# Patient Record
Sex: FEMALE | Race: BLACK OR AFRICAN AMERICAN | Employment: UNEMPLOYED | URBAN - METROPOLITAN AREA
[De-identification: names, ages, dates, MRNs, and addresses within clinical notes are randomized per-mention and may not be internally consistent; named-entity substitution may affect disease eponyms.]

---

## 2017-02-21 ENCOUNTER — HOSPITAL ENCOUNTER (INPATIENT)
Age: 42
LOS: 9 days | Discharge: HOME | DRG: 264 | End: 2017-03-02
Attending: EMERGENCY MEDICINE | Admitting: INTERNAL MEDICINE
Payer: MEDICARE

## 2017-02-21 ENCOUNTER — APPOINTMENT (OUTPATIENT)
Dept: GENERAL RADIOLOGY | Age: 42
DRG: 264 | End: 2017-02-21
Attending: PHYSICIAN ASSISTANT
Payer: MEDICARE

## 2017-02-21 DIAGNOSIS — N18.6 ESRD NEEDING DIALYSIS (HCC): ICD-10-CM

## 2017-02-21 DIAGNOSIS — Z99.2 ESRD NEEDING DIALYSIS (HCC): ICD-10-CM

## 2017-02-21 DIAGNOSIS — A41.9 SEPSIS, DUE TO UNSPECIFIED ORGANISM: ICD-10-CM

## 2017-02-21 DIAGNOSIS — J18.9 CAP (COMMUNITY ACQUIRED PNEUMONIA): Primary | ICD-10-CM

## 2017-02-21 PROBLEM — E87.1 HYPONATREMIA: Status: ACTIVE | Noted: 2017-02-21

## 2017-02-21 PROBLEM — D64.9 ANEMIA: Status: ACTIVE | Noted: 2017-02-21

## 2017-02-21 PROBLEM — E86.0 DEHYDRATION: Status: ACTIVE | Noted: 2017-02-21

## 2017-02-21 LAB
ALBUMIN SERPL BCP-MCNC: 3.2 G/DL (ref 3.4–5)
ALBUMIN/GLOB SERPL: 0.7 {RATIO} (ref 0.8–1.7)
ALP SERPL-CCNC: 187 U/L (ref 45–117)
ALT SERPL-CCNC: 11 U/L (ref 13–56)
ANION GAP BLD CALC-SCNC: 14 MMOL/L (ref 3–18)
AST SERPL W P-5'-P-CCNC: 8 U/L (ref 15–37)
ATRIAL RATE: 126 BPM
BASOPHILS # BLD AUTO: 0 K/UL (ref 0–0.06)
BASOPHILS # BLD: 0 % (ref 0–2)
BILIRUB SERPL-MCNC: 0.4 MG/DL (ref 0.2–1)
BNP SERPL-MCNC: 2552 PG/ML (ref 0–450)
BUN SERPL-MCNC: 121 MG/DL (ref 7–18)
BUN/CREAT SERPL: 6 (ref 12–20)
CALCIUM SERPL-MCNC: 9.8 MG/DL (ref 8.5–10.1)
CALCULATED P AXIS, ECG09: -2 DEGREES
CALCULATED R AXIS, ECG10: 23 DEGREES
CALCULATED T AXIS, ECG11: 67 DEGREES
CHLORIDE SERPL-SCNC: 91 MMOL/L (ref 100–108)
CK MB CFR SERPL CALC: ABNORMAL % (ref 0–4)
CK MB SERPL-MCNC: <0.5 NG/ML (ref 0.5–3.6)
CK SERPL-CCNC: 63 U/L (ref 26–192)
CO2 SERPL-SCNC: 23 MMOL/L (ref 21–32)
CREAT SERPL-MCNC: 20.99 MG/DL (ref 0.6–1.3)
DIAGNOSIS, 93000: NORMAL
DIFFERENTIAL METHOD BLD: ABNORMAL
EOSINOPHIL # BLD: 0 K/UL (ref 0–0.4)
EOSINOPHIL NFR BLD: 0 % (ref 0–5)
ERYTHROCYTE [DISTWIDTH] IN BLOOD BY AUTOMATED COUNT: 15.1 % (ref 11.6–14.5)
FLUAV AG NPH QL IA: NEGATIVE
FLUBV AG NOSE QL IA: NEGATIVE
GLOBULIN SER CALC-MCNC: 4.6 G/DL (ref 2–4)
GLUCOSE SERPL-MCNC: 110 MG/DL (ref 74–99)
HCG SERPL QL: NEGATIVE
HCT VFR BLD AUTO: 31.4 % (ref 35–45)
HCT VFR BLD AUTO: 35 % (ref 35–45)
HGB BLD-MCNC: 10.8 G/DL (ref 12–16)
HGB BLD-MCNC: 11.8 G/DL (ref 12–16)
INR PPP: 4.5 (ref 0.8–1.2)
LACTATE SERPL-SCNC: 1.8 MMOL/L (ref 0.4–2)
LYMPHOCYTES # BLD AUTO: 5 % (ref 21–52)
LYMPHOCYTES # BLD: 0.6 K/UL (ref 0.9–3.6)
MCH RBC QN AUTO: 31.1 PG (ref 24–34)
MCHC RBC AUTO-ENTMCNC: 33.7 G/DL (ref 31–37)
MCV RBC AUTO: 92.1 FL (ref 74–97)
MONOCYTES # BLD: 0.7 K/UL (ref 0.05–1.2)
MONOCYTES NFR BLD AUTO: 6 % (ref 3–10)
NEUTS SEG # BLD: 11 K/UL (ref 1.8–8)
NEUTS SEG NFR BLD AUTO: 89 % (ref 40–73)
P-R INTERVAL, ECG05: 88 MS
PLATELET # BLD AUTO: 182 K/UL (ref 135–420)
PMV BLD AUTO: 10.9 FL (ref 9.2–11.8)
POTASSIUM SERPL-SCNC: 4.1 MMOL/L (ref 3.5–5.5)
PROT SERPL-MCNC: 7.8 G/DL (ref 6.4–8.2)
PROTHROMBIN TIME: 40.2 SEC (ref 11.5–15.2)
Q-T INTERVAL, ECG07: 406 MS
QRS DURATION, ECG06: 84 MS
QTC CALCULATION (BEZET), ECG08: 588 MS
RBC # BLD AUTO: 3.8 M/UL (ref 4.2–5.3)
SODIUM SERPL-SCNC: 128 MMOL/L (ref 136–145)
TROPONIN I SERPL-MCNC: <0.02 NG/ML (ref 0–0.06)
VENTRICULAR RATE, ECG03: 126 BPM
WBC # BLD AUTO: 12.2 K/UL (ref 4.6–13.2)

## 2017-02-21 PROCEDURE — 74011250636 HC RX REV CODE- 250/636: Performed by: PHYSICIAN ASSISTANT

## 2017-02-21 PROCEDURE — 74011250637 HC RX REV CODE- 250/637: Performed by: INTERNAL MEDICINE

## 2017-02-21 PROCEDURE — 74011250636 HC RX REV CODE- 250/636: Performed by: INTERNAL MEDICINE

## 2017-02-21 PROCEDURE — 99285 EMERGENCY DEPT VISIT HI MDM: CPT

## 2017-02-21 PROCEDURE — 71010 XR CHEST PORT: CPT

## 2017-02-21 PROCEDURE — 77030013140 HC MSK NEB VYRM -A

## 2017-02-21 PROCEDURE — 87804 INFLUENZA ASSAY W/OPTIC: CPT | Performed by: PHYSICIAN ASSISTANT

## 2017-02-21 PROCEDURE — 65660000000 HC RM CCU STEPDOWN

## 2017-02-21 PROCEDURE — 83605 ASSAY OF LACTIC ACID: CPT | Performed by: PHYSICIAN ASSISTANT

## 2017-02-21 PROCEDURE — 74011250637 HC RX REV CODE- 250/637: Performed by: PHYSICIAN ASSISTANT

## 2017-02-21 PROCEDURE — 87186 SC STD MICRODIL/AGAR DIL: CPT | Performed by: PHYSICIAN ASSISTANT

## 2017-02-21 PROCEDURE — 87040 BLOOD CULTURE FOR BACTERIA: CPT | Performed by: PHYSICIAN ASSISTANT

## 2017-02-21 PROCEDURE — 93005 ELECTROCARDIOGRAM TRACING: CPT

## 2017-02-21 PROCEDURE — 83880 ASSAY OF NATRIURETIC PEPTIDE: CPT | Performed by: PHYSICIAN ASSISTANT

## 2017-02-21 PROCEDURE — 74011000250 HC RX REV CODE- 250: Performed by: PHYSICIAN ASSISTANT

## 2017-02-21 PROCEDURE — 85025 COMPLETE CBC W/AUTO DIFF WBC: CPT | Performed by: PHYSICIAN ASSISTANT

## 2017-02-21 PROCEDURE — 82550 ASSAY OF CK (CPK): CPT | Performed by: PHYSICIAN ASSISTANT

## 2017-02-21 PROCEDURE — 87077 CULTURE AEROBIC IDENTIFY: CPT | Performed by: PHYSICIAN ASSISTANT

## 2017-02-21 PROCEDURE — 85018 HEMOGLOBIN: CPT | Performed by: INTERNAL MEDICINE

## 2017-02-21 PROCEDURE — 85610 PROTHROMBIN TIME: CPT | Performed by: INTERNAL MEDICINE

## 2017-02-21 PROCEDURE — 96360 HYDRATION IV INFUSION INIT: CPT

## 2017-02-21 PROCEDURE — 74011000258 HC RX REV CODE- 258: Performed by: PHYSICIAN ASSISTANT

## 2017-02-21 PROCEDURE — 36415 COLL VENOUS BLD VENIPUNCTURE: CPT | Performed by: INTERNAL MEDICINE

## 2017-02-21 PROCEDURE — 84703 CHORIONIC GONADOTROPIN ASSAY: CPT | Performed by: PHYSICIAN ASSISTANT

## 2017-02-21 PROCEDURE — 74011000258 HC RX REV CODE- 258: Performed by: INTERNAL MEDICINE

## 2017-02-21 PROCEDURE — 80053 COMPREHEN METABOLIC PANEL: CPT | Performed by: PHYSICIAN ASSISTANT

## 2017-02-21 RX ORDER — LEVOFLOXACIN 5 MG/ML
750 INJECTION, SOLUTION INTRAVENOUS ONCE
Status: COMPLETED | OUTPATIENT
Start: 2017-02-21 | End: 2017-02-21

## 2017-02-21 RX ORDER — AZITHROMYCIN 250 MG/1
250 TABLET, FILM COATED ORAL
Status: COMPLETED | OUTPATIENT
Start: 2017-02-21 | End: 2017-02-21

## 2017-02-21 RX ORDER — ALBUMIN HUMAN 250 G/1000ML
12.5 SOLUTION INTRAVENOUS
Status: COMPLETED | OUTPATIENT
Start: 2017-02-21 | End: 2017-02-22

## 2017-02-21 RX ORDER — ACETAMINOPHEN 325 MG/1
650 TABLET ORAL
Status: DISCONTINUED | OUTPATIENT
Start: 2017-02-21 | End: 2017-03-02 | Stop reason: HOSPADM

## 2017-02-21 RX ORDER — ACETAMINOPHEN 500 MG
500 TABLET ORAL
Status: COMPLETED | OUTPATIENT
Start: 2017-02-21 | End: 2017-02-21

## 2017-02-21 RX ORDER — DIPHENHYDRAMINE HCL 25 MG
25 CAPSULE ORAL
Status: ON HOLD | COMMUNITY
End: 2017-06-21

## 2017-02-21 RX ORDER — SODIUM CHLORIDE 0.9 % (FLUSH) 0.9 %
5-10 SYRINGE (ML) INJECTION AS NEEDED
Status: DISCONTINUED | OUTPATIENT
Start: 2017-02-21 | End: 2017-03-02 | Stop reason: HOSPADM

## 2017-02-21 RX ORDER — SODIUM CHLORIDE 9 MG/ML
500 INJECTION, SOLUTION INTRAVENOUS ONCE
Status: COMPLETED | OUTPATIENT
Start: 2017-02-21 | End: 2017-02-21

## 2017-02-21 RX ORDER — ZOLPIDEM TARTRATE 5 MG/1
5 TABLET ORAL
COMMUNITY

## 2017-02-21 RX ORDER — IPRATROPIUM BROMIDE AND ALBUTEROL SULFATE 2.5; .5 MG/3ML; MG/3ML
3 SOLUTION RESPIRATORY (INHALATION)
Status: DISCONTINUED | OUTPATIENT
Start: 2017-02-21 | End: 2017-03-02 | Stop reason: HOSPADM

## 2017-02-21 RX ORDER — WARFARIN 2.5 MG/1
2.5 TABLET ORAL EVERY EVENING
Status: ON HOLD | COMMUNITY
End: 2017-02-22

## 2017-02-21 RX ORDER — MIDODRINE HYDROCHLORIDE 5 MG/1
5 TABLET ORAL DAILY
COMMUNITY
End: 2017-03-02

## 2017-02-21 RX ORDER — PRAVASTATIN SODIUM 20 MG/1
20 TABLET ORAL
COMMUNITY

## 2017-02-21 RX ORDER — SEVELAMER HYDROCHLORIDE 800 MG/1
1600 TABLET, FILM COATED ORAL SEE ADMIN INSTRUCTIONS
Status: ON HOLD | COMMUNITY
End: 2017-05-12

## 2017-02-21 RX ORDER — CINACALCET 30 MG/1
30 TABLET, FILM COATED ORAL
Status: ON HOLD | COMMUNITY
End: 2017-05-12

## 2017-02-21 RX ORDER — IPRATROPIUM BROMIDE AND ALBUTEROL SULFATE 2.5; .5 MG/3ML; MG/3ML
3 SOLUTION RESPIRATORY (INHALATION)
Status: COMPLETED | OUTPATIENT
Start: 2017-02-21 | End: 2017-02-21

## 2017-02-21 RX ORDER — TIZANIDINE HYDROCHLORIDE 4 MG/1
4 CAPSULE, GELATIN COATED ORAL
COMMUNITY
End: 2017-04-04

## 2017-02-21 RX ORDER — FLUTICASONE PROPIONATE 50 MCG
2 SPRAY, SUSPENSION (ML) NASAL
Status: ON HOLD | COMMUNITY
End: 2017-05-12

## 2017-02-21 RX ORDER — PROMETHAZINE HYDROCHLORIDE 25 MG/1
25 TABLET ORAL
COMMUNITY

## 2017-02-21 RX ORDER — ACETAMINOPHEN 500 MG
1000 TABLET ORAL
Status: COMPLETED | OUTPATIENT
Start: 2017-02-21 | End: 2017-02-21

## 2017-02-21 RX ORDER — MIDODRINE HYDROCHLORIDE 2.5 MG/1
5 TABLET ORAL
Status: DISCONTINUED | OUTPATIENT
Start: 2017-02-21 | End: 2017-03-02 | Stop reason: HOSPADM

## 2017-02-21 RX ORDER — TRAMADOL HYDROCHLORIDE 50 MG/1
50 TABLET ORAL
COMMUNITY
End: 2017-03-02

## 2017-02-21 RX ORDER — SEVELAMER HYDROCHLORIDE 800 MG/1
2400 TABLET, FILM COATED ORAL 2 TIMES DAILY
COMMUNITY

## 2017-02-21 RX ADMIN — IPRATROPIUM BROMIDE AND ALBUTEROL SULFATE 3 ML: .5; 3 SOLUTION RESPIRATORY (INHALATION) at 11:45

## 2017-02-21 RX ADMIN — MIDODRINE HYDROCHLORIDE 5 MG: 2.5 TABLET ORAL at 17:40

## 2017-02-21 RX ADMIN — SODIUM CHLORIDE 500 MG: 900 INJECTION, SOLUTION INTRAVENOUS at 15:55

## 2017-02-21 RX ADMIN — SODIUM CHLORIDE 500 ML: 900 INJECTION, SOLUTION INTRAVENOUS at 08:05

## 2017-02-21 RX ADMIN — PIPERACILLIN SODIUM,TAZOBACTAM SODIUM 2.25 G: 2; .25 INJECTION, POWDER, FOR SOLUTION INTRAVENOUS at 11:41

## 2017-02-21 RX ADMIN — ACETAMINOPHEN 650 MG: 325 TABLET, FILM COATED ORAL at 13:48

## 2017-02-21 RX ADMIN — SODIUM CHLORIDE 500 ML: 900 INJECTION, SOLUTION INTRAVENOUS at 12:11

## 2017-02-21 RX ADMIN — MIDODRINE HYDROCHLORIDE 5 MG: 2.5 TABLET ORAL at 13:10

## 2017-02-21 RX ADMIN — ACETAMINOPHEN 500 MG: 500 TABLET ORAL at 15:05

## 2017-02-21 RX ADMIN — CEFTRIAXONE 2 G: 2 INJECTION, POWDER, FOR SOLUTION INTRAMUSCULAR; INTRAVENOUS at 13:07

## 2017-02-21 RX ADMIN — LEVOFLOXACIN 750 MG: 5 INJECTION, SOLUTION INTRAVENOUS at 13:52

## 2017-02-21 RX ADMIN — ACETAMINOPHEN 1000 MG: 500 TABLET ORAL at 07:40

## 2017-02-21 RX ADMIN — ACETAMINOPHEN 650 MG: 325 TABLET, FILM COATED ORAL at 18:40

## 2017-02-21 RX ADMIN — AZITHROMYCIN 250 MG: 250 TABLET, FILM COATED ORAL at 18:47

## 2017-02-21 NOTE — ED NOTES
Receiving RN Eleazar Tang) made aware of Darius Amaya MD approval for pt to go upstairs. Updated on MAR and pt's VS. Muse score reviewed.

## 2017-02-21 NOTE — ED NOTES
Pt's BP reported to Griffin Abbott and orders received to give another 500 mL NS bolus to pt and reassess BP. Receiving RN made aware. Dialysis RN at bedside to speak w/ pt. Aware that pt is going to telemetry. Pt resting in stretcher in NAD.

## 2017-02-21 NOTE — PROGRESS NOTES
Pharmacy Dosing Services:  Warfarin    Consult for Warfarin Dosing by Pharmacy by Dr. Jessica Guillermo provided for this 35 y.o.  female , for indication of DVT. Dose to achieve an INR goal of 2-3    Warfarin to be Held Tonight ( INR 4.5 ). LABS    PT/INR Lab Results   Component Value Date/Time    INR 4.5 02/21/2017 08:00 AM      Platelets Lab Results   Component Value Date/Time    PLATELET 975 81/89/6004 08:00 AM      H/H     Lab Results   Component Value Date/Time    HGB 11.8 02/21/2017 08:00 AM        Warfarin Administrations (last 168 hours)     None          Pharmacy to follow daily and will provide subsequent Warfarin dosing based on clinical status.   Tiffanie Baca, [facility] [place]  Contact information     [phone]

## 2017-02-21 NOTE — PROGRESS NOTES
Readmission Risk Assessment:     Moderate Risk and MSSP/Good Help ACO patients    RRAT Score:  13-20    Initial Assessment: Chart reviewed and noted Pt currently in ED pending bed placement to unit. CM following for transition of care needs. Emergency Contact:  See facesheet    Pertinent Medical Hx:     CKD, Dialysis,     PCP/Specialists: Pt unable to recall MD's name      Community Services:       DME:          Moderate Risk Care Transition Plan:  1. Evaluate for [facility] or [facility], SNF, acute rehab, community care coordination of resources. 2. Involve patient/caregiver in assessment, planning, education and implement of intervention. 3. CM daily patient care huddles/interdisciplinary rounds. 4. PCP/Specialist appointment within 5  7 days made prior to discharge. 5. Facilitate transportation and logistics for follow-up appointments. 6. Medication reconciliation [address]  7. Formal handoff between hospital provider and post-acute provider to transition patient  Handoff to [address] Nurse Navigator or PCP practice.

## 2017-02-21 NOTE — IP AVS SNAPSHOT
Beulah Latham 
 
 
 [address] [place] [address] 
[phone] Patient: Luis M Restrepo MRN: [id number] ZBN:[birth year] You are allergic to the following Allergen Reactions Vancomycin Other (comments) Felt like her body was burning Aspirin Nausea and Vomiting Pt reports aspirin gave her a stomach ulcer. Vicodin (Hydrocodone-Acetaminophen) Nausea and Vomiting Recent Documentation Height  
  
  
  
  
  
 1.651 m Unresulted Labs Order Current Status CULTURE, BLOOD Preliminary result CULTURE, BLOOD Preliminary result CULTURE, BLOOD Preliminary result CULTURE, BLOOD Preliminary result CULTURE, BLOOD Preliminary result CULTURE, BLOOD Preliminary result Emergency Contacts Name Discharge Info Relation Home Work Mobile Marlys CAREGIVER [3] Daughter [21]   [phone] Rosa Isela Pablo  Friend [5] [phone] About your hospitalization You were admitted on:  February 21, 2017 You last received care in the:  [address] [place] [address] You were discharged on:  March 2, 2017 Why you were hospitalized Your primary diagnosis was:  Sepsis (Hcc) Your diagnoses also included:  Cap (Community Acquired Pneumonia), Esrd Needing Dialysis (Hcc), Anemia, Hyponatremia, Dehydration, Prolonged Q-T Interval On Ecg, Diastolic Dysfunction, Infected Prosthetic Vascular Graft (Hcc), Bacteremia Due To Staphylococcus Aureus, C. Difficile Colitis, Pna (Pneumonia), Hypokalemia, Hypoglycemia, Constipation Providers Seen During Your Hospitalizations Provider Role Specialty Primary office phone Carley Turk MD Attending Provider Emergency Medicine [phone] Manoj Shen,  Attending Provider Internal Medicine [phone] Your Primary Care Physician (PCP) Primary Care Physician Office Phone Office Fax OTHER, PHYS ** None ** ** None ** Follow-up Information Follow up With Details Comments Contact Info Elías Roland MD  PCP [address] [facility] [address] [facility] 
[phone] HD clinic as scheduled [address] In 2 weeks call and set appointment for follow up [phone] Oralia Kirk MD In 2 weeks call and set appointment for follow up [facility] [place] [id number] 
[phone] Current Discharge Medication List  
  
START taking these medications Dose & Instructions Dispensing Information Comments Morning Noon Evening Bedtime  
 oxyCODONE-acetaminophen  mg per tablet Commonly known as:  PERCOCET 10 Your next dose is: Today, Tomorrow Other:  _________ Dose:  1-2 Tab Take 1-2 Tabs by mouth every four (4) hours as needed. Max Daily Amount: 12 Tabs. Quantity:  20 Tab Refills:  0 CONTINUE these medications which have CHANGED Dose & Instructions Dispensing Information Comments Morning Noon Evening Bedtime  
 midodrine 5 mg tablet Commonly known as:  Elyn Pata What changed:  when to take this Your next dose is: Today, Tomorrow Other:  _________ Dose:  5 mg Take 1 Tab by mouth three (3) times daily (with meals) for 30 days. Quantity:  30 Tab Refills:  0 CONTINUE these medications which have NOT CHANGED Dose & Instructions Dispensing Information Comments Morning Noon Evening Bedtime AMBIEN 5 mg tablet Generic drug:  zolpidem Your next dose is: Today, Tomorrow Other:  _________ Dose:  5 mg Take 5 mg by mouth nightly as needed for Sleep. Refills:  0  
     
   
   
   
  
 BENADRYL 25 mg capsule Generic drug:  diphenhydrAMINE Your next dose is: Today, Tomorrow Other:  _________  Dose:  25 mg  
 Take 25 mg by mouth See Admin Instructions. Dialysis pre med Refills:  0  
     
   
   
   
  
 FLONASE 50 mcg/actuation nasal spray Generic drug:  fluticasone Your next dose is: Today, Tomorrow Other:  _________ Dose:  2 Spray 2 Sprays by Both Nostrils route daily as needed for Rhinitis. Refills:  0  
     
   
   
   
  
 pravastatin 20 mg tablet Commonly known as:  PRAVACHOL Your next dose is: Today, Tomorrow Other:  _________ Dose:  20 mg Take 20 mg by mouth nightly. Refills:  0  
     
   
   
   
  
 promethazine 25 mg tablet Commonly known as:  PHENERGAN Your next dose is: Today, Tomorrow Other:  _________ Dose:  25 mg Take 25 mg by mouth See Admin Instructions. Dialysis premed Refills:  0 SENSIPAR 30 mg tablet Generic drug:  cinacalcet Your next dose is: Today, Tomorrow Other:  _________ Dose:  30 mg Take 30 mg by mouth nightly. Refills:  0  
     
   
   
   
  
 * sevelamer 800 mg tablet Commonly known as:  RENAGEL Your next dose is: Today, Tomorrow Other:  _________ Dose:  2400 mg Take 2,400 mg by mouth three (3) times daily (with meals). Refills:  0  
     
   
   
   
  
 * sevelamer 800 mg tablet Commonly known as:  RENAGEL Your next dose is: Today, Tomorrow Other:  _________ Dose:  1600 mg Take 1,600 mg by mouth See Admin Instructions. 2 tabs if/when eating snack Refills:  0  
     
   
   
   
  
 tiZANidine 4 mg capsule Commonly known as:  Vibha Muscat Your next dose is: Today, Tomorrow Other:  _________ Dose:  4 mg Take 4 mg by mouth two (2) times daily as needed. Refills:  0  
     
   
   
   
  
 * Notice: This list has 2 medication(s) that are the same as other medications prescribed for you.  Read the directions carefully, and ask your doctor or other care provider to review them with you. STOP taking these medications   
 dextromethorphan-guaiFENesin  mg/5 mL syrup Commonly known as:  ROBITUSSIN-DM  
   
  
 traMADol 50 mg tablet Commonly known as:  ULTRAM  
   
  
 warfarin 2.5 mg tablet Commonly known as:  COUMADIN Where to Get Your Medications Information on where to get these meds will be given to you by the nurse or doctor. ! Ask your nurse or doctor about these medications  
  midodrine 5 mg tablet  
 oxyCODONE-acetaminophen  mg per tablet Discharge Instructions DISCHARGE SUMMARY from Nurse The following personal items are in your possession at time of discharge: 
 
Dental Appliances: None Visual Aid: None Home Medications: None Jewelry: Bracelet (red plastic bracelet) Clothing: Pants, Shirt, Other (comment) (sneakers) Other Valuables: Cell Phone PATIENT INSTRUCTIONS: 
 
 
F-face looks uneven A-arms unable to move or move unevenly S-speech slurred or non-existent T-time-call 911 as soon as signs and symptoms begin-DO NOT go Back to bed or wait to see if you get better-TIME IS BRAIN. Warning Signs of HEART ATTACK Call 911 if you have these symptoms: 
? Chest discomfort. Most heart attacks involve discomfort in the center of the chest that lasts more than a few minutes, or that goes away and comes back. It can feel like uncomfortable pressure, squeezing, fullness, or pain. ? Discomfort in other areas of the upper body. Symptoms can include pain or discomfort in one or both arms, the back, neck, jaw, or stomach. ? Shortness of breath with or without chest discomfort. ? Other signs may include breaking out in a cold sweat, nausea, or lightheadedness. Don't wait more than five minutes to call [address]! Fast action can save your life. Calling 911 is almost always the fastest way to get lifesaving treatment. Emergency Medical Services staff can begin treatment when they arrive  up to an hour sooner than if someone gets to the hospital by car. The discharge information has been reviewed with the patient. The patient verbalized understanding. Discharge medications reviewed with the patient and appropriate educational materials and side effects teaching were provided. Patient armband removed and shredded Anemia: Care Instructions Your Care Instructions Anemia is a low level of red blood cells, which carry oxygen throughout your body. Many things can cause anemia. Lack of iron is one of the most common causes. Your body needs iron to make hemoglobin, a substance in red blood cells that carries oxygen from the lungs to your body's cells. Without enough iron, the body produces fewer and smaller red blood cells. As a result, your body's cells do not get enough oxygen, and you feel tired and weak. And you may have trouble concentrating. Bleeding is the most common cause of a lack of iron. You may have heavy menstrual bleeding or bleeding caused by conditions such as ulcers, hemorrhoids, or cancer. Regular use of aspirin or other anti-inflammatory medicines (such as ibuprofen) also can cause bleeding in some people. A lack of iron in your diet also can cause anemia, especially at times when the body needs more iron, such as during pregnancy, infancy, and the teen years. Your doctor may have prescribed iron pills. It may take several months of treatment for your iron levels to return to normal. Your doctor also may suggest that you eat foods that are rich in iron, such as meat and beans. There are many other causes of anemia. It is not always due to a lack of iron. Finding the specific cause of your anemia will help your doctor find the right treatment for you. Follow-up care is a key part of your treatment and safety. Be sure to make and go to all appointments, and call your doctor if you are having problems. It's also a good idea to know your test results and keep a list of the medicines you take. How can you care for yourself at home? · Take your medicines exactly as prescribed. Call your doctor if you think you are having a problem with your medicine. · If your doctor recommends iron pills, take them as directed: ¨ Try to take the pills on an empty stomach about 1 hour before or 2 hours after meals. But you may need to take iron with food to avoid an upset stomach. ¨ Do not take antacids or drink milk or caffeine drinks (such as coffee, tea, or cola) at the same time or within 2 hours of the time that you take your iron. They can make it hard for your body to absorb the iron. ¨ Vitamin C (from food or supplements) helps your body absorb iron. Try taking iron pills with a glass of orange juice or some other food that is high in vitamin C, such as citrus fruits. ¨ Iron pills may cause stomach problems, such as heartburn, nausea, diarrhea, constipation, and cramps. Be sure to drink plenty of fluids, and include fruits, vegetables, and fiber in your diet each day. Iron pills often make your bowel movements dark or green. ¨ If you forget to take an iron pill, do not take a double dose of iron the next time you take a pill. ¨ Keep iron pills out of the reach of small children. An overdose of iron can be very dangerous. · Follow your doctor's advice about eating iron-rich foods. These include red meat, shellfish, poultry, eggs, beans, raisins, whole-grain bread, and leafy green vegetables. · Steam vegetables to help them keep their iron content. When should you call for help? Call 911 anytime you think you may need emergency care. For example, call if: 
· You have symptoms of a heart attack. These may include: ¨ Chest pain or pressure, or a strange feeling in the chest. 
¨ Sweating. ¨ Shortness of breath. ¨ Nausea or vomiting. ¨ Pain, pressure, or a strange feeling in the back, neck, jaw, or upper belly or in one or both shoulders or arms. ¨ Lightheadedness or sudden weakness. ¨ A fast or irregular heartbeat. After you call 911, the  may tell you to chew 1 adult-strength or 2 to 4 low-dose aspirin. Wait for an ambulance. Do not try to drive yourself. · You passed out (lost consciousness). Call your doctor now or seek immediate medical care if: 
· You have new or increased shortness of breath. · You are dizzy or lightheaded, or you feel like you may faint. · Your fatigue and weakness continue or get worse. · You have any abnormal bleeding, such as: 
¨ Nosebleeds. ¨ Vaginal bleeding that is different (heavier, more frequent, at a different time of the month) than what you are used to. ¨ Bloody or black stools, or rectal bleeding. ¨ Bloody or pink urine. Watch closely for changes in your health, and be sure to contact your doctor if: 
· You do not get better as expected. Where can you learn more? Go to http://jaquan-treva.info/. Enter R301 in the search box to learn more about \"Anemia: Care Instructions. \" Current as of: February 5, 2016 Content Version: 11.1 © 3830-7973 [employer]. Care instructions adapted under license by [employer] (which disclaims liability or warranty for this information). If you have questions about a medical condition or this instruction, always ask your healthcare professional. [address] any warranty or liability for your use of this information. Sepsis: Care Instructions Your Care Instructions Sepsis is an infection that has spread throughout your body. It is a life-threatening condition and often causes extremely low blood pressure. This can lead to problems with many different organs. The cause of sepsis is not always clear, but it can happen as part of a long-term or sudden illness. Sometimes even a mild illness can lead to sepsis. Follow-up care is a key part of your treatment and safety. Be sure to make and go to all appointments, and call your doctor if you are having problems. Its also a good idea to know your test results and keep a list of the medicines you take. How can you care for yourself at home? · If your doctor prescribed antibiotics, take them as directed. Do not stop taking them just because you feel better. You need to take the full course of antibiotics. · Drink plenty of fluids, enough so that your urine is light yellow or clear like water. Choose water or caffeine-free clear liquids until you feel better. If you have kidney, heart, or liver disease and have to limit fluids, talk with your doctor before you increase your fluid intake. You can try rehydration drinks, such as Gatorade or Powerade. · Do not drink alcohol. · Eat a healthy diet. Include fruits, vegetables, and whole grains in your diet every day. · Walking is an easy way to get exercise. Gradually increase the amount you walk every day. Make sure your doctor knows that you are starting an exercise program. 
· Do not smoke or use other tobacco products. If you need help quitting, talk to your doctor about stop-smoking programs and medicines. These can increase your chances of quitting for good. When should you call for help? Call 911 anytime you think you may need emergency care. For example, call if: 
· You passed out (lost consciousness). Call your doctor now or seek immediate medical care if: 
· You have a fever or chills. · You have cool, pale, or clammy skin. · You are dizzy or lightheaded, or you feel like you may faint. · You have any new symptoms, such as a cough, pain in one part of your body, or urinary problems. Watch closely for changes in your health, and be sure to contact your doctor if: 
· You do not get better as expected. Where can you learn more? Go to http://jaquan-treva.info/. Enter S474 in the search box to learn more about \"Sepsis: Care Instructions. \" Current as of: May 27, 2016 Content Version: 11.1 © 8687-6643 [employer]. Care instructions adapted under license by [employer] (which disclaims liability or warranty for this information). If you have questions about a medical condition or this instruction, always ask your healthcare professional. [address] any warranty or liability for your use of this information. Discharge Orders None General Information Please provide this summary of care documentation to your next provider. Introducing [facility]! Emily Joshi introduces [employer] patient portal. Now you can access parts of your medical record, email your doctor's office, and request medication refills online. 1. In your internet browser, go to [email or web address] 2. Click on the First Time User? Click Here link in the Sign In box. You will see the New Member Sign Up page. 3. Enter your [employer] Access Code exactly as it appears below. You will not need to use this code after youve completed the sign-up process. If you do not sign up before the expiration date, you must request a new code. · [employer] Access Code: [email or web address] Expires: 5/22/2017  8:26 AM 
 
4. Enter the last four digits of your Social Security Number (xxxx) and Date of Birth (mm/dd/yyyy) as indicated and click Submit. You will be taken to the next sign-up page. 5. Create a [employer] Gifts Project[employer] ID. This will be your [employer] login ID and cannot be changed, so think of one that is secure and easy to remember. 6. Create a [employer] password. You can change your password at any time. 7. Enter your Password Reset Question and Answer. This can be used at a later time if you forget your password. 8. Enter your e-mail address. You will receive e-mail notification when new information is available in [facility]. 9. Click Sign Up. You can now view and download portions of your medical record. 10. Click the Download Summary menu link to download a portable copy of your medical information. If you have questions, please visit the Frequently Asked Questions section of the [employer] website. Remember, [employer] is NOT to be used for urgent needs. For medical emergencies, dial 911. Now available from your iPhone and Android! Patient Signature:  ____________________________________________________________ Date:  ____________________________________________________________  
  
[facility] Provider Signature:  ____________________________________________________________ Date:  ____________________________________________________________

## 2017-02-21 NOTE — PROGRESS NOTES
Nephrology Progress note    Subjective:     Kasandra Matthews is a 35 y.o. female with PMH chronic hypotension, ESRD on HD TTS presenting with weakness, fever to 102.3, cough. She has chills and her BP in ER was lower than baseline so she received multiple fluid boluses. She denies SOB, CP.  currently. She missed HD Saturday due to feeling poorly. K 4.1 today. O2 sat 100% RA. CXR shows RLL pneumonia. Admit Date: 2/21/2017      Allergy:  Allergies   Allergen Reactions    Vancomycin Other (comments)     Felt like her body was burning    Aspirin Nausea and Vomiting     Pt reports aspirin gave her a stomach ulcer.      Vicodin [Hydrocodone-Acetaminophen] Nausea and Vomiting        Objective:     Visit Vitals    /78 (BP 1 Location: Right arm, BP Patient Position: At rest)    Pulse (!) 138    Temp (!) 100.5 °F (38.1 °C)    Resp 22    Ht 5' 5\" (1.651 m)    Wt 92.7 kg (204 lb 4.8 oz)    LMP 12/01/2012    SpO2 98%    BMI 34 kg/m2         Intake/Output Summary (Last 24 hours) at 02/21/17 1438  Last data filed at 02/21/17 1352   Gross per 24 hour   Intake              150 ml   Output                0 ml   Net              150 ml       Physical Exam:       General: No acute distress   HENT: Atraumatic and normocephalic   Eyes: Normal conjunctiva   Neck: Supple    Cardiovascular: Normal S1 & S2, no m/r/g   Pulmonary/Chest Wall: Clear to auscultation bilaterally   Abdominal: Soft and non-tender   Musculoskeletal: no edema   Neurological: No focal deficits       Data Review:    @anne@  Lab Results   Component Value Date/Time    WBC 12.2 02/21/2017 08:00 AM    RBC 3.80 (L) 02/21/2017 08:00 AM    HCT 35.0 02/21/2017 08:00 AM    MCV 92.1 02/21/2017 08:00 AM    MCH 31.1 02/21/2017 08:00 AM    MCHC 33.7 02/21/2017 08:00 AM    RDW 15.1 (H) 02/21/2017 08:00 AM      No results found for: IRONNo components found for: FERRITIN  No components found for: PTHINT  Urinalysis  No results found for: UGLU Impression:     ESRD  Pneumonia-r/o sepsis  Hypotension  Tachycardia  Anemia CKD  SHPT    Plan:     IV boluses given, would avoid further IVF  Blood cx sent and multiple abx started-Zithromax, Ceftriaxone, levaquin  Too unstable to HD currently given low BP and high HR- will attempt in AM if more hemodynamically stable    MD Gaudencio Baxter  [phone]

## 2017-02-21 NOTE — ED NOTES
Pt reports to this RN that she is having SOB. Pt presents w/o tachypnea and 02 saturations are 100% on room air. Pt in NAD distress. Pt repositioned in stretcher. Family at bedside.

## 2017-02-21 NOTE — ED NOTES
Spoke w/ Riaz Vazquez MD on phone. MD updated on pt's VS and MD gives approval for pt to go upstairs to Telemetry.

## 2017-02-21 NOTE — H&P
History & Physical    Patient: Maris Baig MRN: [id number]  CSN: [id number]    YOB: 1975  Age: 35 y.o. Sex: female      DOA: 2/21/2017  Primary Care Provider:  Yamil Howard MD      Assessment/Plan     1. Sepsis  2. CAP  3. ESRD on HD  4. Hypotension, chronic  5. 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 Hyponatremia  7. dehdyration      PLAN:  - Admit to medical service with telemetry monitoring  - culture blood, sputum  - start ceftriaxone/ zithromax  - resume home dose midodrine  - nephrology consultation for hemodialysis  - check INR, pharmacy to dose warfarin  - duonebs prn wheezing/ dyspnea  - full code, dvt ppx on warfarin      Patient Active Problem List   Diagnosis Code    CAP (community acquired pneumonia) J18.9    ESRD needing dialysis ([facility] [address] N18.6    Sepsis ([facility] [address] A41.9    Anemia D64.9    Hyponatremia E87.1    Dehydration E86.0     HPI:   CC: \" she made me come\"  Maris Baig is a 35 y.o. 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 Symptoms are associated w myalgia and poor po intake. She had skipped Tuesday dialysis session due to malaise. Currently she denies dyspnea but does have a dry cough. No sore throat, headaches, mental status changes, nausea or vomiting. On presentation to the ER she was febrile to 102.3, tachcyrdic and normotensive. Exam was unremarkable. Rapid flu negative. She did not have leukocytosis but a left shift was present. CXR w RLL inifiltrate. Medicine is asked to admit for further management.        Past Medical History   Diagnosis Date    Chronic kidney disease     Dialysis patient ([facility] [address]     Hypertension      Past Surgical History   Procedure Laterality Date    Hx csf shunt      Hx hysterectomy      Hx other surgical       dialysis shunt left arm      Social History Substance Use Topics    Smoking status: Never Smoker    Smokeless tobacco: None    Alcohol use No     History reviewed. No pertinent family history. No current facility-administered medications on file prior to encounter. Current Outpatient Prescriptions on File Prior to Encounter   Medication Sig Dispense Refill    sevelamer (RENAGEL) 400 mg tablet Take 400 mg by mouth three (3) times daily (with meals). Allergies   Allergen Reactions    Vancomycin Anaphylaxis    Aspirin Nausea and Vomiting     Pt reports aspirin gave her a stomach ulcer.  Vicodin [Hydrocodone-Acetaminophen] Nausea and Vomiting       Review of Systems  Constitutional: + fever, +chills, +diaphoresis, +malaise, fatigue -weight gain/loss or falls  Skin: no itching or rashes  HEENT: no neck stiffness, hearing loss, tinnitus, epistaxis or sore throat  EYES: no blurry vision, double vision or photophobia  CARDIOVASCULAR: no, cp, palpitations, orthopnea, pnd or LE edema  PULMONARY: + cough,- wheeze, -shortness of breath or sputum production  GI: no nausea, vomiting, diarrhea, abdominal pain, melena, hematemesis or brbpr  : no dysuria, hematuria  MUSCULOSKELETAL: no back pain, joint pain or myalgias  ENDOCRINE: no heat/cold intolerance, polyuria or polydipsia  HEME: no easy bruising or bleeding  NEURO: no unilateral weakness, numbness, tingling or seizures      Physical Exam:        Visit Vitals    BP 96/66    Pulse (!) 115    Temp 99.4 °F (37.4 °C)    Resp 13    Ht 5' 5\" (1.651 m)    Wt 92.7 kg (204 lb 4.8 oz)    LMP 2012    SpO2 99%    BMI 34 kg/m2      O2 Device: Room air    Temp (24hrs), Av.9 °F (38.3 °C), Min:99.4 °F (37.4 °C), Max:102.3 °F (39.1 °C)           Body mass index is 34 kg/(m^2). General:  Awake, cooperative, no distress, ill appearing   Head:  Normocephalic, without obvious abnormality, atraumatic. Eyes:  Conjunctivae/corneas clear, sclera anicteric, PERRL, EOMs intact.    Nose: Nares normal. No drainage or sinus tenderness. Throat: Lips, mucosa, and tongue normal. .   Neck: Supple, symmetrical, trachea midline, no adenopathy. Lungs:    scattered expiratory wheezes, no rales or rhonchi noted, equal expansion       Heart:  Regular rate and rhythm, S1, S2 normal, no murmur, click, rub or gallop, cap refill normal      Abdomen: Soft, non-tender. Bowel sounds normal. No masses,  No organomegaly. Extremities: Extremities normal, atraumatic, no cyanosis or edema. Pulses: 2+ and symmetric all extremities. Skin: Skin color pale, texture, turgor normal. No rashes or lesions   Neurologic: CNII-XII intact. No focal motor or sensory deficit. Laboratory Studies:    CMP:   Lab Results   Component Value Date/Time     (L) 02/21/2017 08:00 AM    K 4.1 02/21/2017 08:00 AM    CL 91 (L) 02/21/2017 08:00 AM    CO2 23 02/21/2017 08:00 AM    AGAP 14 02/21/2017 08:00 AM     (H) 02/21/2017 08:00 AM     (H) 02/21/2017 08:00 AM    CREA 20.99 (H) 02/21/2017 08:00 AM    GFRAA 2 (L) 02/21/2017 08:00 AM    GFRNA 2 (L) 02/21/2017 08:00 AM    CA 9.8 02/21/2017 08:00 AM    ALB 3.2 (L) 02/21/2017 08:00 AM    TP 7.8 02/21/2017 08:00 AM    GLOB 4.6 (H) 02/21/2017 08:00 AM    AGRAT 0.7 (L) 02/21/2017 08:00 AM    SGOT 8 (L) 02/21/2017 08:00 AM    ALT 11 (L) 02/21/2017 08:00 AM     CBC:   Lab Results   Component Value Date/Time    WBC 12.2 02/21/2017 08:00 AM    HGB 11.8 (L) 02/21/2017 08:00 AM    HCT 35.0 02/21/2017 08:00 AM     02/21/2017 08:00 AM       Imaging studies personally reviewed:  CXR: \"   Patchy right lower lobe opacity, which may represent an early pneumonic  infiltrate. Recommend radiographic follow-up to expected clinical resolution. \"               Mg Hernandez, DO  Internal Medicine/Geriatrics

## 2017-02-21 NOTE — ED NOTES
IV access attempt unsuccessful at this time. IV fluids paused. Pt resting in stretcher. Pt in NAD at this time. MD aware and states that he will attempt IV access on pt. Ultrasound placed at bedside.

## 2017-02-21 NOTE — ROUTINE PROCESS
TRANSFER - OUT REPORT:    Verbal report given to Gretel Still RN (name) on Sandra Young  being transferred to Telemetry (unit) for routine progression of care       Report consisted of patients Situation, Background, Assessment and   Recommendations(SBAR). Information from the following report(s) SBAR, ED Summary, MAR, Recent Results and Cardiac Rhythm Sinus Tachycardia was reviewed with the receiving nurse. Lines:   Peripheral IV 02/21/17 Right Forearm (Active)   Site Assessment Clean, dry, & intact 2/21/2017 11:40 AM   Phlebitis Assessment 0 2/21/2017 11:40 AM   Infiltration Assessment 0 2/21/2017 11:40 AM   Dressing Status Clean, dry, & intact 2/21/2017 11:40 AM   Dressing Type Transparent 2/21/2017 11:40 AM   Hub Color/Line Status Flushed;Patent 2/21/2017 11:40 AM        Opportunity for questions and clarification was provided.       Patient transported with:   Monitor  Tech

## 2017-02-21 NOTE — ED TRIAGE NOTES
Per EMS, pt reports feeling fatigued and achy w/ flu like symptoms since Thursday. Pt is a dialysis pt and had last dialysis round on Thursday. Pt presents w/ fever but denies cough. Per EMS, pt does not produce urine. Sepsis Screening completed    ( X )Patient meets SIRS criteria. (  )Patient does not meet SIRS criteria.       SIRS Criteria is achieved when two or more of the following are present   Temperature < 96.8°F (36°C) or > 100.9°F (38.3°C)   Heart Rate > 90 beats per minute   Respiratory Rate > 20 breaths per minute   WBC count > 12,000 or <4,000 or > 10% bands

## 2017-02-21 NOTE — ED NOTES
Pt given sheet for comfort. Pt in NAD at this time. Family at bedside. Side rails raised and call light within reach.

## 2017-02-21 NOTE — ED PROVIDER NOTES
HPI Comments:   7:36 AM   Maria Del Carmen Montano is a 35 y.o. female with a hx of HTN and ESRD on HD x 5 years (Tuesday/Thursday/Saturday, [facility] to the ED for cold/flu-like sxs x 4 days. Associated sxs include generalized body aches, generalized fatigue/lethargy, nasal congestion, nausea, diarrhea, and fever (temperature in .3F). Pt apparently missed her dialysis appointment 3 days ago (last treatment was 5 days ago). States she doesn't produce urine. Vaccines are UTD, including flu. PSHx hysterectomy and CSF shunt placement. Pt denies vomiting, cough, sore throat, rhinorrhea and any other Sx or complaints. The history is provided by the patient and the EMS personnel. No  was used. Past Medical History:   Diagnosis Date    Chronic kidney disease     Dialysis patient ([facility] [address]     Hypertension        Past Surgical History:   Procedure Laterality Date    Hx csf shunt      Hx hysterectomy      Hx other surgical       dialysis shunt left arm         History reviewed. No pertinent family history. Social History     Social History    Marital status: SINGLE     Spouse name: N/A    Number of children: N/A    Years of education: N/A     Occupational History    Not on file. Social History Main Topics    Smoking status: Never Smoker    Smokeless tobacco: Not on file    Alcohol use No    Drug use: Not on file    Sexual activity: Not on file     Other Topics Concern    Not on file     Social History Narrative         ALLERGIES: Vancomycin; Aspirin; and Vicodin [hydrocodone-acetaminophen]    Review of Systems   Constitutional: Positive for fatigue (generalized) and fever (temperature in .3F). Negative for chills. HENT: Positive for congestion. Negative for rhinorrhea and sore throat. Respiratory: Negative for cough and shortness of breath. Cardiovascular: Negative for chest pain.    Gastrointestinal: Positive for diarrhea and nausea. Negative for abdominal distention and vomiting. Genitourinary: Positive for decreased urine volume (no longer makes urine). Musculoskeletal: Positive for myalgias (generalized body aches). Negative for joint swelling. Skin: Negative for rash. Neurological: Positive for weakness (generalized). Negative for dizziness and headaches. Hematological: Negative for adenopathy. Vitals:    02/21/17 0930 02/21/17 1205 02/21/17 1300 02/21/17 1310   BP:  (!) 81/49 95/58 105/77   Pulse:  (!) 109 (!) 114 (!) 117   Resp:  24 20    Temp: 99.4 °F (37.4 °C) 98.9 °F (37.2 °C)     SpO2:  99% 99%    Weight:       Height:                Physical Exam   Constitutional: She is oriented to person, place, and time. She appears well-developed and well-nourished. No distress. AA female in NAD. Appears sick. Answering questions. Following directions. Daughter at bedside. HENT:   Head: Normocephalic and atraumatic. Right Ear: External ear normal. No swelling or tenderness. Tympanic membrane is not perforated, not erythematous and not bulging. Left Ear: External ear normal. No swelling or tenderness. Tympanic membrane is not perforated, not erythematous and not bulging. Nose: Mucosal edema present. No rhinorrhea. Mouth/Throat: Uvula is midline, oropharynx is clear and moist and mucous membranes are normal. No oral lesions. No trismus in the jaw. No dental abscesses or uvula swelling. No oropharyngeal exudate, posterior oropharyngeal edema, posterior oropharyngeal erythema or tonsillar abscesses. Eyes: Conjunctivae are normal. Right eye exhibits no discharge. Left eye exhibits no discharge. No scleral icterus. Neck: Normal range of motion. Neck supple. Cardiovascular: Normal rate, regular rhythm, normal heart sounds and intact distal pulses. Exam reveals no gallop and no friction rub. No murmur heard. Pulmonary/Chest: Effort normal and breath sounds normal. No accessory muscle usage.  No tachypnea. No respiratory distress. She has no decreased breath sounds. She has no wheezes. She has no rhonchi. She has no rales. Abdominal: Soft. She exhibits no distension. There is no tenderness. Musculoskeletal: Normal range of motion. Left AV fistula with trace bloody drainage. Non-tender. Appreciate bruit   Lymphadenopathy:     She has no cervical adenopathy. Neurological: She is alert and oriented to person, place, and time. Skin: Skin is warm and dry. No rash noted. She is not diaphoretic. No erythema. Psychiatric: She has a normal mood and affect. Judgment normal.   Nursing note and vitals reviewed. RESULTS:    EKG interpretation: (Preliminary)  7:33 AM   Sinus tachycardia with short NE, rate 126 bpm. No MARITA. Abnormal EKG. EKG read by Joni Ford PA-C    XR CHEST PORT   Final Result:  Patchy right lower lobe opacity, which may represent an early pneumonic  infiltrate. Recommend radiographic follow-up to expected clinical resolution. As read by the radiologist.            Labs Reviewed   METABOLIC PANEL, COMPREHENSIVE - Abnormal; Notable for the following:        Result Value    Sodium 128 (*)     Chloride 91 (*)     Glucose 110 (*)      (*)     Creatinine 20.99 (*)     BUN/Creatinine ratio 6 (*)     GFR est AA 2 (*)     GFR est non-AA 2 (*)     ALT (SGPT) 11 (*)     AST (SGOT) 8 (*)     Alk. phosphatase 187 (*)     Albumin 3.2 (*)     Globulin 4.6 (*)     A-G Ratio 0.7 (*)     All other components within normal limits   CBC WITH AUTOMATED DIFF - Abnormal; Notable for the following:     RBC 3.80 (*)     HGB 11.8 (*)     RDW 15.1 (*)     NEUTROPHILS 89 (*)     LYMPHOCYTES 5 (*)     ABS. NEUTROPHILS 11.0 (*)     ABS.  LYMPHOCYTES 0.6 (*)     All other components within normal limits   CARDIAC PANEL,(CK, CKMB & TROPONIN) - Abnormal; Notable for the following:     CK - MB <0.5 (*)     All other components within normal limits   PRO-BNP - Abnormal; Notable for the following:     NT pro-BNP 2552 (*)     All other components within normal limits   PROTHROMBIN TIME + INR - Abnormal; Notable for the following:     Prothrombin time 40.2 (*)     INR 4.5 (*)     All other components within normal limits   INFLUENZA A & B AG (RAPID TEST)   CULTURE, BLOOD   LACTIC ACID, PLASMA   HCG QL SERUM   HGB & HCT       Recent Results (from the past 12 hour(s))   INFLUENZA A & B AG (RAPID TEST)    Collection Time: 02/21/17  7:30 AM   Result Value Ref Range    Influenza A Antigen NEGATIVE  NEG      Influenza B Antigen NEGATIVE  NEG     EKG, 12 LEAD, INITIAL    Collection Time: 02/21/17  7:33 AM   Result Value Ref Range    Ventricular Rate 126 BPM    Atrial Rate 126 BPM    P-R Interval 88 ms    QRS Duration 84 ms    Q-T Interval 406 ms    QTC Calculation (Bezet) 588 ms    Calculated P Axis -2 degrees    Calculated R Axis 23 degrees    Calculated T Axis 67 degrees    Diagnosis       Sinus tachycardia with short MN  Nonspecific T wave abnormality  Abnormal ECG  When compared with ECG of 13-OCT-2015 14:03,  No significant change was found     LACTIC ACID, PLASMA    Collection Time: 02/21/17  8:00 AM   Result Value Ref Range    Lactic acid 1.8 0.4 - 2.0 MMOL/L   METABOLIC PANEL, COMPREHENSIVE    Collection Time: 02/21/17  8:00 AM   Result Value Ref Range    Sodium 128 (L) 136 - 145 mmol/L    Potassium 4.1 3.5 - 5.5 mmol/L    Chloride 91 (L) 100 - 108 mmol/L    CO2 23 21 - 32 mmol/L    Anion gap 14 3.0 - 18 mmol/L    Glucose 110 (H) 74 - 99 mg/dL     (H) 7.0 - 18 MG/DL    Creatinine 20.99 (H) 0.6 - 1.3 MG/DL    BUN/Creatinine ratio 6 (L) 12 - 20      GFR est AA 2 (L) >60 ml/min/1.73m2    GFR est non-AA 2 (L) >60 ml/min/1.73m2    Calcium 9.8 8.5 - 10.1 MG/DL    Bilirubin, total 0.4 0.2 - 1.0 MG/DL    ALT (SGPT) 11 (L) 13 - 56 U/L    AST (SGOT) 8 (L) 15 - 37 U/L    Alk.  phosphatase 187 (H) 45 - 117 U/L    Protein, total 7.8 6.4 - 8.2 g/dL    Albumin 3.2 (L) 3.4 - 5.0 g/dL    Globulin 4.6 (H) 2.0 - 4.0 g/dL    A-G Ratio 0.7 (L) 0.8 - 1.7     CBC WITH AUTOMATED DIFF    Collection Time: 02/21/17  8:00 AM   Result Value Ref Range    WBC 12.2 4.6 - 13.2 K/uL    RBC 3.80 (L) 4.20 - 5.30 M/uL    HGB 11.8 (L) 12.0 - 16.0 g/dL    HCT 35.0 35.0 - 45.0 %    MCV 92.1 74.0 - 97.0 FL    MCH 31.1 24.0 - 34.0 PG    MCHC 33.7 31.0 - 37.0 g/dL    RDW 15.1 (H) 11.6 - 14.5 %    PLATELET 555 119 - 481 K/uL    MPV 10.9 9.2 - 11.8 FL    NEUTROPHILS 89 (H) 40 - 73 %    LYMPHOCYTES 5 (L) 21 - 52 %    MONOCYTES 6 3 - 10 %    EOSINOPHILS 0 0 - 5 %    BASOPHILS 0 0 - 2 %    ABS. NEUTROPHILS 11.0 (H) 1.8 - 8.0 K/UL    ABS. LYMPHOCYTES 0.6 (L) 0.9 - 3.6 K/UL    ABS. MONOCYTES 0.7 0.05 - 1.2 K/UL    ABS. EOSINOPHILS 0.0 0.0 - 0.4 K/UL    ABS. BASOPHILS 0.0 0.0 - 0.06 K/UL    DF AUTOMATED     CARDIAC PANEL,(CK, CKMB & TROPONIN)    Collection Time: 02/21/17  8:00 AM   Result Value Ref Range    CK 63 26 - 192 U/L    CK - MB <0.5 (L) 0.5 - 3.6 ng/ml    CK-MB Index CANNOT BE CALCULATED 0.0 - 4.0 %    Troponin-I, Qt. <0.02 0.00 - 0.06 NG/ML   PRO-BNP    Collection Time: 02/21/17  8:00 AM   Result Value Ref Range    NT pro-BNP 2552 (H) 0 - 450 PG/ML   HCG QL SERUM    Collection Time: 02/21/17  8:00 AM   Result Value Ref Range    HCG, Ql. NEGATIVE  NEG     PROTHROMBIN TIME + INR    Collection Time: 02/21/17  8:00 AM   Result Value Ref Range    Prothrombin time 40.2 (H) 11.5 - 15.2 sec    INR 4.5 (H) 0.8 - 1.2          MDM  Number of Diagnoses or Management Options  CAP (community acquired pneumonia):   ESRD needing dialysis [facility]):   Sepsis, due to unspecified organism [facility]):   Diagnosis management comments: Influenza, UTI, PNA, bronchitis, sepsis, metabolic derangement.  Will likely need dialysis       Amount and/or Complexity of Data Reviewed  Clinical lab tests: reviewed and ordered  Tests in the radiology section of CPT®: ordered and reviewed (CXR)  Tests in the medicine section of CPT®: reviewed and ordered (EKG)  Obtain history from someone other than the patient: yes (EMS personnel)  Discuss the patient with other providers: yes Kathleen Vo MD (Nephrology), Hiren Aguilar MD (ED Attending), Xiomara Salvador DO (Internal Medicine))  Independent visualization of images, tracings, or specimens: yes (EKG, CXR)    Critical Care  Total time providing critical care: 30-74 minutes (30)    ED Course     MEDICATIONS GIVEN:  Medications   sodium chloride (NS) flush 5-10 mL (not administered)   levoFLOXacin (LEVAQUIN) 750 mg in D5W IVPB (not administered)   midodrine (PROAMITINE) tablet 5 mg (5 mg Oral Given 2/21/17 1310)   cefTRIAXone (ROCEPHIN) 2 g in 0.9% sodium chloride (MBP/ADV) 50 mL MBP (2 g IntraVENous New Bag 2/21/17 1307)   azithromycin (ZITHROMAX) 500 mg in 0.9% sodium chloride (MBP/ADV) 250 mL adv (not administered)   acetaminophen (TYLENOL) tablet 650 mg (not administered)   albuterol-ipratropium (DUO-NEB) 2.5 MG-0.5 MG/3 ML (not administered)   albumin human 25% (BUMINATE) solution 12.5 g (not administered)   acetaminophen (TYLENOL) tablet 1,000 mg (1,000 mg Oral Given 2/21/17 0740)   sodium chloride 0.9 % bolus infusion 500 mL (0 mL IntraVENous IV Completed 2/21/17 1202)   piperacillin-tazobactam (ZOSYN) 2.25 g in 0.9% sodium chloride (MBP/ADV) 50 mL MBP (0 g IntraVENous IV Completed 2/21/17 1211)   albuterol-ipratropium (DUO-NEB) 2.5 MG-0.5 MG/3 ML (3 mL Nebulization Given 2/21/17 1145)   0.9% sodium chloride infusion 500 mL (500 mL IntraVENous New Bag 2/21/17 1211)        Procedures      PROGRESS NOTE:   7:36 AM  Initial assessment performed.   Written by Dex Mccauley ED Scribe, as dictated by Kizzy Barney PA-C    SEPSIS ASSESSMENT NOTE:   9:14 AM  Kizzy Barney PA-C has seen and assessed the patient as follows:    Capillary Refill:normal/brisk  Cardiopulmonary Evaluation:   Lung Sounds: rhonchi   Cardiac Sounds: tachycardic, regular and rhythm ______  Peripheral Pulses: present  Skin Exam: pale and warm    Visit Vitals    /77    Pulse (!) 117    Temp 98.9 °F (37.2 °C)    Resp 20    Ht 5' 5\" (1.651 m)    Wt 92.7 kg (204 lb 4.8 oz)    LMP 12/01/2012    SpO2 99%    BMI 34 kg/m2       Written by RODRIGO Smith, as dictated by Jerardo Coronel PA-C     CONSULT NOTE:   9:21 AM  Jerardo Coronel PA-C spoke with Gerson Cerda MD   Specialty: Nephrology  Discussed pt's hx, disposition, and available diagnostic and imaging results over the telephone. Reviewed care plans. She agrees that pt needs dialysis and states she will set up dialysis in the hospital. She agrees to consult the pt. Written by RODRIGO Smith, as dictated by Jerardo Coronel PA-C. FACE-TO-FACE PROGRESS NOTE:  9:24 AM   Was requested to see pt by the ALLAN. Evaluated pt face-to-face. On exam, pt had a rapid HR with a regular rhythm. Lungs were CTAB. Skin was warm to touch. Left upper arm with dialysis fistula in place with palpable thrill. Abdomen is soft, non tender, and non distended. No rebound or guarding. Explained to pt and family that she did need admission and will be dialyzed in the ED or the floor, depending on where the nephrologist is able to get it. Explained the diagnosis of PNA to pt and family. Written by RODRIGO Smith, as dictated by Lea Lara MD.     CONSULT NOTE:   9:31 AM  Jerardo Coronel PA-C spoke with Negar Sterling DO   Specialty: Internal Medicine  Discussed pt's hx, disposition, and available diagnostic and imaging results over the telephone. Reviewed care plans. Consulting physician agrees with plans as outlined. He agrees to admit the pt to telemetry. Written by RODRIGO Smith, as dictated by Jerardo Coronel PA-C    ADMISSION NOTE:  9:31 AM  Patient is being admitted to the hospital by Negar Sterling DO. The results of their tests and reasons for their admission have been discussed with them and/or available family. They convey agreement and understanding for the need to be admitted and for their admission diagnosis. Written by Candido Kathleen ED Scribe, as dictated by Keith Omer PA-C.    CONDITIONS ON ADMISSION:  Pneumonia is present at the time of admission. MRSA is not present at the time of admission. Wound infection is not present at the time of admission. Pressure Ulcer is not present at the time of admission. PROGRESS NOTE:   10:22 AM  Sha Tony MD attempted to place right EJ but was unsuccessful. PROGRESS NOTE:   12:10 PM  Weve had difficulty gaining peripheral access, despite repeated attempts. Vascular tech has achieved access, so there has been a delay in treatment. CLINICAL IMPRESSION    1. CAP (community acquired pneumonia)    2. ESRD needing dialysis ([facility] [address]    3. Sepsis, due to unspecified organism [facility])         Attestations: This note is prepared by Tr Allen, acting as Scribe for Keith Omer PA-C. Keith Omer PA-C: The scribe's documentation has been prepared under my direction and personally reviewed by me in its entirety. I confirm that the note above accurately reflects all work, treatment, procedures, and medical decision making performed by me. 10:44 AM  I have spent 30 minutes of critical care time involved in lab review, consultations with specialist, family decision-making, and documentation. During this entire length of time I was immediately available to the patient. Critical Care: The reason for providing this level of medical care for this critically ill patient was due a critical illness that impaired one or more vital organ systems such that there was a high probability of imminent or life threatening deterioration in the patients condition. This care involved high complexity decision making to assess, manipulate, and support vital system functions, to treat this degreee vital organ system failure and to prevent further life threatening deterioration of the patients condition.

## 2017-02-22 PROBLEM — R94.31 PROLONGED Q-T INTERVAL ON ECG: Status: ACTIVE | Noted: 2017-02-22

## 2017-02-22 PROBLEM — T82.7XXA INFECTED PROSTHETIC VASCULAR GRAFT (HCC): Status: ACTIVE | Noted: 2017-02-22

## 2017-02-22 PROBLEM — I51.89 DIASTOLIC DYSFUNCTION: Chronic | Status: ACTIVE | Noted: 2017-02-22

## 2017-02-22 LAB
ANION GAP BLD CALC-SCNC: 22 MMOL/L (ref 3–18)
BACTERIA SPEC CULT: ABNORMAL
BASOPHILS # BLD AUTO: 0 K/UL (ref 0–0.06)
BASOPHILS # BLD: 0 % (ref 0–2)
BUN SERPL-MCNC: 129 MG/DL (ref 7–18)
BUN/CREAT SERPL: 6 (ref 12–20)
CALCIUM SERPL-MCNC: 9.4 MG/DL (ref 8.5–10.1)
CHLORIDE SERPL-SCNC: 92 MMOL/L (ref 100–108)
CO2 SERPL-SCNC: 20 MMOL/L (ref 21–32)
CREAT SERPL-MCNC: 21.74 MG/DL (ref 0.6–1.3)
DIFFERENTIAL METHOD BLD: ABNORMAL
EOSINOPHIL # BLD: 0.1 K/UL (ref 0–0.4)
EOSINOPHIL NFR BLD: 1 % (ref 0–5)
ERYTHROCYTE [DISTWIDTH] IN BLOOD BY AUTOMATED COUNT: 15.2 % (ref 11.6–14.5)
GLUCOSE SERPL-MCNC: 90 MG/DL (ref 74–99)
HCT VFR BLD AUTO: 31 % (ref 35–45)
HGB BLD-MCNC: 10.6 G/DL (ref 12–16)
INR PPP: 7.5 (ref 0.8–1.2)
LYMPHOCYTES # BLD AUTO: 7 % (ref 21–52)
LYMPHOCYTES # BLD: 0.7 K/UL (ref 0.9–3.6)
MAGNESIUM SERPL-MCNC: 2.2 MG/DL (ref 1.8–2.4)
MCH RBC QN AUTO: 31.1 PG (ref 24–34)
MCHC RBC AUTO-ENTMCNC: 34.2 G/DL (ref 31–37)
MCV RBC AUTO: 90.9 FL (ref 74–97)
MONOCYTES # BLD: 0.8 K/UL (ref 0.05–1.2)
MONOCYTES NFR BLD AUTO: 7 % (ref 3–10)
NEUTS SEG # BLD: 9.1 K/UL (ref 1.8–8)
NEUTS SEG NFR BLD AUTO: 85 % (ref 40–73)
PLATELET # BLD AUTO: 164 K/UL (ref 135–420)
PMV BLD AUTO: 10.7 FL (ref 9.2–11.8)
POTASSIUM SERPL-SCNC: 4.1 MMOL/L (ref 3.5–5.5)
PROTHROMBIN TIME: 59.1 SEC (ref 11.5–15.2)
RBC # BLD AUTO: 3.41 M/UL (ref 4.2–5.3)
SERVICE CMNT-IMP: ABNORMAL
SODIUM SERPL-SCNC: 134 MMOL/L (ref 136–145)
WBC # BLD AUTO: 10.6 K/UL (ref 4.6–13.2)

## 2017-02-22 PROCEDURE — 74011250636 HC RX REV CODE- 250/636: Performed by: INTERNAL MEDICINE

## 2017-02-22 PROCEDURE — 74011250636 HC RX REV CODE- 250/636: Performed by: FAMILY MEDICINE

## 2017-02-22 PROCEDURE — 83735 ASSAY OF MAGNESIUM: CPT | Performed by: FAMILY MEDICINE

## 2017-02-22 PROCEDURE — 65660000000 HC RM CCU STEPDOWN

## 2017-02-22 PROCEDURE — 85610 PROTHROMBIN TIME: CPT | Performed by: INTERNAL MEDICINE

## 2017-02-22 PROCEDURE — 74011250637 HC RX REV CODE- 250/637: Performed by: INTERNAL MEDICINE

## 2017-02-22 PROCEDURE — 74011000258 HC RX REV CODE- 258: Performed by: INTERNAL MEDICINE

## 2017-02-22 PROCEDURE — P9047 ALBUMIN (HUMAN), 25%, 50ML: HCPCS | Performed by: INTERNAL MEDICINE

## 2017-02-22 PROCEDURE — 85025 COMPLETE CBC W/AUTO DIFF WBC: CPT | Performed by: INTERNAL MEDICINE

## 2017-02-22 PROCEDURE — 36415 COLL VENOUS BLD VENIPUNCTURE: CPT | Performed by: INTERNAL MEDICINE

## 2017-02-22 PROCEDURE — 90935 HEMODIALYSIS ONE EVALUATION: CPT

## 2017-02-22 PROCEDURE — 74011000258 HC RX REV CODE- 258: Performed by: FAMILY MEDICINE

## 2017-02-22 PROCEDURE — 80048 BASIC METABOLIC PNL TOTAL CA: CPT | Performed by: INTERNAL MEDICINE

## 2017-02-22 PROCEDURE — 87493 C DIFF AMPLIFIED PROBE: CPT | Performed by: FAMILY MEDICINE

## 2017-02-22 RX ADMIN — CEFTAROLINE FOSAMIL 200 MG: 400 POWDER, FOR SOLUTION INTRAVENOUS at 15:43

## 2017-02-22 RX ADMIN — PHYTONADIONE 10 MG: 10 INJECTION, EMULSION INTRAMUSCULAR; INTRAVENOUS; SUBCUTANEOUS at 15:27

## 2017-02-22 RX ADMIN — MIDODRINE HYDROCHLORIDE 5 MG: 2.5 TABLET ORAL at 09:20

## 2017-02-22 RX ADMIN — MIDODRINE HYDROCHLORIDE 5 MG: 2.5 TABLET ORAL at 12:04

## 2017-02-22 RX ADMIN — ALBUMIN (HUMAN) 12.5 G: 0.25 INJECTION, SOLUTION INTRAVENOUS at 06:57

## 2017-02-22 NOTE — PROGRESS NOTES
Nephrology Progress note    Subjective:     Pete Eden is a 35 y.o. female with PMH chronic hypotension, ESRD on HD TTS presenting with weakness, fever to 102.3, cough. She has chills and her BP in ER was lower than baseline so she received multiple fluid boluses. She denies SOB, CP.  currently. She missed HD Saturday due to feeling poorly. K 4.1 today. O2 sat 100% RA. CXR shows RLL pneumonia. Seen on HD- tolerated well. Access noted to have small hole with purulent drainage- Vascular has been notified and graft scan ordered  Admit Date: 2/21/2017      Allergy:  Allergies   Allergen Reactions    Vancomycin Other (comments)     Felt like her body was burning    Aspirin Nausea and Vomiting     Pt reports aspirin gave her a stomach ulcer.      Vicodin [Hydrocodone-Acetaminophen] Nausea and Vomiting        Objective:     Visit Vitals    BP (!) 86/62 (BP 1 Location: Right arm, BP Patient Position: At rest)    Pulse 80    Temp 97.7 °F (36.5 °C)    Resp 20    Ht 5' 5\" (1.651 m)    Wt 92.7 kg (204 lb 4.8 oz)    LMP 12/01/2012    SpO2 99%    Breastfeeding No    BMI 34 kg/m2         Intake/Output Summary (Last 24 hours) at 02/22/17 1144  Last data filed at 02/21/17 1352   Gross per 24 hour   Intake              150 ml   Output                0 ml   Net              150 ml       Physical Exam:       General: No acute distress   HENT: Atraumatic and normocephalic   Eyes: Normal conjunctiva   Neck: Supple    Cardiovascular: Normal S1 & S2, no m/r/g   Pulmonary/Chest Wall: Clear to auscultation bilaterally   Abdominal: Soft and non-tender   Musculoskeletal: no edema   Neurological: No focal deficits       Data Review:      Lab Results   Component Value Date/Time    WBC 10.6 02/22/2017 01:24 AM    RBC 3.41 (L) 02/22/2017 01:24 AM    HCT 31.0 (L) 02/22/2017 01:24 AM    MCV 90.9 02/22/2017 01:24 AM    MCH 31.1 02/22/2017 01:24 AM    MCHC 34.2 02/22/2017 01:24 AM    RDW 15.2 (H) 02/22/2017 01:24 AM      No results found for: IRONNo components found for: FERRITIN  No components found for: PTHINT  Urinalysis  No results found for: UGLU       Impression:     ESRD  Pneumonia  Infected AVG with tiny hole noted by dialysis nurse  Hypotension/sepsis- GPC bacteremia  Tachycardia-improved  Anemia CKD  SHPT    Plan:     F/u blood cx and multiple abx started-Zithromax, Ceftriaxone, levaquin  HD today  Vascular to evaluate access, may need revision    MD Gaudencio Hidalgo  [phone]

## 2017-02-22 NOTE — PROGRESS NOTES
Called by nursing staff for consult to evaluate patients LUE AVG. Pt is septic and hypotensive and has a possible infected AVG by history. There is no documented exam of AVG. Will see patient when I can after office hours. Have ordered a graft scan. Patient may require a graft excision and placement of a temporary dialysis catheter until infection clears then placement of TDC. However, none of this will be done until INR is 1.5 or less. Currently 7.5.

## 2017-02-22 NOTE — CONSULTS
Surgery Consult      Patient: Medina Amin MRN: [id number]  CSN: [id number]      YOB: 1975    Age: 35 y.o. Sex: female      DOA: 2/21/2017       HPI:     Medina Amin is a 35 y.o. female who came into the ED via EMS for fever, weakness on the 2/21. She is ESRD with H/O HTN. She receives HD TTS via LUE AVG. She and her daughter noticed pus coming from the site on 2/19 with swelling which was new. Her last dialysis prior to the ER was on 2/18. She voices chronic pain to the graft site but other than that has never had issues with it. She is followed by Dr. Elisa Gaines. This is her second AVG and has been on HD for 5 years. Past Medical History:   Diagnosis Date    Chronic kidney disease     Dialysis patient ([facility] [address]     Hypertension        Past Surgical History:   Procedure Laterality Date    HX CSF SHUNT      HX HYSTERECTOMY      HX OTHER SURGICAL      dialysis shunt left arm       History reviewed. No pertinent family history. Social History     Social History    Marital status: SINGLE     Spouse name: N/A    Number of children: N/A    Years of education: N/A     Social History Main Topics    Smoking status: Never Smoker    Smokeless tobacco: None    Alcohol use No    Drug use: None    Sexual activity: Not Asked     Other Topics Concern    None     Social History Narrative       Prior to Admission medications    Medication Sig Start Date End Date Taking? Authorizing Provider   sevelamer (RENAGEL) 800 mg tablet Take 2,400 mg by mouth three (3) times daily (with meals). Yes Historical Provider   sevelamer (RENAGEL) 800 mg tablet Take 1,600 mg by mouth See Admin Instructions. 2 tabs if/when eating snack   Yes Historical Provider   pravastatin (PRAVACHOL) 20 mg tablet Take 20 mg by mouth nightly. Yes Historical Provider   warfarin (COUMADIN) 2.5 mg tablet Take 2.5 mg by mouth every evening.    Yes Historical Provider   zolpidem (AMBIEN) 5 mg tablet Take 5 mg by mouth nightly as needed for Sleep. Yes Historical Provider   traMADol (ULTRAM) 50 mg tablet Take 50 mg by mouth every twelve (12) hours as needed for Pain. Yes Historical Provider   tiZANidine (ZANAFLEX) 4 mg capsule Take 4 mg by mouth two (2) times daily as needed. Historical Provider   midodrine (PROAMITINE) 5 mg tablet Take 5 mg by mouth daily. Historical Provider   cinacalcet (SENSIPAR) 30 mg tablet Take 30 mg by mouth nightly. Historical Provider   diphenhydrAMINE (BENADRYL) 25 mg capsule Take 25 mg by mouth See Admin Instructions. Dialysis pre med    Historical Provider   promethazine (PHENERGAN) 25 mg tablet Take 25 mg by mouth See Admin Instructions. Dialysis premed    Historical Provider   fluticasone (FLONASE) 50 mcg/actuation nasal spray 2 Sprays by Both Nostrils route daily as needed for Rhinitis. Historical Provider   dextromethorphan-guaiFENesin (ROBITUSSIN-DM)  mg/5 mL syrup Take 5 mL by mouth every six (6) hours as needed for Cough. Historical Provider       Allergies   Allergen Reactions    Vancomycin Other (comments)     Felt like her body was burning    Aspirin Nausea and Vomiting     Pt reports aspirin gave her a stomach ulcer.      Vicodin [Hydrocodone-Acetaminophen] Nausea and Vomiting       Physical Exam:      Visit Vitals    BP (!) 86/62 (BP 1 Location: Right arm, BP Patient Position: At rest)    Pulse 80    Temp 97.7 °F (36.5 °C)    Resp 20    Ht 5' 5\" (1.651 m)    Wt 204 lb 4.8 oz (92.7 kg)    SpO2 99%    Breastfeeding No    BMI 34 kg/m2       GENERAL: alert, fatigued, cooperative, no distress, appears stated age, EYE: negative findings: anicteric sclera, LYMPHATIC: Cervical, supraclavicular, and axillary nodes normal.,  , THROAT & NECK: normal and no erythema or exudates noted. , LUNG: clear to auscultation bilaterally, HEART: regular rate and rhythm, S1, S2 normal, no murmur, click, rub or gallop, EXTREMITIES:  extremities normal, atraumatic, no cyanosis or edema, SKIN: LUE AVG with small open area of red drain, swelling, TTP, full ROM of hand/wrist NEUROLOGIC: negative    ROS:  Constitutional: negative  Eyes: negative  Ears, nose, mouth, throat, and face: negative  Respiratory: negative  Cardiovascular: negative  Genitourinary:anuric  Musculoskeletal:negative  Behavioral/Psych: negative  Unless otherwise mentioned in the HPI. Data Review:    CBC:   Lab Results   Component Value Date/Time    WBC 10.6 02/22/2017 01:24 AM    RBC 3.41 02/22/2017 01:24 AM    HGB 10.6 02/22/2017 01:24 AM    HCT 31.0 02/22/2017 01:24 AM    PLATELET 201 01/32/0280 01:24 AM      BMP:   Lab Results   Component Value Date/Time    Glucose 90 02/22/2017 01:24 AM    Sodium 134 02/22/2017 01:24 AM    Potassium 4.1 02/22/2017 01:24 AM    Chloride 92 02/22/2017 01:24 AM    CO2 20 02/22/2017 01:24 AM     02/22/2017 01:24 AM    Creatinine 21.74 02/22/2017 01:24 AM    Calcium 9.4 02/22/2017 01:24 AM     Coagulation:   Lab Results   Component Value Date/Time    Prothrombin time 59.1 02/22/2017 01:24 AM    INR 7.5 02/22/2017 01:24 AM    aPTT 30.9 03/31/2011 05:20 AM     Cardiac markers:   Lab Results   Component Value Date/Time    CK 63 02/21/2017 08:00 AM    CK-MB Index CANNOT BE CALCULATED 02/21/2017 08:00 AM     Liver:   Lab Results   Component Value Date/Time    AST (SGOT) 8 02/21/2017 08:00 AM    Alk. phosphatase 187 02/21/2017 08:00 AM    Albumin 3.2 02/21/2017 08:00 AM    Protein, total 7.8 02/21/2017 08:00 AM    Lipase 144 09/18/2010 08:25 PM         Assessment/Plan     42yo F admitted for fever, weakness. Vascular services were called d/t pus/drainage from LUE AVG. Ms. Doreen Neumann and her daughter were present during this consult with myself and Dr. Reji Smith. They were in agreement with the following plan. Plan:  1. Duplex of LUE Graft  2. The graft is most likely infected. A graft excision and placement of a temporary dialysis catheter will occur once the INR level is <1.5 (Current level is 7.5).  Also, pt is hypotensive.   3. Once infection clears, a tunneled dialysis catheter will be placed    Principal Problem:    Sepsis ([facility] [address] (2/21/2017)    Active Problems:    CAP (community acquired pneumonia) (2/21/2017)      ESRD needing dialysis ([facility] [address] (2/21/2017)      Anemia (2/21/2017)      Hyponatremia (2/21/2017)      Dehydration (2/21/2017)      Prolonged Q-T interval on ECG (8/72/3277)      Diastolic dysfunction (9/78/6711)        Anika Funes NP  February 22, 2017

## 2017-02-22 NOTE — PROGRESS NOTES
1942:  Assumed care. Pt awake, alert and oriented. Pt resting in bed with no acute signs of distress. Call bell and telephone within reach. White board updated. Family at bedside. Ice applied to right forearm for prior infiltrate. No IV access. Hospitalist aware    The documentation for this period is being entered following the guidelines as defined in the [facility] policy by Christelle Villareal RN.    0230:  Critical result. Positive blood cultures. Hospitalist paged. No new orders. 0244:  Critical result:  INR 7.5. Hospitalist paged. Order for Vitamin K 10 mg once. 0330:  Vitamin K 10 mg administered. Shift Summary:  Pt had uneventful shift besides critical results. Pt getting dialysis this am.  Pt in bed resting with no signs of distress or c/o pain.

## 2017-02-22 NOTE — PROGRESS NOTES
Pharmacy Dosing Consult for Teflaro dosing:    Pt is receiving Teflaro 200 mg IVPB q12h per MD for MRSA ( suspected source to be infected graft )  HD patient   Current dose is appropriate for HD dosing.     Garcia Last RPh  [phone]

## 2017-02-22 NOTE — ROUTINE PROCESS
Bedside and Verbal shift change report given to Danita Ruggiero RN (oncoming nurse) by Ervin Bell RN (offgoing nurse).  Report included the following information SBAR, Kardex, Intake/Output, MAR, Recent Results and Cardiac Rhythm SR ST.

## 2017-02-22 NOTE — PROGRESS NOTES
Hospitalist Progress Note    Patient: Katharine Thornton MRN: [id number]  CSN: [id number]    YOB: 1975  Age: 35 y.o. Sex: female    DOA: 2/21/2017 LOS:  LOS: 1 day          Chief Complaint:    Fever and Cough      Assessment/Plan       Hospital Problems  Date Reviewed: 2/21/2017          Codes Class Noted POA    Prolonged Q-T interval on ECG ICD-10-CM: R94.31  ICD-9-CM: 794.31  2/22/2017 Yes        Diastolic dysfunction (Chronic) ICD-10-CM: I51.9  ICD-9-CM: 429.9  2/22/2017 Yes        Infected prosthetic vascular graft ([facility] ICD-10-CM: T82. 7XXA  ICD-9-CM: 996.62  2/22/2017 Yes        CAP (community acquired pneumonia) ICD-10-CM: J18.9  ICD-9-CM: 243  2/21/2017 Yes        ESRD needing dialysis [facility]) ICD-10-CM: N18.6  ICD-9-CM: 585.6  2/21/2017 Yes        * (Principal)Sepsis ([facility] ICD-10-CM: A41.9  ICD-9-CM: 038.9, 995.91  2/21/2017 Yes        Anemia ICD-10-CM: D64.9  ICD-9-CM: 285.9  2/21/2017 Yes        Hyponatremia ICD-10-CM: E87.1  ICD-9-CM: 276.1  2/21/2017 Yes        Dehydration ICD-10-CM: E86.0  ICD-9-CM: 276.51  2/21/2017 Yes            Continue sepsis bundle; Day 2 of Rocephin and Azithromycin for CAP. S/P 1 dose levaquin yesterday. Positive blood cultures overnight: Gm (+) cocci in clusters in both aerobic and anaerobic bottles--presumptive Staph aureus  WBC trending down but w/ similar decrease in Hgb (may be dilutional). Afebrile since 1550 yesterday; Tachycardia resolved. Change to Ceftaroline today for MRSA coverage due to Vancomycin allergy; Total treatment duration anticipated to be 14+ sterile days at this point; daily cultures. Source suspected to be infected graft. Need to have INR <1.5 to have revision tomorrow per vascular surgery. BP still on the low side; only 2 doses of midodrine yesterday given patient's arrival time to ED. Monitor closely today. Albumin given this morning per nephrology. Hypochloremic hyponatremia improved. Likely dehydration.     BUN/Cr markedly elevated. Patient has been skipping HD recently. If vitals stable, re-attempt HD today. Appreciate nephrology assistance w/ this patient. INR 7.5 today (2/22/17) up from 4.5 yesterday: 10mg vitamin K administered overnight per nursing notes although no record of this in [facility]; re-order 10mg vitamin K for SLOW IV infusion; check INR 12 hours; if still >1.5; can dose vitamin K again at that time. Prolonged QTc: avoid prolonging agents. Discontinue azithromycin. Doubtful 2 sources of infection and risk of further prolongation and torsades outweighs benefit of azithro at this point. Check Mg. CM for discharge planning    Subjective:    Was not able to go for HD yesterday due to unstable vital signs. Not much appetite but feeling a bit better.      Review of systems:    Constitutional: denies fevers, chills, myalgias  Respiratory: denies SOB, cough  Cardiovascular: denies chest pain, palpitations  Gastrointestinal: denies nausea, vomiting, diarrhea      Vital signs/Intake and Output:  Visit Vitals    BP (!) 86/62 (BP 1 Location: Right arm, BP Patient Position: At rest)    Pulse 80    Temp 97.7 °F (36.5 °C)    Resp 20    Ht 5' 5\" (1.651 m)    Wt 92.7 kg (204 lb 4.8 oz)    SpO2 99%    Breastfeeding No    BMI 34 kg/m2     Current Shift:     Last three shifts:  02/20 1901 - 02/22 0700  In: 150 [I.V.:150]  Out: -     Exam:    General: Well developed, alert, NAD, OX3  Head/Neck: NCAT, supple, No masses, No lymphadenopathy  CVS:Regular rate and rhythm, no M/R/G, S1/S2 heard, no thrill  Lungs:Clear to auscultation bilaterally, no wheezes, rhonchi, or rales  Abdomen: Soft, Nontender, No distention, Normal Bowel sounds, No hepatomegaly  Extremities: No C/C/E, pulses palpable 2+; LUE AV graft; swollen, tender, erythematous  Skin:normal texture and turgor, no rashes, no lesions  Neuro:grossly normal , follows commands  Psych:appropriate                Labs: Results:       Chemistry Recent Labs 02/22/17   0124  02/21/17   0800   GLU  90  110*   NA  134*  128*   K  4.1  4.1   CL  92*  91*   CO2  20*  23   BUN  129*  121*   CREA  21.74*  20.99*   CA  9.4  9.8   AGAP  22*  14   BUCR  6*  6*   AP   --   187*   TP   --   7.8   ALB   --   3.2*   GLOB   --   4.6*   AGRAT   --   0.7*      CBC w/Diff Recent Labs      02/22/17   0124  02/21/17   1809  02/21/17   0800   WBC  10.6   --   12.2   RBC  3.41*   --   3.80*   HGB  10.6*  10.8*  11.8*   HCT  31.0*  31.4*  35.0   PLT  164   --   182   GRANS  85*   --   89*   LYMPH  7*   --   5*   EOS  1   --   0      Cardiac Enzymes Recent Labs      02/21/17   0800   CPK  63   CKND1  CANNOT BE CALCULATED      Coagulation Recent Labs      02/22/17   0124  02/21/17   0800   PTP  59.1*  40.2*   INR  7.5*  4.5*       Lipid Panel No results found for: CHOL, CHOLPOCT, CHOLX, CHLST, CHOLV, [id number], HDL, LDL, NLDLCT, DLDL, LDLC, DLDLP, [id number], VLDLC, VLDL, TGL, TGLX, TRIGL, [id number], TRIGP, TGLPOCT, [id number], CHHD, CHHDX   BNP No results for input(s): BNPP in the last 72 hours. Liver Enzymes Recent Labs      02/21/17   0800   TP  7.8   ALB  3.2*   AP  187*   SGOT  8*      Thyroid Studies No results found for: T4, T3U, TSH, TSHEXT, TSHEXT     Procedures/imaging: see electronic medical records for all procedures/Xrays and details which were not copied into this note but were reviewed prior to creation of Plan    90 minutes of critical care time spent in the direct evaluation and treatment of this high risk patient. The reason for providing this level of medical care for this critically ill patient was due a critical illness that impaired one or more vital organ systems such that there was a high probability of imminent or life threatening deterioration in the patients condition.  This care involved high complexity decision making to assess, manipulate, and support vital system functions, to treat this degreee vital organ system failure and to prevent further life threatening deterioration of the patients condition.         Nan Amaral,

## 2017-02-22 NOTE — PROGRESS NOTES
0700 Assumed pt care, pt is alert and oriented x4, family is at bedside and pt is currently undergoing dialysis. Pt does not have IV access. [phone] Left voicemail for Dr. Vikas Siddiqui in regards to pt's left arm graft. Graft site was oozing pus and blood. Hospitalist is aware. 1000 Dr. Vikas Siddiqui and his NP rounding with pt. Shift Summary- Pt experienced an uneventful shift. Dressing to left arm was replaced and reinforced due to bleeding and pt's blood pressure has increased to the upper 78O systolic.

## 2017-02-23 ENCOUNTER — ANESTHESIA EVENT (OUTPATIENT)
Dept: SURGERY | Age: 42
DRG: 264 | End: 2017-02-23
Payer: MEDICARE

## 2017-02-23 PROBLEM — B95.61 BACTEREMIA DUE TO STAPHYLOCOCCUS AUREUS: Status: ACTIVE | Noted: 2017-02-23

## 2017-02-23 PROBLEM — A04.72 C. DIFFICILE COLITIS: Status: ACTIVE | Noted: 2017-02-23

## 2017-02-23 PROBLEM — R78.81 BACTEREMIA DUE TO STAPHYLOCOCCUS AUREUS: Status: ACTIVE | Noted: 2017-02-23

## 2017-02-23 PROBLEM — J18.9 PNA (PNEUMONIA): Status: ACTIVE | Noted: 2017-02-23

## 2017-02-23 LAB
ANION GAP BLD CALC-SCNC: 13 MMOL/L (ref 3–18)
BACTERIA SPEC CULT: ABNORMAL
BASOPHILS # BLD AUTO: 0 K/UL (ref 0–0.06)
BASOPHILS # BLD: 0 % (ref 0–2)
BUN SERPL-MCNC: 55 MG/DL (ref 7–18)
BUN/CREAT SERPL: 5 (ref 12–20)
CALCIUM SERPL-MCNC: 9 MG/DL (ref 8.5–10.1)
CHLORIDE SERPL-SCNC: 100 MMOL/L (ref 100–108)
CO2 SERPL-SCNC: 26 MMOL/L (ref 21–32)
CREAT SERPL-MCNC: 11.24 MG/DL (ref 0.6–1.3)
DIFFERENTIAL METHOD BLD: ABNORMAL
EOSINOPHIL # BLD: 0.3 K/UL (ref 0–0.4)
EOSINOPHIL NFR BLD: 4 % (ref 0–5)
ERYTHROCYTE [DISTWIDTH] IN BLOOD BY AUTOMATED COUNT: 15.3 % (ref 11.6–14.5)
EST. AVERAGE GLUCOSE BLD GHB EST-MCNC: NORMAL MG/DL
GLUCOSE BLD STRIP.AUTO-MCNC: 84 MG/DL (ref 70–110)
GLUCOSE BLD STRIP.AUTO-MCNC: 84 MG/DL (ref 70–110)
GLUCOSE BLD STRIP.AUTO-MCNC: 98 MG/DL (ref 70–110)
GLUCOSE SERPL-MCNC: 80 MG/DL (ref 74–99)
GRAM STN SPEC: ABNORMAL
GRAM STN SPEC: ABNORMAL
HBA1C MFR BLD: 4.9 % (ref 4.5–5.6)
HCT VFR BLD AUTO: 28.5 % (ref 35–45)
HGB BLD-MCNC: 9.6 G/DL (ref 12–16)
INR PPP: 1.3 (ref 0.8–1.2)
LYMPHOCYTES # BLD AUTO: 17 % (ref 21–52)
LYMPHOCYTES # BLD: 1.1 K/UL (ref 0.9–3.6)
MCH RBC QN AUTO: 31 PG (ref 24–34)
MCHC RBC AUTO-ENTMCNC: 33.7 G/DL (ref 31–37)
MCV RBC AUTO: 91.9 FL (ref 74–97)
MONOCYTES # BLD: 0.7 K/UL (ref 0.05–1.2)
MONOCYTES NFR BLD AUTO: 10 % (ref 3–10)
NEUTS SEG # BLD: 4.6 K/UL (ref 1.8–8)
NEUTS SEG NFR BLD AUTO: 69 % (ref 40–73)
PLATELET # BLD AUTO: 149 K/UL (ref 135–420)
PMV BLD AUTO: 11.6 FL (ref 9.2–11.8)
POTASSIUM SERPL-SCNC: 3.6 MMOL/L (ref 3.5–5.5)
PROTHROMBIN TIME: 16.1 SEC (ref 11.5–15.2)
RBC # BLD AUTO: 3.1 M/UL (ref 4.2–5.3)
SERVICE CMNT-IMP: ABNORMAL
SODIUM SERPL-SCNC: 139 MMOL/L (ref 136–145)
WBC # BLD AUTO: 6.8 K/UL (ref 4.6–13.2)

## 2017-02-23 PROCEDURE — 74011250637 HC RX REV CODE- 250/637: Performed by: INTERNAL MEDICINE

## 2017-02-23 PROCEDURE — 74011000258 HC RX REV CODE- 258: Performed by: FAMILY MEDICINE

## 2017-02-23 PROCEDURE — 85610 PROTHROMBIN TIME: CPT | Performed by: INTERNAL MEDICINE

## 2017-02-23 PROCEDURE — 74011000258 HC RX REV CODE- 258: Performed by: INTERNAL MEDICINE

## 2017-02-23 PROCEDURE — 82962 GLUCOSE BLOOD TEST: CPT

## 2017-02-23 PROCEDURE — 87040 BLOOD CULTURE FOR BACTERIA: CPT | Performed by: FAMILY MEDICINE

## 2017-02-23 PROCEDURE — 80048 BASIC METABOLIC PNL TOTAL CA: CPT | Performed by: INTERNAL MEDICINE

## 2017-02-23 PROCEDURE — 36415 COLL VENOUS BLD VENIPUNCTURE: CPT | Performed by: INTERNAL MEDICINE

## 2017-02-23 PROCEDURE — 74011250636 HC RX REV CODE- 250/636: Performed by: FAMILY MEDICINE

## 2017-02-23 PROCEDURE — 65660000000 HC RM CCU STEPDOWN

## 2017-02-23 PROCEDURE — 74011250636 HC RX REV CODE- 250/636: Performed by: INTERNAL MEDICINE

## 2017-02-23 PROCEDURE — 93990 DOPPLER FLOW TESTING: CPT

## 2017-02-23 PROCEDURE — 83036 HEMOGLOBIN GLYCOSYLATED A1C: CPT | Performed by: HOSPITALIST

## 2017-02-23 PROCEDURE — 74011000250 HC RX REV CODE- 250: Performed by: FAMILY MEDICINE

## 2017-02-23 PROCEDURE — 85025 COMPLETE CBC W/AUTO DIFF WBC: CPT | Performed by: INTERNAL MEDICINE

## 2017-02-23 RX ORDER — DEXTROSE 50 % IN WATER (D50W) INTRAVENOUS SYRINGE
25-50 AS NEEDED
Status: DISCONTINUED | OUTPATIENT
Start: 2017-02-23 | End: 2017-03-02 | Stop reason: HOSPADM

## 2017-02-23 RX ORDER — MAGNESIUM SULFATE 100 %
4 CRYSTALS MISCELLANEOUS AS NEEDED
Status: DISCONTINUED | OUTPATIENT
Start: 2017-02-23 | End: 2017-03-02 | Stop reason: HOSPADM

## 2017-02-23 RX ORDER — METRONIDAZOLE 500 MG/100ML
500 INJECTION, SOLUTION INTRAVENOUS EVERY 8 HOURS
Status: DISCONTINUED | OUTPATIENT
Start: 2017-02-23 | End: 2017-02-23

## 2017-02-23 RX ORDER — INSULIN LISPRO 100 [IU]/ML
INJECTION, SOLUTION INTRAVENOUS; SUBCUTANEOUS
Status: DISCONTINUED | OUTPATIENT
Start: 2017-02-23 | End: 2017-03-02 | Stop reason: HOSPADM

## 2017-02-23 RX ORDER — METRONIDAZOLE 250 MG/1
500 TABLET ORAL 3 TIMES DAILY
Status: DISCONTINUED | OUTPATIENT
Start: 2017-02-23 | End: 2017-03-02 | Stop reason: HOSPADM

## 2017-02-23 RX ADMIN — METRONIDAZOLE 500 MG: 250 TABLET ORAL at 15:26

## 2017-02-23 RX ADMIN — METRONIDAZOLE 500 MG: 500 INJECTION, SOLUTION INTRAVENOUS at 07:43

## 2017-02-23 RX ADMIN — METRONIDAZOLE 500 MG: 250 TABLET ORAL at 22:31

## 2017-02-23 RX ADMIN — MIDODRINE HYDROCHLORIDE 5 MG: 2.5 TABLET ORAL at 11:18

## 2017-02-23 RX ADMIN — MIDODRINE HYDROCHLORIDE 5 MG: 2.5 TABLET ORAL at 07:43

## 2017-02-23 RX ADMIN — CEFTAROLINE FOSAMIL 200 MG: 400 POWDER, FOR SOLUTION INTRAVENOUS at 01:30

## 2017-02-23 RX ADMIN — CEFTRIAXONE 2 G: 2 INJECTION, POWDER, FOR SOLUTION INTRAMUSCULAR; INTRAVENOUS at 11:17

## 2017-02-23 RX ADMIN — MIDODRINE HYDROCHLORIDE 5 MG: 2.5 TABLET ORAL at 15:27

## 2017-02-23 RX ADMIN — METRONIDAZOLE 500 MG: 500 INJECTION, SOLUTION INTRAVENOUS at 02:15

## 2017-02-23 NOTE — PROGRESS NOTES
200  Pt is resting quietly in bed, NAD, no c/o voiced. Daughter at bedside. Placed isolation materials at door and explained to pt isolation protocol. Left upper arm fistula dressing with scant amt blood, able to feel pulse in fistula but does not have bruit or thrill. Good radial pulse in both arms, good sensation and movement, no pain. 0130  Pt cont to rest quietly in NAD.    0230  Assisted to restroom for liquid dark stool, no abd pain    0408  Resting quietly in bed, daughter at bedside. 0556  No change in status. 0710  {BSI BEDSIDE_VERBAL_Bedside shift change report given to LEANDRO Barnett (oncoming nurse) by DRAKE Blancas RN (offgoing nurse). Report included the following information SBAR, Intake/Output, Recent Results, Med Rec Status, Cardiac Rhythm sr and Alarm Parameters .

## 2017-02-23 NOTE — PROGRESS NOTES
Hospitalist Progress Note-critical care note     Patient: Stephanie Avila MRN: [id number]  CSN: [id number]    YOB: 1975  Age: 35 y.o. Sex: female    DOA: 2/21/2017 LOS:  LOS: 2 days            Chief complaint: bacteremia,  Sepsis, c diff, pna, infectious of av fistular     Assessment/Plan         Patient Active Problem List   Diagnosis Code    CAP (community acquired pneumonia) J18.9    ESRD needing dialysis ([facility] [address] N18.6    Sepsis ([facility] [address] A41.9    Anemia D64.9    Hyponatremia E87.1    Dehydration E86.0    Prolonged Q-T interval on ECG C86.19    Diastolic dysfunction T61.1    Infected prosthetic vascular graft ([facility] [address] T82. 7XXA    Bacteremia due to Staphylococcus aureus R78.81    C. difficile colitis A04.7    PNA (pneumonia) J18.9   1. Sepsis   due to c diff, av fistular infection vs pna   Id was on board  2. Bacteremia-MSSA and pna  ID on board ,on rocephin now, will f/u repeated bcx   3. c diff   Still having diarrhea ,will  Continue monitor electrolytes  4 ESRD on HD  Nephrology on board , vascular will have temp TDC  Will have Duplex of graft to confirm the infection   5. . 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 Hyponatremia  Resolved       Subjective: still have diarrhea,stomach uncomfortable   Nurse: no acute issue     Review of systems:    General: No fevers or chills. Cardiovascular: No chest pain or pressure. No palpitations. Pulmonary: No shortness of breath. Gastrointestinal: No nausea, vomiting. Diarrhea     Vital signs/Intake and Output:  Visit Vitals    /66    Pulse 73    Temp 98.2 °F (36.8 °C)    Resp 17    Ht 5' 5\" (1.651 m)    Wt 93.5 kg (206 lb 2.1 oz)    SpO2 98%    Breastfeeding No    BMI 34.3 kg/m2     Current Shift:     Last three shifts:  02/21 1901 - 02/23 0700  In: 56 [P.O.:290; I.V.:200]  Out: -     Physical Exam:  General: WD, WN.   Alert, cooperative, no acute distress    HEENT: NC, Atraumatic. PERRLA, anicteric sclerae. Lungs: CTA Bilaterally. No Wheezing/Rhonchi/Rales. Heart:  Regular  rhythm,  No murmur, No Rubs, No Gallops  Abdomen: Soft, Non distended, Non tender.  +Bowel sounds,   Extremities: No c/c. Av fistular noted   Psych:   Not anxious or agitated. Neurologic:  No acute neurological deficit. Labs: Results:       Chemistry Recent Labs      02/23/17 0117 02/22/17 0124 02/21/17   0800   GLU  80  90  110*   NA  139  134*  128*   K  3.6  4.1  4.1   CL  100  92*  91*   CO2  26  20*  23   BUN  55*  129*  121*   CREA  11.24*  21.74*  20.99*   CA  9.0  9.4  9.8   AGAP  13  22*  14   BUCR  5*  6*  6*   AP   --    --   187*   TP   --    --   7.8   ALB   --    --   3.2*   GLOB   --    --   4.6*   AGRAT   --    --   0.7*      CBC w/Diff Recent Labs      02/23/17 0117 02/22/17 0124 02/21/17   1809  02/21/17   0800   WBC  6.8  10.6   --   12.2   RBC  3.10*  3.41*   --   3.80*   HGB  9.6*  10.6*  10.8*  11.8*   HCT  28.5*  31.0*  31.4*  35.0   PLT  149  164   --   182   GRANS  69  85*   --   89*   LYMPH  17*  7*   --   5*   EOS  4  1   --   0      Cardiac Enzymes Recent Labs      02/21/17   0800   CPK  63   CKND1  CANNOT BE CALCULATED      Coagulation Recent Labs      02/23/17 0117 02/22/17   0124   PTP  16.1*  59.1*   INR  1.3*  7.5*       Lipid Panel No results found for: CHOL, CHOLPOCT, CHOLX, CHLST, CHOLV, [id number], HDL, LDL, NLDLCT, DLDL, LDLC, DLDLP, 190268, VLDLC, VLDL, TGL, TGLX, TRIGL, [id number], TRIGP, TGLPOCT, [id number], CHHD, CHHDX   BNP No results for input(s): BNPP in the last 72 hours.    Liver Enzymes Recent Labs      02/21/17   0800   TP  7.8   ALB  3.2*   AP  187*   SGOT  8*      Thyroid Studies No results found for: T4, T3U, TSH, TSHEXT     Procedures/imaging: see electronic medical records for all procedures/Xrays and details which were not copied into this note but were reviewed prior to creation of Rosa Glalo MD

## 2017-02-23 NOTE — PROGRESS NOTES
0700 Assumed pt care, pt is alert and oriented x4, no acute events reported overnight, VSS, lungs sounds diminished in lower lobes on room air, SR on the monitor. Pt denies any further needs at this time. 0900 Performed dressing change to left arm graft. Consent for procedure tomorrow signed and reviewed with pt.    1000 Educated pt and family member on c-diff precautions. Shift Summary- Pt experienced an uneventful shift. NPO midnight for procedure in am.    Bedside and Verbal shift change report given to CRYS Rodriguez (oncoming nurse) by Barbara Novoa   (offgoing nurse).  Report included the following information SBAR, Kardex, ED Summary, Intake/Output, MAR, Recent Results and Cardiac Rhythm SR.

## 2017-02-23 NOTE — ROUTINE PROCESS
1942:  Assumed care. Pt awake, alert and oriented. Pt resting in bed with no acute signs of distress. Call bell and telephone within reach. White board updated. Pt having diarreah. Order sent out for cdiff. Pt put on enteric precautions. 2340:  Call from lab. Critical Result:    Cdiff positive. Spoke with Dr New. Order for Flagyl 500 Q8H    Shift Summary:   Pt in bed resting with no signs of distress or c/o pain.

## 2017-02-23 NOTE — CONSULTS
[address] [place] [address] Rd    Name:  Jessica Glez  MR#:  [id number]  :  1975  Account #:  [de-identified]  Date of Adm:  2017  Date of Consultation:  2017      TIME: 10:29 a.m. REFERRING PHYSICIAN: Dr. Virginia Sam. CONSULTING PHYSICIAN: Dr. Florencio Shaver. REASON FOR CONSULTATION: Bacteremia. IMPRESSION  1. Methicillin-sensitive Staphylococcus aureus bacteremia, most likely  secondary to a left upper extremity arteriovenous graft infection in a  49-year-old female with end-stage renal disease on hemodialysis. The  patient has a positive blood culture from 2017 in association with  fever and leukocytosis. There was report of purulent drainage from the  arteriovenous graft. I suspect this is the most likely source of the  bacteremia, although she is noted to have a right lower lobe infiltrate  and could possibly have bacteremia on the basis of pneumonia. I  would  this less likely. 2. Right lower lobe infiltrate. The patient may have a community-  acquired pneumonia. 3. Clostridium difficile colitis. The patient has a positive Clostridium  difficile PCR from 2017.  4. History of VANCOMYCIN ALLERGY. In reviewing the symptoms  with the patient, I suspect this may represent a red man reaction and  not a true allergy. 5. Medical problems including end-stage renal disease, hypertension,  status post cerebrospinal fluid shunt, hysterectomy. SUGGESTIONS  1. Change Ceftaroline to Ceftriaxone, which will cover the MSSA  isolate in the blood, as well as offer good coverage for community-  acquired pneumonia. 2. Continue metronidazole for coverage of the Clostridium difficile. 3. Follow up on the repeat blood culture. 4. Follow up on the duplex imaging of the graft that has been ordered  by Vascular Surgery. 5. Echocardiogram to rule out a vegetation.     HISTORY OF PRESENT ILLNESS: The patient is a 49-year-old  female with end-stage renal disease on hemodialysis, who had an AV  graft placed approximately 4 months ago. She had a previous graft  which had problems with clotting. The patient was doing fairly well until  02/21/2017 when she presented with a 5-day history of fever in  association with cough. She was also noted to have purulent drainage  from her AV graft at the time of the last dialysis. The patient does  report some mild graft tenderness. She was noted to be febrile to  102.3 degrees Fahrenheit in the emergency room. She had a negative  rapid influenza. She had blood cultures drawn and was admitted. The  single blood culture drawn on 02/21/2017 is showing methicillin-  sensitive Staphylococcus aureus. The patient is currently on a  combination of Ceftaroline and  metronidazole. She does report a  cough which is nonproductive. She also has had some diarrhea. PAST MEDICAL HISTORY: End-stage renal disease, hypertension,  status post CSF shunt, status post hysterectomy, status post AV graft  placement in the left arm x2. ALLERGIES: SHE REPORTS ALLERGIES TO  1. VANCOMYCIN. THE ALLERGY TO VANCOMYCIN CONSISTED  OF FLUSHING. 2. ASPIRIN. 3. VICODIN. MEDICATIONS: At the time of admission, include Renagel. FAMILY MEDICAL HISTORY: Noncontributory. SOCIAL HISTORY: Negative for smoking or alcohol. REVIEW OF SYSTEMS: Negative for visual or auditory complaints. There is no difficulty swallowing. She reports mild shortness of breath  and nonproductive cough. She denies any chest pain. She has had no  nausea, vomiting, but has had diarrhea. She denies any abdominal  pain. She does not make any urine. She reports mild AV graft  tenderness. She denies any rash. PHYSICAL EXAMINATION  VITAL SIGNS: The temperature is 99.8, pulse is 79, blood pressure is  95/59, respiratory rate is 17. GENERAL: She is a well-developed female who does not appear toxic. HEAD AND NECK: Exam reveals the sclerae to be anicteric. The  conjunctivae are clear. The mouth shows no thrush. The neck is  without lymphadenopathy. LUNGS: The lung fields show some decreased breath sounds at the  right base. CARDIOVASCULAR: Exam reveals no murmurs, gallops or rubs. ABDOMEN: Abdominal exam reveals obesity. There is no significant  tenderness. EXTREMITIES: Examination of the extremities reveals the left upper  extremity AV graft  site to be without purulent drainage or erythema at  this time. LABORATORY DATA: White blood cell count is 6.8, hemoglobin is 9.6,  hematocrit 28.5, platelet count 721,440 with 69 segs, 17 lymphs, 10  monocytes. The BUN is 55 with a creatinine of 11.24. Blood culture  from 02/21/2017 is growing MSSA. C. difficile PCR from 02/22/2017 is  positive. Blood cultures from 02/23/2017 is in progress. Chest x-ray shows a patchy right lower lobe infiltrate. COMMENT: I suspect the patient's bacteremia is most likely secondary  to an AV graft infection. Pneumonia is also a consideration, but I think  less likely. We can change the Ceftaroline to ceftriaxone and   coverage for both the bacteremia and community-acquired pneumonia. I would like to limit the antibiotics as much as possible given her  Clostridium difficile colitis.         Sathish Schafer MD    [facility] LIZETH / MLS  D:  02/23/2017   10:37  T:  02/23/2017   10:59  Job #:  [id number]

## 2017-02-23 NOTE — PROCEDURES
[facility]  *** FINAL REPORT ***    Name: Arianna Uribe  MRN: [id number]    Inpatient  : 1975  HIS Order #: [id number]  [address] [facility] Visit #: [id number]  Date: 2017    TYPE OF TEST: Dialysis Access Duplex    REASON FOR TEST  Surveillance    Graft:-  Summary:  Op. Date:    /  /  Surgeon:    Results:-            Velocity  Ratio  Flow Volume Stenosis            --------  -----  ----------- --------  Inflow:  Proximal:  Mid-graft:  Distal:  Outflow:                       N/A    Mean Flow Volume:    INTERPRETATION/FINDINGS  Duplex images were obtained using 2-D gray scale, color flow, and  spectral Doppler analysis. Duplex exam of the dialysis access reveals :  1. No significant stenosis within the arteriovenous graft in the upper   arm. 2. Inflow arterial flow= 118cm/s  3. Arterial end  kwsycfojzv=547mt/s  4. Arterial end loop (laterally)= 110cm/s, flow volume= 1272ml/min  5. Mid loop at cubital fossa= 138cm/s, flow volume= 1589ml/min . There   is non vascular hypoechoic mass adjacent to mid loop at the lenght of   cubital fossa with transverse measurement=1.5cm, possible hemorrhage. 6. Medial loop= 100cm/s, flow voulme= 1329  7. Venous end anastmosis= 116cm/s  8. Near the venous anastmosis there is non vascular hypoechoic mass  measuring 3.8 x 1.0x 1.4cm, possible hemorrhage. ADDITIONAL COMMENTS    I have personally reviewed the data relevant to the interpretation of  this  study. TECHNOLOGIST: Marlene Marques  Signed: 2017 04:02 PM    PHYSICIAN: Jarad Andino MD  Signed: 2017 02:28 PM

## 2017-02-23 NOTE — PROGRESS NOTES
Pt seen and examined. No active bleeding/drainage to LUE AVG site. VSS. She is aware of the plan for today and tomorrow. Plan:  1. Duplex of graft to be done today      -confirmation of infection   2. NPO after MN  3.  Excision of graft, placement of temporary catheter 2/24

## 2017-02-23 NOTE — ROUTINE PROCESS
Bedside and Verbal shift change report given to Meche Toth RN (oncoming nurse) by Thomas Chavez RN (offgoing nurse).  Report included the following information SBAR, Kardex, Intake/Output, MAR, Recent Results and Cardiac Rhythm ST.

## 2017-02-23 NOTE — PROGRESS NOTES
Appreciate Dr. Melisa Harmon effort to get INR down. Per patient coumadin was to maintain graft patency. Unsure of longterm need. Patient did not get duplex of graft yesterday. Do not want to excise graft without confirmation of infection as I myself did not witness purulent drainage nor was I able to express any purulence. Resume diet and make NPO after midnight. Have scheduled Ms. Arits Sarah for excision of graft and catheter placement for tomorrow at 1 AM pending duplex.

## 2017-02-23 NOTE — WOUND CARE
Pt seen and assessed during [facility] Skin Prevalence. Pt has a dillan score of 22. No pressure injuries noted at this time. Wound care will continue to monitor pt during this hospitalization.

## 2017-02-23 NOTE — PROGRESS NOTES
Nephrology Progress note    Subjective:     Maris Baig is a 35 y.o. female with PMH chronic hypotension, ESRD on HD TTS presenting with weakness, fever to 102.3, cough. She has chills and her BP in ER was lower than baseline so she received multiple fluid boluses. She denies SOB, CP.  currently. She missed HD Saturday due to feeling poorly. K 4.1 today. O2 sat 100% RA. CXR shows RLL pneumonia. Last HD session 2/22, well tolerated. Access noted to have  purulent drainage- Seen by Vascular anticipate Duplex study and possible excision of AVG tomorrow 2/24    Admit Date: 2/21/2017      Allergy:  Allergies   Allergen Reactions    Vancomycin Other (comments)     Felt like her body was burning    Aspirin Nausea and Vomiting     Pt reports aspirin gave her a stomach ulcer.      Vicodin [Hydrocodone-Acetaminophen] Nausea and Vomiting        Objective:     Visit Vitals    BP 95/59 (BP 1 Location: Right arm, BP Patient Position: At rest)    Pulse 79    Temp 98.4 °F (36.9 °C)    Resp 17    Ht 5' 5\" (1.651 m)    Wt 93.5 kg (206 lb 2.1 oz)    LMP 12/01/2012    SpO2 98%    Breastfeeding No    BMI 34.3 kg/m2         Intake/Output Summary (Last 24 hours) at 02/23/17 1009  Last data filed at 02/23/17 0215   Gross per 24 hour   Intake              490 ml   Output                0 ml   Net              490 ml       Physical Exam:       General: No acute distress   HENT: Atraumatic and normocephalic   Eyes: Normal conjunctiva   Neck: Supple    Cardiovascular: Normal S1 & S2, no m/r/g   Pulmonary/Chest Wall: Clear to auscultation bilaterally   Abdominal: Soft and non-tender   Musculoskeletal: no edema   Neurological: No focal deficits     Left arm AVG     Data Review:      Lab Results   Component Value Date/Time    WBC 6.8 02/23/2017 01:17 AM    RBC 3.10 (L) 02/23/2017 01:17 AM    HCT 28.5 (L) 02/23/2017 01:17 AM    MCV 91.9 02/23/2017 01:17 AM    MCH 31.0 02/23/2017 01:17 AM    MCHC 33.7 02/23/2017 01:17 AM RDW 15.3 (H) 02/23/2017 01:17 AM      No results found for: IRONNo components found for: FERRITIN  No components found for: PTHINT  Urinalysis  No results found for: UGLU       Impression:     ESRD  Pneumonia  Infected AVG with  drainage   Hypotension/sepsis- GPC bacteremia  Tachycardia-improved  Anemia CKD  SHPT    Plan:   Duplex study of AVG today  Possible excision of Infected AVG and placement of temp HD catheter tomorrow 2/24  HD tomorrow 2/24  F/u blood cx   Procrit for anemia  Appreciate Vascular surgery input    MD Gaudencio Thompson  [phone]

## 2017-02-24 ENCOUNTER — APPOINTMENT (OUTPATIENT)
Dept: GENERAL RADIOLOGY | Age: 42
DRG: 264 | End: 2017-02-24
Attending: SURGERY
Payer: MEDICARE

## 2017-02-24 ENCOUNTER — ANESTHESIA (OUTPATIENT)
Dept: SURGERY | Age: 42
DRG: 264 | End: 2017-02-24
Payer: MEDICARE

## 2017-02-24 PROBLEM — E87.6 HYPOKALEMIA: Status: ACTIVE | Noted: 2017-02-24

## 2017-02-24 LAB
ALBUMIN SERPL BCP-MCNC: 2.7 G/DL (ref 3.4–5)
ANION GAP BLD CALC-SCNC: 16 MMOL/L (ref 3–18)
BASOPHILS # BLD AUTO: 0 K/UL (ref 0–0.1)
BASOPHILS # BLD: 0 % (ref 0–3)
BUN SERPL-MCNC: 70 MG/DL (ref 7–18)
BUN/CREAT SERPL: 5 (ref 12–20)
CALCIUM SERPL-MCNC: 9.5 MG/DL (ref 8.5–10.1)
CHLORIDE SERPL-SCNC: 98 MMOL/L (ref 100–108)
CO2 SERPL-SCNC: 26 MMOL/L (ref 21–32)
CREAT SERPL-MCNC: 13.7 MG/DL (ref 0.6–1.3)
DIFFERENTIAL METHOD BLD: ABNORMAL
EOSINOPHIL # BLD: 0.3 K/UL (ref 0–0.4)
EOSINOPHIL NFR BLD: 4 % (ref 0–5)
ERYTHROCYTE [DISTWIDTH] IN BLOOD BY AUTOMATED COUNT: 15.3 % (ref 11.6–14.5)
GLUCOSE BLD STRIP.AUTO-MCNC: 100 MG/DL (ref 70–110)
GLUCOSE BLD STRIP.AUTO-MCNC: 82 MG/DL (ref 70–110)
GLUCOSE SERPL-MCNC: 88 MG/DL (ref 74–99)
HCT VFR BLD AUTO: 29.1 % (ref 35–45)
HGB BLD-MCNC: 9.8 G/DL (ref 12–16)
INR PPP: 1.2 (ref 0.8–1.2)
LYMPHOCYTES # BLD AUTO: 28 % (ref 20–51)
LYMPHOCYTES # BLD: 1.8 K/UL (ref 0.8–3.5)
MAGNESIUM SERPL-MCNC: 2.4 MG/DL (ref 1.8–2.4)
MCH RBC QN AUTO: 31 PG (ref 24–34)
MCHC RBC AUTO-ENTMCNC: 33.7 G/DL (ref 31–37)
MCV RBC AUTO: 92.1 FL (ref 74–97)
MONOCYTES # BLD: 0.1 K/UL (ref 0–1)
MONOCYTES NFR BLD AUTO: 1 % (ref 2–9)
NEUTS SEG # BLD: 4.1 K/UL (ref 1.8–8)
NEUTS SEG NFR BLD AUTO: 67 % (ref 42–75)
PHOSPHATE SERPL-MCNC: 5.8 MG/DL (ref 2.5–4.9)
PLATELET # BLD AUTO: 195 K/UL (ref 135–420)
PLATELET COMMENTS,PCOM: ABNORMAL
PMV BLD AUTO: 11.6 FL (ref 9.2–11.8)
POTASSIUM SERPL-SCNC: 3.3 MMOL/L (ref 3.5–5.5)
PROTHROMBIN TIME: 14.3 SEC (ref 11.5–15.2)
RBC # BLD AUTO: 3.16 M/UL (ref 4.2–5.3)
RBC MORPH BLD: ABNORMAL
SODIUM SERPL-SCNC: 140 MMOL/L (ref 136–145)
WBC # BLD AUTO: 6.3 K/UL (ref 4.6–13.2)

## 2017-02-24 PROCEDURE — 74011250637 HC RX REV CODE- 250/637: Performed by: INTERNAL MEDICINE

## 2017-02-24 PROCEDURE — 87186 SC STD MICRODIL/AGAR DIL: CPT | Performed by: FAMILY MEDICINE

## 2017-02-24 PROCEDURE — 74011250636 HC RX REV CODE- 250/636: Performed by: INTERNAL MEDICINE

## 2017-02-24 PROCEDURE — 90935 HEMODIALYSIS ONE EVALUATION: CPT

## 2017-02-24 PROCEDURE — C1752 CATH,HEMODIALYSIS,SHORT-TERM: HCPCS | Performed by: SURGERY

## 2017-02-24 PROCEDURE — 87040 BLOOD CULTURE FOR BACTERIA: CPT | Performed by: FAMILY MEDICINE

## 2017-02-24 PROCEDURE — C1750 CATH, HEMODIALYSIS,LONG-TERM: HCPCS | Performed by: SURGERY

## 2017-02-24 PROCEDURE — 36415 COLL VENOUS BLD VENIPUNCTURE: CPT | Performed by: HOSPITALIST

## 2017-02-24 PROCEDURE — 76210000016 HC OR PH I REC 1 TO 1.5 HR: Performed by: SURGERY

## 2017-02-24 PROCEDURE — 87077 CULTURE AEROBIC IDENTIFY: CPT | Performed by: SURGERY

## 2017-02-24 PROCEDURE — 77030013079 HC BLNKT BAIR HGGR 3M -A: Performed by: NURSE ANESTHETIST, CERTIFIED REGISTERED

## 2017-02-24 PROCEDURE — 77030011640 HC PAD GRND REM COVD -A: Performed by: SURGERY

## 2017-02-24 PROCEDURE — 77030019895 HC PCKNG STRP IODO -A: Performed by: SURGERY

## 2017-02-24 PROCEDURE — 77030002935 HC SUT MCRYL J&J -C: Performed by: SURGERY

## 2017-02-24 PROCEDURE — 77030020256 HC SOL INJ NACL 0.9%  500ML: Performed by: SURGERY

## 2017-02-24 PROCEDURE — 77030002987 HC SUT PROL J&J -B: Performed by: SURGERY

## 2017-02-24 PROCEDURE — 74011250636 HC RX REV CODE- 250/636: Performed by: SURGERY

## 2017-02-24 PROCEDURE — 82962 GLUCOSE BLOOD TEST: CPT

## 2017-02-24 PROCEDURE — 83735 ASSAY OF MAGNESIUM: CPT | Performed by: HOSPITALIST

## 2017-02-24 PROCEDURE — 74011250636 HC RX REV CODE- 250/636

## 2017-02-24 PROCEDURE — 65660000000 HC RM CCU STEPDOWN

## 2017-02-24 PROCEDURE — 06HN33Z INSERTION OF INFUSION DEVICE INTO LEFT FEMORAL VEIN, PERCUTANEOUS APPROACH: ICD-10-PCS | Performed by: SURGERY

## 2017-02-24 PROCEDURE — 87075 CULTR BACTERIA EXCEPT BLOOD: CPT | Performed by: SURGERY

## 2017-02-24 PROCEDURE — 77030002916 HC SUT ETHLN J&J -A: Performed by: SURGERY

## 2017-02-24 PROCEDURE — 74011250637 HC RX REV CODE- 250/637: Performed by: HOSPITALIST

## 2017-02-24 PROCEDURE — 87077 CULTURE AEROBIC IDENTIFY: CPT | Performed by: FAMILY MEDICINE

## 2017-02-24 PROCEDURE — 77030014008 HC SPNG HEMSTAT J&J -C: Performed by: SURGERY

## 2017-02-24 PROCEDURE — 77030018719 HC DRSG PTCH ANTIMIC J&J -A: Performed by: SURGERY

## 2017-02-24 PROCEDURE — 77030031139 HC SUT VCRL2 J&J -A: Performed by: SURGERY

## 2017-02-24 PROCEDURE — 74011000250 HC RX REV CODE- 250

## 2017-02-24 PROCEDURE — 03PY07Z REMOVAL OF AUTOLOGOUS TISSUE SUBSTITUTE FROM UPPER ARTERY, OPEN APPROACH: ICD-10-PCS | Performed by: SURGERY

## 2017-02-24 PROCEDURE — 87186 SC STD MICRODIL/AGAR DIL: CPT | Performed by: SURGERY

## 2017-02-24 PROCEDURE — 76010000131 HC OR TIME 2 TO 2.5 HR: Performed by: SURGERY

## 2017-02-24 PROCEDURE — 77030002524 HC INSTR CLMP FGRTY EDWD -B: Performed by: SURGERY

## 2017-02-24 PROCEDURE — 77030012508 HC MSK AIRWY LMA AMBU -A: Performed by: NURSE ANESTHETIST, CERTIFIED REGISTERED

## 2017-02-24 PROCEDURE — 85610 PROTHROMBIN TIME: CPT | Performed by: HOSPITALIST

## 2017-02-24 PROCEDURE — 77030011267 HC ELECTRD BLD COVD -A: Performed by: SURGERY

## 2017-02-24 PROCEDURE — 88304 TISSUE EXAM BY PATHOLOGIST: CPT | Performed by: SURGERY

## 2017-02-24 PROCEDURE — 02HV33Z INSERTION OF INFUSION DEVICE INTO SUPERIOR VENA CAVA, PERCUTANEOUS APPROACH: ICD-10-PCS | Performed by: SURGERY

## 2017-02-24 PROCEDURE — 74011250636 HC RX REV CODE- 250/636: Performed by: ANESTHESIOLOGY

## 2017-02-24 PROCEDURE — 87116 MYCOBACTERIA CULTURE: CPT | Performed by: SURGERY

## 2017-02-24 PROCEDURE — 85025 COMPLETE CBC W/AUTO DIFF WBC: CPT | Performed by: HOSPITALIST

## 2017-02-24 PROCEDURE — 76060000035 HC ANESTHESIA 2 TO 2.5 HR: Performed by: SURGERY

## 2017-02-24 PROCEDURE — 5A1D60Z PERFORMANCE OF URINARY FILTRATION, MULTIPLE: ICD-10-PCS | Performed by: INTERNAL MEDICINE

## 2017-02-24 PROCEDURE — 80069 RENAL FUNCTION PANEL: CPT | Performed by: HOSPITALIST

## 2017-02-24 PROCEDURE — 88305 TISSUE EXAM BY PATHOLOGIST: CPT | Performed by: SURGERY

## 2017-02-24 PROCEDURE — 77030032490 HC SLV COMPR SCD KNE COVD -B: Performed by: SURGERY

## 2017-02-24 PROCEDURE — 87070 CULTURE OTHR SPECIMN AEROBIC: CPT | Performed by: SURGERY

## 2017-02-24 PROCEDURE — 74011000258 HC RX REV CODE- 258: Performed by: INTERNAL MEDICINE

## 2017-02-24 PROCEDURE — 77030002996 HC SUT SLK J&J -A: Performed by: SURGERY

## 2017-02-24 RX ORDER — SODIUM CHLORIDE 0.9 % (FLUSH) 0.9 %
5-10 SYRINGE (ML) INJECTION AS NEEDED
Status: DISCONTINUED | OUTPATIENT
Start: 2017-02-24 | End: 2017-02-24 | Stop reason: HOSPADM

## 2017-02-24 RX ORDER — LIDOCAINE HYDROCHLORIDE 20 MG/ML
INJECTION, SOLUTION EPIDURAL; INFILTRATION; INTRACAUDAL; PERINEURAL AS NEEDED
Status: DISCONTINUED | OUTPATIENT
Start: 2017-02-24 | End: 2017-02-24 | Stop reason: HOSPADM

## 2017-02-24 RX ORDER — MORPHINE SULFATE 2 MG/ML
2 INJECTION, SOLUTION INTRAMUSCULAR; INTRAVENOUS
Status: DISCONTINUED | OUTPATIENT
Start: 2017-02-24 | End: 2017-02-25

## 2017-02-24 RX ORDER — EPHEDRINE SULFATE/0.9% NACL/PF 25 MG/5 ML
SYRINGE (ML) INTRAVENOUS AS NEEDED
Status: DISCONTINUED | OUTPATIENT
Start: 2017-02-24 | End: 2017-02-24 | Stop reason: HOSPADM

## 2017-02-24 RX ORDER — ONDANSETRON 2 MG/ML
INJECTION INTRAMUSCULAR; INTRAVENOUS AS NEEDED
Status: DISCONTINUED | OUTPATIENT
Start: 2017-02-24 | End: 2017-02-24 | Stop reason: HOSPADM

## 2017-02-24 RX ORDER — FENTANYL CITRATE 50 UG/ML
INJECTION, SOLUTION INTRAMUSCULAR; INTRAVENOUS AS NEEDED
Status: DISCONTINUED | OUTPATIENT
Start: 2017-02-24 | End: 2017-02-24 | Stop reason: HOSPADM

## 2017-02-24 RX ORDER — FLUMAZENIL 0.1 MG/ML
0.2 INJECTION INTRAVENOUS
Status: DISCONTINUED | OUTPATIENT
Start: 2017-02-24 | End: 2017-02-24 | Stop reason: HOSPADM

## 2017-02-24 RX ORDER — SODIUM CHLORIDE 9 MG/ML
50 INJECTION, SOLUTION INTRAVENOUS CONTINUOUS
Status: DISCONTINUED | OUTPATIENT
Start: 2017-02-24 | End: 2017-02-26

## 2017-02-24 RX ORDER — MIDAZOLAM HYDROCHLORIDE 1 MG/ML
INJECTION, SOLUTION INTRAMUSCULAR; INTRAVENOUS AS NEEDED
Status: DISCONTINUED | OUTPATIENT
Start: 2017-02-24 | End: 2017-02-24 | Stop reason: HOSPADM

## 2017-02-24 RX ORDER — HYDROMORPHONE HYDROCHLORIDE 2 MG/ML
0.5 INJECTION, SOLUTION INTRAMUSCULAR; INTRAVENOUS; SUBCUTANEOUS
Status: DISCONTINUED | OUTPATIENT
Start: 2017-02-24 | End: 2017-02-24 | Stop reason: HOSPADM

## 2017-02-24 RX ORDER — DEXTROSE 50 % IN WATER (D50W) INTRAVENOUS SYRINGE
25-50 AS NEEDED
Status: DISCONTINUED | OUTPATIENT
Start: 2017-02-24 | End: 2017-02-24 | Stop reason: HOSPADM

## 2017-02-24 RX ORDER — POTASSIUM CHLORIDE 750 MG/1
10 TABLET, EXTENDED RELEASE ORAL
Status: COMPLETED | OUTPATIENT
Start: 2017-02-24 | End: 2017-02-24

## 2017-02-24 RX ORDER — NALOXONE HYDROCHLORIDE 0.4 MG/ML
0.1 INJECTION, SOLUTION INTRAMUSCULAR; INTRAVENOUS; SUBCUTANEOUS AS NEEDED
Status: DISCONTINUED | OUTPATIENT
Start: 2017-02-24 | End: 2017-02-24 | Stop reason: HOSPADM

## 2017-02-24 RX ORDER — OXYCODONE AND ACETAMINOPHEN 5; 325 MG/1; MG/1
1 TABLET ORAL
Status: DISCONTINUED | OUTPATIENT
Start: 2017-02-24 | End: 2017-02-25

## 2017-02-24 RX ORDER — INSULIN LISPRO 100 [IU]/ML
INJECTION, SOLUTION INTRAVENOUS; SUBCUTANEOUS ONCE
Status: DISCONTINUED | OUTPATIENT
Start: 2017-02-24 | End: 2017-02-24 | Stop reason: HOSPADM

## 2017-02-24 RX ORDER — MAGNESIUM SULFATE 100 %
4 CRYSTALS MISCELLANEOUS AS NEEDED
Status: DISCONTINUED | OUTPATIENT
Start: 2017-02-24 | End: 2017-02-24 | Stop reason: HOSPADM

## 2017-02-24 RX ORDER — PROPOFOL 10 MG/ML
INJECTION, EMULSION INTRAVENOUS AS NEEDED
Status: DISCONTINUED | OUTPATIENT
Start: 2017-02-24 | End: 2017-02-24 | Stop reason: HOSPADM

## 2017-02-24 RX ORDER — HYDROMORPHONE HYDROCHLORIDE 1 MG/ML
1 INJECTION, SOLUTION INTRAMUSCULAR; INTRAVENOUS; SUBCUTANEOUS
Status: COMPLETED | OUTPATIENT
Start: 2017-02-24 | End: 2017-02-24

## 2017-02-24 RX ADMIN — FENTANYL CITRATE 100 MCG: 50 INJECTION, SOLUTION INTRAMUSCULAR; INTRAVENOUS at 12:56

## 2017-02-24 RX ADMIN — HYDROMORPHONE HYDROCHLORIDE 1 MG: 1 INJECTION, SOLUTION INTRAMUSCULAR; INTRAVENOUS; SUBCUTANEOUS at 18:00

## 2017-02-24 RX ADMIN — HYDROMORPHONE HYDROCHLORIDE 0.5 MG: 2 INJECTION, SOLUTION INTRAMUSCULAR; INTRAVENOUS; SUBCUTANEOUS at 16:01

## 2017-02-24 RX ADMIN — FENTANYL CITRATE 25 MCG: 50 INJECTION, SOLUTION INTRAMUSCULAR; INTRAVENOUS at 13:52

## 2017-02-24 RX ADMIN — METRONIDAZOLE 500 MG: 250 TABLET ORAL at 10:14

## 2017-02-24 RX ADMIN — LIDOCAINE HYDROCHLORIDE 80 MG: 20 INJECTION, SOLUTION EPIDURAL; INFILTRATION; INTRACAUDAL; PERINEURAL at 13:05

## 2017-02-24 RX ADMIN — MIDODRINE HYDROCHLORIDE 5 MG: 2.5 TABLET ORAL at 22:26

## 2017-02-24 RX ADMIN — MIDODRINE HYDROCHLORIDE 5 MG: 2.5 TABLET ORAL at 10:14

## 2017-02-24 RX ADMIN — SODIUM CHLORIDE 50 ML/HR: 900 INJECTION, SOLUTION INTRAVENOUS at 12:01

## 2017-02-24 RX ADMIN — MIDAZOLAM HYDROCHLORIDE 2 MG: 1 INJECTION, SOLUTION INTRAMUSCULAR; INTRAVENOUS at 12:56

## 2017-02-24 RX ADMIN — ONDANSETRON 4 MG: 2 INJECTION INTRAMUSCULAR; INTRAVENOUS at 14:42

## 2017-02-24 RX ADMIN — PROPOFOL 120 MG: 10 INJECTION, EMULSION INTRAVENOUS at 13:05

## 2017-02-24 RX ADMIN — Medication 10 MG: at 14:21

## 2017-02-24 RX ADMIN — HYDROMORPHONE HYDROCHLORIDE 0.5 MG: 2 INJECTION, SOLUTION INTRAMUSCULAR; INTRAVENOUS; SUBCUTANEOUS at 15:50

## 2017-02-24 RX ADMIN — Medication 10 MG: at 13:40

## 2017-02-24 RX ADMIN — CEFTRIAXONE 2 G: 2 INJECTION, POWDER, FOR SOLUTION INTRAMUSCULAR; INTRAVENOUS at 10:23

## 2017-02-24 RX ADMIN — POTASSIUM CHLORIDE 10 MEQ: 10 TABLET, EXTENDED RELEASE ORAL at 22:26

## 2017-02-24 RX ADMIN — METRONIDAZOLE 500 MG: 250 TABLET ORAL at 22:26

## 2017-02-24 RX ADMIN — FENTANYL CITRATE 25 MCG: 50 INJECTION, SOLUTION INTRAMUSCULAR; INTRAVENOUS at 13:51

## 2017-02-24 RX ADMIN — OXYCODONE HYDROCHLORIDE AND ACETAMINOPHEN 1 TABLET: 5; 325 TABLET ORAL at 22:37

## 2017-02-24 RX ADMIN — FENTANYL CITRATE 50 MCG: 50 INJECTION, SOLUTION INTRAMUSCULAR; INTRAVENOUS at 13:29

## 2017-02-24 RX ADMIN — ERYTHROPOIETIN 6000 UNITS: 3000 INJECTION, SOLUTION INTRAVENOUS; SUBCUTANEOUS at 19:09

## 2017-02-24 NOTE — ANESTHESIA POSTPROCEDURE EVALUATION
Post-Anesthesia Evaluation & Assessment    Visit Vitals    /85    Pulse 66    Temp 36.4 °C (97.6 °F)    Resp 17    Ht 5' 5\" (1.651 m)    Wt 93.5 kg (206 lb 2.1 oz)    SpO2 100%    Breastfeeding No    BMI 34.3 kg/m2       No untreated/active PONV    Post-operative hydration adequate. Adequate post-operative analgesia per PACU discharge criteria    Mental status & level of consciousness: alert and oriented x 3    Respiratory status: patent unassisted airway     No apparent anesthetic complications requiring additional post-anesthetic care    Patient has met all discharge requirements.             Kari Lee MD

## 2017-02-24 NOTE — PROGRESS NOTES
Hospitalist Progress Note-critical care note     Patient: Papito Steen MRN: [id number]  CSN: [id number]    YOB: 1975  Age: 35 y.o. Sex: female    DOA: 2/21/2017 LOS:  LOS: 3 days            Chief complaint: bacteremia,  Sepsis, c diff, pna, infectious of av fistular     Assessment/Plan         Patient Active Problem List   Diagnosis Code    CAP (community acquired pneumonia) J18.9    ESRD needing dialysis ([facility] [address] N18.6    Sepsis ([facility] [address] A41.9    Anemia D64.9    Hyponatremia E87.1    Dehydration E86.0    Prolonged Q-T interval on ECG H61.20    Diastolic dysfunction E50.0    Infected prosthetic vascular graft ([facility] [address] T82. 7XXA    Bacteremia due to Staphylococcus aureus R78.81    C. difficile colitis A04.7    PNA (pneumonia) J18.9   1. Sepsis   due to c diff, av fistular infection vs pna   Id was on board  2. Bacteremia-MSSA and pna  ID on board ,on rocephin now, echo-ordered to  r/o vegetation   3. c diff   Improving. will  Continue monitor electrolytes  4 ESRD on HD  Nephrology on board , vascular will have temp TDC today   5. . 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 Hyponatremia  Resolved   7 hypokalemia   Give 10 meq K, keep low normal range due to ckd     Subjective: feel better , one time diarrhea   Nurse: no acute issue   Mother was at the bedside. Review of systems:    General: No fevers or chills. Cardiovascular: No chest pain or pressure. No palpitations. Pulmonary: No shortness of breath. Gastrointestinal: No nausea, vomiting.      Vital signs/Intake and Output:  Visit Vitals    /68 (BP 1 Location: Right arm, BP Patient Position: At rest)    Pulse 76    Temp 97.6 °F (36.4 °C)    Resp 17    Ht 5' 5\" (1.651 m)    Wt 93.5 kg (206 lb 2.1 oz)    SpO2 98%    Breastfeeding No    BMI 34.3 kg/m2     Current Shift:  02/24 0701 - 02/24 1900  In: 100 [I.V.:100]  Out: -   Last three shifts:  02/22 1901 - 02/24 0700  In: 56 [P.O.:290; I.V.:200]  Out: 0     Physical Exam:  General: WD, WN. Alert, cooperative, no acute distress    HEENT: NC, Atraumatic. PERRLA, anicteric sclerae. Lungs: CTA Bilaterally. No Wheezing/Rhonchi/Rales. Heart:  Regular  rhythm,  No murmur, No Rubs, No Gallops  Abdomen: Soft, Non distended, Non tender.  +Bowel sounds,   Extremities: No c/c. Av fistular noted -of left arm   Psych:   Not anxious or agitated. Neurologic:  No acute neurological deficit. Labs: Results:       Chemistry Recent Labs      02/24/17 0145 02/23/17 0117 02/22/17 0124   GLU  88  80  90   NA  140  139  134*   K  3.3*  3.6  4.1   CL  98*  100  92*   CO2  26  26  20*   BUN  70*  55*  129*   CREA  13.70*  11.24*  21.74*   CA  9.5  9.0  9.4   AGAP  16  13  22*   BUCR  5*  5*  6*   ALB  2.7*   --    --       CBC w/Diff Recent Labs      02/24/17 0145 02/23/17 0117 02/22/17 0124   WBC  6.3  6.8  10.6   RBC  3.16*  3.10*  3.41*   HGB  9.8*  9.6*  10.6*   HCT  29.1*  28.5*  31.0*   PLT  195  149  164   GRANS  67  69  85*   LYMPH  28  17*  7*   EOS  4  4  1      Cardiac Enzymes No results for input(s): CPK, CKND1, SHEREEN in the last 72 hours. No lab exists for component: CKRMB, TROIP   Coagulation Recent Labs      02/24/17 0145 02/23/17 0117   PTP  14.3  16.1*   INR  1.2  1.3*       Lipid Panel No results found for: CHOL, CHOLPOCT, CHOLX, CHLST, CHOLV, [id number], HDL, LDL, NLDLCT, DLDL, LDLC, DLDLP, 830562, VLDLC, VLDL, TGL, TGLX, TRIGL, [id number], TRIGP, TGLPOCT, [id number], CHHD, CHHDX   BNP No results for input(s): BNPP in the last 72 hours.    Liver Enzymes Recent Labs      02/24/17   0145   ALB  2.7*      Thyroid Studies No results found for: T4, T3U, TSH, TSHEXT, TSHEXT     Procedures/imaging: see electronic medical records for all procedures/Xrays and details which were not copied into this note but were reviewed prior to creation of Warren Shah MD

## 2017-02-24 NOTE — PROGRESS NOTES
1925: Assumed patient care, she was resting quietly in bed with no signs of distress noted. Patient was alert and orientated to person, place, time and situation. Vital signs were stable. MEWS score was a one. Patient denied any pain, discomfort, nausea, vomiting, dizziness or anxiety. Respiratory status remained stable on room air. White board was updated and explained. Bed was locked and in lowest position. Call bell, water and personal belongings were within reach. Patient had no questions, comments or concerns after bedside shift report. 0000: Patient NPO for a procedure in the morning.    0700: Patient had an uneventful night. Respiratory status, vital signs and MEWS score remained stable. Patient had no complaints of pain or discomfort. Patient had no questions, comments or concerns after bedside shift report.

## 2017-02-24 NOTE — PROGRESS NOTES
Head to toe assessment completed at this time Contact precaution for C-DIFF Pt has pending procedure for excision left arm graft placement temporary, NPO status. Patient is A&OX4. Pt denies N/V chest pain and SOB or distress. Pt is calm and cooperative. Pedal pulses are present. Capillary refill less than 3 seconds. Skin in warm and dry. Lungs are clear bilaterally. Bowel sounds are active. Abdomen is soft and non-tender. Pt ambulate independently. No bladder distention evident, Pt anuria at this time. Last bowel movement  02/24/2017 diarrhea. Musculoskeletal function weakness. 24 g needle in the right arm. Patient was oriented to call bell and bed function. VS stable. Pt currently resting in bed. Will continue to monitor. 1115 CHG wipes completed prior to procedure. 1130 Pt transported to surgery    1720 Pt transfer back to Room 334, pt request pain medication Paged Dr. Krysta Sloan received telephone order for Dilaudid 1 mg IV NOW and Percocet 5-325 mg 1 tab every six hours. Telephone order read back     1810 Dialysis started at this time. Triple lumen left femoral being used for dialysis. Shift summary: Pt had scheduled procedure today to remove fistula in left arm and placed triple lumen left femoral placed which is being used for dialysis. Pain remained controlled with medication.

## 2017-02-24 NOTE — INTERVAL H&P NOTE
H&P Update:  Tab Patel was seen and examined. History and physical has been reviewed. Significant clinical changes have occurred as noted:  Duplex has confirmed fluid around graft. Patient with continued purulent drainage. Will excise graft and place a temporary line today for HD.      Signed By: Dilia High MD     February 24, 2017 12:14 PM

## 2017-02-24 NOTE — PROGRESS NOTES
Chart reviewed, noted 35 yr old female admitted from home with sepsis; noted pt with ESRD on dialysis at [facility] TTS. Met with pt, pt's dtr Leena Martin, and pt's mother. Pt stated that: she'd be going home with her dtr; she was independent with ADLs; she went to dialysis TTS my Medicaid cab. T/C [facility], [address], [address] [place], [place] [address]; [phone] and advised them of pt's admission. Will notify them when pt is discharged.

## 2017-02-24 NOTE — PERIOP NOTES
TRANSFER - OUT REPORT:    Verbal report given to Aye Alexandre RN (name) on Shantanu Stovall  being transferred to [employer] (unit) for routine progression of care       Report consisted of patients Situation, Background, Assessment and   Recommendations(SBAR). Information from the following report(s) SBAR, OR Summary, Procedure Summary, Intake/Output and MAR was reviewed with the receiving nurse. Lines:   Triple Lumen 02/24/17 Left Femoral (Active)   Central Line Being Utilized Yes 2/24/2017  4:00 PM   Criteria for Appropriate Use Dialysis/apheresis 2/24/2017  4:00 PM   Site Assessment Clean, dry, & intact 2/24/2017  4:00 PM   Infiltration Assessment 0 2/24/2017  4:00 PM   Affected Extremity/Extremities Pulses palpable 2/24/2017  4:00 PM   Dressing Status Clean, dry, & intact 2/24/2017  4:00 PM   Dressing Type 4 X 4 2/24/2017  4:00 PM       Peripheral IV 02/22/17 Right Arm (Active)   Site Assessment Clean, dry, & intact 2/24/2017  4:00 PM   Phlebitis Assessment 0 2/24/2017  4:00 PM   Infiltration Assessment 0 2/24/2017  4:00 PM   Dressing Status Clean, dry, & intact 2/24/2017  4:00 PM   Dressing Type Transparent;Tape 2/24/2017  4:00 PM   Hub Color/Line Status Yellow;Capped 2/24/2017  4:00 PM   Action Taken Open ports on tubing capped 2/24/2017 11:00 AM   Alcohol Cap Used Yes 2/24/2017 11:00 AM       Peripheral IV 02/24/17 Right Hand (Active)   Site Assessment Clean, dry, & intact 2/24/2017  4:00 PM   Phlebitis Assessment 0 2/24/2017  4:00 PM   Infiltration Assessment 0 2/24/2017  4:00 PM   Dressing Status Clean, dry, & intact 2/24/2017  4:00 PM   Dressing Type Transparent;Tape 2/24/2017  4:00 PM   Hub Color/Line Status Blue; Infusing 2/24/2017  4:00 PM        Opportunity for questions and clarification was provided.       Patient transported with:   Registered Nurse  Tech

## 2017-02-24 NOTE — ROUTINE PROCESS
TRANSFER - IN REPORT:    Verbal report received from [facility] on Jenna Jay  being received from telemetry for ordered procedure      Report consisted of patients Situation, Background, Assessment and   Recommendations(SBAR). Information from the following report(s) SBAR was reviewed with the receiving nurse. Opportunity for questions and clarification was provided. Assessment completed upon patients arrival to unit and care assumed.

## 2017-02-24 NOTE — PROGRESS NOTES
ID Progress Note    Current antibiotics: Ceftriaxone + Metronidazole (Day 4)    CC: Sepsis. Subjective: 34y Female with ESRD on HD seen for MSSA bacteremia (felt secondary to AVG), C difficile colitis and possible pneumonia. Reports some improvement in her diarrhea today. For excision of AVG later today. No cough or SOB. No rash. PE:  Afebrile, VSS.  NAD. HEENT: Anicteric. Mouth w/o thrush. Neck: Supple. Lungs: CTA ant/lat/post bilaterally. CV: RRR. Abdomen: Soft. Non tender. Ext: Lt AVG site dressed. Labs/Radiology:  WBC=6.3 Fpir=250K Cr=13.70  Blood culture - 2/21/17 - MSSA                          2/23/17 - GPC                          2/24/17 - NGSF. C difficile DNA - positive. Echo - pending. ASSESSMENT/RECOMMENDATIONS    1. High grade MSSA bacteremia. AVG infection - for excision - continue ceftriaxone (on this for coverage of possible pneumonia as well)      F/U on TTE. 2. C difficile colitis - continue oral metronidazole. 3. Possible pneumonia. Patchy infiltrate RLL. On ceftriaxone. 4. ESRD on HD.

## 2017-02-24 NOTE — BRIEF OP NOTE
BRIEF OPERATIVE NOTE    Date of Procedure: 2/24/2017   Preoperative Diagnosis: INFECTED LEFT ARM GRAFT  Postoperative Diagnosis: INFECTED LEFT ARM GRAFT    Procedure(s):  EXCISION LEFT ARM GRAFT,PLACEMENT TEMPORARY DIALYSIS CATHETER W/C-ARM, PATIENT IS IN ROOM #334  Surgeon(s) and Role:     * Amirah Steve MD - Primary            Surgical Staff:  Circ-1: Arnoldo Henriquez RN  Scrub Tech-1: Kira Gao  Surg Asst-1: Alessandro Perera. Luis Hirsch Staff: Doreen Schaefer RN  Event Time In   Incision Start 1323   Incision Close 1507     Anesthesia: General   Estimated Blood Loss: 75mL  Specimens:   ID Type Source Tests Collected by Time Destination   1 : LEFT ARM WOUND Wound Left Arm CULTURE, WOUND W GRAM STAIN, CULTURE, ANAEROBIC, AEROBIC/ANAEROBIC CULTURE Amirah Steve MD 2/24/2017 1325 Microbiology   2 : LEFT ARM AV GRAFT Wound  CULTURE, ANAEROBIC, CULTURE, WOUND W GRAM STAIN, AEROBIC/ANAEROBIC CULTURE Amirah Steve MD 2/24/2017 1358 Microbiology      Findings: Two previous upper arm loop AV grafts. Newer, patent once grossly infected. Previous graft thrombosed and well incorporated. Entirety of new graft removed. Removed as much of previous graft as possible to unaffected areas. Evidence of occluded left IJ and central venous occlusion on the right.   Placed a left femoral catheter with good flow   Complications: None  Implants: 24cm temporary dialysis catheter left femoral vein

## 2017-02-24 NOTE — PROGRESS NOTES
Nephrology Progress note    Subjective:     Carli Dexter is a 35 y.o. female with PMH chronic hypotension, ESRD on HD TTS presenting with weakness, fever to 102.3, cough. She has chills and her BP in ER was lower than baseline so she received multiple fluid boluses. She denies SOB, CP.  currently. She missed HD Saturday due to feeling poorly. K 4.1 today. O2 sat 100% RA. CXR shows RLL pneumonia.  -Access noted to have  purulent drainage- excision of AVG done on 2/24    Seen on HD. C/o pain over LUE and Rt groin area (where she had procedures today)    Admit Date: 2/21/2017      Allergy:  Allergies   Allergen Reactions    Vancomycin Other (comments)     Felt like her body was burning    Aspirin Nausea and Vomiting     Pt reports aspirin gave her a stomach ulcer.      Vicodin [Hydrocodone-Acetaminophen] Nausea and Vomiting        Objective:     Visit Vitals    /74    Pulse 65    Temp 98 °F (36.7 °C)    Resp 14    Ht 5' 5\" (1.651 m)    Wt 93.5 kg (206 lb 2.1 oz)    LMP 12/01/2012    SpO2 98%    Breastfeeding No    BMI 34.3 kg/m2         Intake/Output Summary (Last 24 hours) at 02/24/17 1702  Last data filed at 02/24/17 1600   Gross per 24 hour   Intake              640 ml   Output                0 ml   Net              640 ml       Physical Exam:       General: No acute distress   HENT: Atraumatic and normocephalic   Eyes: Normal conjunctiva   Neck: Supple    Cardiovascular: Normal S1 & S2, no m/r/g   Pulmonary/Chest Wall: Clear to auscultation bilaterally   Abdominal: Soft and non-tender   Musculoskeletal: no edema   Neurological: No focal deficits     Left arm AVG     Data Review:      Lab Results   Component Value Date/Time    WBC 6.3 02/24/2017 01:45 AM    RBC 3.16 (L) 02/24/2017 01:45 AM    HCT 29.1 (L) 02/24/2017 01:45 AM    MCV 92.1 02/24/2017 01:45 AM    MCH 31.0 02/24/2017 01:45 AM    MCHC 33.7 02/24/2017 01:45 AM    RDW 15.3 (H) 02/24/2017 01:45 AM      No results found for: Sandy Douglas components found for: FERRITIN  No components found for: PTHINT  Urinalysis  No results found for: UGLU       Impression:     -ESRD on HD TTS  -MSSA sepsis sec to  infected AVG    -Hypokalemia  -Infected AVG with drainage.  S/p excision   -Anemia CKD  -SHPT  -C diff  -Hyperphosphatemia     Plan:   -HD today for 3 hrs on 4 K bath  -Procrit for anemia  -ID following        MD Gaudencio Ortiz  [phone]

## 2017-02-24 NOTE — ANESTHESIA PREPROCEDURE EVALUATION
Anesthetic History   No history of anesthetic complications            Review of Systems / Medical History  Patient summary reviewed, nursing notes reviewed and pertinent labs reviewed    Pulmonary      Recent URI             Neuro/Psych   Within defined limits           Cardiovascular  Within defined limits  Hypertension: well controlled              Exercise tolerance: >4 METS     GI/Hepatic/Renal         Renal disease: ESRD and dialysis       Endo/Other  Within defined limits           Other Findings              Physical Exam    Airway  Mallampati: II  TM Distance: 4 - 6 cm  Neck ROM: normal range of motion   Mouth opening: Normal     Cardiovascular    Rhythm: regular  Rate: normal         Dental  No notable dental hx       Pulmonary  Breath sounds clear to auscultation               Abdominal  GI exam deferred       Other Findings            Anesthetic Plan    ASA: 4  Patient did not consent to regional anesthesiaAnesthesia type: general          Induction: Intravenous  Anesthetic plan and risks discussed with: Patient

## 2017-02-24 NOTE — PERIOP NOTES
Patient transfer to room 334. Family at bedside. Handoff with HARMEET Styles RN.     Visit Vitals    /85 (BP 1 Location: Right arm, BP Patient Position: At rest)    Pulse 79    Temp 98 °F (36.7 °C)    Resp 13    Ht 5' 5\" (1.651 m)    Wt 93.5 kg (206 lb 2.1 oz)    LMP 12/01/2012    SpO2 98%    Breastfeeding No    BMI 34.3 kg/m2

## 2017-02-24 NOTE — OP NOTES
[address] [place] [address]  OPERATIVE REPORT    Name:  Dae Hoffman  MR#:  [id number]  :  1975  Account #:  [de-identified]  Date of Adm:  2017  Date of Surgery:  2017      PREOPERATIVE DIAGNOSES  1. End-stage renal disease. 2. Infected left upper extremity forearm upper arm loop graft. POSTOPERATIVE DIAGNOSES  1. End-stage renal disease. 2. Infected left upper extremity forearm upper arm loop graft. PROCEDURES PERFORMED  1. Excision of left upper extremity arteriovenous graft x2.  2. Attempted percutaneous right internal jugular temporary dialysis  catheter. 3. Placement of left femoral vein temporary dialysis catheter. SURGEON:  Jaspal Slater M.D.    ESTIMATED BLOOD LOSS:  75 mL. SPECIMENS REMOVED: Left arm graft, left arm wound fluid culture    ANESTHESIA:  General.    PACKS AND DRAINS:  Quarter-inch iodoform packing. IMPLANTS:  A 24 cm temporary dialysis catheter. COMPLICATIONS:  None. CONDITION:  To recovery stable. FINDINGS  1. Evidence of two loop upper arm AV grafts, with one being patent. The patent graft was obviously infected, not incorporated in the tissue,  and was surrounded by purulent fluid. 2. The old graft was thrombosed and well incorporated. There was  evidence of a central venous occlusion on the right as we were unable  to pass the wire distally beyond the innominate vein. 3. An occluded left IJ vein. 4. Satisfactory placement of the femoral vein catheter in the superior  vena cava. 5. Good blood return. 6. No resistance flushing. INDICATIONS FOR THE PROCEDURE:  This patient is a 59-year-old  female who presented to [facility] with fevers, chills,  and sepsis. The patient was found to be bacteremic, with Gram-  positive cocci. Initially, the patient was noted to have a lower lobe  pneumonia.   However, on exam, it was noted the patient had an  indurated upper extremity arteriovenous graft with purulent drainage. This was determined to be the most likely source of her sepsis. The  decision was made to excise this graft. Informed consent was  obtained. DESCRIPTION OF PROCEDURE:  On 02/24/2017, the patient was  taken to the operating room. She was identified by name and ID  bracelet by myself and the entire operative team.  Once this was done,  the patient was placed on the operating table in supine position with  the left arm extended. The appropriate depth of anesthesia was  obtained, and an LMA was placed without any complications. The  patient was then prepped and draped, and time-out was performed. The patient had already received the preoperative dose of antibiotics  as scheduled for treatment. At this point, we made an incision a  fingerbreadth above the antecubital fossa. We used her previous  incision for graft placement. After making this incision, we  encountered a copious amount of purulent fluid that was sent to  Pathology. We then noted that there were two grafts in this area, one  that was patent and another one appeared to be grossly thrombosed  and well incorporated into the tissues. It did not appear to be grossly  infected. Given these findings, we initially started with the infected  graft. We clamped the graft in this area and then made another  incision in the upper arm, where the patient had her arterial  anastomosis. We then clamped this graft, flushed the artery, and  excised the remaining graft. We then ligated the remaining part of the  graft attached to the artery with #2-silk ties, followed by running 5-0  Prolene suture. Once this was completed, we removed the clamp and  confirmed hemostasis. We then followed the loop around in the same  upper arm incision. We excised much of the graft up to the venous  anastomosis and again tied it off with 2-0 silk ties x2. Once we had both ends of the graft ligated, we then excised this new  graft in its entirety.   It appeared to be grossly infected and was sent to  Pathology. We then turned our attention to the thrombosed well-  incorporated previous graft. In the area that was infected, blunt and  sharp dissection was used to excise this portion of the graft within the  antecubital fossa area. We then used an 0-silk tie to tie off the distal  end and transected the graft to remove as much of that graft in the  infected area as possible. We then irrigated all the incisions. We used  deep dermal sutures to close the upper arm incision, followed by  interrupted nylon for that incision, as well as the two counter incisions  to help remove the graft. In the antecubital fossa, we irrigated  vigorously and packed it with quarter-inch iodoform. We used a single  nylon suture to reapproximate her skin in the middle portion. Once this  was done, we then dressed the wound with 4 x 4's and ABDs, and  wrapped with Kerlix and Coban. Once this was completed, we then  reprepped and draped, and attempted a right internal jugular  temporary dialysis catheter. Also, the internal jugular vein was patent. We obtained percutaneous access, and advanced the Bentson wire. We met resistance. Under fluoroscopy, it was noted that the right at  the innominate vein confluence we were unable to advance the wire  beyond this point, which was concerning for a possible innominate vein  stenosis or occlusion. As we were unable to advance the wire any  further, we then interrogated the left internal jugular vein. This  appeared to be thrombosed. Given these results, we then turned our  attention to the left femoral vein. Under ultrasound, it was patent and compressible. It appeared to an  appropriate site for access. We prepped and draped the patient in the  standard fashion. We then used an 18-gauge needle to obtain  percutaneous access into the left femoral vein.   We advanced a  Bentson wire up through the femoral vein, followed by two dilators and  then the 24 cm temporary dialysis catheter. We placed a Biopatch  flushing on the skin and secured the catheter to the skin using nylon  sutures. We then checked the catheter lumen. It was an easy to flush  catheter, and there was good blood return, without any resistance. We  then flushed the catheters clear and then placed 2 mL of heparin into  both lumens of the catheter. We then dressed the catheter in a  standard sterile fashion at the end of the procedure. I was present and  scrubbed throughout the procedure, and all counts were correct x2.         MD Xiomara Mandel / [facility]  D:  02/24/2017   15:25  T:  02/24/2017   17:44  Job #:  [id number]

## 2017-02-24 NOTE — ROUTINE PROCESS
TRANSFER - IN REPORT:    Verbal report received from Dyana Wolf RN(name) on Shantanu Stovall  being received from [facility]) for routine post - op      Report consisted of patients Situation, Background, Assessment and   Recommendations(SBAR). Information from the following report(s) SBAR, Kardex, Intake/Output and MAR was reviewed with the receiving nurse. Opportunity for questions and clarification was provided. Assessment completed upon patients arrival to unit and care assumed.

## 2017-02-24 NOTE — PROGRESS NOTES
Pt has been NPO since mn. VSS, afebrile. No active bleeding/drainage from LUE AVG site. She is ready for procedure today.

## 2017-02-24 NOTE — H&P (VIEW-ONLY)
Surgery Consult      Patient: Vy Walters MRN: [id number]  CSN: [id number]      YOB: 1975    Age: 35 y.o. Sex: female      DOA: 2/21/2017       HPI:     Vy Walters is a 35 y.o. female who came into the ED via EMS for fever, weakness on the 2/21. She is ESRD with H/O HTN. She receives HD TTS via LUE AVG. She and her daughter noticed pus coming from the site on 2/19 with swelling which was new. Her last dialysis prior to the ER was on 2/18. She voices chronic pain to the graft site but other than that has never had issues with it. She is followed by Dr. Jose Monge. This is her second AVG and has been on HD for 5 years. Past Medical History:   Diagnosis Date    Chronic kidney disease     Dialysis patient ([facility] [address]     Hypertension        Past Surgical History:   Procedure Laterality Date    HX CSF SHUNT      HX HYSTERECTOMY      HX OTHER SURGICAL      dialysis shunt left arm       History reviewed. No pertinent family history. Social History     Social History    Marital status: SINGLE     Spouse name: N/A    Number of children: N/A    Years of education: N/A     Social History Main Topics    Smoking status: Never Smoker    Smokeless tobacco: None    Alcohol use No    Drug use: None    Sexual activity: Not Asked     Other Topics Concern    None     Social History Narrative       Prior to Admission medications    Medication Sig Start Date End Date Taking? Authorizing Provider   sevelamer (RENAGEL) 800 mg tablet Take 2,400 mg by mouth three (3) times daily (with meals). Yes Historical Provider   sevelamer (RENAGEL) 800 mg tablet Take 1,600 mg by mouth See Admin Instructions. 2 tabs if/when eating snack   Yes Historical Provider   pravastatin (PRAVACHOL) 20 mg tablet Take 20 mg by mouth nightly. Yes Historical Provider   warfarin (COUMADIN) 2.5 mg tablet Take 2.5 mg by mouth every evening.    Yes Historical Provider   zolpidem (AMBIEN) 5 mg tablet Take 5 mg by mouth nightly as needed for Sleep. Yes Historical Provider   traMADol (ULTRAM) 50 mg tablet Take 50 mg by mouth every twelve (12) hours as needed for Pain. Yes Historical Provider   tiZANidine (ZANAFLEX) 4 mg capsule Take 4 mg by mouth two (2) times daily as needed. Historical Provider   midodrine (PROAMITINE) 5 mg tablet Take 5 mg by mouth daily. Historical Provider   cinacalcet (SENSIPAR) 30 mg tablet Take 30 mg by mouth nightly. Historical Provider   diphenhydrAMINE (BENADRYL) 25 mg capsule Take 25 mg by mouth See Admin Instructions. Dialysis pre med    Historical Provider   promethazine (PHENERGAN) 25 mg tablet Take 25 mg by mouth See Admin Instructions. Dialysis premed    Historical Provider   fluticasone (FLONASE) 50 mcg/actuation nasal spray 2 Sprays by Both Nostrils route daily as needed for Rhinitis. Historical Provider   dextromethorphan-guaiFENesin (ROBITUSSIN-DM)  mg/5 mL syrup Take 5 mL by mouth every six (6) hours as needed for Cough. Historical Provider       Allergies   Allergen Reactions    Vancomycin Other (comments)     Felt like her body was burning    Aspirin Nausea and Vomiting     Pt reports aspirin gave her a stomach ulcer.      Vicodin [Hydrocodone-Acetaminophen] Nausea and Vomiting       Physical Exam:      Visit Vitals    BP (!) 86/62 (BP 1 Location: Right arm, BP Patient Position: At rest)    Pulse 80    Temp 97.7 °F (36.5 °C)    Resp 20    Ht 5' 5\" (1.651 m)    Wt 204 lb 4.8 oz (92.7 kg)    SpO2 99%    Breastfeeding No    BMI 34 kg/m2       GENERAL: alert, fatigued, cooperative, no distress, appears stated age, EYE: negative findings: anicteric sclera, LYMPHATIC: Cervical, supraclavicular, and axillary nodes normal.,  , THROAT & NECK: normal and no erythema or exudates noted. , LUNG: clear to auscultation bilaterally, HEART: regular rate and rhythm, S1, S2 normal, no murmur, click, rub or gallop, EXTREMITIES:  extremities normal, atraumatic, no cyanosis or edema, SKIN: LUE AVG with small open area of red drain, swelling, TTP, full ROM of hand/wrist NEUROLOGIC: negative    ROS:  Constitutional: negative  Eyes: negative  Ears, nose, mouth, throat, and face: negative  Respiratory: negative  Cardiovascular: negative  Genitourinary:anuric  Musculoskeletal:negative  Behavioral/Psych: negative  Unless otherwise mentioned in the HPI. Data Review:    CBC:   Lab Results   Component Value Date/Time    WBC 10.6 02/22/2017 01:24 AM    RBC 3.41 02/22/2017 01:24 AM    HGB 10.6 02/22/2017 01:24 AM    HCT 31.0 02/22/2017 01:24 AM    PLATELET 377 54/11/9132 01:24 AM      BMP:   Lab Results   Component Value Date/Time    Glucose 90 02/22/2017 01:24 AM    Sodium 134 02/22/2017 01:24 AM    Potassium 4.1 02/22/2017 01:24 AM    Chloride 92 02/22/2017 01:24 AM    CO2 20 02/22/2017 01:24 AM     02/22/2017 01:24 AM    Creatinine 21.74 02/22/2017 01:24 AM    Calcium 9.4 02/22/2017 01:24 AM     Coagulation:   Lab Results   Component Value Date/Time    Prothrombin time 59.1 02/22/2017 01:24 AM    INR 7.5 02/22/2017 01:24 AM    aPTT 30.9 03/31/2011 05:20 AM     Cardiac markers:   Lab Results   Component Value Date/Time    CK 63 02/21/2017 08:00 AM    CK-MB Index CANNOT BE CALCULATED 02/21/2017 08:00 AM     Liver:   Lab Results   Component Value Date/Time    AST (SGOT) 8 02/21/2017 08:00 AM    Alk. phosphatase 187 02/21/2017 08:00 AM    Albumin 3.2 02/21/2017 08:00 AM    Protein, total 7.8 02/21/2017 08:00 AM    Lipase 144 09/18/2010 08:25 PM         Assessment/Plan     40yo F admitted for fever, weakness. Vascular services were called d/t pus/drainage from LUE AVG. Ms. Saroj Russell and her daughter were present during this consult with myself and Dr. Toy Alarcon. They were in agreement with the following plan. Plan:  1. Duplex of LUE Graft  2. The graft is most likely infected. A graft excision and placement of a temporary dialysis catheter will occur once the INR level is <1.5 (Current level is 7.5).  Also, pt is hypotensive.   3. Once infection clears, a tunneled dialysis catheter will be placed    Principal Problem:    Sepsis ([facility] [address] (2/21/2017)    Active Problems:    CAP (community acquired pneumonia) (2/21/2017)      ESRD needing dialysis ([facility] [address] (2/21/2017)      Anemia (2/21/2017)      Hyponatremia (2/21/2017)      Dehydration (2/21/2017)      Prolonged Q-T interval on ECG (1/30/7585)      Diastolic dysfunction (7/20/3098)        Juanjo Irvin NP  February 22, 2017

## 2017-02-25 PROBLEM — E16.2 HYPOGLYCEMIA: Status: ACTIVE | Noted: 2017-02-25

## 2017-02-25 LAB
ANION GAP BLD CALC-SCNC: 10 MMOL/L (ref 3–18)
BACTERIA SPEC CULT: ABNORMAL
BACTERIA SPEC CULT: ABNORMAL
BASOPHILS # BLD AUTO: 0.1 K/UL (ref 0–0.06)
BASOPHILS # BLD: 1 % (ref 0–2)
BUN SERPL-MCNC: 37 MG/DL (ref 7–18)
BUN/CREAT SERPL: 4 (ref 12–20)
CALCIUM SERPL-MCNC: 9.2 MG/DL (ref 8.5–10.1)
CHLORIDE SERPL-SCNC: 102 MMOL/L (ref 100–108)
CO2 SERPL-SCNC: 28 MMOL/L (ref 21–32)
CREAT SERPL-MCNC: 8.77 MG/DL (ref 0.6–1.3)
DIFFERENTIAL METHOD BLD: ABNORMAL
EOSINOPHIL # BLD: 0.2 K/UL (ref 0–0.4)
EOSINOPHIL NFR BLD: 3 % (ref 0–5)
ERYTHROCYTE [DISTWIDTH] IN BLOOD BY AUTOMATED COUNT: 15.7 % (ref 11.6–14.5)
GLUCOSE BLD STRIP.AUTO-MCNC: 107 MG/DL (ref 70–110)
GLUCOSE BLD STRIP.AUTO-MCNC: 122 MG/DL (ref 70–110)
GLUCOSE BLD STRIP.AUTO-MCNC: 90 MG/DL (ref 70–110)
GLUCOSE BLD STRIP.AUTO-MCNC: 91 MG/DL (ref 70–110)
GLUCOSE SERPL-MCNC: 99 MG/DL (ref 74–99)
GRAM STN SPEC: ABNORMAL
GRAM STN SPEC: ABNORMAL
HCT VFR BLD AUTO: 28 % (ref 35–45)
HGB BLD-MCNC: 9 G/DL (ref 12–16)
INR PPP: 1.2 (ref 0.8–1.2)
LYMPHOCYTES # BLD AUTO: 29 % (ref 21–52)
LYMPHOCYTES # BLD: 1.7 K/UL (ref 0.9–3.6)
MAGNESIUM SERPL-MCNC: 2.2 MG/DL (ref 1.8–2.4)
MCH RBC QN AUTO: 30.7 PG (ref 24–34)
MCHC RBC AUTO-ENTMCNC: 32.1 G/DL (ref 31–37)
MCV RBC AUTO: 95.6 FL (ref 74–97)
MONOCYTES # BLD: 0.5 K/UL (ref 0.05–1.2)
MONOCYTES NFR BLD AUTO: 8 % (ref 3–10)
NEUTS SEG # BLD: 3.4 K/UL (ref 1.8–8)
NEUTS SEG NFR BLD AUTO: 59 % (ref 40–73)
PLATELET # BLD AUTO: 202 K/UL (ref 135–420)
PLATELET COMMENTS,PCOM: ABNORMAL
PMV BLD AUTO: 10.6 FL (ref 9.2–11.8)
POTASSIUM SERPL-SCNC: 3.9 MMOL/L (ref 3.5–5.5)
PROTHROMBIN TIME: 14.6 SEC (ref 11.5–15.2)
RBC # BLD AUTO: 2.93 M/UL (ref 4.2–5.3)
RBC MORPH BLD: ABNORMAL
SERVICE CMNT-IMP: ABNORMAL
SODIUM SERPL-SCNC: 140 MMOL/L (ref 136–145)
WBC # BLD AUTO: 5.9 K/UL (ref 4.6–13.2)

## 2017-02-25 PROCEDURE — 74011000258 HC RX REV CODE- 258: Performed by: INTERNAL MEDICINE

## 2017-02-25 PROCEDURE — 87040 BLOOD CULTURE FOR BACTERIA: CPT | Performed by: SURGERY

## 2017-02-25 PROCEDURE — 80048 BASIC METABOLIC PNL TOTAL CA: CPT | Performed by: SURGERY

## 2017-02-25 PROCEDURE — 85610 PROTHROMBIN TIME: CPT | Performed by: SURGERY

## 2017-02-25 PROCEDURE — 74011250636 HC RX REV CODE- 250/636: Performed by: SURGERY

## 2017-02-25 PROCEDURE — 77030018836 HC SOL IRR NACL ICUM -A

## 2017-02-25 PROCEDURE — 93306 TTE W/DOPPLER COMPLETE: CPT

## 2017-02-25 PROCEDURE — 74011250637 HC RX REV CODE- 250/637: Performed by: INTERNAL MEDICINE

## 2017-02-25 PROCEDURE — 36415 COLL VENOUS BLD VENIPUNCTURE: CPT | Performed by: SURGERY

## 2017-02-25 PROCEDURE — 85025 COMPLETE CBC W/AUTO DIFF WBC: CPT | Performed by: SURGERY

## 2017-02-25 PROCEDURE — 82962 GLUCOSE BLOOD TEST: CPT

## 2017-02-25 PROCEDURE — 65660000000 HC RM CCU STEPDOWN

## 2017-02-25 PROCEDURE — 74011250636 HC RX REV CODE- 250/636: Performed by: INTERNAL MEDICINE

## 2017-02-25 PROCEDURE — 74011250636 HC RX REV CODE- 250/636: Performed by: ANESTHESIOLOGY

## 2017-02-25 PROCEDURE — 77010033678 HC OXYGEN DAILY

## 2017-02-25 PROCEDURE — 83735 ASSAY OF MAGNESIUM: CPT | Performed by: SURGERY

## 2017-02-25 RX ORDER — HYDROMORPHONE HYDROCHLORIDE 2 MG/ML
0.5 INJECTION, SOLUTION INTRAMUSCULAR; INTRAVENOUS; SUBCUTANEOUS
Status: DISCONTINUED | OUTPATIENT
Start: 2017-02-25 | End: 2017-03-02 | Stop reason: HOSPADM

## 2017-02-25 RX ORDER — OXYCODONE AND ACETAMINOPHEN 10; 325 MG/1; MG/1
1-2 TABLET ORAL
Status: DISCONTINUED | OUTPATIENT
Start: 2017-02-25 | End: 2017-03-02 | Stop reason: HOSPADM

## 2017-02-25 RX ADMIN — HYDROMORPHONE HYDROCHLORIDE 0.5 MG: 2 INJECTION INTRAMUSCULAR; INTRAVENOUS; SUBCUTANEOUS at 23:01

## 2017-02-25 RX ADMIN — OXYCODONE HYDROCHLORIDE AND ACETAMINOPHEN 1 TABLET: 5; 325 TABLET ORAL at 09:40

## 2017-02-25 RX ADMIN — MIDODRINE HYDROCHLORIDE 5 MG: 2.5 TABLET ORAL at 09:41

## 2017-02-25 RX ADMIN — CEFTRIAXONE 2 G: 2 INJECTION, POWDER, FOR SOLUTION INTRAMUSCULAR; INTRAVENOUS at 12:13

## 2017-02-25 RX ADMIN — METRONIDAZOLE 500 MG: 250 TABLET ORAL at 09:41

## 2017-02-25 RX ADMIN — MIDODRINE HYDROCHLORIDE 5 MG: 2.5 TABLET ORAL at 12:13

## 2017-02-25 RX ADMIN — METRONIDAZOLE 500 MG: 250 TABLET ORAL at 17:30

## 2017-02-25 RX ADMIN — MIDODRINE HYDROCHLORIDE 5 MG: 2.5 TABLET ORAL at 17:30

## 2017-02-25 RX ADMIN — HYDROMORPHONE HYDROCHLORIDE 0.5 MG: 2 INJECTION INTRAMUSCULAR; INTRAVENOUS; SUBCUTANEOUS at 13:16

## 2017-02-25 RX ADMIN — METRONIDAZOLE 500 MG: 250 TABLET ORAL at 22:45

## 2017-02-25 RX ADMIN — SODIUM CHLORIDE 50 ML/HR: 900 INJECTION, SOLUTION INTRAVENOUS at 07:07

## 2017-02-25 NOTE — PROGRESS NOTES
9:53 AM  Pt is awake, alert, orientwed x 3. Pain level  Left arm 8/10. Medicated with Percocet 5 /325 mg po for pain level 8/10. Ace wrap dressing to left arm dry and intact. Pt has 3 lumen cath to left groin for dialysis. Pt has + 1 nonpitting edema to left leg. Echocardiogram was done this morning. 12:22 PM   Pt's pain on left arm persists and not relieved by Percocet. Pt was seen by dr Darlene Merritt. And increased dose of Percocet to 10/325 1-2 tabs po PRN  and dilaudid 0.5 mg IV PRN. 1:18 PM   Pt medicated with Dilaudid 0.5 mg Iv for pain level 8/10.    1415  Pain level 6/10 and more comfortable.

## 2017-02-25 NOTE — DIALYSIS
HD initiated @1802, TDC flushed and patent. Pt c/o pain, dilaudid administered by unit nurse. Pain med effective. 2000, venous chamber coagulated, new line set up, tx continues. 2100 pt bp 85/60 she is asymptomatic, with 17 minutes to tx remaining, tx is discontinued. Pt bp 95/56. Pt alert and oriented x4, Her verbiage is clear, she's hemodynamically stable post HD.

## 2017-02-25 NOTE — PROGRESS NOTES
ID Progress Note     Current antibiotics: Ceftriaxone + Metronidazole (Day 5)     CC: Sepsis.     Subjective: 34y Female with ESRD on HD seen for MSSA bacteremia (felt secondary to AVG), C difficile colitis and possible pneumonia. Patient is s/p excision of her AVG which was noted to be obviously infected yesterday by Dr. oTy Alarcon. She states that she has some pain at the site. Her diarrhea has improved. Denies any cough or sob. No skin rashes.      PE: Afebrile, VSS.  NAD. HEENT: Anicteric. Mouth w/o thrush. Neck: Supple. Lungs: CTA ant/lat/post bilaterally. CV: RRR. Abdomen: Soft. Non tender. Ext: Lt AVG site dressed. Femoral TDC      Labs/Radiology:  WBC=6.9, Hb=9, Gev=888  Cr=8.77  Blood culture - 2/21/17 - MSSA  2/23/17 - MSSA  2/24/17 - MSSA  2/25/17 - pending    Wound 2/24/17 - pending  C difficile DNA - positive. Echo - pending.     ASSESSMENT/RECOMMENDATIONS     1. High grade MSSA bacteremia. AVG infection - s/p excision - continue ceftriaxone (on this for coverage of possible pneumonia as well)  F/U on TTE. Repeat blood cultures to document clearance     2. C difficile colitis - continue oral metronidazole.     3. Possible pneumonia. Patchy infiltrate RLL. On ceftriaxone.     4. ESRD on HD.

## 2017-02-25 NOTE — PROGRESS NOTES
Hospitalist Progress Note-critical care note     Patient: Jenna Jay MRN: [id number]  CSN: [id number]    YOB: 1975  Age: 35 y.o. Sex: female    DOA: 2/21/2017 LOS:  LOS: 4 days            Chief complaint: bacteremia,  Sepsis, c diff, pna, infectious of av fistular , hypoglycemia     Assessment/Plan         Patient Active Problem List   Diagnosis Code    CAP (community acquired pneumonia) J18.9    ESRD needing dialysis ([facility] [address] N18.6    Sepsis ([facility] [address] A41.9    Anemia D64.9    Hyponatremia E87.1    Dehydration E86.0    Prolonged Q-T interval on ECG W41.67    Diastolic dysfunction K66.9    Infected prosthetic vascular graft ([facility] [address] T82. 7XXA    Bacteremia due to Staphylococcus aureus R78.81    C. difficile colitis A04.7    PNA (pneumonia) J18.9    Hypokalemia E87.6   1. Sepsis   due to c diff, av fistular infection vs pna and bacteremia   Id was on board  2. Bacteremia-MSSA and pna  ID on board ,on rocephin now, echo-ordered to  r/o vegetation ,results pending   AVG infection - s/p excision per vascular yesterday   3. c diff   Resolving. will  Continue monitor electrolytes  4 ESRD on HD  Nephrology on board , [facility] per vascular   5. . 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 Hyponatremia  Resolved   7 hypokalemia   Resolved   8 hypoglycemia   No appetite, will give snack, will continue glucose check, will d.c checking if no low over night   Subjective: no diarrhea, why need checking my glucose   Nurse: glucose low   Daughter was at bedside. Review of systems:    General: No fevers or chills. Cardiovascular: No chest pain or pressure. No palpitations. Pulmonary: No shortness of breath. Gastrointestinal: No nausea, vomiting.      Vital signs/Intake and Output:  Visit Vitals    /67 (BP 1 Location: Right arm, BP Patient Position: At rest)    Pulse 81    Temp 97.4 °F (36.3 °C)    Resp 17    Ht 5' 5\" (1.651 m)    Wt 92.8 kg (204 lb 9.6 oz)    SpO2 100%    Breastfeeding No    BMI 34.05 kg/m2     Current Shift:  02/25 0701 - 02/25 1900  In: 240 [P.O.:240]  Out: -   Last three shifts:  02/23 1901 - 02/25 0700  In: 780 [P.O.:380; I.V.:400]  Out: 897     Physical Exam:  General: WD, WN. Alert, cooperative, no acute distress    HEENT: NC, Atraumatic. PERRLA, anicteric sclerae. Lungs: CTA Bilaterally. No Wheezing/Rhonchi/Rales. Heart:  Regular  rhythm,  No murmur, No Rubs, No Gallops  Abdomen: Soft, Non distended, Non tender.  +Bowel sounds,   Extremities: No c/c. Left arm wrapped. Psych:   Not anxious or agitated. Neurologic:  No acute neurological deficit. Labs: Results:       Chemistry Recent Labs      02/25/17 0440 02/24/17 0145 02/23/17 0117   GLU  99  88  80   NA  140  140  139   K  3.9  3.3*  3.6   CL  102  98*  100   CO2  28  26  26   BUN  37*  70*  55*   CREA  8.77*  13.70*  11.24*   CA  9.2  9.5  9.0   AGAP  10  16  13   BUCR  4*  5*  5*   ALB   --   2.7*   --       CBC w/Diff Recent Labs      02/25/17 0440 02/24/17 0145 02/23/17 0117   WBC  5.9  6.3  6.8   RBC  2.93*  3.16*  3.10*   HGB  9.0*  9.8*  9.6*   HCT  28.0*  29.1*  28.5*   PLT  202  195  149   GRANS  59  67  69   LYMPH  29  28  17*   EOS  3  4  4      Cardiac Enzymes No results for input(s): CPK, CKND1, SHEREEN in the last 72 hours. No lab exists for component: CKRMB, TROIP   Coagulation Recent Labs      02/25/17 0440 02/24/17 0145   PTP  14.6  14.3   INR  1.2  1.2       Lipid Panel No results found for: CHOL, CHOLPOCT, CHOLX, CHLST, CHOLV, [id number], HDL, LDL, NLDLCT, DLDL, LDLC, DLDLP, [id number], VLDLC, VLDL, TGL, TGLX, TRIGL, [id number], TRIGP, TGLPOCT, [id number], CHHD, CHHDX   BNP No results for input(s): BNPP in the last 72 hours.    Liver Enzymes Recent Labs      02/24/17   0145   ALB  2.7*      Thyroid Studies No results found for: T4, T3U, TSH, TSHEXT, TSHEXT Procedures/imaging: see electronic medical records for all procedures/Xrays and details which were not copied into this note but were reviewed prior to creation of Saida Mane MD

## 2017-02-25 NOTE — PROGRESS NOTES
Right arm dressed. No dressings at bedside and patient is in pain. Will switch pain medications around as percocet 5 not working or touching pain. Perfectly fine for nursing to change dressing later today or tomorrow once pain under control. Please call with any further questions or concerns.

## 2017-02-25 NOTE — PROGRESS NOTES
1555  Bedside and Verbal shift change report given to Malka Parker RN (oncoming nurse) by Renuka Clancy RN (offgoing nurse). Report included the following information SBAR, Kardex and MAR.

## 2017-02-25 NOTE — PROGRESS NOTES
1935 - Bedside report received from Neel Poole, [facility]. Patient in bed. Pain 0/10. Getting dyalisis now    2050 - Patient in bed at this time. IV to R arm x2  intact and patent Dressing to L arm graft site  CDI. + CMS. Pt A & O x 4. LS clear, on RA. Abdomen soft, NT and ND. + BS to all 4 quadrants. Denies nausea. Pain 0/10. Call light within reach. Dyalisis still in progress. 2230-Medications given. Potential side effects explained to patient, patient verbalizes understanding, opportunities for questions provided. Patient stable, No apparent distress at this time, bed in locked position, call bell and phone within reach. 0220-Pt sleeping comfortably. 4141- Lab called with positive BCs. Dr Kirsty Hernandez made aware, no new orders. MD notified that pt is on Rocephin and Flagyl. Pt had uneventful shift. Pt ambulated indipendently. Pain remained well-controlled with medication. No issues/concerns at this time.  Call bell within reach

## 2017-02-25 NOTE — PROGRESS NOTES
Bedside and Verbal shift change report given to KESHIA Serna RN (oncoming nurse) by Ayo Cain. Sachin Godfrey RN (offgoing nurse). Report included the following information SBAR, Kardex, Intake/Output and MAR.

## 2017-02-25 NOTE — PROGRESS NOTES
Nephrology Progress note    Subjective:     Tab Patel is a 35 y.o. female with PMH chronic hypotension, ESRD on HD TTS presenting with weakness, fever to 102.3, cough. She has chills and her BP in ER was lower than baseline so she received multiple fluid boluses. She denies SOB, CP.  currently. She missed HD Saturday due to feeling poorly. K 4.1 today. O2 sat 100% RA. CXR shows RLL pneumonia. -HD access noted to have  purulent drainage- excision of AVG done on 2/24    Pain controlled. No SOB or CP. Tolerated HD yesterday. Admit Date: 2/21/2017      Allergy:  Allergies   Allergen Reactions    Vancomycin Other (comments)     Felt like her body was burning    Aspirin Nausea and Vomiting     Pt reports aspirin gave her a stomach ulcer.  Vicodin [Hydrocodone-Acetaminophen] Nausea and Vomiting        Objective:     Visit Vitals    /67 (BP 1 Location: Right arm, BP Patient Position: At rest)    Pulse 81    Temp 97.4 °F (36.3 °C)    Resp 17    Ht 5' 5\" (1.651 m)    Wt 92.8 kg (204 lb 9.6 oz)    LMP 12/01/2012    SpO2 100%    Breastfeeding No    BMI 34.05 kg/m2         Intake/Output Summary (Last 24 hours) at 02/25/17 1302  Last data filed at 02/25/17 0944   Gross per 24 hour   Intake              680 ml   Output              897 ml   Net             -217 ml       Physical Exam:       General: No acute distress   HENT: Atraumatic and normocephalic   Eyes: Normal conjunctiva   Neck: Supple    Cardiovascular: Normal S1 & S2, no m/r/g   Pulmonary/Chest Wall: Clear to auscultation bilaterally   Abdominal: Soft and non-tender   Musculoskeletal: no edema, dressed LUE surgical site   Neurological: No focal deficits   HD access: Lt groin temp line.       Data Review:      Lab Results   Component Value Date/Time    WBC 5.9 02/25/2017 04:40 AM    RBC 2.93 (L) 02/25/2017 04:40 AM    HCT 28.0 (L) 02/25/2017 04:40 AM    MCV 95.6 02/25/2017 04:40 AM    MCH 30.7 02/25/2017 04:40 AM    MCHC 32.1 02/25/2017 04:40 AM    RDW 15.7 (H) 02/25/2017 04:40 AM      No results found for: IRONNo components found for: FERRITIN  No components found for: PTHINT  Urinalysis  No results found for: UGLU       Impression:     -ESRD on HD TTS  -MSSA sepsis sec to  infected AVG. Repeat BC +ve. -Hypokalemia  -Infected AVG with drainage.  S/p excision   -Anemia CKD  -SHPT  -C diff  -Hyperphosphatemia     Plan:   -No indication for HD today  -Procrit for anemia  -ID following  -Tunneled HD catheter to be placed once bacteremia is controlled         MD Gaudencio Salazar  [phone]

## 2017-02-26 PROBLEM — K59.00 CONSTIPATION: Status: ACTIVE | Noted: 2017-02-26

## 2017-02-26 LAB
ANION GAP BLD CALC-SCNC: 12 MMOL/L (ref 3–18)
BASOPHILS # BLD AUTO: 0.1 K/UL (ref 0–0.1)
BASOPHILS # BLD: 2 % (ref 0–3)
BUN SERPL-MCNC: 45 MG/DL (ref 7–18)
BUN/CREAT SERPL: 4 (ref 12–20)
CALCIUM SERPL-MCNC: 9.1 MG/DL (ref 8.5–10.1)
CHLORIDE SERPL-SCNC: 100 MMOL/L (ref 100–108)
CO2 SERPL-SCNC: 28 MMOL/L (ref 21–32)
CREAT SERPL-MCNC: 11.29 MG/DL (ref 0.6–1.3)
DIFFERENTIAL METHOD BLD: ABNORMAL
EOSINOPHIL # BLD: 0.2 K/UL (ref 0–0.4)
EOSINOPHIL NFR BLD: 3 % (ref 0–5)
ERYTHROCYTE [DISTWIDTH] IN BLOOD BY AUTOMATED COUNT: 15.3 % (ref 11.6–14.5)
GLUCOSE BLD STRIP.AUTO-MCNC: 87 MG/DL (ref 70–110)
GLUCOSE BLD STRIP.AUTO-MCNC: 90 MG/DL (ref 70–110)
GLUCOSE BLD STRIP.AUTO-MCNC: 91 MG/DL (ref 70–110)
GLUCOSE BLD STRIP.AUTO-MCNC: 99 MG/DL (ref 70–110)
GLUCOSE SERPL-MCNC: 93 MG/DL (ref 74–99)
HCT VFR BLD AUTO: 25.6 % (ref 35–45)
HGB BLD-MCNC: 8.3 G/DL (ref 12–16)
INR PPP: 1.2 (ref 0.8–1.2)
LYMPHOCYTES # BLD AUTO: 31 % (ref 20–51)
LYMPHOCYTES # BLD: 2.2 K/UL (ref 0.8–3.5)
MAGNESIUM SERPL-MCNC: 2.5 MG/DL (ref 1.8–2.4)
MCH RBC QN AUTO: 31.1 PG (ref 24–34)
MCHC RBC AUTO-ENTMCNC: 32.4 G/DL (ref 31–37)
MCV RBC AUTO: 95.9 FL (ref 74–97)
MONOCYTES # BLD: 0.4 K/UL (ref 0–1)
MONOCYTES NFR BLD AUTO: 6 % (ref 2–9)
NEUTS SEG # BLD: 4.2 K/UL (ref 1.8–8)
NEUTS SEG NFR BLD AUTO: 58 % (ref 42–75)
NRBC BLD-RTO: 12 PER 100 WBC
PLATELET # BLD AUTO: 276 K/UL (ref 135–420)
PLATELET COMMENTS,PCOM: ABNORMAL
PMV BLD AUTO: 10.4 FL (ref 9.2–11.8)
POTASSIUM SERPL-SCNC: 3.8 MMOL/L (ref 3.5–5.5)
PROTHROMBIN TIME: 14.7 SEC (ref 11.5–15.2)
RBC # BLD AUTO: 2.67 M/UL (ref 4.2–5.3)
RBC MORPH BLD: ABNORMAL
SODIUM SERPL-SCNC: 140 MMOL/L (ref 136–145)
WBC # BLD AUTO: 7.1 K/UL (ref 4.6–13.2)

## 2017-02-26 PROCEDURE — 74011250636 HC RX REV CODE- 250/636: Performed by: FAMILY MEDICINE

## 2017-02-26 PROCEDURE — 36415 COLL VENOUS BLD VENIPUNCTURE: CPT | Performed by: SURGERY

## 2017-02-26 PROCEDURE — 80048 BASIC METABOLIC PNL TOTAL CA: CPT | Performed by: SURGERY

## 2017-02-26 PROCEDURE — 82962 GLUCOSE BLOOD TEST: CPT

## 2017-02-26 PROCEDURE — 74011250637 HC RX REV CODE- 250/637: Performed by: SURGERY

## 2017-02-26 PROCEDURE — 74011250637 HC RX REV CODE- 250/637: Performed by: INTERNAL MEDICINE

## 2017-02-26 PROCEDURE — 74011250637 HC RX REV CODE- 250/637: Performed by: HOSPITALIST

## 2017-02-26 PROCEDURE — 87040 BLOOD CULTURE FOR BACTERIA: CPT | Performed by: SURGERY

## 2017-02-26 PROCEDURE — 74011250636 HC RX REV CODE- 250/636: Performed by: SURGERY

## 2017-02-26 PROCEDURE — 85610 PROTHROMBIN TIME: CPT | Performed by: SURGERY

## 2017-02-26 PROCEDURE — 74011000258 HC RX REV CODE- 258: Performed by: FAMILY MEDICINE

## 2017-02-26 PROCEDURE — 65660000000 HC RM CCU STEPDOWN

## 2017-02-26 PROCEDURE — 85025 COMPLETE CBC W/AUTO DIFF WBC: CPT | Performed by: SURGERY

## 2017-02-26 PROCEDURE — 83735 ASSAY OF MAGNESIUM: CPT | Performed by: SURGERY

## 2017-02-26 PROCEDURE — 74011250636 HC RX REV CODE- 250/636: Performed by: INTERNAL MEDICINE

## 2017-02-26 PROCEDURE — 74011000258 HC RX REV CODE- 258: Performed by: INTERNAL MEDICINE

## 2017-02-26 RX ORDER — DOCUSATE SODIUM 100 MG/1
100 CAPSULE, LIQUID FILLED ORAL DAILY
Status: DISCONTINUED | OUTPATIENT
Start: 2017-02-26 | End: 2017-03-02 | Stop reason: HOSPADM

## 2017-02-26 RX ADMIN — HYDROMORPHONE HYDROCHLORIDE 0.5 MG: 2 INJECTION INTRAMUSCULAR; INTRAVENOUS; SUBCUTANEOUS at 19:28

## 2017-02-26 RX ADMIN — HYDROMORPHONE HYDROCHLORIDE 0.5 MG: 2 INJECTION INTRAMUSCULAR; INTRAVENOUS; SUBCUTANEOUS at 09:14

## 2017-02-26 RX ADMIN — CEFTRIAXONE 2 G: 2 INJECTION, POWDER, FOR SOLUTION INTRAMUSCULAR; INTRAVENOUS at 11:02

## 2017-02-26 RX ADMIN — OXYCODONE HYDROCHLORIDE AND ACETAMINOPHEN 2 TABLET: 10; 325 TABLET ORAL at 00:50

## 2017-02-26 RX ADMIN — DOCUSATE SODIUM 100 MG: 100 CAPSULE, LIQUID FILLED ORAL at 11:02

## 2017-02-26 RX ADMIN — MIDODRINE HYDROCHLORIDE 5 MG: 2.5 TABLET ORAL at 09:04

## 2017-02-26 RX ADMIN — METRONIDAZOLE 500 MG: 250 TABLET ORAL at 09:04

## 2017-02-26 RX ADMIN — MIDODRINE HYDROCHLORIDE 5 MG: 2.5 TABLET ORAL at 22:57

## 2017-02-26 RX ADMIN — MIDODRINE HYDROCHLORIDE 5 MG: 2.5 TABLET ORAL at 14:55

## 2017-02-26 RX ADMIN — PROMETHAZINE HYDROCHLORIDE 12.5 MG: 25 INJECTION, SOLUTION INTRAMUSCULAR; INTRAVENOUS at 20:56

## 2017-02-26 RX ADMIN — OXYCODONE HYDROCHLORIDE AND ACETAMINOPHEN 2 TABLET: 10; 325 TABLET ORAL at 11:02

## 2017-02-26 RX ADMIN — OXYCODONE HYDROCHLORIDE AND ACETAMINOPHEN 2 TABLET: 10; 325 TABLET ORAL at 22:57

## 2017-02-26 RX ADMIN — METRONIDAZOLE 500 MG: 250 TABLET ORAL at 19:10

## 2017-02-26 RX ADMIN — METRONIDAZOLE 500 MG: 250 TABLET ORAL at 22:57

## 2017-02-26 NOTE — PROGRESS NOTES
ID Progress Note      Current antibiotics: Ceftriaxone + Metronidazole (Day 6)      CC: Sepsis.      Subjective: 34y Female with ESRD on HD seen for MSSA bacteremia (felt secondary to AVG), C difficile colitis and possible pneumonia. Patient is s/p excision of her AVG which was noted to be obviously infected on 2/24 by Dr. Violeta Snowden. She continues to have pain at the site and is requesting more pain medications. She denies any fevers or chills. Denies any n/v. Has not had any loose stool for the past 2 days. Denies any cough or sob. No skin rashes.       PE: Afebrile, VSS.  NAD. HEENT: Anicteric. Mouth w/o thrush. Neck: Supple. Lungs: CTA ant/lat/post bilaterally. CV: RRR. Abdomen: Soft. Non tender. Ext: Lt AVG site dressed. Femoral TDC on L      Labs/Radiology:  WBC=7.1, Hb=8.3, Wkk=223  Cr=11.29  Blood culture - 2/21/17 - MSSA  2/23/17 - MSSA  2/24/17 - MSSA  2/25/17 - NGTD  2/26/17 - pending   Wound 2/24/17 - pending  C difficile DNA - positive. Echo - no obvious valvular vegetations.      ASSESSMENT/RECOMMENDATIONS      1. High grade MSSA bacteremia. AVG infection - s/p excision - continue ceftriaxone (on this for coverage of possible pneumonia as well)  TTE without obvious vegetations  F/u repeat blood cultures to document clearance      2. C difficile colitis - continue oral metronidazole  -diarrhea has resolved on treatment      3. Possible pneumonia. Patchy infiltrate RLL. On ceftriaxone.      4.  ESRD on HD.

## 2017-02-26 NOTE — ROUTINE PROCESS
Bedside and Verbal shift change report given to Celestine Smith RN  (oncoming nurse) by Sabina Dasilva RN  (offgoing nurse). Report given with SBAR, Kardex, Intake/Output and Recent Results.

## 2017-02-26 NOTE — PROGRESS NOTES
Hospitalist Progress Note-critical care note     Patient: Stephanie Avila MRN: [id number]  CSN: [id number]    YOB: 1975  Age: 35 y.o. Sex: female    DOA: 2/21/2017 LOS:  LOS: 5 days            Chief complaint: bacteremia,  Sepsis, c diff, pna, infectious of av fistular , constipation     Assessment/Plan         Patient Active Problem List   Diagnosis Code    CAP (community acquired pneumonia) J18.9    ESRD needing dialysis ([address] N18.6    Sepsis ([facility] [address] A41.9    Anemia D64.9    Hyponatremia E87.1    Dehydration E86.0    Prolonged Q-T interval on ECG C76.79    Diastolic dysfunction S27.2    Infected prosthetic vascular graft ([facility] [address] T82. 7XXA    Bacteremia due to Staphylococcus aureus R78.81    C. difficile colitis A04.7    PNA (pneumonia) J18.9    Hypokalemia E87.6    Hypoglycemia E16.2   1. Sepsis   due to c diff, av fistular infection vs pna and bacteremia   Id was on board  2. Bacteremia-MSSA and pna  ID on board ,on rocephin now, echo mo vegetation noted   AVG infection - s/p excision per vascular yesterday   3. c diff   Resolving. will  Continue monitor electrolytes  4 ESRD on HD  Nephrology on board , [facility] per vascular   5. . 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 Hyponatremia  Resolved   7 hypokalemia   Resolved   8 hypoglycemia   Resolved   9 constipation   bm regimen   Subjective: no bm, nauseas due to coffee   Nurse:arm pain   Daughter was at bedside. Review of systems:    General: No fevers or chills. Cardiovascular: No chest pain or pressure. No palpitations. Pulmonary: No shortness of breath. Gastrointestinal: No nausea, vomiting.      Vital signs/Intake and Output:  Visit Vitals    /89 (BP 1 Location: Right arm, BP Patient Position: At rest)    Pulse 81    Temp 97.4 °F (36.3 °C)    Resp 18    Ht 5' 5\" (1.651 m)    Wt 96.8 kg (213 lb 6.5 oz)    SpO2 100%    Breastfeeding No    BMI 35.51 kg/m2     Current Shift:  02/26 0701 - 02/26 1900  In: 200 [P.O.:150; I.V.:50]  Out: 500   Last three shifts:  02/24 1901 - 02/26 0700  In: 980 [P.O.:980]  Out: 897     Physical Exam:  General: WD, WN. Alert, cooperative, no acute distress    HEENT: NC, Atraumatic. PERRLA, anicteric sclerae. Lungs: CTA Bilaterally. No Wheezing/Rhonchi/Rales. Heart:  Regular  rhythm,  No murmur, No Rubs, No Gallops  Abdomen: Soft, Non distended, Non tender.  +Bowel sounds,   Extremities: No c/c. Left arm wrapped. Psych:   Not anxious or agitated. Neurologic:  No acute neurological deficit. Labs: Results:       Chemistry Recent Labs      02/26/17 0425 02/25/17 0440 02/24/17   0145   GLU  93  99  88   NA  140  140  140   K  3.8  3.9  3.3*   CL  100  102  98*   CO2  28  28  26   BUN  45*  37*  70*   CREA  11.29*  8.77*  13.70*   CA  9.1  9.2  9.5   AGAP  12  10  16   BUCR  4*  4*  5*   ALB   --    --   2.7*      CBC w/Diff Recent Labs      02/26/17 0425 02/25/17 0440 02/24/17 0145   WBC  7.1  5.9  6.3   RBC  2.67*  2.93*  3.16*   HGB  8.3*  9.0*  9.8*   HCT  25.6*  28.0*  29.1*   PLT  276  202  195   GRANS  58  59  67   LYMPH  31  29  28   EOS  3  3  4      Cardiac Enzymes No results for input(s): CPK, CKND1, SHEREEN in the last 72 hours. No lab exists for component: CKRMB, TROIP   Coagulation Recent Labs      02/26/17 0425 02/25/17 0440   PTP  14.7  14.6   INR  1.2  1.2       Lipid Panel No results found for: CHOL, CHOLPOCT, CHOLX, CHLST, CHOLV, [id number], HDL, LDL, NLDLCT, DLDL, LDLC, DLDLP, [id number], VLDLC, VLDL, TGL, TGLX, TRIGL, [id number], TRIGP, TGLPOCT, [id number], CHHD, CHHDX   BNP No results for input(s): BNPP in the last 72 hours.    Liver Enzymes Recent Labs      02/24/17   0145   ALB  2.7*      Thyroid Studies No results found for: T4, T3U, TSH, TSHEXT, TSHEXT     Procedures/imaging: see electronic medical records for all procedures/Xrays and details which were not copied into this note but were reviewed prior to creation of Ashley Cabrera MD

## 2017-02-26 NOTE — PROGRESS NOTES
Nephrology Progress note    Subjective:     Pete Eden is a 35 y.o. female with PMH chronic hypotension, ESRD on HD TTS presenting with weakness, fever to 102.3, cough. She has chills and her BP in ER was lower than baseline so she received multiple fluid boluses. She denies SOB, CP.  currently. She missed HD Saturday due to feeling poorly. K 4.1 today. O2 sat 100% RA. CXR shows RLL pneumonia. -HD access noted to have  purulent drainage- excision of AVG done on 2/24    Pain controlled. No SOB or CP. Admit Date: 2/21/2017      Allergy:  Allergies   Allergen Reactions    Vancomycin Other (comments)     Felt like her body was burning    Aspirin Nausea and Vomiting     Pt reports aspirin gave her a stomach ulcer.  Vicodin [Hydrocodone-Acetaminophen] Nausea and Vomiting        Objective:     Visit Vitals    /77    Pulse 70    Temp 97.6 °F (36.4 °C)    Resp 18    Ht 5' 5\" (1.651 m)    Wt 96.4 kg (212 lb 9.6 oz)    LMP 12/01/2012    SpO2 100%    Breastfeeding No    BMI 35.38 kg/m2         Intake/Output Summary (Last 24 hours) at 02/26/17 1049  Last data filed at 02/26/17 0334   Gross per 24 hour   Intake              600 ml   Output                0 ml   Net              600 ml       Physical Exam:       General: No acute distress   HENT: Atraumatic and normocephalic   Eyes: Normal conjunctiva   Neck: Supple    Cardiovascular: Normal S1 & S2, no m/r/g   Pulmonary/Chest Wall: Clear to auscultation bilaterally   Abdominal: Soft and non-tender   Musculoskeletal: no edema, dressed LUE surgical site   Neurological: No focal deficits   HD access: Lt groin temp line.       Data Review:      Lab Results   Component Value Date/Time    WBC 7.1 02/26/2017 04:25 AM    RBC 2.67 (L) 02/26/2017 04:25 AM    HCT 25.6 (L) 02/26/2017 04:25 AM    MCV 95.9 02/26/2017 04:25 AM    MCH 31.1 02/26/2017 04:25 AM    MCHC 32.4 02/26/2017 04:25 AM    RDW 15.3 (H) 02/26/2017 04:25 AM      No results found for: [facility] components found for: FERRITIN  No components found for: PTHINT  Urinalysis  No results found for: UGLU       Impression:     -ESRD on HD TTS  -MSSA sepsis sec to  infected AVG. Repeat BC +ve. -Hypokalemia, corrected  -Infected AVG with drainage.  S/p excision   -Anemia CKD  -SHPT  -C diff  -Hyperphosphatemia     Plan:   -Will do HD tomorrow for more clearance, then back to TTS schedule   -Procrit for anemia  -ID following  -Pt will need Tunneled HD catheter once bacteremia clears         MD Gaudencio Torres  [phone]

## 2017-02-26 NOTE — ROUTINE PROCESS
Verbal shift change report given to Lorna Fuentes RN (oncoming nurse) by Clovis Hoover   (offgoing nurse). Report included the following information SBAR, Kardex and MAR.

## 2017-02-26 NOTE — PROGRESS NOTES
3166 Assessment completed and documented. left arm had elastic bandage that is clean dry and intact. Patient medicated for pain. 0919 Dr. Jassi Pearson paged at this time. Patients is feeling nauseous and would like phenergan for nausea. Shift Summary- Patient had an uneventful shift. Patient continues to have pain in  Left arm and pain medication helps to relieve pain. Patient started feeling nauseous towards end of shift. Dr. Jassi Pearson paged and no return call. Night nurse stated that she paged the hospitalist and received an order for an anti nausea medication. No immediate concerns at this time.

## 2017-02-27 LAB
BACTERIA SPEC CULT: ABNORMAL
BACTERIA SPEC CULT: ABNORMAL
GLUCOSE BLD STRIP.AUTO-MCNC: 81 MG/DL (ref 70–110)
GLUCOSE BLD STRIP.AUTO-MCNC: 86 MG/DL (ref 70–110)
GLUCOSE BLD STRIP.AUTO-MCNC: 87 MG/DL (ref 70–110)
GRAM STN SPEC: ABNORMAL
INR PPP: 1.3 (ref 0.8–1.2)
MAGNESIUM SERPL-MCNC: 2.6 MG/DL (ref 1.8–2.4)
PROTHROMBIN TIME: 15.7 SEC (ref 11.5–15.2)
SERVICE CMNT-IMP: ABNORMAL
SERVICE CMNT-IMP: ABNORMAL

## 2017-02-27 PROCEDURE — 65660000000 HC RM CCU STEPDOWN

## 2017-02-27 PROCEDURE — 74011250636 HC RX REV CODE- 250/636: Performed by: INTERNAL MEDICINE

## 2017-02-27 PROCEDURE — 74011000258 HC RX REV CODE- 258: Performed by: INTERNAL MEDICINE

## 2017-02-27 PROCEDURE — 85610 PROTHROMBIN TIME: CPT | Performed by: SURGERY

## 2017-02-27 PROCEDURE — 87040 BLOOD CULTURE FOR BACTERIA: CPT | Performed by: SURGERY

## 2017-02-27 PROCEDURE — 74011250637 HC RX REV CODE- 250/637: Performed by: SURGERY

## 2017-02-27 PROCEDURE — 74011250637 HC RX REV CODE- 250/637: Performed by: INTERNAL MEDICINE

## 2017-02-27 PROCEDURE — 83735 ASSAY OF MAGNESIUM: CPT | Performed by: SURGERY

## 2017-02-27 PROCEDURE — 74011000250 HC RX REV CODE- 250: Performed by: INTERNAL MEDICINE

## 2017-02-27 PROCEDURE — 82962 GLUCOSE BLOOD TEST: CPT

## 2017-02-27 PROCEDURE — 90935 HEMODIALYSIS ONE EVALUATION: CPT

## 2017-02-27 PROCEDURE — 36415 COLL VENOUS BLD VENIPUNCTURE: CPT | Performed by: SURGERY

## 2017-02-27 RX ORDER — HEPARIN SODIUM 1000 [USP'U]/ML
5000 INJECTION, SOLUTION INTRAVENOUS; SUBCUTANEOUS
Status: DISCONTINUED | OUTPATIENT
Start: 2017-02-28 | End: 2017-03-02 | Stop reason: HOSPADM

## 2017-02-27 RX ORDER — HEPARIN SODIUM (PORCINE) LOCK FLUSH IV SOLN 100 UNIT/ML 100 UNIT/ML
500 SOLUTION INTRAVENOUS
Status: DISCONTINUED | OUTPATIENT
Start: 2017-02-27 | End: 2017-03-02 | Stop reason: HOSPADM

## 2017-02-27 RX ORDER — SENNOSIDES 8.6 MG/1
1 TABLET ORAL DAILY
Status: DISCONTINUED | OUTPATIENT
Start: 2017-02-27 | End: 2017-03-02 | Stop reason: HOSPADM

## 2017-02-27 RX ORDER — HEPARIN SODIUM (PORCINE) LOCK FLUSH IV SOLN 100 UNIT/ML 100 UNIT/ML
SOLUTION INTRAVENOUS
Status: DISPENSED
Start: 2017-02-27 | End: 2017-02-28

## 2017-02-27 RX ADMIN — HEPARIN SODIUM (PORCINE) LOCK FLUSH IV SOLN 100 UNIT/ML 500 UNITS: 100 SOLUTION at 13:00

## 2017-02-27 RX ADMIN — MIDODRINE HYDROCHLORIDE 5 MG: 2.5 TABLET ORAL at 16:12

## 2017-02-27 RX ADMIN — SODIUM CHLORIDE: 9 INJECTION INTRAMUSCULAR; INTRAVENOUS; SUBCUTANEOUS at 08:44

## 2017-02-27 RX ADMIN — OXYCODONE HYDROCHLORIDE AND ACETAMINOPHEN 2 TABLET: 10; 325 TABLET ORAL at 22:04

## 2017-02-27 RX ADMIN — OXYCODONE HYDROCHLORIDE AND ACETAMINOPHEN 2 TABLET: 10; 325 TABLET ORAL at 16:12

## 2017-02-27 RX ADMIN — OXYCODONE HYDROCHLORIDE AND ACETAMINOPHEN 2 TABLET: 10; 325 TABLET ORAL at 07:14

## 2017-02-27 RX ADMIN — CEFTRIAXONE 2 G: 2 INJECTION, POWDER, FOR SOLUTION INTRAMUSCULAR; INTRAVENOUS at 14:31

## 2017-02-27 RX ADMIN — MIDODRINE HYDROCHLORIDE 5 MG: 2.5 TABLET ORAL at 11:19

## 2017-02-27 RX ADMIN — ERYTHROPOIETIN 6000 UNITS: 3000 INJECTION, SOLUTION INTRAVENOUS; SUBCUTANEOUS at 12:29

## 2017-02-27 RX ADMIN — OXYCODONE HYDROCHLORIDE AND ACETAMINOPHEN 2 TABLET: 10; 325 TABLET ORAL at 11:19

## 2017-02-27 RX ADMIN — METRONIDAZOLE 500 MG: 250 TABLET ORAL at 16:12

## 2017-02-27 RX ADMIN — MIDODRINE HYDROCHLORIDE 5 MG: 2.5 TABLET ORAL at 07:07

## 2017-02-27 RX ADMIN — METRONIDAZOLE 500 MG: 250 TABLET ORAL at 22:04

## 2017-02-27 NOTE — PROGRESS NOTES
ID Progress Note      Current antibiotics: Ceftriaxone + Metronidazole (Day 7)      CC: Sepsis.      Subjective: 34y Female with ESRD on HD seen for MSSA bacteremia (felt secondary to AVG), C difficile colitis and possible pneumonia. Patient is s/p excision of her AVG which was noted to be obviously infected on 2/24 by Dr. Lord Ascencio. Pt reports continued pain at L arm. Otherwise is doing well with no f/c/ns  No cough sob  No abd discomfort. No rash. No issues with TDC at groin. Reports normal stools      PE: Afebrile, VS reviewed  NAD. HEENT: Anicteric. Mouth w/o thrush. Neck: Supple. Lungs: CTA ant/lat/post bilaterally. CV: RRR. Abdomen: Soft. Non tender. Ext: Lt AVG site dressed. Femoral TDC on L      Labs/Radiology:  crt 11.29    Blood culture - 2/21/17 - MSSA  2/23/17 - MSSA  2/24/17 - MSSA  2/25/17 - NGTD  2/26/17 - NGTD  2/27/17:  Pending       Wound 2/24/17 - MSSA  C difficile DNA - positive. Echo - no obvious valvular vegetations.      ASSESSMENT/RECOMMENDATIONS      1. High grade MSSA bacteremia. AVG infection - s/p excision - continue ceftriaxone (on this for coverage of possible pneumonia as well)  TTE without obvious vegetations  F/u repeat blood cultures to document clearance    ==>  Anticipate 28 day course of therapy from first negative blood culture  Anticipate IV ceftriaxone at this time            Hold off on placing more permanent TDC at this time - f/u on repeat blood cultures      2. C difficile colitis - continue oral metronidazole  -diarrhea has resolved on treatment      3. Possible pneumonia. Patchy infiltrate RLL. On ceftriaxone.      4. ESRD on HD.     Case d/w patient

## 2017-02-27 NOTE — PROGRESS NOTES
Hospitalist Progress Note-critical care note     Patient: Sivakumar Grayson MRN: [id number]  CSN: [id number]    YOB: 1975  Age: 35 y.o. Sex: female    DOA: 2/21/2017 LOS:  LOS: 6 days            Chief complaint: bacteremia,  Sepsis, c diff, pna, infectious of av fistular , constipation     Assessment/Plan         Patient Active Problem List   Diagnosis Code    CAP (community acquired pneumonia) J18.9    ESRD needing dialysis ([facility] [address] N18.6    Sepsis ([facility] [address] A41.9    Anemia D64.9    Hyponatremia E87.1    Dehydration E86.0    Prolonged Q-T interval on ECG R39.19    Diastolic dysfunction P14.2    Infected prosthetic vascular graft ([facility] [address] T82. 7XXA    Bacteremia due to Staphylococcus aureus R78.81    C. difficile colitis A04.7    PNA (pneumonia) J18.9    Hypokalemia E87.6    Hypoglycemia E16.2    Constipation K59.00   1. Sepsis  Resolved. due to c diff, av fistular infection vs pna and bacteremia   2. Bacteremia-MSSA and pna  ID on board ,on rocephin now, echo no vegetation noted   AVG infection - s/p excision per vascular   3. c diff   Resolving. will  Continue monitor electrolytes, on flagyl po   4 ESRD on HD  Nephrology on board , [facility] per vascular , HD today    5. . 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 Hyponatremia  Resolved   7 hypokalemia   Resolved   8 hypoglycemia   Resolved   9 constipation   bm regimen       Subjective: no BM   Nurse:no acute issue     bp low during HD, midodrine was given   Review of systems:    General: No fevers or chills. Cardiovascular: No chest pain or pressure. No palpitations. Pulmonary: No shortness of breath. Gastrointestinal: No nausea, vomiting.      Vital signs/Intake and Output:  Visit Vitals    BP (!) 89/54    Pulse 75    Temp 97.8 °F (36.6 °C) (Axillary)    Resp 20    Ht 5' 5\" (1.651 m)    Wt 96.8 kg (213 lb 6.5 oz)    SpO2 95%    Breastfeeding No    BMI 35.51 kg/m2     Current Shift:  02/27 0701 - 02/27 1900  In: 450 [P.O.:450]  Out: -   Last three shifts:  02/25 1901 - 02/27 0700  In: 540 [P.O.:490; I.V.:50]  Out: 500     Physical Exam:  General: WD, WN. Alert, cooperative, no acute distress    HEENT: NC, Atraumatic. PERRLA, anicteric sclerae. Lungs: CTA Bilaterally. No Wheezing/Rhonchi/Rales. Heart:  Regular  rhythm,  No murmur, No Rubs, No Gallops  Abdomen: Soft, Non distended, Non tender.  +Bowel sounds,   Extremities: No c/c. Left arm wrapped. Psych:   Not anxious or agitated. Neurologic:  No acute neurological deficit. Labs: Results:       Chemistry Recent Labs      02/26/17 0425 02/25/17 0440   GLU  93  99   NA  140  140   K  3.8  3.9   CL  100  102   CO2  28  28   BUN  45*  37*   CREA  11.29*  8.77*   CA  9.1  9.2   AGAP  12  10   BUCR  4*  4*      CBC w/Diff Recent Labs      02/26/17 0425 02/25/17 0440   WBC  7.1  5.9   RBC  2.67*  2.93*   HGB  8.3*  9.0*   HCT  25.6*  28.0*   PLT  276  202   GRANS  58  59   LYMPH  31  29   EOS  3  3      Cardiac Enzymes No results for input(s): CPK, CKND1, SHEREEN in the last 72 hours. No lab exists for component: CKRMB, TROIP   Coagulation Recent Labs      02/27/17 0435 02/26/17 0425   PTP  15.7*  14.7   INR  1.3*  1.2       Lipid Panel No results found for: CHOL, CHOLPOCT, CHOLX, CHLST, CHOLV, [id number], HDL, LDL, NLDLCT, DLDL, LDLC, DLDLP, [id number], VLDLC, VLDL, TGL, TGLX, TRIGL, [id number], TRIGP, TGLPOCT, [id number], CHHD, CHHDX   BNP No results for input(s): BNPP in the last 72 hours. Liver Enzymes No results for input(s): TP, ALB, TBIL, AP, SGOT, GPT in the last 72 hours.     No lab exists for component: DBIL   Thyroid Studies No results found for: T4, T3U, TSH, TSHEXT, TSHEXT     Procedures/imaging: see electronic medical records for all procedures/Xrays and details which were not copied into this note but were reviewed prior to creation of Luis Orta MD

## 2017-02-27 NOTE — PROGRESS NOTES
2257 Pt premedicated with two tabs 10/325 percocet po prior to dsg change. 2340 Dsg to left upper fistula changed per orders. A wet to dry dsg was placed using 4x4 gauze and saline and covered with ABD pads, kerlex and held in place with an ace wrap. The packing was kept in place. Pt tolerated the procedure well.

## 2017-02-27 NOTE — PROGRESS NOTES
Nephrology Progress note    Subjective:     Carli Dexter is a 35 y.o. female with PMH chronic hypotension, ESRD on HD TTS presenting with weakness, fever to 102.3, cough. She has chills and her BP in ER was lower than baseline so she received multiple fluid boluses. She denies SOB, CP.  currently. She missed HD Saturday due to feeling poorly. K 4.1 today. O2 sat 100% RA. CXR shows RLL pneumonia. -HD access noted to have  purulent drainage- excision of AVG done on 2/24    Seen on HD. No SOB or CP. Uldall catheter with poor flow initially, improved following Activase. Admit Date: 2/21/2017      Allergy:  Allergies   Allergen Reactions    Vancomycin Other (comments)     Felt like her body was burning    Aspirin Nausea and Vomiting     Pt reports aspirin gave her a stomach ulcer.      Vicodin [Hydrocodone-Acetaminophen] Nausea and Vomiting        Objective:     Visit Vitals    BP 94/60    Pulse 71    Temp 97.8 °F (36.6 °C) (Axillary)    Resp 20    Ht 5' 5\" (1.651 m)    Wt 96.8 kg (213 lb 6.5 oz)    LMP 12/01/2012    SpO2 95%    Breastfeeding No    BMI 35.51 kg/m2         Intake/Output Summary (Last 24 hours) at 02/27/17 1101  Last data filed at 02/27/17 1012   Gross per 24 hour   Intake              650 ml   Output              500 ml   Net              150 ml       Physical Exam:       General: No acute distress   HENT: Atraumatic and normocephalic   Eyes: Normal conjunctiva   Neck: Supple    Cardiovascular: Normal S1 & S2, no m/r/g   Pulmonary/Chest Wall: Clear to auscultation bilaterally   Abdominal: Soft and non-tender   Musculoskeletal: no edema, dressing on  LUE surgical site   Neurological: No focal deficits   HD access: Lt groin Uldall cath Qb 250      Data Review:      Lab Results   Component Value Date/Time    WBC 7.1 02/26/2017 04:25 AM    RBC 2.67 (L) 02/26/2017 04:25 AM    HCT 25.6 (L) 02/26/2017 04:25 AM    MCV 95.9 02/26/2017 04:25 AM    MCH 31.1 02/26/2017 04:25 AM    MCHC 32.4 02/26/2017 04:25 AM    RDW 15.3 (H) 02/26/2017 04:25 AM      No results found for: IRONNo components found for: FERRITIN  No components found for: PTHINT  Urinalysis  No results found for: UGLU       Impression:     -ESRD on HD TTS  -MSSA sepsis sec to  infected AVG. -Hypokalemia, corrected  -Infected AVG with drainage. S/p excision   -Anemia CKD  -SHPT  -C diff  -Hyperphosphatemia     Plan:   -HD today   -Procrit for anemia  -ID following  -Pt will need Tunneled HD catheter once bacteremia clears.  BC of 2/25 and 2//26 neg so far        MD Gaudencio Rosenthal [place]  [phone]

## 2017-02-27 NOTE — ROUTINE PROCESS
Bedside and Verbal shift change report given to DRAKE De La Paz Rn (oncoming nurse) by Leon Lopez RN (offgoing nurse). Report included the following information SBAR, Kardex, Intake/Output and MAR.

## 2017-02-27 NOTE — PROGRESS NOTES
Left femoral [place] accessed per protocol. Tx initiated at 0750 without difficulty, lines patent. UF goal of 1000ml initiated for hemodynamic stability. At 0800 arterial pressure elevated, nss bolus flush ineffective, lines reversed ineffective, bfr slowly decreased to 200 ineffective, nephrology contacted, new order given for alteplase 2ml per intracatheter to dwell for 1 hour and reinitiated treatment. Tx reinitiated at 0950. Lines reversed at 0955. BFR decreased to 300 due to elevated arterial pressure. BFR decreased to 275 at 1008 d/t increased arterial pressure, BFR decreased to 200 at 1008 due to increased arterial pressure. At 1100 b/p 88/57, 200ml bolus saline given, pt asymptomatic, goal decreased by 200ml, midodrine requested. At 1110, b/p 100/61 and heart rate 84. At 1200 treatment ended d/t clotted arterial line, nephrologist notified, epogen 6000 units given, dressing changed. New order for heparin intracathter.

## 2017-02-27 NOTE — PROGRESS NOTES
Pt seen and examined. No events. Received HD via L groin catheter this AM.  Visit Vitals    BP (!) 89/54    Pulse 75    Temp 97.8 °F (36.6 °C) (Axillary)    Resp 20    Ht 5' 5\" (1.651 m)    Wt 213 lb 6.5 oz (96.8 kg)    LMP 12/01/2012    SpO2 95%    Breastfeeding No    BMI 35.51 kg/m2    NAD  RRR  L arm dressings removed. No purulent drainage.   No bleeding noted    A/P: 35 F with ESRD and sepsis s/p excision of infected L arm loop AVG  1) Local wound care with wet to dry packing daily  2) Will need home health for dressing changes on d/c  3) Will need permacath prior to d/c once ok with ID  4) Will follow

## 2017-02-27 NOTE — ROUTINE PROCESS
Bedside shift change report given to Lexi RN (oncoming nurse) by Reji Og RN (offgoing nurse). Report included the following information SBAR, Kardex and MAR.

## 2017-02-27 NOTE — PROGRESS NOTES
Attempted a follow up visit to provide Advance Medical Directive information. Patient was unavailable each time visit was attempted. Chaplains remain available to provide pastoral support to patient and family as needed and requested. Rev.  Livan Kearney  [phone]

## 2017-02-28 LAB
ALBUMIN SERPL BCP-MCNC: 2.4 G/DL (ref 3.4–5)
ANION GAP BLD CALC-SCNC: 9 MMOL/L (ref 3–18)
BUN SERPL-MCNC: 30 MG/DL (ref 7–18)
BUN/CREAT SERPL: 3 (ref 12–20)
CALCIUM SERPL-MCNC: 8.9 MG/DL (ref 8.5–10.1)
CHLORIDE SERPL-SCNC: 102 MMOL/L (ref 100–108)
CO2 SERPL-SCNC: 28 MMOL/L (ref 21–32)
CREAT SERPL-MCNC: 10.6 MG/DL (ref 0.6–1.3)
ERYTHROCYTE [DISTWIDTH] IN BLOOD BY AUTOMATED COUNT: 14.9 % (ref 11.6–14.5)
GLUCOSE BLD STRIP.AUTO-MCNC: 111 MG/DL (ref 70–110)
GLUCOSE BLD STRIP.AUTO-MCNC: 85 MG/DL (ref 70–110)
GLUCOSE BLD STRIP.AUTO-MCNC: 95 MG/DL (ref 70–110)
GLUCOSE BLD STRIP.AUTO-MCNC: 95 MG/DL (ref 70–110)
GLUCOSE SERPL-MCNC: 95 MG/DL (ref 74–99)
HCT VFR BLD AUTO: 23.3 % (ref 35–45)
HGB BLD-MCNC: 7.5 G/DL (ref 12–16)
INR PPP: 1.3 (ref 0.8–1.2)
MAGNESIUM SERPL-MCNC: 2.2 MG/DL (ref 1.8–2.4)
MCH RBC QN AUTO: 30.7 PG (ref 24–34)
MCHC RBC AUTO-ENTMCNC: 32.2 G/DL (ref 31–37)
MCV RBC AUTO: 95.5 FL (ref 74–97)
PHOSPHATE SERPL-MCNC: 5.5 MG/DL (ref 2.5–4.9)
PLATELET # BLD AUTO: 329 K/UL (ref 135–420)
PMV BLD AUTO: 9.6 FL (ref 9.2–11.8)
POTASSIUM SERPL-SCNC: 3.7 MMOL/L (ref 3.5–5.5)
PROTHROMBIN TIME: 15.4 SEC (ref 11.5–15.2)
RBC # BLD AUTO: 2.44 M/UL (ref 4.2–5.3)
SODIUM SERPL-SCNC: 139 MMOL/L (ref 136–145)
WBC # BLD AUTO: 8.5 K/UL (ref 4.6–13.2)

## 2017-02-28 PROCEDURE — 74011250637 HC RX REV CODE- 250/637: Performed by: INTERNAL MEDICINE

## 2017-02-28 PROCEDURE — 74011250636 HC RX REV CODE- 250/636: Performed by: FAMILY MEDICINE

## 2017-02-28 PROCEDURE — 74011250637 HC RX REV CODE- 250/637: Performed by: HOSPITALIST

## 2017-02-28 PROCEDURE — 74011000258 HC RX REV CODE- 258: Performed by: INTERNAL MEDICINE

## 2017-02-28 PROCEDURE — 83735 ASSAY OF MAGNESIUM: CPT | Performed by: SURGERY

## 2017-02-28 PROCEDURE — 87040 BLOOD CULTURE FOR BACTERIA: CPT | Performed by: SURGERY

## 2017-02-28 PROCEDURE — 85610 PROTHROMBIN TIME: CPT | Performed by: SURGERY

## 2017-02-28 PROCEDURE — 74011250636 HC RX REV CODE- 250/636: Performed by: SURGERY

## 2017-02-28 PROCEDURE — 82962 GLUCOSE BLOOD TEST: CPT

## 2017-02-28 PROCEDURE — 74011000258 HC RX REV CODE- 258: Performed by: FAMILY MEDICINE

## 2017-02-28 PROCEDURE — 80069 RENAL FUNCTION PANEL: CPT | Performed by: SURGERY

## 2017-02-28 PROCEDURE — 36415 COLL VENOUS BLD VENIPUNCTURE: CPT | Performed by: SURGERY

## 2017-02-28 PROCEDURE — 65660000000 HC RM CCU STEPDOWN

## 2017-02-28 PROCEDURE — 85027 COMPLETE CBC AUTOMATED: CPT | Performed by: SURGERY

## 2017-02-28 PROCEDURE — 74011250637 HC RX REV CODE- 250/637: Performed by: SURGERY

## 2017-02-28 PROCEDURE — 74011250636 HC RX REV CODE- 250/636: Performed by: INTERNAL MEDICINE

## 2017-02-28 RX ADMIN — OXYCODONE HYDROCHLORIDE AND ACETAMINOPHEN 1 TABLET: 10; 325 TABLET ORAL at 23:27

## 2017-02-28 RX ADMIN — OXYCODONE HYDROCHLORIDE AND ACETAMINOPHEN 1 TABLET: 10; 325 TABLET ORAL at 23:33

## 2017-02-28 RX ADMIN — HYDROMORPHONE HYDROCHLORIDE 0.5 MG: 2 INJECTION INTRAMUSCULAR; INTRAVENOUS; SUBCUTANEOUS at 02:02

## 2017-02-28 RX ADMIN — MIDODRINE HYDROCHLORIDE 5 MG: 2.5 TABLET ORAL at 14:05

## 2017-02-28 RX ADMIN — OXYCODONE HYDROCHLORIDE AND ACETAMINOPHEN 2 TABLET: 10; 325 TABLET ORAL at 14:10

## 2017-02-28 RX ADMIN — METRONIDAZOLE 500 MG: 250 TABLET ORAL at 21:22

## 2017-02-28 RX ADMIN — CEFTRIAXONE 2 G: 2 INJECTION, POWDER, FOR SOLUTION INTRAMUSCULAR; INTRAVENOUS at 10:46

## 2017-02-28 RX ADMIN — OXYCODONE HYDROCHLORIDE AND ACETAMINOPHEN 2 TABLET: 10; 325 TABLET ORAL at 07:19

## 2017-02-28 RX ADMIN — MIDODRINE HYDROCHLORIDE 5 MG: 2.5 TABLET ORAL at 17:20

## 2017-02-28 RX ADMIN — PROMETHAZINE HYDROCHLORIDE 12.5 MG: 25 INJECTION, SOLUTION INTRAMUSCULAR; INTRAVENOUS at 11:19

## 2017-02-28 RX ADMIN — SENNOSIDES 8.6 MG: 8.6 TABLET, FILM COATED ORAL at 14:05

## 2017-02-28 RX ADMIN — PROMETHAZINE HYDROCHLORIDE 12.5 MG: 25 INJECTION, SOLUTION INTRAMUSCULAR; INTRAVENOUS at 00:32

## 2017-02-28 RX ADMIN — DOCUSATE SODIUM 100 MG: 100 CAPSULE, LIQUID FILLED ORAL at 09:00

## 2017-02-28 RX ADMIN — METRONIDAZOLE 500 MG: 250 TABLET ORAL at 17:20

## 2017-02-28 NOTE — PROGRESS NOTES
conducted a Follow up consultation and Spiritual Assessment for Pete Eden, who is a 35 y.o.,female. The  provided the following Interventions:  Continued the relationship of care and support with patient and family present. Listened empathically. Offered assurance of  prayer on patient's behalf. Chart reviewed. The following outcomes were achieved:  Patient expressed gratitude for pastoral care visit. Assessment:  Corinna Garcia visited with patient and provided Advance medical  Directive information for patient and left form for patient to consider. T here are no further spiritual or Christianity issues which require Spiritual Care Services interventions at this time. Plan:  Chaplains will continue to follow and will provide pastoral care on an as needed/requested basis.  recommends bedside caregivers page  on duty if patient shows signs of acute spiritual or emotional distress. Rev.  [facility]  [phone]

## 2017-02-28 NOTE — PROGRESS NOTES
Nephrology Progress note    Subjective:       Marilu Rodrigues is a 35 y.o. female with PMH chronic hypotension, ESRD on HD TTS presenting with weakness, fever to 102.3, cough. She has chills and her BP in ER was lower than baseline so she received multiple fluid boluses. She denies SOB, CP.  currently. She missed HD Saturday due to feeling poorly. K 4.1 today. O2 sat 100% RA. CXR shows RLL pneumonia. -HD access noted to have  purulent drainage- excision of AVG done on 2/24  Uldall catheter with poor flow initially on 2/27, improved following Activase.   Denies fever or chills today      Admit Date: 2/21/2017  Principal Problem:    Sepsis ([facility] [address] (2/21/2017)    Active Problems:    CAP (community acquired pneumonia) (2/21/2017)      ESRD needing dialysis ([facility] [address] (2/21/2017)      Anemia (2/21/2017)      Hyponatremia (2/21/2017)      Dehydration (2/21/2017)      Prolonged Q-T interval on ECG (2/70/3473)      Diastolic dysfunction (1/25/7775)      Infected prosthetic vascular graft ([facility] [address] (2/22/2017)      Bacteremia due to Staphylococcus aureus (2/23/2017)      C. difficile colitis (2/23/2017)      PNA (pneumonia) (2/23/2017)      Hypokalemia (2/24/2017)      Hypoglycemia (2/25/2017)      Constipation (2/26/2017)      Current Facility-Administered Medications   Medication Dose Route Frequency    heparin (porcine) 100 unit/mL injection 500 Units  500 Units InterCATHeter Q TU, TH & SAT    senna (SENOKOT) tablet 8.6 mg  1 Tab Oral DAILY    heparin (porcine) 1,000 unit/mL injection 5,000 Units  5,000 Units IntraVENous Q TU, TH & SAT    docusate sodium (COLACE) capsule 100 mg  100 mg Oral DAILY    promethazine (PHENERGAN) 12.5 mg in 0.9% sodium chloride 50 mL IVPB  12.5 mg IntraVENous Q6H PRN    HYDROmorphone (DILAUDID) injection 0.5 mg  0.5 mg IntraVENous Q2H PRN    oxyCODONE-acetaminophen (PERCOCET 10)  mg per tablet 1-2 Tab  1-2 Tab Oral Q4H PRN    insulin lispro (HUMALOG) injection   SubCUTAneous AC&HS    glucose chewable tablet 16 g  4 Tab Oral PRN    glucagon (GLUCAGEN) injection 1 mg  1 mg IntraMUSCular PRN    dextrose (D50W) injection syrg 12.5-25 g  25-50 mL IntraVENous PRN    epoetin tripp (EPOGEN;PROCRIT) injection 6,000 Units  6,000 Units IntraVENous Q MON, WED & FRI    cefTRIAXone (ROCEPHIN) 2 g in 0.9% sodium chloride (MBP/ADV) 50 mL MBP  2 g IntraVENous Q24H    metroNIDAZOLE (FLAGYL) tablet 500 mg  500 mg Oral TID    sodium chloride (NS) flush 5-10 mL  5-10 mL IntraVENous PRN    midodrine (PROAMITINE) tablet 5 mg  5 mg Oral TID WITH MEALS    acetaminophen (TYLENOL) tablet 650 mg  650 mg Oral Q4H PRN    albuterol-ipratropium (DUO-NEB) 2.5 MG-0.5 MG/3 ML  3 mL Nebulization Q4H PRN         Allergy:   Allergies   Allergen Reactions    Vancomycin Other (comments)     Felt like her body was burning    Aspirin Nausea and Vomiting     Pt reports aspirin gave her a stomach ulcer.      Vicodin [Hydrocodone-Acetaminophen] Nausea and Vomiting        Objective:     Visit Vitals    /74 (BP 1 Location: Right arm, BP Patient Position: At rest)    Pulse 82    Temp 98.4 °F (36.9 °C)    Resp 17    Ht 5' 5\" (1.651 m)    Wt 98.1 kg (216 lb 4.8 oz)    LMP 12/01/2012    SpO2 99%    Breastfeeding No    BMI 35.99 kg/m2         Intake/Output Summary (Last 24 hours) at 02/28/17 1434  Last data filed at 02/28/17 1418   Gross per 24 hour   Intake              240 ml   Output                0 ml   Net              240 ml       Physical Exam:     General: No acute distress   HENT: Atraumatic and normocephalic   Eyes: Normal conjunctiva   Neck: Supple    Cardiovascular: Normal S1 & S2, no m/r/g   Pulmonary/Chest Wall: Clear to auscultation bilaterally   Abdominal: Soft and non-tender   Musculoskeletal: no edema, dressing on  LUE surgical site   Neurological: No focal deficits   HD access: Lt groin Uldall cath in place    Data Review:  Lab Results   Component Value Date/Time    Sodium 139 02/28/2017 04:42 AM Potassium 3.7 02/28/2017 04:42 AM    Chloride 102 02/28/2017 04:42 AM    CO2 28 02/28/2017 04:42 AM    Anion gap 9 02/28/2017 04:42 AM    Glucose 95 02/28/2017 04:42 AM    BUN 30 02/28/2017 04:42 AM    Creatinine 10.60 02/28/2017 04:42 AM    BUN/Creatinine ratio 3 02/28/2017 04:42 AM    GFR est AA 5 02/28/2017 04:42 AM    GFR est non-AA 4 02/28/2017 04:42 AM    Calcium 8.9 02/28/2017 04:42 AM     Lab Results   Component Value Date/Time    WBC 8.5 02/28/2017 04:42 AM    HGB 7.5 02/28/2017 04:42 AM    HCT 23.3 02/28/2017 04:42 AM    PLATELET 237 61/46/0683 04:42 AM    MCV 95.5 02/28/2017 04:42 AM     Lab Results   Component Value Date/Time    Calcium 8.9 02/28/2017 04:42 AM    Phosphorus 5.5 02/28/2017 04:42 AM     No results found for: IRON, FE, TIBC, IBCT, PSAT, FERR  No results found for: FERR      Impression:   -ESRD on HD TTS  -MSSA sepsis, sec to infected AVG. HALEIGH neg for veg.  -Hypokalemia, corrected  -Infected AVG with drainage. S/p excision   -Anemia CKD  -SHPT  -C diff  -Hyperphosphatemia        Plan:        -HD tomorrow 3/1  -Procrit for anemia  -ID following  -Pt will need Tunneled HD catheter once bacteremia is satisfactorily cleared.  BC of 2/25 and 2//26 neg so far   We will await ID approval    Mendoza Flores MD, MPH [facility]  [phone]

## 2017-02-28 NOTE — PROGRESS NOTES
0800 Assumed pt care. Pt in bed alert and oriented. No acute distress. 1000 pt wants to wait a while to take a while later to take her meds. 1030 pt wash self up and daughter @ bedside visiting. 1410 pt medicated for lt arm pain 6/10    1500 Pt 's pain relieved. 1800 Shift Summary: Pt spent fair day. Has been medicated for lt arm pain. No acute distress.

## 2017-02-28 NOTE — PROGRESS NOTES
Bedside and Verbal shift change report given to AL Bartholomew (oncoming nurse) by Ann Montano RN   (offgoing nurse). Report included the following information SBAR, ED Summary, OR Summary, Intake/Output, MAR and Recent Results.

## 2017-02-28 NOTE — PROGRESS NOTES
SHIFT SUMMARY: Patient remained free of falls and injuries this shift. Patient complained of pain in LUE this shift. Pain was well controlled with PRN pain med's. Patient scheduled for dialysis today.         Patient Vitals for the past 12 hrs:   Temp Pulse Resp BP SpO2   02/28/17 0523 98.4 °F (36.9 °C) 82 17 108/74 99 %   02/27/17 2300 98.5 °F (36.9 °C) 86 17 127/76 99 %   02/27/17 1938 98 °F (36.7 °C) 78 17 98/62 99 %

## 2017-02-28 NOTE — ROUTINE PROCESS
Bedside and Verbal shift change report given to Alyssa Mathur RN by Octaviano Colon RN. Report included the following information SBAR, Kardex, OR Summary, Intake/Output and MAR.

## 2017-02-28 NOTE — PROGRESS NOTES
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 Pt overall with fair-good PO intake with 58% average PO intake of meals. C/o of occasional nausea which is relieved with phenergan. Confirms no h/o DM. PMHx: ESRD on HD, HTN   Cultural/Hoahaoism Food Preferences: None Identified    FOOD/NUTRITION HISTORY  Diet History: no appetite changes PTA   Food Allergies:  [x] NKFA     [] Yes    Pertinent PTA Medications: renagel, sensipar     NUTRITION INTAKE   Diet Order:  Renal Consistent Carb 2200      Average PO Intake:        % Diet Eaten   02/28/17 1418 100 %   02/28/17 0800 50 %   02/27/17 1012 75 %   02/26/17 1813 50 %   02/26/17 0903 75 %   02/25/17 1855 20 %   02/25/17 1400 90 %   02/25/17 0944 40 %   02/24/17 2351 25 %   Pertinent Medications:  [x] Reviewed; abx, colace, midodrine, percocet, phenergan, senna  Insulin:  [x] SSI  [] Pre-meal   []  Basal   [] Drip  [] None  Pt expected to meet estimated nutrient needs through next review:          [x]  Yes     [] No;  ANTHROPOMETRICS  Height: 5' 5\" (165.1 cm)       Weight: 98.1 kg (216 lb 4.8 oz)    BMI: 36 kg/m^2  -  obese (30%-39.9% BMI)        Weight change: stable                                  Comparison to Reference Standards:  IBW: 125 lbs      %IBW: 173%      AdjBW: 67 kg    NUTRITION-FOCUSED PHYSICAL ASSESSMENT  Skin: incision L arm, trace edema LLE. GI: Denies diarrhea, constipation, abdominal pain. +Nausea.     BIOCHEMICAL DATA & MEDICAL TESTS  Pertinent Labs:  [x] Reviewed; phos 6.3, Alb 2.5, BUN 33     NUTRITION PRESCRIPTION  Calories: 4233-7062 kcal/day based on 30-35 kcal/kg AdjBW  Protein: 80-94 g/day based on 1.2-1.4 g/kg AdjBW  CHO: 251 g/day based on 50% of total energy  Fluid: 500-1000 ml/day based on anuria     NUTRITION DIAGNOSES:   1- Excessive phosphorus intake related to ESRD on HD as evidenced by phos 6.3    2- Increased protein needs related to ESRD on HD as evidenced by intradialytic protein losses    NUTRITION INTERVENTIONS:   INTERVENTIONS:        GOALS:  1. Consider phos binders 1. Phos WNL by next review 3-5 days   2. Remove CCHO restriction 2. >50% PO intake of meals by next review 3-5 days     LEARNING NEEDS (Diet, Supplementation, Food/Nutrient-Drug Interaction):   [x] None Identified  [] Education provided/documented (refer to Education section of EMR)  [] Identified and patient:  [] Declined     [] Was not appropriate/indicated  NUTRITION MONITORING /EVALUATION:   Follow PO intake  Monitor wt  Monitor renal labs, electrolytes, fluid status    [] Participated in Interdisciplinary Rounds  [x] [address] [place] [address] Reviewed/Documented  [x] Discharge Nutrition Plan: renal    NUTRITION RISK:     [x]  At risk                     []  Not currently at risk     Will follow-up per policy.   Emmy Martin RD  PAGER:  [phone]

## 2017-02-28 NOTE — ROUTINE PROCESS
Bedside shift change report given to Randal Newton RN (oncoming nurse) by Darrow Frankel, RN (offgoing nurse). Report included the following information SBAR, Kardex, MAR and Cardiac Rhythm NSR.

## 2017-02-28 NOTE — PROGRESS NOTES
Cm called patient to discuss home health services for wound care dressing changes,FOC completed for [facility] [phone],PCP Dr. Darleen Claude per patient.

## 2017-02-28 NOTE — PROGRESS NOTES
Hospitalist Progress Note-critical care note     Patient: Carli Dexter MRN: [id number]  CSN: [id number]    YOB: 1975  Age: 35 y.o. Sex: female    DOA: 2/21/2017 LOS:  LOS: 7 days            Chief complaint: bacteremia,  Sepsis, c diff, pna, constipation     Assessment/Plan         Patient Active Problem List   Diagnosis Code    CAP (community acquired pneumonia) J18.9    ESRD needing dialysis ([facility] [address] N18.6    Sepsis ([facility] [address] A41.9    Anemia D64.9    Hyponatremia E87.1    Dehydration E86.0    Prolonged Q-T interval on ECG S23.97    Diastolic dysfunction E29.0    Infected prosthetic vascular graft ([facility] [address] T82. 7XXA    Bacteremia due to Staphylococcus aureus R78.81    C. difficile colitis A04.7    PNA (pneumonia) J18.9    Hypokalemia E87.6    Hypoglycemia E16.2    Constipation K59.00   1. Sepsis  Resolved. due to c diff, av fistular infection vs pna and bacteremia   2. Bacteremia-MSSA and pna  ID on board ,on rocephin now, echo no vegetation noted   AVG infection - s/p excision per vascular   3. c diff   Resolving. will  Continue monitor electrolytes, on flagyl po   4 ESRD on HD  Nephrology on board , [facility] per vascular , HD today    5. . Recurrent thrombosis of fistula-  No warfarin needed, graft removed   6. Hyponatremia  Resolved   7 hypokalemia   Resolved   8 hypoglycemia   Resolved   9 constipation   bm regimen , resolved     Need perm cath and Iv abx arrange before d/c     Subjective: BM ,still arm pain   Nurse:no acute issue     Review of systems:    General: No fevers or chills. Cardiovascular: No chest pain or pressure. No palpitations. Pulmonary: No shortness of breath. Gastrointestinal: No nausea, vomiting.      Vital signs/Intake and Output:  Visit Vitals    /74 (BP 1 Location: Right arm, BP Patient Position: At rest)    Pulse 82    Temp 98.4 °F (36.9 °C)    Resp 17    Ht 5' 5\" (1.651 m)    Wt 98.1 kg (216 lb 4.8 oz)    SpO2 99%    Breastfeeding No    BMI 35.99 kg/m2     Current Shift:     Last three shifts:  02/26 1901 - 02/28 0700  In: 450 [P.O.:450]  Out: 0     Physical Exam:  General: WD, WN. Alert, cooperative, no acute distress    HEENT: NC, Atraumatic. PERRLA, anicteric sclerae. Lungs: CTA Bilaterally. No Wheezing/Rhonchi/Rales. Heart:  Regular  rhythm,  No murmur, No Rubs, No Gallops  Abdomen: Soft, Non distended, Non tender.  +Bowel sounds,   Extremities: No c/c. Left arm wrapped. Psych:   Not anxious or agitated. Neurologic:  No acute neurological deficit. Labs: Results:       Chemistry Recent Labs      02/28/17 0442 02/26/17   0425   GLU  95  93   NA  139  140   K  3.7  3.8   CL  102  100   CO2  28  28   BUN  30*  45*   CREA  10.60*  11.29*   CA  8.9  9.1   AGAP  9  12   BUCR  3*  4*   ALB  2.4*   --       CBC w/Diff Recent Labs      02/28/17 0442 02/26/17   0425   WBC  8.5  7.1   RBC  2.44*  2.67*   HGB  7.5*  8.3*   HCT  23.3*  25.6*   PLT  329  276   GRANS   --   58   LYMPH   --   31   EOS   --   3      Cardiac Enzymes No results for input(s): CPK, CKND1, SHEREEN in the last 72 hours. No lab exists for component: CKRMB, TROIP   Coagulation Recent Labs      02/28/17 0442 02/27/17   0435   PTP  15.4*  15.7*   INR  1.3*  1.3*       Lipid Panel No results found for: CHOL, CHOLPOCT, CHOLX, CHLST, CHOLV, [id number], HDL, LDL, NLDLCT, DLDL, LDLC, DLDLP, 508126, VLDLC, VLDL, TGL, TGLX, TRIGL, [id number], TRIGP, TGLPOCT, [id number], CHHD, CHHDX   BNP No results for input(s): BNPP in the last 72 hours.    Liver Enzymes Recent Labs      02/28/17 0442   ALB  2.4*      Thyroid Studies No results found for: T4, T3U, TSH, TSHEXT, TSHEXT     Procedures/imaging: see electronic medical records for all procedures/Xrays and details which were not copied into this note but were reviewed prior to creation of Flores Heredia MD

## 2017-03-01 LAB
ALBUMIN SERPL BCP-MCNC: 2.5 G/DL (ref 3.4–5)
ANION GAP BLD CALC-SCNC: 15 MMOL/L (ref 3–18)
BACTERIA SPEC CULT: NORMAL
BUN SERPL-MCNC: 33 MG/DL (ref 7–18)
BUN/CREAT SERPL: 3 (ref 12–20)
CALCIUM SERPL-MCNC: 8.8 MG/DL (ref 8.5–10.1)
CHLORIDE SERPL-SCNC: 99 MMOL/L (ref 100–108)
CO2 SERPL-SCNC: 24 MMOL/L (ref 21–32)
CREAT SERPL-MCNC: 13.02 MG/DL (ref 0.6–1.3)
ERYTHROCYTE [DISTWIDTH] IN BLOOD BY AUTOMATED COUNT: 15.2 % (ref 11.6–14.5)
GLUCOSE BLD STRIP.AUTO-MCNC: 126 MG/DL (ref 70–110)
GLUCOSE BLD STRIP.AUTO-MCNC: 89 MG/DL (ref 70–110)
GLUCOSE BLD STRIP.AUTO-MCNC: 91 MG/DL (ref 70–110)
GLUCOSE BLD STRIP.AUTO-MCNC: 94 MG/DL (ref 70–110)
GLUCOSE SERPL-MCNC: 91 MG/DL (ref 74–99)
HCT VFR BLD AUTO: 21.8 % (ref 35–45)
HGB BLD-MCNC: 7.2 G/DL (ref 12–16)
INR PPP: 1.2 (ref 0.8–1.2)
MAGNESIUM SERPL-MCNC: 2.3 MG/DL (ref 1.8–2.4)
MCH RBC QN AUTO: 31.6 PG (ref 24–34)
MCHC RBC AUTO-ENTMCNC: 33 G/DL (ref 31–37)
MCV RBC AUTO: 95.6 FL (ref 74–97)
PHOSPHATE SERPL-MCNC: 6.3 MG/DL (ref 2.5–4.9)
PLATELET # BLD AUTO: 351 K/UL (ref 135–420)
PMV BLD AUTO: 9.5 FL (ref 9.2–11.8)
POTASSIUM SERPL-SCNC: 3.5 MMOL/L (ref 3.5–5.5)
PROTHROMBIN TIME: 15.2 SEC (ref 11.5–15.2)
RBC # BLD AUTO: 2.28 M/UL (ref 4.2–5.3)
SERVICE CMNT-IMP: NORMAL
SODIUM SERPL-SCNC: 138 MMOL/L (ref 136–145)
WBC # BLD AUTO: 8.1 K/UL (ref 4.6–13.2)

## 2017-03-01 PROCEDURE — 90935 HEMODIALYSIS ONE EVALUATION: CPT

## 2017-03-01 PROCEDURE — 74011000258 HC RX REV CODE- 258: Performed by: FAMILY MEDICINE

## 2017-03-01 PROCEDURE — 74011250636 HC RX REV CODE- 250/636: Performed by: INTERNAL MEDICINE

## 2017-03-01 PROCEDURE — 87040 BLOOD CULTURE FOR BACTERIA: CPT | Performed by: SURGERY

## 2017-03-01 PROCEDURE — 83735 ASSAY OF MAGNESIUM: CPT | Performed by: SURGERY

## 2017-03-01 PROCEDURE — 85610 PROTHROMBIN TIME: CPT | Performed by: SURGERY

## 2017-03-01 PROCEDURE — 74011250637 HC RX REV CODE- 250/637: Performed by: SURGERY

## 2017-03-01 PROCEDURE — 74011250637 HC RX REV CODE- 250/637: Performed by: HOSPITALIST

## 2017-03-01 PROCEDURE — 82962 GLUCOSE BLOOD TEST: CPT

## 2017-03-01 PROCEDURE — 85027 COMPLETE CBC AUTOMATED: CPT | Performed by: SURGERY

## 2017-03-01 PROCEDURE — 80069 RENAL FUNCTION PANEL: CPT | Performed by: SURGERY

## 2017-03-01 PROCEDURE — 74011250636 HC RX REV CODE- 250/636: Performed by: FAMILY MEDICINE

## 2017-03-01 PROCEDURE — 65660000000 HC RM CCU STEPDOWN

## 2017-03-01 PROCEDURE — 74011250637 HC RX REV CODE- 250/637: Performed by: INTERNAL MEDICINE

## 2017-03-01 PROCEDURE — 74011000258 HC RX REV CODE- 258: Performed by: INTERNAL MEDICINE

## 2017-03-01 PROCEDURE — 36415 COLL VENOUS BLD VENIPUNCTURE: CPT | Performed by: SURGERY

## 2017-03-01 RX ORDER — CEFAZOLIN SODIUM 2 G/50ML
2 SOLUTION INTRAVENOUS ONCE
Status: COMPLETED | OUTPATIENT
Start: 2017-03-02 | End: 2017-03-02

## 2017-03-01 RX ADMIN — HEPARIN SODIUM (PORCINE) LOCK FLUSH IV SOLN 100 UNIT/ML 500 UNITS: 100 SOLUTION at 12:17

## 2017-03-01 RX ADMIN — MIDODRINE HYDROCHLORIDE 5 MG: 2.5 TABLET ORAL at 09:03

## 2017-03-01 RX ADMIN — MIDODRINE HYDROCHLORIDE 5 MG: 2.5 TABLET ORAL at 19:13

## 2017-03-01 RX ADMIN — PROMETHAZINE HYDROCHLORIDE 12.5 MG: 25 INJECTION, SOLUTION INTRAMUSCULAR; INTRAVENOUS at 00:01

## 2017-03-01 RX ADMIN — OXYCODONE HYDROCHLORIDE AND ACETAMINOPHEN 2 TABLET: 10; 325 TABLET ORAL at 19:23

## 2017-03-01 RX ADMIN — OXYCODONE HYDROCHLORIDE AND ACETAMINOPHEN 2 TABLET: 10; 325 TABLET ORAL at 15:01

## 2017-03-01 RX ADMIN — METRONIDAZOLE 500 MG: 250 TABLET ORAL at 09:03

## 2017-03-01 RX ADMIN — SENNOSIDES 8.6 MG: 8.6 TABLET, FILM COATED ORAL at 09:03

## 2017-03-01 RX ADMIN — MIDODRINE HYDROCHLORIDE 5 MG: 2.5 TABLET ORAL at 12:08

## 2017-03-01 RX ADMIN — CEFTRIAXONE 2 G: 2 INJECTION, POWDER, FOR SOLUTION INTRAMUSCULAR; INTRAVENOUS at 12:09

## 2017-03-01 RX ADMIN — ERYTHROPOIETIN 10000 UNITS: 10000 INJECTION, SOLUTION INTRAVENOUS; SUBCUTANEOUS at 12:17

## 2017-03-01 RX ADMIN — DOCUSATE SODIUM 100 MG: 100 CAPSULE, LIQUID FILLED ORAL at 09:03

## 2017-03-01 RX ADMIN — METRONIDAZOLE 500 MG: 250 TABLET ORAL at 19:13

## 2017-03-01 RX ADMIN — PROMETHAZINE HYDROCHLORIDE 12.5 MG: 25 INJECTION, SOLUTION INTRAMUSCULAR; INTRAVENOUS at 14:39

## 2017-03-01 NOTE — PROGRESS NOTES
Nephrology Progress note    Subjective:       Faith Corcoran is a 35 y.o. female with PMH chronic hypotension, ESRD on HD TTS presenting with weakness, fever to 102.3, cough. She has chills and her BP in ER was lower than baseline so she received multiple fluid boluses. She denies SOB, CP.  currently. She missed HD Saturday due to feeling poorly. K 4.1 today. O2 sat 100% RA. CXR shows RLL pneumonia. -HD access noted to have  purulent drainage- excision of AVG done on 2/24  Uldall catheter with poor flow initially on 2/27, improved following Activase. Denies fever or chills today.  Starting HD now      Admit Date: 2/21/2017  Principal Problem:    Sepsis ([address] (2/21/2017)    Active Problems:    CAP (community acquired pneumonia) (2/21/2017)      ESRD needing dialysis ([facility] [address] (2/21/2017)      Anemia (2/21/2017)      Hyponatremia (2/21/2017)      Dehydration (2/21/2017)      Prolonged Q-T interval on ECG (1/10/9270)      Diastolic dysfunction (0/65/8546)      Infected prosthetic vascular graft ([facility] [address] (2/22/2017)      Bacteremia due to Staphylococcus aureus (2/23/2017)      C. difficile colitis (2/23/2017)      PNA (pneumonia) (2/23/2017)      Hypokalemia (2/24/2017)      Hypoglycemia (2/25/2017)      Constipation (2/26/2017)      Current Facility-Administered Medications   Medication Dose Route Frequency    epoetin tripp (EPOGEN;PROCRIT) injection 10,000 Units  10,000 Units IntraVENous Q MON, WED & FRI    heparin (porcine) 100 unit/mL injection 500 Units  500 Units InterCATHeter Q TU, TH & SAT    senna (SENOKOT) tablet 8.6 mg  1 Tab Oral DAILY    heparin (porcine) 1,000 unit/mL injection 5,000 Units  5,000 Units IntraVENous Q TU, TH & SAT    docusate sodium (COLACE) capsule 100 mg  100 mg Oral DAILY    promethazine (PHENERGAN) 12.5 mg in 0.9% sodium chloride 50 mL IVPB  12.5 mg IntraVENous Q6H PRN    HYDROmorphone (DILAUDID) injection 0.5 mg  0.5 mg IntraVENous Q2H PRN    oxyCODONE-acetaminophen (PERCOCET 10)  mg per tablet 1-2 Tab  1-2 Tab Oral Q4H PRN    insulin lispro (HUMALOG) injection   SubCUTAneous AC&HS    glucose chewable tablet 16 g  4 Tab Oral PRN    glucagon (GLUCAGEN) injection 1 mg  1 mg IntraMUSCular PRN    dextrose (D50W) injection syrg 12.5-25 g  25-50 mL IntraVENous PRN    cefTRIAXone (ROCEPHIN) 2 g in 0.9% sodium chloride (MBP/ADV) 50 mL MBP  2 g IntraVENous Q24H    metroNIDAZOLE (FLAGYL) tablet 500 mg  500 mg Oral TID    sodium chloride (NS) flush 5-10 mL  5-10 mL IntraVENous PRN    midodrine (PROAMITINE) tablet 5 mg  5 mg Oral TID WITH MEALS    acetaminophen (TYLENOL) tablet 650 mg  650 mg Oral Q4H PRN    albuterol-ipratropium (DUO-NEB) 2.5 MG-0.5 MG/3 ML  3 mL Nebulization Q4H PRN         Allergy:   Allergies   Allergen Reactions    Vancomycin Other (comments)     Felt like her body was burning    Aspirin Nausea and Vomiting     Pt reports aspirin gave her a stomach ulcer.      Vicodin [Hydrocodone-Acetaminophen] Nausea and Vomiting        Objective:     Visit Vitals    /73 (BP 1 Location: Right arm, BP Patient Position: At rest)    Pulse 88    Temp 98 °F (36.7 °C)    Resp 17    Ht 5' 5\" (1.651 m)    Wt 99 kg (218 lb 4.8 oz)    LMP 12/01/2012    SpO2 100%    Breastfeeding No    BMI 36.33 kg/m2         Intake/Output Summary (Last 24 hours) at 03/01/17 0858  Last data filed at 03/01/17 0539   Gross per 24 hour   Intake              810 ml   Output                0 ml   Net              810 ml       Physical Exam:     General: No acute distress   HENT: Atraumatic and normocephalic   Eyes: Normal conjunctiva   Neck: Supple    Cardiovascular: Normal S1 & S2, no m/r/g   Pulmonary/Chest Wall: Clear to auscultation bilaterally   Abdominal: Soft and non-tender   Musculoskeletal: no edema, dressing on  LUE surgical site   Neurological: No focal deficits   HD access: Lt groin Uldall cath in place    Data Review:  Lab Results   Component Value Date/Time    Sodium 138 03/01/2017 04:36 AM    Potassium 3.5 03/01/2017 04:36 AM    Chloride 99 03/01/2017 04:36 AM    CO2 24 03/01/2017 04:36 AM    Anion gap 15 03/01/2017 04:36 AM    Glucose 91 03/01/2017 04:36 AM    BUN 33 03/01/2017 04:36 AM    Creatinine 13.02 03/01/2017 04:36 AM    BUN/Creatinine ratio 3 03/01/2017 04:36 AM    GFR est AA 4 03/01/2017 04:36 AM    GFR est non-AA 3 03/01/2017 04:36 AM    Calcium 8.8 03/01/2017 04:36 AM     Lab Results   Component Value Date/Time    WBC 8.1 03/01/2017 04:36 AM    HGB 7.2 03/01/2017 04:36 AM    HCT 21.8 03/01/2017 04:36 AM    PLATELET 795 16/06/5206 04:36 AM    MCV 95.6 03/01/2017 04:36 AM     Lab Results   Component Value Date/Time    Calcium 8.8 03/01/2017 04:36 AM    Phosphorus 6.3 03/01/2017 04:36 AM     No results found for: IRON, FE, TIBC, IBCT, PSAT, FERR  No results found for: FERR      Impression:   -ESRD on HD TTS  -MSSA sepsis, sec to infected AVG. HALEIGH neg for veg.  -Hypokalemia, corrected  -Infected AVG with drainage. S/p excision   -Anemia CKD  -SHPT  -C diff  -Hyperphosphatemia        Plan:        -HD today  -Procrit for anemia  -ID following  -Pt will need Tunneled HD catheter once bacteremia is satisfactorily cleared.  BC of 2/25 and 2//26 neg so far       MD Gaudencio Alfonso  [phone]

## 2017-03-01 NOTE — ROUTINE PROCESS
Bedside and Verbal shift change report given to Dominic Yu RN (oncoming nurse) by Светлана Asencio RN  (offgoing nurse). Report included the following information SBAR, Kardex, Intake/Output, MAR, Cardiac Rhythm SR and Alarm Parameters .

## 2017-03-01 NOTE — PROGRESS NOTES
1261: Shift Summary: Vital signs remained stable overnight; L. Arm pain controlled with PRN Perococet; Cardiac rhythm: SR/ST; PM meds tolerated without difficulty; Call bell left at reach; bed at lowest position and wheels locked     0741: Bedside shift change report given to CRYS Velazco (oncoming nurse) by Heather Barnes. Timothy Maria (offgoing nurse). Report included the following information SBAR and Kardex.

## 2017-03-01 NOTE — PROGRESS NOTES
Shift summary: pt had dialysis today, vitals stable. Dressing changed per doctors order. Pt c/o nausea phenergan given, percocet given for pain per request.     3880: Bedside and Verbal shift change report given to Loc Monterroso RN (oncoming nurse) by Beny Ojeda RN   (offgoing nurse). Report included the following information SBAR and Kardex.

## 2017-03-01 NOTE — PROGRESS NOTES
Hospitalist Progress Note-critical care note     Patient: Kenya Sena MRN: [id number]  CSN: [id number]    YOB: 1975  Age: 35 y.o. Sex: female    DOA: 2/21/2017 LOS:  LOS: 8 days            Chief complaint: bacteremia,  Sepsis, c diff, pna, constipation     Assessment/Plan         Patient Active Problem List   Diagnosis Code    CAP (community acquired pneumonia) J18.9    ESRD needing dialysis ([facility] [address] [facility] N18.6    Sepsis ([facility] [address] [facility] A41.9    Anemia D64.9    Hyponatremia E87.1    Dehydration E86.0    Prolonged Q-T interval on ECG M00.99    Diastolic dysfunction U84.6    Infected prosthetic vascular graft ([facility] [address] T82. 7XXA    Bacteremia due to Staphylococcus aureus R78.81    C. difficile colitis A04.7    PNA (pneumonia) J18.9    Hypokalemia E87.6    Hypoglycemia E16.2    Constipation K59.00   1. Sepsis  Resolved. due to c diff, av fistular infection vs pna and bacteremia   2. Bacteremia-MSSA and pna  ID on board ,on rocephin 5 ,recommend  28-42 days abx.  echo no vegetation noted   AVG infection - s/p excision per vascular   3. c diff   Resolving. will  Continue monitor electrolytes, on flagyl po   4 ESRD on HD  Nephrology on board , [facility] per vascular , HD today   Will have tdc tomorrow    5. . Recurrent thrombosis of fistula-  No warfarin needed, graft removed   6 constipation   bm regimen ,         Subjective: feel fine   Nurse:no acute issue     Review of systems:    General: No fevers or chills. Cardiovascular: No chest pain or pressure. No palpitations. Pulmonary: No shortness of breath. Gastrointestinal: No nausea, vomiting. Vital signs/Intake and Output:  Visit Vitals    /78 (BP 1 Location: Right arm, BP Patient Position: At rest;Supine; Head of bed elevated (Comment degrees))    Pulse 85    Temp 98.7 °F (37.1 °C)    Resp 15    Ht 5' 5\" (1.651 m)    Wt 99 kg (218 lb 4.8 oz)    SpO2 100%    Breastfeeding No    BMI 36.33 kg/m2     Current Shift:  03/01 0701 - 03/01 1900  In: 50 [I.V.:50]  Out: -   Last three shifts:  02/27 1901 - 03/01 0700  In: 1050 [P.O.:600; I.V.:450]  Out: 0     Physical Exam:  General: WD, WN. Alert, cooperative, no acute distress    HEENT: NC, Atraumatic. PERRLA, anicteric sclerae. Lungs: CTA Bilaterally. No Wheezing/Rhonchi/Rales. Heart:  Regular  rhythm,  No murmur, No Rubs, No Gallops  Abdomen: Soft, Non distended, Non tender.  +Bowel sounds,   Extremities: No c/c. Left arm wrapped. Psych:   Not anxious or agitated. Neurologic:  No acute neurological deficit. Labs: Results:       Chemistry Recent Labs      03/01/17 0436 02/28/17 0442   GLU  91  95   NA  138  139   K  3.5  3.7   CL  99*  102   CO2  24  28   BUN  33*  30*   CREA  13.02*  10.60*   CA  8.8  8.9   AGAP  15  9   BUCR  3*  3*   ALB  2.5*  2.4*      CBC w/Diff Recent Labs      03/01/17 0436 02/28/17 0442   WBC  8.1  8.5   RBC  2.28*  2.44*   HGB  7.2*  7.5*   HCT  21.8*  23.3*   PLT  351  329      Cardiac Enzymes No results for input(s): CPK, CKND1, SHEREEN in the last 72 hours. No lab exists for component: CKRMB, TROIP   Coagulation Recent Labs      03/01/17 0436 02/28/17 0442   PTP  15.2  15.4*   INR  1.2  1.3*       Lipid Panel No results found for: CHOL, CHOLPOCT, CHOLX, CHLST, CHOLV, [id number], HDL, LDL, NLDLCT, DLDL, LDLC, DLDLP, [id number], VLDLC, VLDL, TGL, TGLX, TRIGL, [id number], TRIGP, TGLPOCT, [id number], CHHD, CHHDX   BNP No results for input(s): BNPP in the last 72 hours.    Liver Enzymes Recent Labs      03/01/17 0436   ALB  2.5*      Thyroid Studies No results found for: T4, T3U, TSH, TSHEXT, TSHEXT     Procedures/imaging: see electronic medical records for all procedures/Xrays and details which were not copied into this note but were reviewed prior to creation of Jorge Echavarria MD

## 2017-03-01 NOTE — PROGRESS NOTES
Pt seen and examined. No complaints. No events overnight. Visit Vitals    /73 (BP 1 Location: Right arm, BP Patient Position: At rest)    Pulse 88    Temp 98 °F (36.7 °C)    Resp 17    Ht 5' 5\" (1.651 m)    Wt 218 lb 4.8 oz (99 kg)    LMP 12/01/2012    SpO2 100%    Breastfeeding No    BMI 36.33 kg/m2     NAD  LUE dressed. No drainage. Catheter site without infection    A/P: 35 F s/p excision of infected LUE AVG and placement of left femoral temporary graft  1) ID note reviewed. Will place [facility] tomorrow  2) Will need home health for dressing changes.     3) Will order vein mapping prior to d/c

## 2017-03-01 NOTE — PROGRESS NOTES
ID Progress Note      Current antibiotics:   Ceftriaxone day 4 from first negative blood cx  Metronidazole (Day 8)      CC: Sepsis.      Subjective: 34y Female with ESRD on HD seen for MSSA bacteremia (felt secondary to AVG), C difficile colitis and possible pneumonia. Patient is s/p excision of her AVG which was noted to be obviously infected on 2/24 by Dr. Nhi Castaneda. Continued pain at L arm. No other c/o and reports no further diarrhea in the past 24-48 hours. She denies f/c/ns  No cough sob  No n/v/abd pain  No rash. Some discomfort at groin TDC  Denies numbness or paresthesias peripherally     PE: Afebrile, VS reviewed  NAD. HEENT: Anicteric. Mouth w/o thrush. Neck: Supple. Lungs: CTA ant/lat/post bilaterally. CV: RRR. Abdomen: Soft. Non tender. Ext: Lt AVG site dressed. And is clear. Femoral TDC on L  - Some discomfort but no erythema or edema or drainage      Labs/Radiology:  Wbc 8.5  plt 329  crt 10.6     Blood culture - 2/21/17 - MSSA  2/23/17 - MSSA  2/24/17 - MSSA  2/25/17 - NGTD  2/26/17 - NGTD  2/27/17:  NGTD  2/28/17  NGTD      Wound 2/24/17 - MSSA  C difficile DNA - positive. Echo - no obvious valvular vegetations.      ASSESSMENT/RECOMMENDATIONS      1. High grade MSSA bacteremia. AVG infection - s/p excision - continue ceftriaxone (on this for coverage of possible pneumonia as well)  TTE without obvious vegetations  F/u repeat blood cultures to document clearance     ==> Anticipate 28 to 42 day course of therapy from first negative blood culture Anticipate IV ceftriaxone at this time  OK to place more permanent [facility] at this time and remove femoral TDC  Pt is to go out on Ceftriaxone which will require daily therapy - need to see if additional access would be necessary or if [facility] can be used to administer ceftriaxone on non dialysis days. Case management to assist with scheduling dosing of abx on non dialysis days.   Hold off on placing more permanent TDC at this time - f/u on repeat blood cultures      2. C difficile colitis - continue oral metronidazole  -diarrhea has resolved on treatment  -  Would consider oral vancomycin - pt will need to remain on therapy throughout duration of ceftiraixone plus additional 7 days - may need to consider monitoring plasma levels       3. Possible pneumonia. Patchy infiltrate RLL. - clinically improved  On ceftriaxone.      4.  ESRD on HD.     Case d/w patient

## 2017-03-01 NOTE — PROGRESS NOTES
Ceftriaxone ==> cefazolin qhd #5/28  Flagyl  Rec: 1. Bacteremia    mssa    Due to left arm avf - now resected    F/u cultures sterile        ==> for tdc    ==> will change abx to cefazolin qhd through 3/24/17    ==> will repeat blood culture 3/31/17    ==> prescriptions in chart   2. c diff    Diarrhea resolved    Given ongoing abx pressure ==> po vanco through 3/24   Stable from ID perspective for discharge home   Prescription in chart    Subjective: No n/v; diarrhea resolved    No cough/sputum    No abd pain     No rash    PE:   Visit Vitals    /78 (BP 1 Location: Right arm, BP Patient Position: At rest;Supine; Head of bed elevated (Comment degrees))    Pulse 85    Temp 98.7 °F (37.1 °C)    Resp 15    Ht 5' 5\" (1.651 m)    Wt 99 kg (218 lb 4.8 oz)    SpO2 100%    Breastfeeding No    BMI 36.33 kg/m2     Awake/ alert - oriented x 3   Heent: O/c clear. No icterus. No thrush No ulcer. Neck: Supple   Chest: CTA Bilat No exp wheeze. CV: Nl s1s2 No murmurs   Abd: Soft NTND no reboun No masses   Ext:    Tr edema No rash. Left forearm packing in place    No surrounding erythema.  Groin site benign    Lab:     Recent Results (from the past 24 hour(s))   GLUCOSE, POC    Collection Time: 02/28/17  9:22 PM   Result Value Ref Range    Glucose (POC) 111 (H) 70 - 110 mg/dL   PROTHROMBIN TIME + INR    Collection Time: 03/01/17  4:36 AM   Result Value Ref Range    Prothrombin time 15.2 11.5 - 15.2 sec    INR 1.2 0.8 - 1.2     MAGNESIUM    Collection Time: 03/01/17  4:36 AM   Result Value Ref Range    Magnesium 2.3 1.8 - 2.4 mg/dL   RENAL FUNCTION PANEL    Collection Time: 03/01/17  4:36 AM   Result Value Ref Range    Sodium 138 136 - 145 mmol/L    Potassium 3.5 3.5 - 5.5 mmol/L    Chloride 99 (L) 100 - 108 mmol/L    CO2 24 21 - 32 mmol/L    Anion gap 15 3.0 - 18 mmol/L    Glucose 91 74 - 99 mg/dL    BUN 33 (H) 7.0 - 18 MG/DL    Creatinine 13.02 (H) 0.6 - 1.3 MG/DL    BUN/Creatinine ratio 3 (L) 12 - 20      GFR est AA 4 (L) >60 ml/min/1.73m2    GFR est non-AA 3 (L) >60 ml/min/1.73m2    Calcium 8.8 8.5 - 10.1 MG/DL    Phosphorus 6.3 (H) 2.5 - 4.9 MG/DL    Albumin 2.5 (L) 3.4 - 5.0 g/dL   CBC W/O DIFF    Collection Time: 03/01/17  4:36 AM   Result Value Ref Range    WBC 8.1 4.6 - 13.2 K/uL    RBC 2.28 (L) 4.20 - 5.30 M/uL    HGB 7.2 (L) 12.0 - 16.0 g/dL    HCT 21.8 (L) 35.0 - 45.0 %    MCV 95.6 74.0 - 97.0 FL    MCH 31.6 24.0 - 34.0 PG    MCHC 33.0 31.0 - 37.0 g/dL    RDW 15.2 (H) 11.6 - 14.5 %    PLATELET 213 488 - 749 K/uL    MPV 9.5 9.2 - 11.8 FL   CULTURE, BLOOD    Collection Time: 03/01/17  4:36 AM   Result Value Ref Range    Special Requests: NO SPECIAL REQUESTS      Culture result: NO GROWTH AFTER 2 HOURS     GLUCOSE, POC    Collection Time: 03/01/17  6:13 AM   Result Value Ref Range    Glucose (POC) 89 70 - 110 mg/dL   GLUCOSE, POC    Collection Time: 03/01/17 11:17 AM   Result Value Ref Range    Glucose (POC) 91 70 - 110 mg/dL   GLUCOSE, POC    Collection Time: 03/01/17  4:59 PM   Result Value Ref Range    Glucose (POC) 94 70 - 110 mg/dL       Meds:     Current Facility-Administered Medications   Medication Dose Route Frequency Provider Last Rate Last Dose    epoetin tripp (EPOGEN;PROCRIT) injection 10,000 Units  10,000 Units IntraVENous Q MON, WED & Myra Shed, DO   10,000 Units at 03/01/17 1217    heparin (porcine) 100 unit/mL injection 500 Units  500 Units InterCATHeter Q TU, TH & SAT Chelsea Chery MD   500 Units at 03/01/17 1217    senna (SENOKOT) tablet 8.6 mg  1 Tab Oral DAILY Josue Verdugo MD   8.6 mg at 03/01/17 0903    heparin (porcine) 1,000 unit/mL injection 5,000 Units  5,000 Units IntraVENous Q TU, TH & SAT Chelsea Chery MD        docusate sodium (COLACE) capsule 100 mg  100 mg Oral DAILY Josue Verdugo MD   100 mg at 03/01/17 0903    promethazine (PHENERGAN) 12.5 mg in 0.9% sodium chloride 50 mL IVPB  12.5 mg IntraVENous Q6H PRN Kamla Simpson  mL/hr at 03/01/17 1439 12.5 mg at 03/01/17 1439    HYDROmorphone (DILAUDID) injection 0.5 mg  0.5 mg IntraVENous Q2H PRN Micki Ochoa MD   0.5 mg at 02/28/17 0202    oxyCODONE-acetaminophen (PERCOCET 10)  mg per tablet 1-2 Tab  1-2 Tab Oral Q4H PRN Micki Ochoa MD   2 Tab at 03/01/17 1501    insulin lispro (HUMALOG) injection   SubCUTAneous AC&HS Dulce Dejesus MD   Stopped at 02/23/17 1130    glucose chewable tablet 16 g  4 Tab Oral PRN Dulce Dejesus MD        glucagon [facility]) injection 1 mg  1 mg IntraMUSCular PRN Dulce Dejesus MD        dextrose (D50W) injection syrg 12.5-25 g  25-50 mL IntraVENous PRN Dulce Dejesus MD        cefTRIAXone (ROCEPHIN) 2 g in 0.9% sodium chloride (MBP/ADV) 50 mL MBP  2 g IntraVENous Q24H Pablo Amaya  mL/hr at 03/01/17 1209 2 g at 03/01/17 1209    metroNIDAZOLE (FLAGYL) tablet 500 mg  500 mg Oral TID Pablo Amaya MD   500 mg at 03/01/17 3162    sodium chloride (NS) flush 5-10 mL  5-10 mL IntraVENous PRN Sierra Yanes PA-C        midodrine (PROAMITINE) tablet 5 mg  5 mg Oral TID WITH MEALS Oralee Patience, DO   5 mg at 03/01/17 1208    acetaminophen (TYLENOL) tablet 650 mg  650 mg Oral Q4H PRN Oralee Patience, DO   650 mg at 02/21/17 1840    albuterol-ipratropium (DUO-NEB) 2.5 MG-0.5 MG/3 ML  3 mL Nebulization Q4H PRN Oralee Patience, DO           >25 minutes spent w/over 50% time d/w patient approaches to pierre Saavedra  [phone](pg)

## 2017-03-02 ENCOUNTER — HOME HEALTH ADMISSION (OUTPATIENT)
Dept: HOME HEALTH SERVICES | Facility: HOME HEALTH | Age: 42
End: 2017-03-02
Payer: MEDICARE

## 2017-03-02 ENCOUNTER — APPOINTMENT (OUTPATIENT)
Dept: INTERVENTIONAL RADIOLOGY/VASCULAR | Age: 42
DRG: 264 | End: 2017-03-02
Attending: SURGERY
Payer: MEDICARE

## 2017-03-02 VITALS
OXYGEN SATURATION: 100 % | SYSTOLIC BLOOD PRESSURE: 139 MMHG | TEMPERATURE: 96.8 F | WEIGHT: 218.2 LBS | DIASTOLIC BLOOD PRESSURE: 87 MMHG | HEART RATE: 70 BPM | HEIGHT: 65 IN | RESPIRATION RATE: 18 BRPM | BODY MASS INDEX: 36.35 KG/M2

## 2017-03-02 LAB
GLUCOSE BLD STRIP.AUTO-MCNC: 84 MG/DL (ref 70–110)
GLUCOSE BLD STRIP.AUTO-MCNC: 92 MG/DL (ref 70–110)
INR PPP: 1.2 (ref 0.8–1.2)
MAGNESIUM SERPL-MCNC: 2.2 MG/DL (ref 1.8–2.4)
PROTHROMBIN TIME: 14.3 SEC (ref 11.5–15.2)

## 2017-03-02 PROCEDURE — 74011250637 HC RX REV CODE- 250/637: Performed by: INTERNAL MEDICINE

## 2017-03-02 PROCEDURE — 74011250636 HC RX REV CODE- 250/636: Performed by: INTERNAL MEDICINE

## 2017-03-02 PROCEDURE — 74011250637 HC RX REV CODE- 250/637: Performed by: HOSPITALIST

## 2017-03-02 PROCEDURE — 74011000250 HC RX REV CODE- 250: Performed by: SURGERY

## 2017-03-02 PROCEDURE — 74011636320 HC RX REV CODE- 636/320: Performed by: SURGERY

## 2017-03-02 PROCEDURE — 82962 GLUCOSE BLOOD TEST: CPT

## 2017-03-02 PROCEDURE — 87040 BLOOD CULTURE FOR BACTERIA: CPT | Performed by: SURGERY

## 2017-03-02 PROCEDURE — 76937 US GUIDE VASCULAR ACCESS: CPT

## 2017-03-02 PROCEDURE — 99152 MOD SED SAME PHYS/QHP 5/>YRS: CPT

## 2017-03-02 PROCEDURE — 74011250637 HC RX REV CODE- 250/637: Performed by: SURGERY

## 2017-03-02 PROCEDURE — 99153 MOD SED SAME PHYS/QHP EA: CPT

## 2017-03-02 PROCEDURE — 74011250636 HC RX REV CODE- 250/636: Performed by: SURGERY

## 2017-03-02 PROCEDURE — 05HM33Z INSERTION OF INFUSION DEVICE INTO RIGHT INTERNAL JUGULAR VEIN, PERCUTANEOUS APPROACH: ICD-10-PCS | Performed by: SURGERY

## 2017-03-02 PROCEDURE — 83735 ASSAY OF MAGNESIUM: CPT | Performed by: SURGERY

## 2017-03-02 PROCEDURE — 74011250636 HC RX REV CODE- 250/636

## 2017-03-02 PROCEDURE — 36415 COLL VENOUS BLD VENIPUNCTURE: CPT | Performed by: SURGERY

## 2017-03-02 PROCEDURE — 85610 PROTHROMBIN TIME: CPT | Performed by: SURGERY

## 2017-03-02 RX ORDER — HEPARIN SODIUM 200 [USP'U]/100ML
500 INJECTION, SOLUTION INTRAVENOUS
Status: COMPLETED | OUTPATIENT
Start: 2017-03-02 | End: 2017-03-02

## 2017-03-02 RX ORDER — DIPHENHYDRAMINE HYDROCHLORIDE 50 MG/ML
INJECTION, SOLUTION INTRAMUSCULAR; INTRAVENOUS
Status: COMPLETED
Start: 2017-03-02 | End: 2017-03-02

## 2017-03-02 RX ORDER — HEPARIN SODIUM 1000 [USP'U]/ML
10000 INJECTION, SOLUTION INTRAVENOUS; SUBCUTANEOUS
Status: COMPLETED | OUTPATIENT
Start: 2017-03-02 | End: 2017-03-02

## 2017-03-02 RX ORDER — FLUMAZENIL 0.1 MG/ML
0.2 INJECTION INTRAVENOUS AS NEEDED
Status: DISCONTINUED | OUTPATIENT
Start: 2017-03-02 | End: 2017-03-02 | Stop reason: HOSPADM

## 2017-03-02 RX ORDER — MIDODRINE HYDROCHLORIDE 5 MG/1
5 TABLET ORAL
Qty: 30 TAB | Refills: 0 | Status: SHIPPED | OUTPATIENT
Start: 2017-03-02 | End: 2017-04-01

## 2017-03-02 RX ORDER — LIDOCAINE HYDROCHLORIDE 10 MG/ML
1-20 INJECTION INFILTRATION; PERINEURAL
Status: COMPLETED | OUTPATIENT
Start: 2017-03-02 | End: 2017-03-02

## 2017-03-02 RX ORDER — NALOXONE HYDROCHLORIDE 0.4 MG/ML
0.2 INJECTION, SOLUTION INTRAMUSCULAR; INTRAVENOUS; SUBCUTANEOUS
Status: DISCONTINUED | OUTPATIENT
Start: 2017-03-02 | End: 2017-03-02 | Stop reason: HOSPADM

## 2017-03-02 RX ORDER — FENTANYL CITRATE 50 UG/ML
25-200 INJECTION, SOLUTION INTRAMUSCULAR; INTRAVENOUS
Status: DISCONTINUED | OUTPATIENT
Start: 2017-03-02 | End: 2017-03-02 | Stop reason: HOSPADM

## 2017-03-02 RX ORDER — MIDAZOLAM HYDROCHLORIDE 1 MG/ML
1-4 INJECTION, SOLUTION INTRAMUSCULAR; INTRAVENOUS
Status: DISCONTINUED | OUTPATIENT
Start: 2017-03-02 | End: 2017-03-02 | Stop reason: HOSPADM

## 2017-03-02 RX ORDER — OXYCODONE AND ACETAMINOPHEN 10; 325 MG/1; MG/1
1-2 TABLET ORAL
Qty: 20 TAB | Refills: 0 | Status: SHIPPED | OUTPATIENT
Start: 2017-03-02 | End: 2017-03-15 | Stop reason: ALTCHOICE

## 2017-03-02 RX ORDER — SODIUM CHLORIDE 9 MG/ML
25 INJECTION, SOLUTION INTRAVENOUS CONTINUOUS
Status: DISCONTINUED | OUTPATIENT
Start: 2017-03-02 | End: 2017-03-02 | Stop reason: HOSPADM

## 2017-03-02 RX ADMIN — OXYCODONE HYDROCHLORIDE AND ACETAMINOPHEN 2 TABLET: 10; 325 TABLET ORAL at 00:08

## 2017-03-02 RX ADMIN — HEPARIN SODIUM 1000 UNITS: 200 INJECTION, SOLUTION INTRAVENOUS at 10:30

## 2017-03-02 RX ADMIN — FENTANYL CITRATE 50 MCG: 50 INJECTION, SOLUTION INTRAMUSCULAR; INTRAVENOUS at 10:27

## 2017-03-02 RX ADMIN — OXYCODONE HYDROCHLORIDE AND ACETAMINOPHEN 2 TABLET: 10; 325 TABLET ORAL at 12:57

## 2017-03-02 RX ADMIN — MIDAZOLAM HYDROCHLORIDE 0.5 MG: 1 INJECTION, SOLUTION INTRAMUSCULAR; INTRAVENOUS at 10:33

## 2017-03-02 RX ADMIN — OXYCODONE HYDROCHLORIDE AND ACETAMINOPHEN 2 TABLET: 10; 325 TABLET ORAL at 09:29

## 2017-03-02 RX ADMIN — FENTANYL CITRATE 25 MCG: 50 INJECTION, SOLUTION INTRAMUSCULAR; INTRAVENOUS at 10:49

## 2017-03-02 RX ADMIN — CEFAZOLIN SODIUM 2 G: 2 SOLUTION INTRAVENOUS at 09:29

## 2017-03-02 RX ADMIN — METRONIDAZOLE 500 MG: 250 TABLET ORAL at 09:28

## 2017-03-02 RX ADMIN — MIDODRINE HYDROCHLORIDE 5 MG: 2.5 TABLET ORAL at 09:28

## 2017-03-02 RX ADMIN — FENTANYL CITRATE 25 MCG: 50 INJECTION, SOLUTION INTRAMUSCULAR; INTRAVENOUS at 10:41

## 2017-03-02 RX ADMIN — DIPHENHYDRAMINE HYDROCHLORIDE 25 MG: 50 INJECTION, SOLUTION INTRAMUSCULAR; INTRAVENOUS at 10:23

## 2017-03-02 RX ADMIN — DOCUSATE SODIUM 100 MG: 100 CAPSULE, LIQUID FILLED ORAL at 09:29

## 2017-03-02 RX ADMIN — IOPAMIDOL 20 ML: 510 INJECTION, SOLUTION INTRAVASCULAR at 10:30

## 2017-03-02 RX ADMIN — LIDOCAINE HYDROCHLORIDE 15 ML: 10 INJECTION, SOLUTION INFILTRATION; PERINEURAL at 10:30

## 2017-03-02 RX ADMIN — MIDAZOLAM HYDROCHLORIDE 0.5 MG: 1 INJECTION, SOLUTION INTRAMUSCULAR; INTRAVENOUS at 10:41

## 2017-03-02 RX ADMIN — HEPARIN SODIUM 4000 UNITS: 1000 INJECTION, SOLUTION INTRAVENOUS; SUBCUTANEOUS at 10:55

## 2017-03-02 RX ADMIN — SENNOSIDES 8.6 MG: 8.6 TABLET, FILM COATED ORAL at 09:29

## 2017-03-02 RX ADMIN — MIDODRINE HYDROCHLORIDE 5 MG: 2.5 TABLET ORAL at 12:53

## 2017-03-02 RX ADMIN — METRONIDAZOLE 500 MG: 250 TABLET ORAL at 02:46

## 2017-03-02 NOTE — PROGRESS NOTES
TRANSFER - OUT REPORT:    Verbal report given Giovana KITCHEN on Lynda Pope  being transferred to [address] [place] [address](unit) for routine post - op       Report consisted of patients Situation, Background, Assessment and   Recommendations(SBAR). Information from the following report(s) SBAR, Kardex and MAR was reviewed with the receiving nurse. Lines:   Peripheral IV 02/22/17 Right Arm (Active)   Site Assessment Clean, dry, & intact 3/2/2017  9:25 AM   Phlebitis Assessment 0 3/2/2017  9:25 AM   Infiltration Assessment 0 3/2/2017  9:25 AM   Dressing Status Clean, dry, & intact 3/2/2017  9:25 AM   Dressing Type Tape;Transparent 3/2/2017  9:25 AM   Hub Color/Line Status Yellow; Flushed;Capped 3/2/2017  9:25 AM   Action Taken Open ports on tubing capped 3/2/2017  9:25 AM   Alcohol Cap Used Yes 3/2/2017  9:25 AM        Opportunity for questions and clarification was provided.       Patient transported with:   [employer]

## 2017-03-02 NOTE — PROGRESS NOTES
9417: Shift Summary: Uneventful Shift; Vital signs remained stable; Pain addressed with PRN Oxycodone; Cardiac rhythm: SR BBB; Pt. NPO after midnight for procedure; Call bell left at reach; bed at lowest position; and wheels locked     Bedside shift change report given to KRISTI Rasmussen (oncoming nurse) by Ana Cristina Mukherjee (offgoing nurse). Report included the following information SBAR and Kardex.

## 2017-03-02 NOTE — PROGRESS NOTES
1500 Pt spent a fair day today. Rt femoral line was discontinued by angio and new TDC to rt subclavian placed today and tolerated well. Pt has order to discharge home with oral vanco and ancef scripts for during dialysis days. Was seen by  and all arrangements for wound care follow up and home health arranged. Extra wound care supplies given to family as requested. Dual AVS reviewed with Mary Alice Cameron. All medications reviewed individually with patient. Opportunities for questions and concerns provided. Patient discharged via (mode of transport ie. Car, ambulance or air transport) W/C  Patient's arm band appropriately discarded.

## 2017-03-02 NOTE — PROGRESS NOTES
Pt arrived on unit; Pt Alert and Oriented; Consent signed; See MAC_lab for sedation report and/or vital signs. Pt transferred to treatment table for procedure.

## 2017-03-02 NOTE — DISCHARGE INSTRUCTIONS
DISCHARGE SUMMARY from Nurse    The following personal items are in your possession at time of discharge:    Dental Appliances: None  Visual Aid: None     Home Medications: None  Jewelry: Bracelet (red plastic bracelet)  Clothing: Pants, Shirt, Other (comment) (sneakers)  Other Valuables: Cell Phone             PATIENT INSTRUCTIONS:    After general anesthesia or intravenous sedation, for 24 hours or while taking prescription Narcotics:  · Limit your activities  · Do not drive and operate hazardous machinery  · Do not make important personal or business decisions  · Do  not drink alcoholic beverages  · If you have not urinated within 8 hours after discharge, please contact your surgeon on call. Report the following to your surgeon:  · Excessive pain, swelling, redness or odor of or around the surgical area  · Temperature over 100.5  · Nausea and vomiting lasting longer than 4 hours or if unable to take medications  · Any signs of decreased circulation or nerve impairment to extremity: change in color, persistent  numbness, tingling, coldness or increase pain  · Any questions        What to do at Home:  Recommended activity: Activity as tolerated,     If you experience any of the following symptoms chest pain, short ofbreath, please follow up with PCP/ER. *  Please give a list of your current medications to your Primary Care Provider. *  Please update this list whenever your medications are discontinued, doses are      changed, or new medications (including over-the-counter products) are added. *  Please carry medication information at all times in case of emergency situations. These are general instructions for a healthy lifestyle:    No smoking/ No tobacco products/ Avoid exposure to second hand smoke    Surgeon General's Warning:  Quitting smoking now greatly reduces serious risk to your health.     Obesity, smoking, and sedentary lifestyle greatly increases your risk for illness    A healthy diet, regular physical exercise & weight monitoring are important for maintaining a healthy lifestyle    You may be retaining fluid if you have a history of heart failure or if you experience any of the following symptoms:  Weight gain of 3 pounds or more overnight or 5 pounds in a week, increased swelling in our hands or feet or shortness of breath while lying flat in bed. Please call your doctor as soon as you notice any of these symptoms; do not wait until your next office visit. Recognize signs and symptoms of STROKE:    F-face looks uneven    A-arms unable to move or move unevenly    S-speech slurred or non-existent    T-time-call 911 as soon as signs and symptoms begin-DO NOT go       Back to bed or wait to see if you get better-TIME IS BRAIN. Warning Signs of HEART ATTACK     Call 911 if you have these symptoms:   Chest discomfort. Most heart attacks involve discomfort in the center of the chest that lasts more than a few minutes, or that goes away and comes back. It can feel like uncomfortable pressure, squeezing, fullness, or pain.  Discomfort in other areas of the upper body. Symptoms can include pain or discomfort in one or both arms, the back, neck, jaw, or stomach.  Shortness of breath with or without chest discomfort.  Other signs may include breaking out in a cold sweat, nausea, or lightheadedness. Don't wait more than five minutes to call 911 - MINUTES MATTER! Fast action can save your life. Calling 911 is almost always the fastest way to get lifesaving treatment. Emergency Medical Services staff can begin treatment when they arrive -- up to an hour sooner than if someone gets to the hospital by car. The discharge information has been reviewed with the patient. The patient verbalized understanding. Discharge medications reviewed with the patient and appropriate educational materials and side effects teaching were provided.   Patient armband removed and shredded Anemia: Care Instructions  Your Care Instructions    Anemia is a low level of red blood cells, which carry oxygen throughout your body. Many things can cause anemia. Lack of iron is one of the most common causes. Your body needs iron to make hemoglobin, a substance in red blood cells that carries oxygen from the lungs to your body's cells. Without enough iron, the body produces fewer and smaller red blood cells. As a result, your body's cells do not get enough oxygen, and you feel tired and weak. And you may have trouble concentrating. Bleeding is the most common cause of a lack of iron. You may have heavy menstrual bleeding or bleeding caused by conditions such as ulcers, hemorrhoids, or cancer. Regular use of aspirin or other anti-inflammatory medicines (such as ibuprofen) also can cause bleeding in some people. A lack of iron in your diet also can cause anemia, especially at times when the body needs more iron, such as during pregnancy, infancy, and the teen years. Your doctor may have prescribed iron pills. It may take several months of treatment for your iron levels to return to normal. Your doctor also may suggest that you eat foods that are rich in iron, such as meat and beans. There are many other causes of anemia. It is not always due to a lack of iron. Finding the specific cause of your anemia will help your doctor find the right treatment for you. Follow-up care is a key part of your treatment and safety. Be sure to make and go to all appointments, and call your doctor if you are having problems. It's also a good idea to know your test results and keep a list of the medicines you take. How can you care for yourself at home? · Take your medicines exactly as prescribed. Call your doctor if you think you are having a problem with your medicine. · If your doctor recommends iron pills, take them as directed:  ¨ Try to take the pills on an empty stomach about 1 hour before or 2 hours after meals.  But you may need to take iron with food to avoid an upset stomach. ¨ Do not take antacids or drink milk or caffeine drinks (such as coffee, tea, or cola) at the same time or within 2 hours of the time that you take your iron. They can make it hard for your body to absorb the iron. ¨ Vitamin C (from food or supplements) helps your body absorb iron. Try taking iron pills with a glass of orange juice or some other food that is high in vitamin C, such as citrus fruits. ¨ Iron pills may cause stomach problems, such as heartburn, nausea, diarrhea, constipation, and cramps. Be sure to drink plenty of fluids, and include fruits, vegetables, and fiber in your diet each day. Iron pills often make your bowel movements dark or green. ¨ If you forget to take an iron pill, do not take a double dose of iron the next time you take a pill. ¨ Keep iron pills out of the reach of small children. An overdose of iron can be very dangerous. · Follow your doctor's advice about eating iron-rich foods. These include red meat, shellfish, poultry, eggs, beans, raisins, whole-grain bread, and leafy green vegetables. · Steam vegetables to help them keep their iron content. When should you call for help? Call 911 anytime you think you may need emergency care. For example, call if:  · You have symptoms of a heart attack. These may include:  ¨ Chest pain or pressure, or a strange feeling in the chest.  ¨ Sweating. ¨ Shortness of breath. ¨ Nausea or vomiting. ¨ Pain, pressure, or a strange feeling in the back, neck, jaw, or upper belly or in one or both shoulders or arms. ¨ Lightheadedness or sudden weakness. ¨ A fast or irregular heartbeat. After you call 911, the  may tell you to chew 1 adult-strength or 2 to 4 low-dose aspirin. Wait for an ambulance. Do not try to drive yourself. · You passed out (lost consciousness).   Call your doctor now or seek immediate medical care if:  · You have new or increased shortness of breath. · You are dizzy or lightheaded, or you feel like you may faint. · Your fatigue and weakness continue or get worse. · You have any abnormal bleeding, such as:  ¨ Nosebleeds. ¨ Vaginal bleeding that is different (heavier, more frequent, at a different time of the month) than what you are used to. ¨ Bloody or black stools, or rectal bleeding. ¨ Bloody or pink urine. Watch closely for changes in your health, and be sure to contact your doctor if:  · You do not get better as expected. Where can you learn more? Go to http://jaquan-treva.info/. Enter R301 in the search box to learn more about \"Anemia: Care Instructions. \"  Current as of: February 5, 2016  Content Version: 11.1  © 9893-9770 [employer]. Care instructions adapted under license by [employer] (which disclaims liability or warranty for this information). If you have questions about a medical condition or this instruction, always ask your healthcare professional. [place] [address] any warranty or liability for your use of this information. Sepsis: Care Instructions  Your Care Instructions  Sepsis is an infection that has spread throughout your body. It is a life-threatening condition and often causes extremely low blood pressure. This can lead to problems with many different organs. The cause of sepsis is not always clear, but it can happen as part of a long-term or sudden illness. Sometimes even a mild illness can lead to sepsis. Follow-up care is a key part of your treatment and safety. Be sure to make and go to all appointments, and call your doctor if you are having problems. Its also a good idea to know your test results and keep a list of the medicines you take. How can you care for yourself at home? · If your doctor prescribed antibiotics, take them as directed. Do not stop taking them just because you feel better.  You need to take the full course of antibiotics. · Drink plenty of fluids, enough so that your urine is light yellow or clear like water. Choose water or caffeine-free clear liquids until you feel better. If you have kidney, heart, or liver disease and have to limit fluids, talk with your doctor before you increase your fluid intake. You can try rehydration drinks, such as Gatorade or Powerade. · Do not drink alcohol. · Eat a healthy diet. Include fruits, vegetables, and whole grains in your diet every day. · Walking is an easy way to get exercise. Gradually increase the amount you walk every day. Make sure your doctor knows that you are starting an exercise program.  · Do not smoke or use other tobacco products. If you need help quitting, talk to your doctor about stop-smoking programs and medicines. These can increase your chances of quitting for good. When should you call for help? Call 911 anytime you think you may need emergency care. For example, call if:  · You passed out (lost consciousness). Call your doctor now or seek immediate medical care if:  · You have a fever or chills. · You have cool, pale, or clammy skin. · You are dizzy or lightheaded, or you feel like you may faint. · You have any new symptoms, such as a cough, pain in one part of your body, or urinary problems. Watch closely for changes in your health, and be sure to contact your doctor if:  · You do not get better as expected. Where can you learn more? Go to http://jaquan-treva.info/. Enter W619 in the search box to learn more about \"Sepsis: Care Instructions. \"  Current as of: May 27, 2016  Content Version: 11.1  © 4531-8175 [employer]. Care instructions adapted under license by [email or web address] (which disclaims liability or warranty for this information).  If you have questions about a medical condition or this instruction, always ask your healthcare professional. Kate Olvier disclaims any warranty or liability for your use of this information.

## 2017-03-02 NOTE — DISCHARGE SUMMARY
Discharge Summary    Patient: David Arceo MRN: [id number]  CSN: [id number]    YOB: 1975  Age: 35 y.o. Sex: female    DOA: 2/21/2017 LOS:  LOS: 9 days   Discharge Date:      Primary Care Provider:  Yamil Howard MD    Admission Diagnoses: CAP (community acquired pneumonia)  ESRD needing dialysis ([facility] [address]  INFECTED LEFT ARM GRAFT    Discharge Diagnoses:    Patient Active Problem List   Diagnosis Code    CAP (community acquired pneumonia) J18.9    ESRD needing dialysis ([facility] [address] N18.6    Sepsis ([facility] [address] A41.9    Anemia D64.9    Hyponatremia E87.1    Dehydration E86.0    Prolonged Q-T interval on ECG Y06.50    Diastolic dysfunction X47.9    Infected prosthetic vascular graft ([facility] [address] T82. 7XXA    Bacteremia due to Staphylococcus aureus R78.81    C. difficile colitis A04.7    PNA (pneumonia) J18.9    Hypokalemia E87.6    Hypoglycemia E16.2    Constipation K59.00       Discharge Condition: Stable    Discharge Medications:     Current Discharge Medication List      START taking these medications    Details   oxyCODONE-acetaminophen (PERCOCET 10)  mg per tablet Take 1-2 Tabs by mouth every four (4) hours as needed. Max Daily Amount: 12 Tabs. Qty: 20 Tab, Refills: 0         CONTINUE these medications which have CHANGED    Details   midodrine (PROAMITINE) 5 mg tablet Take 1 Tab by mouth three (3) times daily (with meals) for 30 days. Qty: 30 Tab, Refills: 0         CONTINUE these medications which have NOT CHANGED    Details   !! sevelamer (RENAGEL) 800 mg tablet Take 2,400 mg by mouth three (3) times daily (with meals). !! sevelamer (RENAGEL) 800 mg tablet Take 1,600 mg by mouth See Admin Instructions. 2 tabs if/when eating snack      pravastatin (PRAVACHOL) 20 mg tablet Take 20 mg by mouth nightly. zolpidem (AMBIEN) 5 mg tablet Take 5 mg by mouth nightly as needed for Sleep. tiZANidine (ZANAFLEX) 4 mg capsule Take 4 mg by mouth two (2) times daily as needed.       cinacalcet (SENSIPAR) 30 mg tablet Take 30 mg by mouth nightly. diphenhydrAMINE (BENADRYL) 25 mg capsule Take 25 mg by mouth See Admin Instructions. Dialysis pre med      promethazine (PHENERGAN) 25 mg tablet Take 25 mg by mouth See Admin Instructions. Dialysis premed      fluticasone (FLONASE) 50 mcg/actuation nasal spray 2 Sprays by Both Nostrils route daily as needed for Rhinitis. !! - Potential duplicate medications found. Please discuss with provider. STOP taking these medications       traMADol (ULTRAM) 50 mg tablet Comments:   Reason for Stopping:         dextromethorphan-guaiFENesin (ROBITUSSIN-DM)  mg/5 mL syrup Comments:   Reason for Stopping:         warfarin (COUMADIN) 2.5 mg tablet Comments:   Reason for Stopping:             Home medication   vacomycin po   cefazolin per HD   Procedures : av fistular excision, TDC     Consults: Infectious Disease, Nephrology and Vascular Surgery      PHYSICAL EXAM   Visit Vitals    /65 (BP 1 Location: Right arm, BP Patient Position: At rest)    Pulse 63    Temp 98 °F (36.7 °C)    Resp 17    Ht 5' 5\" (1.651 m)    Wt 99 kg (218 lb 3.2 oz)    SpO2 100%    Breastfeeding No    BMI 36.31 kg/m2     General: Awake, cooperative, no acute distress    HEENT: NC, Atraumatic. PERRLA, EOMI. Anicteric sclerae. Lungs:  CTA Bilaterally. No Wheezing/Rhonchi/Rales. Heart:  Regular  rhythm,  No murmur, No Rubs, No Gallops  Abdomen: Soft, Non distended, Non tender. +Bowel sounds,   Extremities: No c/c/e. Left arm ace bandage noted. Psych:   Not anxious or agitated. Neurologic:  No acute neurological deficits. Admission HPI :   Nimesh Bills is a 35 y.o. 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 Symptoms are associated w myalgia and poor po intake.  She had skipped Tuesday dialysis session due to malaise. Currently she denies dyspnea but does have a dry cough. No sore throat, headaches, mental status changes, nausea or vomiting.     On presentation to the ER she was febrile to 102.3, tachcyrdic and normotensive. Exam was unremarkable. Rapid flu negative. She did not have leukocytosis but a left shift was present. CXR w RLL inifiltrate. Medicine is asked to admit for further management    Hospital Course :   Patient presented sepsis picture on admission. Broad coverage abx was given. Vascular was consulted due to suspected av fistular infection. Blood culture indicated  Bacteremia-MSSA and x-ray indicated possible PNA. ID was on board ,on rocephin  ,recommend 28-42 days abx. Echo was performed with  no vegetation noted. AVG was removed. Temporary TDC was placed per vascular for HD. She also received vit K to reverse INR. No anticoagulant needed due to fistular removed. She also received flagyl for c diff. With the treatment, she remains afebrile. Permanent TDC placed. ID recommend :change abx to cefazolin qhd through 3/24/17, will repeat blood culture 3/31/17, po vanco through 3/24 \" . She was on epeogen for her anemia. Activity: Activity as tolerated    Diet: Renal Diet    Follow-up: pcm and HD clinic     Disposition: home     Minutes spent on discharge: 60 min       Labs: Results:       Chemistry Recent Labs      03/01/17   0436  02/28/17   0442   GLU  91  95   NA  138  139   K  3.5  3.7   CL  99*  102   CO2  24  28   BUN  33*  30*   CREA  13.02*  10.60*   CA  8.8  8.9   AGAP  15  9   BUCR  3*  3*   ALB  2.5*  2.4*      CBC w/Diff Recent Labs      03/01/17   0436  02/28/17   0442   WBC  8.1  8.5   RBC  2.28*  2.44*   HGB  7.2*  7.5*   HCT  21.8*  23.3*   PLT  351  329      Cardiac Enzymes No results for input(s): CPK, CKND1, SHEREEN in the last 72 hours.     No lab exists for component: CKRMB, TROIP   Coagulation Recent Labs      03/02/17   0346  03/01/17   0436   PTP 14.3  15.2   INR  1.2  1.2       Lipid Panel No results found for: CHOL, CHOLPOCT, CHOLX, CHLST, CHOLV, [id number], HDL, LDL, NLDLCT, DLDL, LDLC, DLDLP, 362714, VLDLC, VLDL, TGL, TGLX, TRIGL, [id number], TRIGP, TGLPOCT, [id number], CHHD, CHHDX   BNP No results for input(s): BNPP in the last 72 hours. Liver Enzymes Recent Labs      03/01/17   0436   ALB  2.5*      Thyroid Studies No results found for: T4, T3U, TSH, TSHEXT         Significant Diagnostic Studies: Xr Chest Port    Result Date: 2/21/2017  Chest, single view Indication: Fever, weakness Comparison: Several prior exams, most recently 10/13/2015 Findings:  Portable upright AP view of the chest was obtained. There is patchy alveolar opacity present in the right lower lobe, which is new from prior exam. No pneumothorax or pleural effusion. Cardiac size and mediastinal contours are stable. Atherosclerotic calcification of the thoracic aorta again noted. No acute osseous abnormality. Left upper arm vascular stent and surgical clips again present. Impression: Patchy right lower lobe opacity, which may represent an early pneumonic infiltrate. Recommend radiographic follow-up to expected clinical resolution. Skylar Montaño Technologist Service    Result Date: 2/24/2017  See impression. Impression:  Fluoroscopy was provided for this procedure under the supervision and/or direction of the attending provider. ? For further information regarding this procedure, see patient medical record.              [facility]     CC: Yamil Howard MD

## 2017-03-02 NOTE — PROGRESS NOTES
cefazolin qhd #56/28  Flagyl  Rec: 1. Bacteremia    mssa    Due to left arm avf - now resected    F/u cultures sterile        ==> for tdc    ==> will change abx to cefazolin qhd through 3/24/17    ==> will repeat blood culture 3/31/17    ==> prescriptions in chart   2. c diff    Diarrhea resolved    Given ongoing abx pressure ==> po vanco through 3/24   Stable from ID perspective for discharge home   Prescription in chart   thanks    Subjective: No n/v; diarrhea resolved    No cough/sputum    No abd pain     No rash    PE:   Visit Vitals    /87 (BP 1 Location: Right arm, BP Patient Position: At rest)    Pulse 70    Temp 96.8 °F (36 °C)    Resp 18    Ht 5' 5\" (1.651 m)    Wt 99 kg (218 lb 3.2 oz)    SpO2 100%    Breastfeeding No    BMI 36.31 kg/m2     Awake/ alert - oriented x 3   Heent: O/c clear. No icterus. No thrush No ulcer. Neck: Supple   Chest: CTA Bilat No exp wheeze. CV: Nl s1s2 No murmurs   Abd: Soft NTND no reboun No masses   Ext:    Tr edema No rash. Left forearm packing in place    No surrounding erythema.  Groin site benign    Lab:     Recent Results (from the past 24 hour(s))   GLUCOSE, POC    Collection Time: 03/01/17  4:59 PM   Result Value Ref Range    Glucose (POC) 94 70 - 110 mg/dL   GLUCOSE, POC    Collection Time: 03/01/17  9:58 PM   Result Value Ref Range    Glucose (POC) 126 (H) 70 - 110 mg/dL   PROTHROMBIN TIME + INR    Collection Time: 03/02/17  3:46 AM   Result Value Ref Range    Prothrombin time 14.3 11.5 - 15.2 sec    INR 1.2 0.8 - 1.2     MAGNESIUM    Collection Time: 03/02/17  3:46 AM   Result Value Ref Range    Magnesium 2.2 1.8 - 2.4 mg/dL   CULTURE, BLOOD    Collection Time: 03/02/17  3:46 AM   Result Value Ref Range    Special Requests: NO SPECIAL REQUESTS      Culture result: NO GROWTH AFTER 5 HOURS     GLUCOSE, POC    Collection Time: 03/02/17  5:48 AM   Result Value Ref Range    Glucose (POC) 84 70 - 110 mg/dL   GLUCOSE, POC    Collection Time: 03/02/17 12:49 PM   Result Value Ref Range    Glucose (POC) 92 70 - 110 mg/dL       Meds:     Current Facility-Administered Medications   Medication Dose Route Frequency Provider Last Rate Last Dose    fentaNYL citrate (PF) injection  mcg   mcg IntraVENous Rad Multiple Latanya Khalil MD   25 mcg at 03/02/17 1049    midazolam (VERSED) injection 1-4 mg  1-4 mg IntraVENous Multiple Latanya Khalil MD   0.5 mg at 03/02/17 1041    naloxone (NARCAN) injection 0.2 mg  0.2 mg IntraVENous EVERY 2 MINUTES AS NEEDED Latanya Khalil MD        flumazenil (ROMAZICON) 0.1 mg/mL injection 0.2 mg  0.2 mg IntraVENous PRN Latanya Khalil MD        0.9% sodium chloride infusion  25 mL/hr IntraVENous CONTINUOUS Latanya Khalil MD        epoetin tripp (EPOGEN;PROCRIT) injection 10,000 Units  10,000 Units IntraVENous Q MON, WED & Yevonne Lawson Leotis [place], DO   10,000 Units at 03/01/17 1217    heparin (porcine) 100 unit/mL injection 500 Units  500 Units InterCATHeter Q TU, TH & SAT Danika Bridges MD   500 Units at 03/01/17 1217    senna (SENOKOT) tablet 8.6 mg  1 Tab Oral DAILY Bridger Gudino MD   8.6 mg at 03/02/17 0929    heparin (porcine) 1,000 unit/mL injection 5,000 Units  5,000 Units IntraVENous Q TU, TH & SAT Danika Bridges MD        docusate sodium (COLACE) capsule 100 mg  100 mg Oral DAILY Bridger Gudino MD   100 mg at 03/02/17 0929    promethazine (PHENERGAN) 12.5 mg in 0.9% sodium chloride 50 mL IVPB  12.5 mg IntraVENous Q6H PRN Delta Morrissey  mL/hr at 03/01/17 1439 12.5 mg at 03/01/17 1439    HYDROmorphone (DILAUDID) injection 0.5 mg  0.5 mg IntraVENous Q2H PRN Galina Hunter MD   0.5 mg at 02/28/17 0202    oxyCODONE-acetaminophen (PERCOCET 10)  mg per tablet 1-2 Tab  1-2 Tab Oral Q4H PRN Galina Hunter MD   2 Tab at 03/02/17 1257    insulin lispro (HUMALOG) injection   SubCUTAneous AC&HS Bridger Gudino MD   Stopped at 02/23/17 1130    glucose chewable tablet 16 g  4 Tab Oral PRN Bridger Gudino MD        glucagon [facility]) injection 1 mg  1 mg IntraMUSCular PRN Dulce Dejesus MD        dextrose (D50W) injection syrg 12.5-25 g  25-50 mL IntraVENous PRN Dulce Dejesus MD        metroNIDAZOLE (FLAGYL) tablet 500 mg  500 mg Oral TID Pablo Amaya MD   500 mg at 03/02/17 7983    sodium chloride (NS) flush 5-10 mL  5-10 mL IntraVENous PRN Sierra Yanes, PA-C        midodrine (PROAMITINE) tablet 5 mg  5 mg Oral TID WITH MEALS Oralee Patience, DO   5 mg at 03/02/17 1253    acetaminophen (TYLENOL) tablet 650 mg  650 mg Oral Q4H PRN Oralee Patience, DO   650 mg at 02/21/17 1840    albuterol-ipratropium (DUO-NEB) 2.5 MG-0.5 MG/3 ML  3 mL Nebulization Q4H PRN Oralee Patience, DO           >25 minutes spent w/over 50% time d/w patient approaches to bacteremia    Angelica Saavedra  [phone](pg)

## 2017-03-02 NOTE — ACP (ADVANCE CARE PLANNING)
Chaplain Yael Trejo assisted patient in completing advance medical directive. Original and copies given to patient. Copies placed on chart for scanning.      Hollis Cedillo  [phone]

## 2017-03-02 NOTE — PROGRESS NOTES
Nephrology Progress note    Subjective:       Kasandra Matthews is a 35 y.o. female with PMH chronic hypotension, ESRD on HD TTS presenting with weakness, fever to 102.3, cough. She has chills and her BP in ER was lower than baseline so she received multiple fluid boluses. She denies SOB, CP.  currently. She missed HD Saturday due to feeling poorly. K 4.1 today. O2 sat 100% RA. CXR shows RLL pneumonia. -HD access noted to have  purulent drainage- excision of AVG done on 2/24  Denies fever or chills today.  Last HD session 3/1/17      Admit Date: 2/21/2017  Principal Problem:    Sepsis ([facility] (2/21/2017)    Active Problems:    CAP (community acquired pneumonia) (2/21/2017)      ESRD needing dialysis ([facility] (2/21/2017)      Anemia (2/21/2017)      Hyponatremia (2/21/2017)      Dehydration (2/21/2017)      Prolonged Q-T interval on ECG (7/13/3306)      Diastolic dysfunction (0/09/5726)      [facility] (2/22/2017)      Bacteremia due to Staphylococcus aureus (2/23/2017)      C. difficile colitis (2/23/2017)      PNA (pneumonia) (2/23/2017)      Hypokalemia (2/24/2017)      Hypoglycemia (2/25/2017)      Constipation (2/26/2017)      Current Facility-Administered Medications   Medication Dose Route Frequency    fentaNYL citrate (PF) injection  mcg   mcg IntraVENous Rad Multiple    midazolam (VERSED) injection 1-4 mg  1-4 mg IntraVENous Multiple    naloxone (NARCAN) injection 0.2 mg  0.2 mg IntraVENous EVERY 2 MINUTES AS NEEDED    flumazenil (ROMAZICON) 0.1 mg/mL injection 0.2 mg  0.2 mg IntraVENous PRN    0.9% sodium chloride infusion  25 mL/hr IntraVENous CONTINUOUS    lidocaine (XYLOCAINE) 10 mg/mL (1 %) injection 1-20 mL  1-20 mL SubCUTAneous RAD ONCE    heparinized saline 2 units/mL infusion 1,000 Units  500 mL Irrigation RAD ONCE    heparin (porcine) 1,000 unit/mL injection 10,000 Units  10,000 Units Hemodialysis RAD ONCE    diphenhydrAMINE (BENADRYL) 50 mg/mL injection        iopamidol (ISOVUE 250) 51 % contrast injection 50 mL  50 mL IntraVENous RAD ONCE    epoetin tripp (EPOGEN;PROCRIT) injection 10,000 Units  10,000 Units IntraVENous Q MON, WED & FRI    heparin (porcine) 100 unit/mL injection 500 Units  500 Units InterCATHeter Q TU, TH & SAT    senna (SENOKOT) tablet 8.6 mg  1 Tab Oral DAILY    heparin (porcine) 1,000 unit/mL injection 5,000 Units  5,000 Units IntraVENous Q TU, TH & SAT    docusate sodium (COLACE) capsule 100 mg  100 mg Oral DAILY    promethazine (PHENERGAN) 12.5 mg in 0.9% sodium chloride 50 mL IVPB  12.5 mg IntraVENous Q6H PRN    HYDROmorphone (DILAUDID) injection 0.5 mg  0.5 mg IntraVENous Q2H PRN    oxyCODONE-acetaminophen (PERCOCET 10)  mg per tablet 1-2 Tab  1-2 Tab Oral Q4H PRN    insulin lispro (HUMALOG) injection   SubCUTAneous AC&HS    glucose chewable tablet 16 g  4 Tab Oral PRN    glucagon (GLUCAGEN) injection 1 mg  1 mg IntraMUSCular PRN    dextrose (D50W) injection syrg 12.5-25 g  25-50 mL IntraVENous PRN    metroNIDAZOLE (FLAGYL) tablet 500 mg  500 mg Oral TID    sodium chloride (NS) flush 5-10 mL  5-10 mL IntraVENous PRN    midodrine (PROAMITINE) tablet 5 mg  5 mg Oral TID WITH MEALS    acetaminophen (TYLENOL) tablet 650 mg  650 mg Oral Q4H PRN    albuterol-ipratropium (DUO-NEB) 2.5 MG-0.5 MG/3 ML  3 mL Nebulization Q4H PRN         Allergy:   Allergies   Allergen Reactions    Vancomycin Other (comments)     Felt like her body was burning    Aspirin Nausea and Vomiting     Pt reports aspirin gave her a stomach ulcer.      Vicodin [Hydrocodone-Acetaminophen] Nausea and Vomiting        Objective:     Visit Vitals    BP (!) 152/95 (BP 1 Location: Right arm, BP Patient Position: At rest)    Pulse 83    Temp 98 °F (36.7 °C)    Resp 12    Ht 5' 5\" (1.651 m)    Wt 99 kg (218 lb 3.2 oz)    LMP 12/01/2012    SpO2 98%    Breastfeeding No    BMI 36.31 kg/m2         Intake/Output Summary (Last 24 hours) at 03/02/17 1037  Last data filed at 03/02/17 0017   Gross per 24 hour   Intake              580 ml   Output                0 ml   Net              580 ml       Physical Exam:     General: No acute distress   HENT: Atraumatic and normocephalic   Eyes: Normal conjunctiva   Neck: Supple    Cardiovascular: Normal S1 & S2, no m/r/g   Pulmonary/Chest Wall: Clear to auscultation bilaterally   Abdominal: Soft and non-tender   Musculoskeletal: no edema, dressing on  LUE surgical site   Neurological: No focal deficits   HD access: Lt groin Uldall cath in place    Data Review:  Lab Results   Component Value Date/Time    Sodium 138 03/01/2017 04:36 AM    Potassium 3.5 03/01/2017 04:36 AM    Chloride 99 03/01/2017 04:36 AM    CO2 24 03/01/2017 04:36 AM    Anion gap 15 03/01/2017 04:36 AM    Glucose 91 03/01/2017 04:36 AM    BUN 33 03/01/2017 04:36 AM    Creatinine 13.02 03/01/2017 04:36 AM    BUN/Creatinine ratio 3 03/01/2017 04:36 AM    GFR est AA 4 03/01/2017 04:36 AM    GFR est non-AA 3 03/01/2017 04:36 AM    Calcium 8.8 03/01/2017 04:36 AM     Lab Results   Component Value Date/Time    WBC 8.1 03/01/2017 04:36 AM    HGB 7.2 03/01/2017 04:36 AM    HCT 21.8 03/01/2017 04:36 AM    PLATELET 693 50/69/9399 04:36 AM    MCV 95.6 03/01/2017 04:36 AM     Lab Results   Component Value Date/Time    Calcium 8.8 03/01/2017 04:36 AM    Phosphorus 6.3 03/01/2017 04:36 AM     No results found for: IRON, FE, TIBC, IBCT, PSAT, FERR  No results found for: FERR      Impression:   -ESRD on HD TTS  -MSSA sepsis, sec to infected AVG. HALEIGH neg for veg.  -Hypokalemia, corrected  -Infected AVG with drainage. S/p excision   -Anemia CKD  -SHPT  -C diff  -Hyperphosphatemia        Plan:      Anticipate Tunneled HD catheter placement and removal of Uldall catheter today   Patient will continue Cefazolin 2gm Q dialysis at outpatient center  starting this Saturday.  Called and arranged with [facility] Digna, MD  [facility]  [phone]

## 2017-03-02 NOTE — PROGRESS NOTES
Chart reviewed, noted plan for pt to return home with her dtr Jessica Salas and [facility] for dressing changes; noted pt goes to HD on TTS at [facility] via Medicaid cab. Per today's note of renal doctor Dr Pravin Kaminski, pt's last HD was 3-1-17; he had called [facility] and arranged for pt to continue Cefazolin 2 mg q dialysis starting this Saturday. T/C [facility] / Je Evans ([phone]) who acknowledged all in place for pt to resume HD with them on Saturday; pt's chair time was 5:30 am with 5:15 arrival.  Discussed with pt's nurse who reported pt up and about ad cheryle. Met with pt and her dtr Jessica Salas. Pt reported she had already left a message for her Medicaid  about resuming transport this Saturday. Pt and dtr reported they would not need any assist in arranging this cab ride. [facility] liaison AFortier aware of d/c today. CMgr will be avialable should additional needs arise.

## 2017-03-02 NOTE — INTERVAL H&P NOTE
H&P Update:  Shannan Tong was seen and examined. History and physical has been reviewed. Significant clinical changes have occurred as noted:  ID has ok'd placing TDC .   Will attempt to place an IJ [facility] , if not will exchange L femoral for [facility]    Signed By: Oralia Kirk MD     March 2, 2017 10:23 AM

## 2017-03-02 NOTE — ROUTINE PROCESS
Bedside and Verbal shift change report given to Mejia Rosenberg (oncoming nurse) by Feliz Waller nurse). Report included the following information SBAR, Kardex and MAR.

## 2017-03-03 ENCOUNTER — HOME CARE VISIT (OUTPATIENT)
Dept: HOME HEALTH SERVICES | Facility: HOME HEALTH | Age: 42
End: 2017-03-03

## 2017-03-03 ENCOUNTER — HOME CARE VISIT (OUTPATIENT)
Dept: SCHEDULING | Facility: HOME HEALTH | Age: 42
End: 2017-03-03
Payer: MEDICARE

## 2017-03-03 VITALS
BODY MASS INDEX: 33.99 KG/M2 | RESPIRATION RATE: 16 BRPM | TEMPERATURE: 97.8 F | HEIGHT: 65 IN | SYSTOLIC BLOOD PRESSURE: 113 MMHG | HEART RATE: 96 BPM | DIASTOLIC BLOOD PRESSURE: 88 MMHG | WEIGHT: 204 LBS

## 2017-03-03 LAB
BACTERIA SPEC CULT: NORMAL
SERVICE CMNT-IMP: NORMAL

## 2017-03-03 PROCEDURE — 3331090001 HH PPS REVENUE CREDIT

## 2017-03-03 PROCEDURE — A6252 ABSORPT DRG >16 <=48 W/O BDR: HCPCS

## 2017-03-03 PROCEDURE — A6216 NON-STERILE GAUZE<=16 SQ IN: HCPCS

## 2017-03-03 PROCEDURE — A9270 NON-COVERED ITEM OR SERVICE: HCPCS

## 2017-03-03 PROCEDURE — A4927 NON-STERILE GLOVES: HCPCS

## 2017-03-03 PROCEDURE — A4649 SURGICAL SUPPLIES: HCPCS

## 2017-03-03 PROCEDURE — A4245 ALCOHOL WIPES PER BOX: HCPCS

## 2017-03-03 PROCEDURE — 400013 HH SOC

## 2017-03-03 PROCEDURE — G0299 HHS/HOSPICE OF RN EA 15 MIN: HCPCS

## 2017-03-03 PROCEDURE — 3331090002 HH PPS REVENUE DEBIT

## 2017-03-03 PROCEDURE — A6454 SELF-ADHER BAND W>=3" <5"/YD: HCPCS

## 2017-03-03 NOTE — OP NOTES
[address] [place] [address]  OPERATIVE REPORT    Name:  Arianna Uribe  MR#:  [id number]  :  1975  Account #:  [de-identified]  Date of Adm:  2017  Date of Surgery:  2017      PREOPERATIVE DIAGNOSIS:  End-stage renal disease with a left  femoral temporary dialysis catheter. POSTOPERATIVE DIAGNOSIS:  End-stage renal disease with a left  femoral temporary dialysis catheter. PROCEDURES PERFORMED  1. Ultrasound-guided percutaneous access of the right internal jugular  vein. 2. Nonselective catheterization of the inferior vena cava. 3. Placement of a 19 Cm tunneled dialysis catheter. SURGEON:  Mauro Toscano M.D.    ESTIMATED BLOOD LOSS:  Minimal.    SPECIMENS REMOVED:  None. ANESTHESIA:  Moderate sedation, with local.    IMPLANTS:  A 19 cm Palindrome catheter. COMPLICATIONS:  None. CONDITION:  To recovery stable. FINDINGS:  Tortuous internal jugular vein with filling of the superior  vena cava. Satisfactory cannulation of the superior vena cava and  inferior cava. Satisfactory placement of the Palindrome catheter into  the atriocaval junction with good blood return and no resistance  flushing. INDICATIONS FOR PROCEDURE:  This patient is a 24-year-old  female who presented to [facility] with sepsis. The  patient underwent an excision of her left upper extremity dialysis graft  and had a temporary catheter placed in her groin. At the time of  placement of the catheter, it was noted that she had either stenosis or  an occlusion of her right internal jugular vein. The decision made to  take the patient to the catheterization suite once her blood cultures  cleared to place a tunneled dialysis catheter via a right internal jugular  approach. INFORMED CONSENT:  Informed consent was obtained. DESCRIPTION OF PROCEDURE:  On 2017, the patient was  taken to the catheterization suite.   She was identified by name and ID  bracelet by myself and the entire operative team.  Once this was done,  the patient was placed on the catheterization table in supine position. After an appropriate depth of anesthesia was obtained, the patient was  prepped and draped, and a time-out was performed. At this point,  using the ultrasound, the right internal jugular vein was interrogated. It  was patent and compressible. It appeared to be an appropriate site for  access. Lidocaine 1% was used to anesthetize the neck, and then  percutaneous access of the right internal jugular vein was obtained. We then advanced a 6-Vietnamese sheath over a Bentson wire. Using a  Bentson wire and a Cueva catheter, we were able to angle the wire  down to the superior vena cava and then advance the wire down into  the inferior vena cava. Once this was done, we had good wire  access. We then made a counter incision on the patient's chest wall  and tunneled a 19 cm Palindrome catheter up into our puncture site for  the internal jugular vein. We then placed two dilators followed by a  peel-away sheath over the Bentson wire. We then removed the wire  and placed the catheter down the peel-away sheath. Then, we  removed the sheath. We confirmed positioning in the atriocaval  junction under fluoroscopy. We had good blood return and no  resistance to flushing. We then cleared the catheters and placed  concentrated heparin into both lumens of the catheter. We closed our  puncture site with a 4-0 Monocryl and secured the catheter to the chest  wall using nylon sutures at the end of the procedure. I was present and scrubbed for the entire procedure.         MD Riley Lal / [facility]  D:  03/03/2017   08:53  T:  03/03/2017   12:53  Job #:  [id number]

## 2017-03-04 LAB
BACTERIA SPEC CULT: NORMAL
SERVICE CMNT-IMP: NORMAL

## 2017-03-04 PROCEDURE — 3331090002 HH PPS REVENUE DEBIT

## 2017-03-04 PROCEDURE — 3331090001 HH PPS REVENUE CREDIT

## 2017-03-05 LAB
BACTERIA SPEC CULT: NORMAL
SERVICE CMNT-IMP: NORMAL

## 2017-03-05 PROCEDURE — 3331090001 HH PPS REVENUE CREDIT

## 2017-03-05 PROCEDURE — 3331090002 HH PPS REVENUE DEBIT

## 2017-03-06 ENCOUNTER — HOME CARE VISIT (OUTPATIENT)
Dept: HOME HEALTH SERVICES | Facility: HOME HEALTH | Age: 42
End: 2017-03-06
Payer: MEDICARE

## 2017-03-06 LAB
BACTERIA SPEC CULT: NORMAL
SERVICE CMNT-IMP: NORMAL

## 2017-03-06 PROCEDURE — 3331090002 HH PPS REVENUE DEBIT

## 2017-03-06 PROCEDURE — 3331090001 HH PPS REVENUE CREDIT

## 2017-03-07 ENCOUNTER — HOME CARE VISIT (OUTPATIENT)
Dept: SCHEDULING | Facility: HOME HEALTH | Age: 42
End: 2017-03-07
Payer: MEDICARE

## 2017-03-07 LAB
BACTERIA SPEC CULT: NORMAL
SERVICE CMNT-IMP: NORMAL

## 2017-03-07 PROCEDURE — G0495 RN CARE TRAIN/EDU IN HH: HCPCS

## 2017-03-07 PROCEDURE — 3331090002 HH PPS REVENUE DEBIT

## 2017-03-07 PROCEDURE — 3331090001 HH PPS REVENUE CREDIT

## 2017-03-08 VITALS — DIASTOLIC BLOOD PRESSURE: 45 MMHG | SYSTOLIC BLOOD PRESSURE: 99 MMHG

## 2017-03-08 LAB
BACTERIA SPEC CULT: NORMAL
SERVICE CMNT-IMP: NORMAL

## 2017-03-08 PROCEDURE — 3331090001 HH PPS REVENUE CREDIT

## 2017-03-08 PROCEDURE — 3331090002 HH PPS REVENUE DEBIT

## 2017-03-09 PROCEDURE — 3331090001 HH PPS REVENUE CREDIT

## 2017-03-09 PROCEDURE — 3331090002 HH PPS REVENUE DEBIT

## 2017-03-10 ENCOUNTER — HOME CARE VISIT (OUTPATIENT)
Dept: SCHEDULING | Facility: HOME HEALTH | Age: 42
End: 2017-03-10
Payer: MEDICARE

## 2017-03-10 PROCEDURE — G0495 RN CARE TRAIN/EDU IN HH: HCPCS

## 2017-03-10 PROCEDURE — 3331090002 HH PPS REVENUE DEBIT

## 2017-03-10 PROCEDURE — 3331090001 HH PPS REVENUE CREDIT

## 2017-03-11 VITALS
TEMPERATURE: 98.4 F | RESPIRATION RATE: 16 BRPM | SYSTOLIC BLOOD PRESSURE: 117 MMHG | DIASTOLIC BLOOD PRESSURE: 56 MMHG | HEART RATE: 91 BPM | OXYGEN SATURATION: 98 %

## 2017-03-11 PROCEDURE — 3331090002 HH PPS REVENUE DEBIT

## 2017-03-11 PROCEDURE — 3331090001 HH PPS REVENUE CREDIT

## 2017-03-12 PROCEDURE — 3331090002 HH PPS REVENUE DEBIT

## 2017-03-12 PROCEDURE — 3331090001 HH PPS REVENUE CREDIT

## 2017-03-13 PROCEDURE — 3331090002 HH PPS REVENUE DEBIT

## 2017-03-13 PROCEDURE — 3331090001 HH PPS REVENUE CREDIT

## 2017-03-14 ENCOUNTER — HOME CARE VISIT (OUTPATIENT)
Dept: SCHEDULING | Facility: HOME HEALTH | Age: 42
End: 2017-03-14
Payer: MEDICARE

## 2017-03-14 PROCEDURE — G0495 RN CARE TRAIN/EDU IN HH: HCPCS

## 2017-03-14 PROCEDURE — 3331090001 HH PPS REVENUE CREDIT

## 2017-03-14 PROCEDURE — 3331090002 HH PPS REVENUE DEBIT

## 2017-03-15 ENCOUNTER — OFFICE VISIT (OUTPATIENT)
Dept: VASCULAR SURGERY | Age: 42
End: 2017-03-15

## 2017-03-15 VITALS
RESPIRATION RATE: 16 BRPM | OXYGEN SATURATION: 98 % | DIASTOLIC BLOOD PRESSURE: 42 MMHG | SYSTOLIC BLOOD PRESSURE: 112 MMHG | HEART RATE: 85 BPM | TEMPERATURE: 98.3 F

## 2017-03-15 VITALS
DIASTOLIC BLOOD PRESSURE: 88 MMHG | HEART RATE: 82 BPM | WEIGHT: 204 LBS | BODY MASS INDEX: 33.99 KG/M2 | RESPIRATION RATE: 20 BRPM | HEIGHT: 65 IN | SYSTOLIC BLOOD PRESSURE: 146 MMHG

## 2017-03-15 DIAGNOSIS — N18.6 ESRD ON DIALYSIS (HCC): Primary | ICD-10-CM

## 2017-03-15 DIAGNOSIS — Z99.2 ESRD ON DIALYSIS (HCC): Primary | ICD-10-CM

## 2017-03-15 PROCEDURE — 3331090001 HH PPS REVENUE CREDIT

## 2017-03-15 PROCEDURE — 3331090002 HH PPS REVENUE DEBIT

## 2017-03-15 RX ORDER — ALBUTEROL SULFATE 90 UG/1
2 AEROSOL, METERED RESPIRATORY (INHALATION)
COMMUNITY
End: 2018-03-07

## 2017-03-15 RX ORDER — OXYCODONE AND ACETAMINOPHEN 10; 325 MG/1; MG/1
1 TABLET ORAL
Qty: 30 TAB | Refills: 0 | Status: SHIPPED | OUTPATIENT
Start: 2017-03-15 | End: 2017-04-04

## 2017-03-15 NOTE — PROGRESS NOTES
Papito Steen    Chief Complaint   Patient presents with    End Stage Renal Disease       History and Physical    Ms. Hari De Los Santos returns her office for continued evaluation of her left arm wound status post excision of infected AV graft and also discussion of long-term dialysis access. She states her pain has improved in his left upper extremity she still has some burning around the area where the wound was. She also complains of some arm swelling. She denies any fevers or chills and she states that she is to finish her antibiotics on 26 March. .    Past Medical History:   Diagnosis Date    Chronic kidney disease     Dialysis patient [facility])      Patient Active Problem List   Diagnosis Code    CAP (community acquired pneumonia) J18.9    ESRD on dialysis ([facility] N18.6, Z99.2    Sepsis ([facility] A41.9    Anemia D64.9    Hyponatremia E87.1    Dehydration E86.0    Prolonged Q-T interval on ECG U10.65    Diastolic dysfunction U41.9    Infected prosthetic vascular graft ([facility] T82. 7XXA    Bacteremia due to Staphylococcus aureus R78.81    C. difficile colitis A04.7    PNA (pneumonia) J18.9    Hypokalemia E87.6    Hypoglycemia E16.2    Constipation K59.00     Past Surgical History:   Procedure Laterality Date    HX CSF SHUNT      HX HYSTERECTOMY      HX OTHER SURGICAL      dialysis shunt left arm    HX TRANSPLANT      VASCULAR SURGERY PROCEDURE UNLIST       Current Outpatient Prescriptions   Medication Sig Dispense Refill    oxyCODONE-acetaminophen (PERCOCET 10)  mg per tablet Take 1 Tab by mouth every six (6) hours as needed for Pain. Max Daily Amount: 4 Tabs. 30 Tab 0    albuterol (PROVENTIL HFA, VENTOLIN HFA, PROAIR HFA) 90 mcg/actuation inhaler Take  by inhalation.  midodrine (PROAMITINE) 5 mg tablet Take 1 Tab by mouth three (3) times daily (with meals) for 30 days. 30 Tab 0    sevelamer (RENAGEL) 800 mg tablet Take 2,400 mg by mouth three (3) times daily (with meals).       sevelamer (RENAGEL) 800 mg tablet Take 1,600 mg by mouth See Admin Instructions. 2 tabs if/when eating snack      pravastatin (PRAVACHOL) 20 mg tablet Take 20 mg by mouth nightly.  zolpidem (AMBIEN) 5 mg tablet Take 5 mg by mouth nightly as needed for Sleep.  tiZANidine (ZANAFLEX) 4 mg capsule Take 4 mg by mouth two (2) times daily as needed (Pain).  cinacalcet (SENSIPAR) 30 mg tablet Take 30 mg by mouth nightly.  diphenhydrAMINE (BENADRYL) 25 mg capsule Take 25 mg by mouth See Admin Instructions. Dialysis pre med      promethazine (PHENERGAN) 25 mg tablet Take 25 mg by mouth See Admin Instructions. Dialysis premed      fluticasone (FLONASE) 50 mcg/actuation nasal spray 2 Sprays by Both Nostrils route daily as needed for Rhinitis. Allergies   Allergen Reactions    Vancomycin Other (comments)     Felt like her body was burning    Aspirin Nausea and Vomiting     Pt reports aspirin gave her a stomach ulcer.  Vicodin [Hydrocodone-Acetaminophen] Nausea and Vomiting     Social History     Social History    Marital status: UNKNOWN     Spouse name: N/A    Number of children: N/A    Years of education: N/A     Occupational History    Not on file. Social History Main Topics    Smoking status: Never Smoker    Smokeless tobacco: Never Used    Alcohol use No    Drug use: Not on file    Sexual activity: Not on file     Other Topics Concern    Not on file     Social History Narrative      History reviewed. No pertinent family history. Review of Systems    Review of Systems   Constitutional: Negative for chills, diaphoresis, fever, malaise/fatigue and weight loss. HENT: Negative for hearing loss and sore throat. Eyes: Negative for blurred vision, photophobia and redness. Respiratory: Negative for cough, hemoptysis, shortness of breath and wheezing. Cardiovascular: Negative for chest pain, palpitations and orthopnea.    Gastrointestinal: Negative for abdominal pain, blood in stool, constipation, diarrhea, heartburn, nausea and vomiting. Genitourinary: Negative for dysuria, frequency, hematuria and urgency. Musculoskeletal: Negative for back pain and myalgias. Skin: Negative for itching and rash. Neurological: Negative for dizziness, speech change, focal weakness, weakness and headaches. Endo/Heme/Allergies: Does not bruise/bleed easily. Psychiatric/Behavioral: Negative for depression and suicidal ideas. Physical Exam:    Visit Vitals    /88    Pulse 82    Resp 20    Ht 5' 5\" (1.651 m)    Wt 204 lb (92.5 kg)    LMP 12/01/2012    BMI 33.95 kg/m2      Physical Examination: General appearance - alert, well appearing, and in no distress  Mental status - alert, oriented to person, place, and time  Eyes - sclera anicteric, left eye normal, right eye normal  Ears - bilateral TM's and external ear canals normal  Nose - normal and patent, no erythema, discharge or polyps  Mouth - mucous membranes moist, pharynx normal without lesions  Neck - supple, no significant adenopathy  Extremities -left lower extremity wound mostly healed with with a focal area of granulation tissue no tunneling no pocket. 1+ nonpitting edema in the left upper extremity. Impression and Plan:    ICD-10-CM ICD-9-CM    1. [facility] N18.6 585.6 DUPLEX UPPER EXT VEIN MAPPING LEFT    Z99.2 V45.11 DUPLEX UPPER EXT VEIN MAPPING RIGHT     Orders Placed This Encounter    DUPLEX UPPER EXT VEIN MAPPING LEFT    DUPLEX UPPER EXT VEIN MAPPING RIGHT    oxyCODONE-acetaminophen (PERCOCET 10)  mg per tablet    albuterol (PROVENTIL HFA, VENTOLIN HFA, PROAIR HFA) 90 mcg/actuation inhaler     I told Ms. Lisa Smith that I am happy with the appearance of her wound. We will continue with wet-to-dry dressings for this left upper extremity wound. Also prescribed her additional pain medication.   Will obtain bilateral extremity vein mapping with plan to place most likely a right upper extremity access in the early part of April. Follow-up Disposition:  Return in about 6 weeks (around 4/26/2017) for post procedure. The treatment plan was reviewed with the patient in detail. The patient voiced understanding of this plan and all questions and concerns were addressed. The patient agrees with this plan. We discussed the signs and symptoms that would require earlier attention or intervention. The patient was given educational material related to his/her visit and the patient has voiced understanding of the material.     I appreciate the opportunity to participate in the care of your patient. I will be sure to keep you informed of any subsequent changes in the treatment plan. If you have any questions or concerns, please feel free to contact me. John Morris MD    PLEASE NOTE:  This document has been produced using voice recognition software. Unrecognized errors in transcription may be present.

## 2017-03-15 NOTE — MR AVS SNAPSHOT
Visit Information Date & Time Provider Department Dept. Phone Encounter #  
 3/15/2017  9:30 AM Lisa Wang MD BS Vein/[facility] Spec [address] [id number] Follow-up Instructions Return in about 6 weeks (around 4/26/2017) for post procedure. Upcoming Health Maintenance Date Due Pneumococcal 19-64 Highest Risk (1 of 3 - PCV13) 6/5/1992 DTaP/Tdap/Td series (1 - Tdap) 6/5/1994 PAP AKA CERVICAL CYTOLOGY 6/5/1994 INFLUENZA AGE 9 TO ADULT 8/1/2016 Allergies as of 3/15/2017  Review Complete On: 3/15/2017 By: Lisa Wang MD  
  
 Severity Noted Reaction Type Reactions Vancomycin High 12/25/2012    Other (comments) Felt like her body was burning Aspirin  02/21/2017    Nausea and Vomiting Pt reports aspirin gave her a stomach ulcer. Vicodin [Hydrocodone-acetaminophen]  12/25/2012    Nausea and Vomiting Current Immunizations  Never Reviewed No immunizations on file. Not reviewed this visit You Were Diagnosed With   
  
 Codes Comments ESRD on dialysis [facility])    -  Primary ICD-10-CM: N18.6, Z99.2 ICD-9-CM: 585.6, V45.11 Vitals BP Pulse Resp Height(growth percentile) Weight(growth percentile) LMP  
 146/88 82 20 5' 5\" (1.651 m) 204 lb (92.5 kg) 12/01/2012 BMI OB Status Smoking Status 33.95 kg/m2 Hysterectomy Never Smoker Vitals History BMI and BSA Data Body Mass Index Body Surface Area 33.95 kg/m 2 2.06 m 2 [facility]erred [id number] [address] [facility] [phone] Your Updated Medication List  
  
   
This list is accurate as of: 3/15/17 10:11 AM.  Always use your most recent med list.  
  
  
  
  
 albuterol 90 mcg/actuation inhaler Commonly known as:  PROVENTIL HFA, VENTOLIN HFA, PROAIR HFA Take  by inhalation. AMBIEN 5 mg tablet Generic drug:  zolpidem Take 5 mg by mouth nightly as needed for Sleep. BENADRYL 25 mg capsule Generic drug:  diphenhydrAMINE Take 25 mg by mouth See Admin Instructions. Dialysis pre med FLONASE 50 mcg/actuation nasal spray Generic drug:  fluticasone 2 Sprays by Both Nostrils route daily as needed for Rhinitis.  
  
 midodrine 5 mg tablet Commonly known as:  Mel Mariann Take 1 Tab by mouth three (3) times daily (with meals) for 30 days. oxyCODONE-acetaminophen  mg per tablet Commonly known as:  PERCOCET 10 Take 1 Tab by mouth every six (6) hours as needed for Pain. Max Daily Amount: 4 Tabs. pravastatin 20 mg tablet Commonly known as:  PRAVACHOL Take 20 mg by mouth nightly. promethazine 25 mg tablet Commonly known as:  PHENERGAN Take 25 mg by mouth See Admin Instructions. Dialysis premed SENSIPAR 30 mg tablet Generic drug:  cinacalcet Take 30 mg by mouth nightly. * sevelamer 800 mg tablet Commonly known as:  RENAGEL Take 2,400 mg by mouth three (3) times daily (with meals). * sevelamer 800 mg tablet Commonly known as:  RENAGEL Take 1,600 mg by mouth See Admin Instructions. 2 tabs if/when eating snack  
  
 tiZANidine 4 mg capsule Commonly known as:  Christiano Key Take 4 mg by mouth two (2) times daily as needed (Pain). * Notice: This list has 2 medication(s) that are the same as other medications prescribed for you. Read the directions carefully, and ask your doctor or other care provider to review them with you. Prescriptions Printed Refills  
 oxyCODONE-acetaminophen (PERCOCET 10)  mg per tablet 0 Sig: Take 1 Tab by mouth every six (6) hours as needed for Pain. Max Daily Amount: 4 Tabs. Class: Print Route: Oral  
  
Follow-up Instructions Return in about 6 weeks (around 4/26/2017) for post procedure. To-Do List   
 03/17/2017 To Be Determined Appointment with Jimena Lloyd RN at [address] [facility]  
  
 03/30/2017 Imaging:  DUPLEX UPPER EXT VEIN MAPPING LEFT   
  
 03/30/2017 Imaging:  DUPLEX UPPER EXT VEIN MAPPING RIGHT [facility] SERVICES! Demar Thomas introduces [facility] patient portal. Now you can access parts of your medical record, email your doctor's office, and request medication refills online. 1. In your internet browser, go to [email or web address]. [email or web address] 2. Click on the First Time User? Click Here link in the Sign In box. You will see the New Member Sign Up page. 3. Enter your MC10 Access Code exactly as it appears below. You will not need to use this code after youve completed the sign-up process. If you do not sign up before the expiration date, you must request a new code. · [facility] Access Code: [email or web address] Expires: 5/22/2017  9:26 AM 
 
4. Enter the last four digits of your Social Security Number (xxxx) and Date of Birth (mm/dd/yyyy) as indicated and click Submit. You will be taken to the next sign-up page. 5. Create a MC10 ID. This will be your [facility] login ID and cannot be changed, so think of one that is secure and easy to remember. 6. Create a MC[facility] password. You can change your password at any time. 7. Enter your Password Reset Question and Answer. This can be used at a later time if you forget your password. 8. Enter your e-mail address. You will receive e-mail notification when new information is available in [address] 9. Click Sign Up. You can now view and download portions of your medical record. 10. Click the Download Summary menu link to download a portable copy of your medical information. If you have questions, please visit the Frequently Asked Questions section of the MC[employer] website. Remember, [facility] is NOT to be used for urgent needs. For medical emergencies, dial 911. Now available from your iPhone and Android! Please provide this summary of care documentation to your next provider. Your primary care clinician is listed as Eamon Currie. If you have any questions after today's visit, please call [phone].

## 2017-03-16 PROCEDURE — 3331090001 HH PPS REVENUE CREDIT

## 2017-03-16 PROCEDURE — 3331090002 HH PPS REVENUE DEBIT

## 2017-03-17 ENCOUNTER — HOME CARE VISIT (OUTPATIENT)
Dept: SCHEDULING | Facility: HOME HEALTH | Age: 42
End: 2017-03-17
Payer: MEDICARE

## 2017-03-17 PROCEDURE — G0495 RN CARE TRAIN/EDU IN HH: HCPCS

## 2017-03-17 PROCEDURE — 3331090001 HH PPS REVENUE CREDIT

## 2017-03-17 PROCEDURE — 3331090002 HH PPS REVENUE DEBIT

## 2017-03-18 PROCEDURE — 3331090001 HH PPS REVENUE CREDIT

## 2017-03-18 PROCEDURE — 3331090002 HH PPS REVENUE DEBIT

## 2017-03-19 VITALS
OXYGEN SATURATION: 98 % | RESPIRATION RATE: 16 BRPM | SYSTOLIC BLOOD PRESSURE: 95 MMHG | DIASTOLIC BLOOD PRESSURE: 50 MMHG | TEMPERATURE: 98.1 F | HEART RATE: 98 BPM

## 2017-03-19 PROCEDURE — 3331090002 HH PPS REVENUE DEBIT

## 2017-03-19 PROCEDURE — 3331090001 HH PPS REVENUE CREDIT

## 2017-03-20 ENCOUNTER — HOSPITAL ENCOUNTER (OUTPATIENT)
Dept: VASCULAR SURGERY | Age: 42
Discharge: HOME | End: 2017-03-20
Attending: SURGERY
Payer: MEDICARE

## 2017-03-20 DIAGNOSIS — N18.6 ESRD ON DIALYSIS (HCC): ICD-10-CM

## 2017-03-20 DIAGNOSIS — Z99.2 ESRD ON DIALYSIS (HCC): ICD-10-CM

## 2017-03-20 PROCEDURE — 3331090002 HH PPS REVENUE DEBIT

## 2017-03-20 PROCEDURE — 3331090001 HH PPS REVENUE CREDIT

## 2017-03-20 PROCEDURE — 93971 EXTREMITY STUDY: CPT

## 2017-03-23 NOTE — PROCEDURES
[facility]  *** FINAL REPORT ***    Name: Gerardo Mendoza  MRN: [id number]    Outpatient  : 1975  HIS Order #: [id number]  [id number] [facility] Visit #: [id number]  Date: 20 Mar 2017    TYPE OF TEST: Peripheral Venous Testing    REASON FOR TEST  Chronic renal failure    Right Arm:-  Deep venous thrombosis:           Not examined  Superficial venous thrombosis:    Yes    Left Arm:-  Deep venous thrombosis:           Not examined  Superficial venous thrombosis:    Yes    Vein Mapping:    Diam.   Depth  (mm)    Right Cephalic Vein:    Axilla:          3.7    12.0    Upper Arm:       2.9    10.8    Lower Arm:       2.1     2.2    Antecubital:     2.3     4.7    Upper Forearm:   2.2     7.3    Forearm:         1.8     6.6    Wrist:           1.6     6.0    Right Basilic Vein:    Upper Arm:    Lower Arm:    Antecubital:    Upper Forearm:    Lower Forearm:    Wrist:    R.Median Cubital:    Right Brachial Vein:    Proximal:        3.5    13.0    Distal:          2.9    14.9    Right Subclavian Artery:  Right Axillary Artery  :    Left Cephalic Vein:    Axilla:    Upper Arm:    Lower Arm:    Antecubital:    Upper Forearm:    Forearm:    Wrist:    Left Basilic Vein:    Upper Arm:    Lower Arm:    Antecubital:    Upper Forearm:    Lower Forearm:    Wrist:    L. Median Cubital:    Left Brachial Vein:    Proximal:        2.9    13.8    Distal:          1.7    12.3    Left Subclavian Artery:  Left Axillary Artery  :      INTERPRETATION/FINDINGS  TECHNICALLY DIFFICULT STUDY DUE TO MULTIPLE UPPER EXTREMITY DIALYSIS  ACCESS PROCEDURES. Duplex images were obtained using 2-D gray scale, color flow and  spectral doppler analysis. 1. This study suggests evidence of  scarring or superficial  thrombophelbitis involving the proximal right basilic vein, left  cephalic vein (extending from proximal to antecubital fossa). 2. There were no dectable Doppler signals noted in the left forearm  loop graft.     3. The right cephalic vein appears to be an adequate conduit for  access with diameter and depth measurements as noted. 4. Triphasic radial artery signals were noted bilateral upper  extremities. ADDITIONAL COMMENTS    I have personally reviewed the data relevant to the interpretation of  this  study. TECHNOLOGIST: Tracey Huerta  Signed: 03/23/2017 10:31 AM    PHYSICIAN: Jarad Villagomez MD  Signed: 03/23/2017 02:14 PM

## 2017-04-10 ENCOUNTER — HOSPITAL ENCOUNTER (OUTPATIENT)
Dept: GENERAL RADIOLOGY | Age: 42
Discharge: HOME | End: 2017-04-10
Attending: SURGERY
Payer: MEDICARE

## 2017-04-10 ENCOUNTER — HOSPITAL ENCOUNTER (OUTPATIENT)
Dept: PREADMISSION TESTING | Age: 42
Discharge: HOME | End: 2017-04-10
Attending: SURGERY
Payer: MEDICARE

## 2017-04-10 DIAGNOSIS — Z01.818 PREOP EXAMINATION: ICD-10-CM

## 2017-04-10 LAB
ABO + RH BLD: NORMAL
ALBUMIN SERPL BCP-MCNC: 3.8 G/DL (ref 3.4–5)
ALBUMIN/GLOB SERPL: 0.8 {RATIO} (ref 0.8–1.7)
ALP SERPL-CCNC: 351 U/L (ref 45–117)
ALT SERPL-CCNC: 12 U/L (ref 13–56)
ANION GAP BLD CALC-SCNC: 12 MMOL/L (ref 3–18)
APTT PPP: 31.4 SEC (ref 23–36.4)
AST SERPL W P-5'-P-CCNC: 16 U/L (ref 15–37)
ATRIAL RATE: 81 BPM
BASOPHILS # BLD AUTO: 0 K/UL (ref 0–0.06)
BASOPHILS # BLD: 1 % (ref 0–2)
BILIRUB SERPL-MCNC: 0.5 MG/DL (ref 0.2–1)
BLOOD GROUP ANTIBODIES SERPL: NORMAL
BUN SERPL-MCNC: 42 MG/DL (ref 7–18)
BUN/CREAT SERPL: 4 (ref 12–20)
CALCIUM SERPL-MCNC: 10.6 MG/DL (ref 8.5–10.1)
CALCULATED P AXIS, ECG09: 82 DEGREES
CALCULATED R AXIS, ECG10: 85 DEGREES
CALCULATED T AXIS, ECG11: 84 DEGREES
CHLORIDE SERPL-SCNC: 100 MMOL/L (ref 100–108)
CO2 SERPL-SCNC: 27 MMOL/L (ref 21–32)
CREAT SERPL-MCNC: 10.91 MG/DL (ref 0.6–1.3)
DIAGNOSIS, 93000: NORMAL
DIFFERENTIAL METHOD BLD: ABNORMAL
EOSINOPHIL # BLD: 0.2 K/UL (ref 0–0.4)
EOSINOPHIL NFR BLD: 4 % (ref 0–5)
ERYTHROCYTE [DISTWIDTH] IN BLOOD BY AUTOMATED COUNT: 14.5 % (ref 11.6–14.5)
GLOBULIN SER CALC-MCNC: 4.9 G/DL (ref 2–4)
GLUCOSE SERPL-MCNC: 66 MG/DL (ref 74–99)
HCT VFR BLD AUTO: 44.9 % (ref 35–45)
HGB BLD-MCNC: 14.2 G/DL (ref 12–16)
INR PPP: 1.1 (ref 0.8–1.2)
LYMPHOCYTES # BLD AUTO: 36 % (ref 21–52)
LYMPHOCYTES # BLD: 1.5 K/UL (ref 0.9–3.6)
MCH RBC QN AUTO: 31.3 PG (ref 24–34)
MCHC RBC AUTO-ENTMCNC: 31.6 G/DL (ref 31–37)
MCV RBC AUTO: 99.1 FL (ref 74–97)
MONOCYTES # BLD: 0.4 K/UL (ref 0.05–1.2)
MONOCYTES NFR BLD AUTO: 11 % (ref 3–10)
NEUTS SEG # BLD: 1.9 K/UL (ref 1.8–8)
NEUTS SEG NFR BLD AUTO: 48 % (ref 40–73)
P-R INTERVAL, ECG05: 150 MS
PLATELET # BLD AUTO: 284 K/UL (ref 135–420)
PMV BLD AUTO: 10.8 FL (ref 9.2–11.8)
POTASSIUM SERPL-SCNC: 4.8 MMOL/L (ref 3.5–5.5)
PROT SERPL-MCNC: 8.7 G/DL (ref 6.4–8.2)
PROTHROMBIN TIME: 13.8 SEC (ref 11.5–15.2)
Q-T INTERVAL, ECG07: 408 MS
QRS DURATION, ECG06: 74 MS
QTC CALCULATION (BEZET), ECG08: 473 MS
RBC # BLD AUTO: 4.53 M/UL (ref 4.2–5.3)
SODIUM SERPL-SCNC: 139 MMOL/L (ref 136–145)
SPECIMEN EXP DATE BLD: NORMAL
VENTRICULAR RATE, ECG03: 81 BPM
WBC # BLD AUTO: 4 K/UL (ref 4.6–13.2)

## 2017-04-10 PROCEDURE — 80053 COMPREHEN METABOLIC PANEL: CPT | Performed by: SURGERY

## 2017-04-10 PROCEDURE — 71010 XR CHEST SNGL V: CPT

## 2017-04-10 PROCEDURE — 85610 PROTHROMBIN TIME: CPT | Performed by: SURGERY

## 2017-04-10 PROCEDURE — 86900 BLOOD TYPING SEROLOGIC ABO: CPT | Performed by: SURGERY

## 2017-04-10 PROCEDURE — 36415 COLL VENOUS BLD VENIPUNCTURE: CPT | Performed by: SURGERY

## 2017-04-10 PROCEDURE — 93005 ELECTROCARDIOGRAM TRACING: CPT

## 2017-04-10 PROCEDURE — 85730 THROMBOPLASTIN TIME PARTIAL: CPT | Performed by: SURGERY

## 2017-04-10 PROCEDURE — 85025 COMPLETE CBC W/AUTO DIFF WBC: CPT | Performed by: SURGERY

## 2017-04-11 LAB
ACID FAST STN SPEC: NEGATIVE
MYCOBACTERIUM SPEC QL CULT: NEGATIVE
SPECIMEN PREPARATION: NORMAL
SPECIMEN SOURCE: NORMAL

## 2017-04-14 ENCOUNTER — ANESTHESIA EVENT (OUTPATIENT)
Dept: SURGERY | Age: 42
End: 2017-04-14
Payer: MEDICARE

## 2017-04-14 ENCOUNTER — SURGERY (OUTPATIENT)
Age: 42
End: 2017-04-14

## 2017-04-14 ENCOUNTER — HOSPITAL ENCOUNTER (OUTPATIENT)
Age: 42
Setting detail: OUTPATIENT SURGERY
Discharge: HOME | End: 2017-04-14
Attending: SURGERY | Admitting: SURGERY
Payer: MEDICARE

## 2017-04-14 ENCOUNTER — ANESTHESIA (OUTPATIENT)
Dept: SURGERY | Age: 42
End: 2017-04-14
Payer: MEDICARE

## 2017-04-14 VITALS
TEMPERATURE: 98 F | WEIGHT: 200.06 LBS | HEART RATE: 78 BPM | OXYGEN SATURATION: 95 % | RESPIRATION RATE: 12 BRPM | SYSTOLIC BLOOD PRESSURE: 137 MMHG | DIASTOLIC BLOOD PRESSURE: 76 MMHG | BODY MASS INDEX: 33.33 KG/M2 | HEIGHT: 65 IN

## 2017-04-14 LAB — POTASSIUM SERPL-SCNC: 4.9 MMOL/L (ref 3.5–5.5)

## 2017-04-14 PROCEDURE — 77030032490 HC SLV COMPR SCD KNE COVD -B: Performed by: SURGERY

## 2017-04-14 PROCEDURE — 74011000250 HC RX REV CODE- 250

## 2017-04-14 PROCEDURE — 77030031139 HC SUT VCRL2 J&J -A: Performed by: SURGERY

## 2017-04-14 PROCEDURE — 74011250636 HC RX REV CODE- 250/636

## 2017-04-14 PROCEDURE — 74011250637 HC RX REV CODE- 250/637: Performed by: ANESTHESIOLOGY

## 2017-04-14 PROCEDURE — 77030012508 HC MSK AIRWY LMA AMBU -A: Performed by: ANESTHESIOLOGY

## 2017-04-14 PROCEDURE — 77030002996 HC SUT SLK J&J -A: Performed by: SURGERY

## 2017-04-14 PROCEDURE — 74011250636 HC RX REV CODE- 250/636: Performed by: SURGERY

## 2017-04-14 PROCEDURE — 77030020782 HC GWN BAIR PAWS FLX 3M -B: Performed by: SURGERY

## 2017-04-14 PROCEDURE — 76010000131 HC OR TIME 2 TO 2.5 HR: Performed by: SURGERY

## 2017-04-14 PROCEDURE — C1768 GRAFT, VASCULAR: HCPCS | Performed by: SURGERY

## 2017-04-14 PROCEDURE — 76060000035 HC ANESTHESIA 2 TO 2.5 HR: Performed by: SURGERY

## 2017-04-14 PROCEDURE — 77030010507 HC ADH SKN DERMBND J&J -B: Performed by: SURGERY

## 2017-04-14 PROCEDURE — 77030020256 HC SOL INJ NACL 0.9%  500ML: Performed by: SURGERY

## 2017-04-14 PROCEDURE — 84132 ASSAY OF SERUM POTASSIUM: CPT | Performed by: SURGERY

## 2017-04-14 PROCEDURE — 76210000016 HC OR PH I REC 1 TO 1.5 HR: Performed by: SURGERY

## 2017-04-14 PROCEDURE — 77030010512 HC APPL CLP LIG J&J -C: Performed by: SURGERY

## 2017-04-14 PROCEDURE — 77030002924 HC SUT GORTX WLGO -B: Performed by: SURGERY

## 2017-04-14 PROCEDURE — 36415 COLL VENOUS BLD VENIPUNCTURE: CPT | Performed by: SURGERY

## 2017-04-14 PROCEDURE — 76210000021 HC REC RM PH II 0.5 TO 1 HR: Performed by: SURGERY

## 2017-04-14 PROCEDURE — 77030002935 HC SUT MCRYL J&J -C: Performed by: SURGERY

## 2017-04-14 PROCEDURE — 77030011640 HC PAD GRND REM COVD -A: Performed by: SURGERY

## 2017-04-14 DEVICE — GRAFT VASC 4-7MMX45CM TAPR -- ACUSEAL: Type: IMPLANTABLE DEVICE | Site: ARM | Status: FUNCTIONAL

## 2017-04-14 RX ORDER — MIDAZOLAM HYDROCHLORIDE 1 MG/ML
INJECTION, SOLUTION INTRAMUSCULAR; INTRAVENOUS AS NEEDED
Status: DISCONTINUED | OUTPATIENT
Start: 2017-04-14 | End: 2017-04-14

## 2017-04-14 RX ORDER — PROPOFOL 10 MG/ML
INJECTION, EMULSION INTRAVENOUS AS NEEDED
Status: DISCONTINUED | OUTPATIENT
Start: 2017-04-14 | End: 2017-04-14 | Stop reason: HOSPADM

## 2017-04-14 RX ORDER — ONDANSETRON 2 MG/ML
4 INJECTION INTRAMUSCULAR; INTRAVENOUS ONCE
Status: DISCONTINUED | OUTPATIENT
Start: 2017-04-14 | End: 2017-04-14 | Stop reason: HOSPADM

## 2017-04-14 RX ORDER — ONDANSETRON 2 MG/ML
INJECTION INTRAMUSCULAR; INTRAVENOUS AS NEEDED
Status: DISCONTINUED | OUTPATIENT
Start: 2017-04-14 | End: 2017-04-14 | Stop reason: HOSPADM

## 2017-04-14 RX ORDER — FENTANYL CITRATE 50 UG/ML
50 INJECTION, SOLUTION INTRAMUSCULAR; INTRAVENOUS
Status: DISCONTINUED | OUTPATIENT
Start: 2017-04-14 | End: 2017-04-14 | Stop reason: HOSPADM

## 2017-04-14 RX ORDER — MIDAZOLAM HYDROCHLORIDE 1 MG/ML
INJECTION, SOLUTION INTRAMUSCULAR; INTRAVENOUS AS NEEDED
Status: DISCONTINUED | OUTPATIENT
Start: 2017-04-14 | End: 2017-04-14 | Stop reason: HOSPADM

## 2017-04-14 RX ORDER — FLUMAZENIL 0.1 MG/ML
0.2 INJECTION INTRAVENOUS
Status: DISCONTINUED | OUTPATIENT
Start: 2017-04-14 | End: 2017-04-14 | Stop reason: HOSPADM

## 2017-04-14 RX ORDER — OXYCODONE AND ACETAMINOPHEN 5; 325 MG/1; MG/1
1 TABLET ORAL AS NEEDED
Status: DISCONTINUED | OUTPATIENT
Start: 2017-04-14 | End: 2017-04-14 | Stop reason: HOSPADM

## 2017-04-14 RX ORDER — SODIUM CHLORIDE, SODIUM LACTATE, POTASSIUM CHLORIDE, CALCIUM CHLORIDE 600; 310; 30; 20 MG/100ML; MG/100ML; MG/100ML; MG/100ML
100 INJECTION, SOLUTION INTRAVENOUS CONTINUOUS
Status: DISCONTINUED | OUTPATIENT
Start: 2017-04-14 | End: 2017-04-14 | Stop reason: HOSPADM

## 2017-04-14 RX ORDER — LIDOCAINE HYDROCHLORIDE 20 MG/ML
INJECTION, SOLUTION EPIDURAL; INFILTRATION; INTRACAUDAL; PERINEURAL AS NEEDED
Status: DISCONTINUED | OUTPATIENT
Start: 2017-04-14 | End: 2017-04-14 | Stop reason: HOSPADM

## 2017-04-14 RX ORDER — FENTANYL CITRATE 50 UG/ML
INJECTION, SOLUTION INTRAMUSCULAR; INTRAVENOUS AS NEEDED
Status: DISCONTINUED | OUTPATIENT
Start: 2017-04-14 | End: 2017-04-14 | Stop reason: HOSPADM

## 2017-04-14 RX ORDER — OXYCODONE AND ACETAMINOPHEN 5; 325 MG/1; MG/1
1 TABLET ORAL
Qty: 40 TAB | Refills: 0 | Status: SHIPPED | OUTPATIENT
Start: 2017-04-14 | End: 2017-04-19 | Stop reason: ALTCHOICE

## 2017-04-14 RX ORDER — SODIUM CHLORIDE, SODIUM LACTATE, POTASSIUM CHLORIDE, CALCIUM CHLORIDE 600; 310; 30; 20 MG/100ML; MG/100ML; MG/100ML; MG/100ML
125 INJECTION, SOLUTION INTRAVENOUS CONTINUOUS
Status: DISCONTINUED | OUTPATIENT
Start: 2017-04-14 | End: 2017-04-14

## 2017-04-14 RX ORDER — CEFAZOLIN SODIUM IN 0.9 % NACL 2 G/100 ML
2 PLASTIC BAG, INJECTION (ML) INTRAVENOUS ONCE
Status: COMPLETED | OUTPATIENT
Start: 2017-04-14 | End: 2017-04-14

## 2017-04-14 RX ORDER — NALOXONE HYDROCHLORIDE 0.4 MG/ML
0.4 INJECTION, SOLUTION INTRAMUSCULAR; INTRAVENOUS; SUBCUTANEOUS AS NEEDED
Status: DISCONTINUED | OUTPATIENT
Start: 2017-04-14 | End: 2017-04-14 | Stop reason: HOSPADM

## 2017-04-14 RX ORDER — SODIUM CHLORIDE 9 MG/ML
500 INJECTION, SOLUTION INTRAVENOUS CONTINUOUS
Status: DISCONTINUED | OUTPATIENT
Start: 2017-04-14 | End: 2017-04-14 | Stop reason: HOSPADM

## 2017-04-14 RX ADMIN — FENTANYL CITRATE 25 MCG: 50 INJECTION, SOLUTION INTRAMUSCULAR; INTRAVENOUS at 14:35

## 2017-04-14 RX ADMIN — PROPOFOL 150 MG: 10 INJECTION, EMULSION INTRAVENOUS at 13:51

## 2017-04-14 RX ADMIN — FENTANYL CITRATE 25 MCG: 50 INJECTION, SOLUTION INTRAMUSCULAR; INTRAVENOUS at 14:42

## 2017-04-14 RX ADMIN — FENTANYL CITRATE 100 MCG: 50 INJECTION, SOLUTION INTRAMUSCULAR; INTRAVENOUS at 13:47

## 2017-04-14 RX ADMIN — SODIUM CHLORIDE 500 ML: 900 INJECTION, SOLUTION INTRAVENOUS at 10:00

## 2017-04-14 RX ADMIN — FENTANYL CITRATE 25 MCG: 50 INJECTION, SOLUTION INTRAMUSCULAR; INTRAVENOUS at 15:16

## 2017-04-14 RX ADMIN — ONDANSETRON 4 MG: 2 INJECTION INTRAMUSCULAR; INTRAVENOUS at 16:05

## 2017-04-14 RX ADMIN — HEPARIN SODIUM: 1000 INJECTION, SOLUTION INTRAVENOUS; SUBCUTANEOUS at 14:20

## 2017-04-14 RX ADMIN — FENTANYL CITRATE 25 MCG: 50 INJECTION, SOLUTION INTRAMUSCULAR; INTRAVENOUS at 14:08

## 2017-04-14 RX ADMIN — FENTANYL CITRATE 25 MCG: 50 INJECTION, SOLUTION INTRAMUSCULAR; INTRAVENOUS at 15:30

## 2017-04-14 RX ADMIN — OXYCODONE HYDROCHLORIDE AND ACETAMINOPHEN 1 TABLET: 5; 325 TABLET ORAL at 17:20

## 2017-04-14 RX ADMIN — LIDOCAINE HYDROCHLORIDE 60 MG: 20 INJECTION, SOLUTION EPIDURAL; INFILTRATION; INTRACAUDAL; PERINEURAL at 13:51

## 2017-04-14 RX ADMIN — CEFAZOLIN 2 G: 10 INJECTION, POWDER, FOR SOLUTION INTRAVENOUS; PARENTERAL at 13:53

## 2017-04-14 RX ADMIN — FENTANYL CITRATE 50 MCG: 50 INJECTION, SOLUTION INTRAMUSCULAR; INTRAVENOUS at 15:46

## 2017-04-14 RX ADMIN — FENTANYL CITRATE 25 MCG: 50 INJECTION, SOLUTION INTRAMUSCULAR; INTRAVENOUS at 14:28

## 2017-04-14 RX ADMIN — MIDAZOLAM HYDROCHLORIDE 2 MG: 1 INJECTION, SOLUTION INTRAMUSCULAR; INTRAVENOUS at 13:47

## 2017-04-14 NOTE — PERIOP NOTES
Patient and family received written and verbal instructions at time of discharge. Patient and family verbalized understanding of discharge instruction. Armband removed and shredded. Patient instructed not to drive, operate heavy machinery, sign legal documents, or drink alcohol with prescribed medications. Patient and family verbalized understanding, unable to sign due to signature pad unavailable.

## 2017-04-14 NOTE — PERIOP NOTES
Patient has hemodialysis on T/Th/Sa - dialysis completed yesterday as scheduled. Dialysis catheter located to right chest; dressing in C/D/I.

## 2017-04-14 NOTE — DISCHARGE INSTRUCTIONS
INSTRUCTIONS FOLLOWING ARTERIOVENOUS FISTULA, GRAFT ACCESS, REVISION, OR DECLOT    ACTIVITY:  Limited activity today. Keep access arm straight and raised above the level of your heart for 24 hours or until the swelling goes away. DIET:  May have your usual food, unless told otherwise by your doctor. PAIN:  Use the prescription for pain medicine your doctor gave you. If you do not have one, usual your normal pain medicine. If this does not control your pain, call your doctor. DO NOT TAKE ASPIRIN OR MEDICINE WITH ASPIRIN IN IT, unless your doctor says you may. DRESSING CARE:   Keep your dressing clean and dry. Leave your dressing on until the Dialysis Nurse or your doctor takes it off. BLEEDING:  If you have any bleeding put pressure over the bleeding area and call your doctor. CARE OF YOUR ACCESS ARM:  You may have some bruising, swelling, and discoloration for several weeks. After the swelling has gone down, you will be able to see the outline of the graft. By placing your fingertips over the graft you will be able to feel a vibration (thrill) that means blood is flowing and the graft is working. Call your Kidney doctor if you do not feel the [address] or for any problems like swelling, redness, tingling, or numbness in access arm, pus, or fever over 101. DO NOT:  1. Wear tight sleeves, watches, belts, or bracelets over the graft. 2. Carry heavy bags across the graft or fistula. 3. Sleep on your graft side. 4. Let your blood pressure be taken in your access arm. 5. Let blood be drawn from your access arm. For the next 24 hours, DO NOT Drive, Drink Alcoholic Beverages, or Make IMPORTANT Decisions.   DISCHARGE SUMMARY from Nurse    The following personal items are in your possession at time of discharge:    Dental Appliances: None           Jewelry: None  Clothing: Pants, Shirt, Footwear, Socks, Jacket/Coat, Undergarments (locker #5)  Other Valuables: Cell Phone (locker #5) PATIENT INSTRUCTIONS:    After general anesthesia or intravenous sedation, for 24 hours or while taking prescription Narcotics:  · Limit your activities  · Do not drive and operate hazardous machinery  · Do not make important personal or business decisions  · Do  not drink alcoholic beverages  · If you have not urinated within 8 hours after discharge, please contact your surgeon on call. Report the following to your surgeon:  · Excessive pain, swelling, redness or odor of or around the surgical area  · Temperature over 100.5  · Nausea and vomiting lasting longer than 4 hours or if unable to take medications  · Any signs of decreased circulation or nerve impairment to extremity: change in color, persistent  numbness, tingling, coldness or increase pain  · Any questions        What to do at Home:  Recommended activity: Activity as tolerated and no driving for today. If you experience any of the following symptoms, please follow up with your provider. *  Please give a list of your current medications to your Primary Care Provider. *  Please update this list whenever your medications are discontinued, doses are      changed, or new medications (including over-the-counter products) are added. *  Please carry medication information at all times in case of emergency situations. These are general instructions for a healthy lifestyle:    No smoking/ No tobacco products/ Avoid exposure to second hand smoke    Surgeon General's Warning:  Quitting smoking now greatly reduces serious risk to your health.     Obesity, smoking, and sedentary lifestyle greatly increases your risk for illness    A healthy diet, regular physical exercise & weight monitoring are important for maintaining a healthy lifestyle    You may be retaining fluid if you have a history of heart failure or if you experience any of the following symptoms:  Weight gain of 3 pounds or more overnight or 5 pounds in a week, increased swelling in our hands or feet or shortness of breath while lying flat in bed. Please call your doctor as soon as you notice any of these symptoms; do not wait until your next office visit. Recognize signs and symptoms of STROKE:    F-face looks uneven    A-arms unable to move or move unevenly    S-speech slurred or non-existent    T-time-call 911 as soon as signs and symptoms begin-DO NOT go       Back to bed or wait to see if you get better-TIME IS BRAIN. Warning Signs of HEART ATTACK     Call 911 if you have these symptoms:   Chest discomfort. Most heart attacks involve discomfort in the center of the chest that lasts more than a few minutes, or that goes away and comes back. It can feel like uncomfortable pressure, squeezing, fullness, or pain.  Discomfort in other areas of the upper body. Symptoms can include pain or discomfort in one or both arms, the back, neck, jaw, or stomach.  Shortness of breath with or without chest discomfort.  Other signs may include breaking out in a cold sweat, nausea, or lightheadedness. Don't wait more than five minutes to call 911 - MINUTES MATTER! Fast action can save your life. Calling 911 is almost always the fastest way to get lifesaving treatment. Emergency Medical Services staff can begin treatment when they arrive -- up to an hour sooner than if someone gets to the hospital by car. The discharge information has been reviewed with the patient and caregiver. The patient and caregiver verbalized understanding. Discharge medications reviewed with the patient and caregiver and appropriate educational materials and side effects teaching were provided.           Patient armband removed and shredded

## 2017-04-14 NOTE — ADDENDUM NOTE
Addendum  created 04/14/17 1726 by Reji Whitt CRNA    Anesthesia Event edited, Procedure Event Log accessed

## 2017-04-14 NOTE — INTERVAL H&P NOTE
H&P Update:  Klaudia Ross was seen and examined. History and physical has been reviewed. The patient has been examined.  There have been no significant clinical changes since the completion of the originally dated History and Physical.    Signed By: Donavon Morgan MD     April 14, 2017 12:52 PM

## 2017-04-14 NOTE — PERIOP NOTES
TRANSFER - IN REPORT:    Verbal report received from [address] [place] [address]  (name) on Peggy Yang  being received from OR (unit) for routine progression of care      Report consisted of patients Situation, Background, Assessment and   Recommendations(SBAR). Information from the following report(s) OR Summary, Procedure Summary and Intake/Output was reviewed with the receiving nurse. Opportunity for questions and clarification was provided. Assessment completed upon patients arrival to unit and care assumed.

## 2017-04-14 NOTE — IP AVS SNAPSHOT
Renee Pachecomarie 
 
 
 [address] [place] [id number] 
[phone] Patient: Skylar Vargas MRN: [id number] KWS:[id number] You are allergic to the following Allergen Reactions Vancomycin Other (comments) Felt like her body was burning Aspirin Nausea and Vomiting Pt reports aspirin gave her a stomach ulcer. Vicodin (Hydrocodone-Acetaminophen) Nausea and Vomiting Recent Documentation Height Weight BMI OB Status Smoking Status 1.651 m 90.7 kg 33.29 kg/m2 Hysterectomy Never Smoker Emergency Contacts Name Discharge Info Relation Home Work Mobile Marlys CAREGIVER [3] Daughter [21] [phone] About your hospitalization You were admitted on:  April 14, 2017 You last received care in the:  [facility] PACU You were discharged on:  April 14, 2017 Unit phone number:  [phone] Why you were hospitalized Your primary diagnosis was:  Not on File Providers Seen During Your Hospitalizations Provider Role Specialty Primary office phone Tayla Gentile MD Attending Provider Vascular Surgery [phone] Your Primary Care Physician (PCP) Primary Care Physician Office Phone Office Fax Shira Patel [phone] Follow-up Information Follow up With Details Comments Contact Info Myles Bernheim, MD   [address] [place] [address] [address] [facility] 
[phone] Tayla Gentile MD Call today Follow up in 1 week 97 Jenna David [facility] 150 
[phone] Your Appointments Wednesday May 03, 2017 11:00 AM EDT Follow Up with Tayla Gentile MD  
BS Vein/Vascular Spec [facility] ([facility])  
 [address] [place] [address] Drive [address] [place] [address],[address] [facility] 150  
[phone] Current Discharge Medication List  
  
START taking these medications Dose & Instructions Dispensing Information Comments Morning Noon Evening Bedtime  
 oxyCODONE-acetaminophen 5-325 mg per tablet Commonly known as:  PERCOCET Your last dose was: Your next dose is:    
   
   
 Dose:  1 Tab Take 1 Tab by mouth every four (4) hours as needed for Pain. Max Daily Amount: 6 Tabs. Quantity:  40 Tab Refills:  0 CONTINUE these medications which have NOT CHANGED Dose & Instructions Dispensing Information Comments Morning Noon Evening Bedtime  
 albuterol 90 mcg/actuation inhaler Commonly known as:  PROVENTIL HFA, VENTOLIN HFA, PROAIR HFA Your last dose was: Your next dose is:    
   
   
 Dose:  2 Puff Take 2 Puffs by inhalation every six (6) hours as needed for Wheezing. Refills:  0 AMBIEN 5 mg tablet Generic drug:  zolpidem Your last dose was: Your next dose is:    
   
   
 Dose:  5 mg Take 5 mg by mouth nightly as needed for Sleep. Indications: dialysis days Refills:  0  
     
   
   
   
  
 BENADRYL 25 mg capsule Generic drug:  diphenhydrAMINE Your last dose was: Your next dose is:    
   
   
 Dose:  25 mg Take 25 mg by mouth See Admin Instructions. Dialysis pre med Refills:  0  
     
   
   
   
  
 FLONASE 50 mcg/actuation nasal spray Generic drug:  fluticasone Your last dose was: Your next dose is:    
   
   
 Dose:  2 Spray 2 Sprays by Both Nostrils route daily as needed for Rhinitis. Refills:  0  
     
   
   
   
  
 pravastatin 20 mg tablet Commonly known as:  PRAVACHOL Your last dose was: Your next dose is:    
   
   
 Dose:  20 mg Take 20 mg by mouth nightly. Indications: hypercholesterolemia Refills:  0  
     
   
   
   
  
 promethazine 25 mg tablet Commonly known as:  PHENERGAN Your last dose was:     
   
Your next dose is:    
   
   
 Dose:  25 mg  
 Take 25 mg by mouth See Admin Instructions. Dialysis premed Refills:  0 SENSIPAR 30 mg tablet Generic drug:  cinacalcet Your last dose was: Your next dose is:    
   
   
 Dose:  30 mg Take 30 mg by mouth nightly. Refills:  0  
     
   
   
   
  
 * sevelamer 800 mg tablet Commonly known as:  RENAGEL Your last dose was: Your next dose is:    
   
   
 Dose:  2400 mg Take 2,400 mg by mouth three (3) times daily (with meals). Refills:  0  
     
   
   
   
  
 * sevelamer 800 mg tablet Commonly known as:  RENAGEL Your last dose was: Your next dose is:    
   
   
 Dose:  1600 mg Take 1,600 mg by mouth See Admin Instructions. 2 tabs if/when eating snack Refills:  0  
     
   
   
   
  
 * Notice: This list has 2 medication(s) that are the same as other medications prescribed for you. Read the directions carefully, and ask your doctor or other care provider to review them with you. Where to Get Your Medications Information on where to get these meds will be given to you by the nurse or doctor. ! Ask your nurse or doctor about these medications  
  oxyCODONE-acetaminophen 5-325 mg per tablet Discharge Instructions INSTRUCTIONS FOLLOWING ARTERIOVENOUS FISTULA, GRAFT ACCESS, REVISION, OR DECLOT 
 
ACTIVITY: 
Limited activity today. Keep access arm straight and raised above the level of your heart for 24 hours or until the swelling goes away. DIET: 
May have your usual food, unless told otherwise by your doctor. PAIN: 
Use the prescription for pain medicine your doctor gave you. If you do not have one, usual your normal pain medicine. If this does not control your pain, call your doctor. DO NOT TAKE ASPIRIN OR MEDICINE WITH ASPIRIN IN IT, unless your doctor says you may. DRESSING CARE:  
Keep your dressing clean and dry.  Leave your dressing on until the Dialysis Nurse or your doctor takes it off. BLEEDING: If you have any bleeding put pressure over the bleeding area and call your doctor. CARE OF YOUR ACCESS ARM: 
You may have some bruising, swelling, and discoloration for several weeks. After the swelling has gone down, you will be able to see the outline of the graft. By placing your fingertips over the graft you will be able to feel a vibration (thrill) that means blood is flowing and the graft is working. Call your Kidney doctor if you do not feel the [address] or for any problems like swelling, redness, tingling, or numbness in access arm, pus, or fever over 101. DO NOT: 
1. Wear tight sleeves, watches, belts, or bracelets over the graft. 2. Carry heavy bags across the graft or fistula. 3. Sleep on your graft side. 4. Let your blood pressure be taken in your access arm. 5. Let blood be drawn from your access arm. For the next 24 hours, DO NOT Drive, Drink Alcoholic Beverages, or Make IMPORTANT Decisions. DISCHARGE SUMMARY from Nurse The following personal items are in your possession at time of discharge: 
 
Dental Appliances: None Jewelry: None Clothing: Pants, Shirt, Footwear, Socks, Jacket/Coat, Undergarments (locker #5) Other Valuables: Cell Phone (locker #5) PATIENT INSTRUCTIONS: 
 
 
F-face looks uneven A-arms unable to move or move unevenly S-speech slurred or non-existent T-time-call 911 as soon as signs and symptoms begin-DO NOT go Back to bed or wait to see if you get better-TIME IS BRAIN. Warning Signs of HEART ATTACK Call 911 if you have these symptoms: 
? Chest discomfort.  Most heart attacks involve discomfort in the center of the chest that lasts more than a few minutes, or that goes away and comes back. It can feel like uncomfortable pressure, squeezing, fullness, or pain. ? Discomfort in other areas of the upper body. Symptoms can include pain or discomfort in one or both arms, the back, neck, jaw, or stomach. ? Shortness of breath with or without chest discomfort. ? Other signs may include breaking out in a cold sweat, nausea, or lightheadedness. Don't wait more than five minutes to call [address]! Fast action can save your life. Calling 911 is almost always the fastest way to get lifesaving treatment. Emergency Medical Services staff can begin treatment when they arrive  up to an hour sooner than if someone gets to the hospital by car. The discharge information has been reviewed with the patient and caregiver. The patient and caregiver verbalized understanding. Discharge medications reviewed with the patient and caregiver and appropriate educational materials and side effects teaching were provided. Patient armband removed and shredded Discharge Orders None Introducing [facility] SERVICES! Annia Bosch introduces [employer] patient portal. Now you can access parts of your medical record, email your doctor's office, and request medication refills online. 1. In your internet browser, go to [email or web address] 2. Click on the First Time User? Click Here link in the Sign In box. You will see the New Member Sign Up page. 3. Enter your [employer] Access Code exactly as it appears below. You will not need to use this code after youve completed the sign-up process. If you do not sign up before the expiration date, you must request a new code. · [employer] Access Code: [email or web address] Expires: 5/22/2017  9:26 AM 
 
4. Enter the last four digits of your Social Security Number (xxxx) and Date of Birth (mm/dd/yyyy) as indicated and click Submit.  You will be taken to the next sign-up page. 5. Create a [employer] ID. This will be your bluebird bio login ID and cannot be changed, so think of one that is secure and easy to remember. 6. Create a bluebird bio password. You can change your password at any time. 7. Enter your Password Reset Question and Answer. This can be used at a later time if you forget your password. 8. Enter your e-mail address. You will receive e-mail notification when new information is available in [address] 9. Click Sign Up. You can now view and download portions of your medical record. 10. Click the Download Summary menu link to download a portable copy of your medical information. If you have questions, please visit the Frequently Asked Questions section of the bluebird bio website. Remember, bluebird bio is NOT to be used for urgent needs. For medical emergencies, dial 911. Now available from your iPhone and Android! General Information Please provide this summary of care documentation to your next provider. Patient Signature:  ____________________________________________________________ Date:  ____________________________________________________________  
  
Kris Bell Provider Signature:  ____________________________________________________________ Date:  ____________________________________________________________

## 2017-04-14 NOTE — ANESTHESIA PREPROCEDURE EVALUATION
Anesthetic History   No history of anesthetic complications            Review of Systems / Medical History  Patient summary reviewed, nursing notes reviewed and pertinent labs reviewed    Pulmonary  Within defined limits                 Neuro/Psych   Within defined limits           Cardiovascular  Within defined limits                Exercise tolerance: >4 METS     GI/Hepatic/Renal         Renal disease: ESRD    Pertinent negatives: No GERD, PUD, hepatitis and liver disease   Endo/Other        Obesity  Pertinent negatives: No diabetes, hypothyroidism, hyperthyroidism, morbid obesity and blood dyscrasia   Other Findings              Physical Exam    Airway  Mallampati: III  TM Distance: 4 - 6 cm  Neck ROM: normal range of motion   Mouth opening: Normal     Cardiovascular  Regular rate and rhythm,  S1 and S2 normal,  no murmur, click, rub, or gallop             Dental    Dentition: Poor dentition  Comments: Many missing/broken teeth   Pulmonary  Breath sounds clear to auscultation               Abdominal  GI exam deferred       Other Findings            Anesthetic Plan    ASA: 4  Anesthesia type: general          Induction: Intravenous  Anesthetic plan and risks discussed with: Patient and Family      GA/LMA. Regional with MAC also discussed but the patient prefers GA.

## 2017-04-14 NOTE — BRIEF OP NOTE
BRIEF OPERATIVE NOTE    Date of Procedure: 4/14/2017   Preoperative Diagnosis: END STAGE RENAL DISEASE  Postoperative Diagnosis: END STAGE RENAL DISEASE    Procedure(s):  RIGHT UPPER EXTREMITY  ARTEROVENOUS GRAFT CREATION   Surgeon(s) and Role:     * Melani Leonard MD - Primary            Surgical Staff:  Circ-1: Rhonda Rowe RN  Circ-2: Jarrett Silva RN  Circ-Relief: Melina Burciaga RN; Bruna German RN; Denice Dodson  Scrub Tech-1: Buck Martinez  Scrub Tech-Relief: Frank Wills  Surg Asst-1: Janie Randall  Surg Asst-Relief: Rendall Reamer  Event Time In   Incision Start 1407   Incision Close      Anesthesia: General   Estimated Blood Loss: 75mL  Specimens: * No specimens in log *   Findings: Good thrill in graft at completion of the procedure   Complications: None  Implants:   Implant Name Type Inv.  Item Serial No.  Lot No. LRB No. Used Action   GRAFT VASC [id number] TAPR -- ACUSEAL - [id number]   GRAFT VASC [id number] TAPR -- ACUSEAL [id number]  [employer]   Right 1 Implanted

## 2017-04-14 NOTE — ANESTHESIA POSTPROCEDURE EVALUATION
Post-Anesthesia Evaluation & Assessment    Visit Vitals    /69    Pulse 79    Temp 36.8 °C (98.2 °F)    Resp 13    Ht 5' 5\" (1.651 m)    Wt 90.7 kg (200 lb 1 oz)    SpO2 98%    BMI 33.29 kg/m2       Nausea/Vomiting: Controlled. Post-operative hydration adequate. Pain Scale 1: Numeric (0 - 10) (04/14/17 1646)  Pain Intensity 1: 0 (04/14/17 1646)   Managed    Pain score at or below stated goal level. Mental status & Level of consciousness: alert and oriented x 3    Neurological status: moves all extremities, sensation grossly intact    Pulmonary status: airway patent, adequate oxygenation. Complications related to anesthesia: none    Patient has met all PACU discharge requirements.       Rosa Fonseca DO

## 2017-04-14 NOTE — H&P (VIEW-ONLY)
Oziel Bauman    Chief Complaint   Patient presents with    End Stage Renal Disease       History and Physical    Ms. Jim Champagne returns her office for continued evaluation of her left arm wound status post excision of infected AV graft and also discussion of long-term dialysis access. She states her pain has improved in his left upper extremity she still has some burning around the area where the wound was. She also complains of some arm swelling. She denies any fevers or chills and she states that she is to finish her antibiotics on 26 March. .    Past Medical History:   Diagnosis Date    Chronic kidney disease     Dialysis patient [facility])      Patient Active Problem List   Diagnosis Code    CAP (community acquired pneumonia) J18.9    ESRD on dialysis ([facility] N18.6, Z99.2    Sepsis ([facility] A41.9    Anemia D64.9    Hyponatremia E87.1    Dehydration E86.0    Prolonged Q-T interval on ECG S65.68    Diastolic dysfunction C70.2    Infected prosthetic vascular graft ([facility] T82. 7XXA    Bacteremia due to Staphylococcus aureus R78.81    C. difficile colitis A04.7    PNA (pneumonia) J18.9    Hypokalemia E87.6    Hypoglycemia E16.2    Constipation K59.00     Past Surgical History:   Procedure Laterality Date    HX CSF SHUNT      HX HYSTERECTOMY      HX OTHER SURGICAL      dialysis shunt left arm    HX TRANSPLANT      VASCULAR SURGERY PROCEDURE UNLIST       Current Outpatient Prescriptions   Medication Sig Dispense Refill    oxyCODONE-acetaminophen (PERCOCET 10)  mg per tablet Take 1 Tab by mouth every six (6) hours as needed for Pain. Max Daily Amount: 4 Tabs. 30 Tab 0    albuterol (PROVENTIL HFA, VENTOLIN HFA, PROAIR HFA) 90 mcg/actuation inhaler Take  by inhalation.  midodrine (PROAMITINE) 5 mg tablet Take 1 Tab by mouth three (3) times daily (with meals) for 30 days. 30 Tab 0    sevelamer (RENAGEL) 800 mg tablet Take 2,400 mg by mouth three (3) times daily (with meals).       sevelamer (RENAGEL) 800 mg tablet Take 1,600 mg by mouth See Admin Instructions. 2 tabs if/when eating snack      pravastatin (PRAVACHOL) 20 mg tablet Take 20 mg by mouth nightly.  zolpidem (AMBIEN) 5 mg tablet Take 5 mg by mouth nightly as needed for Sleep.  tiZANidine (ZANAFLEX) 4 mg capsule Take 4 mg by mouth two (2) times daily as needed (Pain).  cinacalcet (SENSIPAR) 30 mg tablet Take 30 mg by mouth nightly.  diphenhydrAMINE (BENADRYL) 25 mg capsule Take 25 mg by mouth See Admin Instructions. Dialysis pre med      promethazine (PHENERGAN) 25 mg tablet Take 25 mg by mouth See Admin Instructions. Dialysis premed      fluticasone (FLONASE) 50 mcg/actuation nasal spray 2 Sprays by Both Nostrils route daily as needed for Rhinitis. Allergies   Allergen Reactions    Vancomycin Other (comments)     Felt like her body was burning    Aspirin Nausea and Vomiting     Pt reports aspirin gave her a stomach ulcer.  Vicodin [Hydrocodone-Acetaminophen] Nausea and Vomiting     Social History     Social History    Marital status: UNKNOWN     Spouse name: N/A    Number of children: N/A    Years of education: N/A     Occupational History    Not on file. Social History Main Topics    Smoking status: Never Smoker    Smokeless tobacco: Never Used    Alcohol use No    Drug use: Not on file    Sexual activity: Not on file     Other Topics Concern    Not on file     Social History Narrative      History reviewed. No pertinent family history. Review of Systems    Review of Systems   Constitutional: Negative for chills, diaphoresis, fever, malaise/fatigue and weight loss. HENT: Negative for hearing loss and sore throat. Eyes: Negative for blurred vision, photophobia and redness. Respiratory: Negative for cough, hemoptysis, shortness of breath and wheezing. Cardiovascular: Negative for chest pain, palpitations and orthopnea.    Gastrointestinal: Negative for abdominal pain, blood in stool, constipation, diarrhea, heartburn, nausea and vomiting. Genitourinary: Negative for dysuria, frequency, hematuria and urgency. Musculoskeletal: Negative for back pain and myalgias. Skin: Negative for itching and rash. Neurological: Negative for dizziness, speech change, focal weakness, weakness and headaches. Endo/Heme/Allergies: Does not bruise/bleed easily. Psychiatric/Behavioral: Negative for depression and suicidal ideas. Physical Exam:    Visit Vitals    /88    Pulse 82    Resp 20    Ht 5' 5\" (1.651 m)    Wt 204 lb (92.5 kg)    LMP 12/01/2012    BMI 33.95 kg/m2      Physical Examination: General appearance - alert, well appearing, and in no distress  Mental status - alert, oriented to person, place, and time  Eyes - sclera anicteric, left eye normal, right eye normal  Ears - bilateral TM's and external ear canals normal  Nose - normal and patent, no erythema, discharge or polyps  Mouth - mucous membranes moist, pharynx normal without lesions  Neck - supple, no significant adenopathy  Extremities -left lower extremity wound mostly healed with with a focal area of granulation tissue no tunneling no pocket. 1+ nonpitting edema in the left upper extremity. Impression and Plan:    ICD-10-CM ICD-9-CM    1. ESRD on dialysis ([facility] N18.6 585.6 DUPLEX UPPER EXT VEIN MAPPING LEFT    Z99.2 V45.11 DUPLEX UPPER EXT VEIN MAPPING RIGHT     Orders Placed This Encounter    DUPLEX UPPER EXT VEIN MAPPING LEFT    DUPLEX UPPER EXT VEIN MAPPING RIGHT    oxyCODONE-acetaminophen (PERCOCET 10)  mg per tablet    albuterol (PROVENTIL HFA, VENTOLIN HFA, PROAIR HFA) 90 mcg/actuation inhaler     I told Ms. Berenice Schwartz that I am happy with the appearance of her wound. We will continue with wet-to-dry dressings for this left upper extremity wound. Also prescribed her additional pain medication.   Will obtain bilateral extremity vein mapping with plan to place most likely a right upper extremity access in the early part of April. Follow-up Disposition:  Return in about 6 weeks (around 4/26/2017) for post procedure. The treatment plan was reviewed with the patient in detail. The patient voiced understanding of this plan and all questions and concerns were addressed. The patient agrees with this plan. We discussed the signs and symptoms that would require earlier attention or intervention. The patient was given educational material related to his/her visit and the patient has voiced understanding of the material.     I appreciate the opportunity to participate in the care of your patient. I will be sure to keep you informed of any subsequent changes in the treatment plan. If you have any questions or concerns, please feel free to contact me. Michael Daly MD    PLEASE NOTE:  This document has been produced using voice recognition software. Unrecognized errors in transcription may be present.

## 2017-04-15 NOTE — OP NOTES
[address] [place] [address]  OPERATIVE REPORT    Name:  Ab Santiago  MR#:  [id number]  :  1975  Account #:  [de-identified]  Date of Adm:  2017  Date of Surgery:  2017      PREOPERATIVE DIAGNOSIS: End-stage renal disease with multiple  upper extremity arteriovenous access site. POSTOPERATIVE DIAGNOSIS: End-stage renal disease with multiple  upper extremity arteriovenous access site. PROCEDURES PERFORMED: Redo exploration of right brachial  artery, re-exploration of right axillary vein with extensive lysis of  adhesions, greater than 30 minutes, placement of a right brachial  artery to axillary vein arteriovenous graft. ESTIMATED BLOOD LOSS: 75 mL. SPECIMENS REMOVED: None. ANESTHESIA: General.    SURGEON: Pedro Rivera MD    PACKS AND DRAINS: None. IMPLANTS: A 4-7 mm tapered Acuseal graft. COMPLICATIONS: None. CONDITION: To recovery, stable. FINDINGS: Palpable thrill throughout the AV graft at the completion of  procedure. INDICATIONS FOR PROCEDURE: The patient is a 41-year-old  female with end-stage renal disease who has undergone multiple  upper extremity graft placements. She was recently admitted with septic  shock after having an infected left upper extremity AV graft placed in  an outside facility. This graft was then ligated and a tunneled catheter  was placed, and now she returns for long-term dialysis access. After  vein mapping, it appeared that she had a satisfactory axillary vein to be  used for a possible arteriovenous graft. Given these findings, decision  was made to take the patient to the operating room for stent graft  placement. Informed consent was obtained. DESCRIPTION OF PROCEDURE: On 2017 she presented to  the operating room, identified by name and ID bracelet by myself and  entire team. Once this was done, the patient was placed on the  operating table in supine position.  After appropriate depth of  anesthesia obtained, the patient was intubated without any  complications. The patient was then prepped and draped and time-out  performed. The patient received preoperative dose of antibiotics. At  this point, starting with the patient's scar overlying the brachial artery,  incision was made and using blunt and sharp dissection, the brachial  artery was re-exposed. Vessel loops were placed for proximal and  distal control. We then turned our attention for a vein anastomosis site. Then, we made an incision in the patient's previous scar in the upper  arm. Extensive lysis of adhesions was completed to expose the small  axillary vein. We again placed vessel loops for vascular control. Once  this was done, we then created a tunnel for Acuseal graft and tunneled  the 7 mm end towards our axillary vein, and the 4 mm end towards the  brachial artery. The patient was then heparinized appropriately. The  brachial artery was occluded. We then made a longitudinal arteriotomy  and completed the end-to-side anastomosis between the brachial  artery and the 4 mm end of the Acuseal graft. Once this was  completed, we flushed the artery through the graft, and confirmed  hemostasis. We then occluded the axillary vein, made a longitudinal  venotomy. We then completed the end-to-side anastomosis between  the 7 mm end of the graft and the axillary vein. Prior to completing the  anastomosis, we back bled the vein and flushed the graft. We instilled  heparinized saline. Once this was completed, we completed our  anastomosis and we had a palpable thrill throughout the graft, as well  confirmed hemostasis. Happy with our results, we then closed the  incisions with one layer of interrupted Vicryl sutures, followed by a  running Monocryl suture and Dermabond. At the end of the procedure,  all counts were correct x2. I was present and scrubbed for the entire  procedure.         MD Vibha Doe Duty  D:  04/14/2017   20:58  T:  04/14/2017   21:36  Job #: [id number]

## 2017-04-19 ENCOUNTER — OFFICE VISIT (OUTPATIENT)
Dept: VASCULAR SURGERY | Age: 42
End: 2017-04-19

## 2017-04-19 VITALS
SYSTOLIC BLOOD PRESSURE: 130 MMHG | BODY MASS INDEX: 33.32 KG/M2 | HEART RATE: 72 BPM | WEIGHT: 200 LBS | HEIGHT: 65 IN | DIASTOLIC BLOOD PRESSURE: 74 MMHG

## 2017-04-19 DIAGNOSIS — N18.6 ESRD ON DIALYSIS (HCC): Primary | ICD-10-CM

## 2017-04-19 DIAGNOSIS — Z99.2 ESRD ON DIALYSIS (HCC): Primary | ICD-10-CM

## 2017-04-19 RX ORDER — OXYCODONE AND ACETAMINOPHEN 7.5; 325 MG/1; MG/1
1 TABLET ORAL
Qty: 30 TAB | Refills: 0 | Status: ON HOLD | OUTPATIENT
Start: 2017-04-19 | End: 2017-05-12

## 2017-04-19 NOTE — PROGRESS NOTES
Juan Roland    Chief Complaint   Patient presents with    Surgical Follow-up    End Stage Renal Disease       History and Physical    Ms. Galindo Poe returns to our office after undergoing a right upper arm graft placement. She states her arm is very sore and she is worried about moving her elbow. She denies fevers or chills. She denies any bleeding from her incisions. She states she has pain in her skin overlying her graft that radiates to her elbow then back on the outside of her arm. Past Medical History:   Diagnosis Date    Chronic kidney disease     ESRD    Dialysis patient [facility])      Patient Active Problem List   Diagnosis Code    CAP (community acquired pneumonia) J18.9    ESRD on dialysis ([address] N18.6, Z99.2    Sepsis ([address] A41.9    Anemia D64.9    Hyponatremia E87.1    Dehydration E86.0    Prolonged Q-T interval on ECG A05.24    Diastolic dysfunction Y20.0    Infected prosthetic vascular graft ([facility] T82. 7XXA    Bacteremia due to Staphylococcus aureus R78.81    C. difficile colitis A04.7    PNA (pneumonia) J18.9    Hypokalemia E87.6    Hypoglycemia E16.2    Constipation K59.00     Past Surgical History:   Procedure Laterality Date    HX CSF SHUNT      HX HYSTERECTOMY      HX OTHER SURGICAL      dialysis shunt left arm; removed    HX TONSILLECTOMY      HX TRANSPLANT      VASCULAR SURGERY PROCEDURE UNLIST       Current Outpatient Prescriptions   Medication Sig Dispense Refill    oxyCODONE-acetaminophen (PERCOCET 7.5) 7.5-325 mg per tablet Take 1 Tab by mouth every six (6) hours as needed for Pain. Max Daily Amount: 4 Tabs. 30 Tab 0    albuterol (PROVENTIL HFA, VENTOLIN HFA, PROAIR HFA) 90 mcg/actuation inhaler Take 2 Puffs by inhalation every six (6) hours as needed for Wheezing.  sevelamer (RENAGEL) 800 mg tablet Take 2,400 mg by mouth three (3) times daily (with meals).  sevelamer (RENAGEL) 800 mg tablet Take 1,600 mg by mouth See Admin Instructions.  2 tabs if/when eating snack      pravastatin (PRAVACHOL) 20 mg tablet Take 20 mg by mouth nightly. Indications: hypercholesterolemia      zolpidem (AMBIEN) 5 mg tablet Take 5 mg by mouth nightly as needed for Sleep. Indications: dialysis days      cinacalcet (SENSIPAR) 30 mg tablet Take 30 mg by mouth nightly.  diphenhydrAMINE (BENADRYL) 25 mg capsule Take 25 mg by mouth See Admin Instructions. Dialysis pre med      promethazine (PHENERGAN) 25 mg tablet Take 25 mg by mouth See Admin Instructions. Dialysis premed      fluticasone (FLONASE) 50 mcg/actuation nasal spray 2 Sprays by Both Nostrils route daily as needed for Rhinitis. Allergies   Allergen Reactions    Vancomycin Other (comments)     Felt like her body was burning    Aspirin Nausea and Vomiting     Pt reports aspirin gave her a stomach ulcer.  Vicodin [Hydrocodone-Acetaminophen] Nausea and Vomiting     Social History     Social History    Marital status: UNKNOWN     Spouse name: N/A    Number of children: N/A    Years of education: N/A     Occupational History    Not on file. Social History Main Topics    Smoking status: Never Smoker    Smokeless tobacco: Never Used    Alcohol use No    Drug use: No    Sexual activity: Not on file     Other Topics Concern    Not on file     Social History Narrative      Family History   Problem Relation Age of Onset    Hypertension Brother     Diabetes Maternal Grandmother        Review of Systems    Review of Systems   Constitutional: Negative for chills, diaphoresis, fever, malaise/fatigue and weight loss. HENT: Negative for hearing loss and sore throat. Eyes: Negative for blurred vision, photophobia and redness. Respiratory: Negative for cough, hemoptysis, shortness of breath and wheezing. Cardiovascular: Negative for chest pain, palpitations and orthopnea. Gastrointestinal: Negative for abdominal pain, blood in stool, constipation, diarrhea, heartburn, nausea and vomiting. Genitourinary: Negative for dysuria, frequency, hematuria and urgency. Musculoskeletal: Positive for myalgias. Negative for back pain. Skin: Negative for itching and rash. Neurological: Negative for dizziness, speech change, focal weakness, weakness and headaches. Endo/Heme/Allergies: Does not bruise/bleed easily. Psychiatric/Behavioral: Negative for depression and suicidal ideas. Physical Exam:    Visit Vitals    /74    Pulse 72    Ht 5' 5\" (1.651 m)    Wt 200 lb (90.7 kg)    LMP 12/01/2012    BMI 33.28 kg/m2      Physical Examination: General appearance - alert, well appearing, and in no distress  Mental status - alert, oriented to person, place, and time  Eyes - sclera anicteric, left eye normal, right eye normal  Ears - right ear normal, left ear normal  Nose - normal and patent, no erythema, discharge or polyps  Mouth - mucous membranes moist, pharynx normal without lesions  Extremities - Right upper extremity with slight swelling to upper outer arm down to her elbow. No evidence of hematoma. Tenderness to palpation overlying graft out of proportion to her exam.  Good thrill in AVG. Full range of motion in elbow      Impression and Plan:    ICD-10-CM ICD-9-CM    1. ESRD on dialysis ([facility] N18.6 585.6     Z99.2 V45.11      Orders Placed This Encounter    oxyCODONE-acetaminophen (PERCOCET 7.5) 7.5-325 mg per tablet     I told Ms. Wesly Martinez that her graft is working well. I told her she has to stop holding her and and to move it so her elbow does not get stiff. We will place her arm in tubigrip and will re evaluate her symptoms next week. Follow-up Disposition:  Return in about 1 week (around 4/26/2017) for Symptom check. The treatment plan was reviewed with the patient in detail. The patient voiced understanding of this plan and all questions and concerns were addressed. The patient agrees with this plan.   We discussed the signs and symptoms that would require earlier attention or intervention. The patient was given educational material related to his/her visit and the patient has voiced understanding of the material.     I appreciate the opportunity to participate in the care of your patient. I will be sure to keep you informed of any subsequent changes in the treatment plan. If you have any questions or concerns, please feel free to contact me. Ana Gallegos MD    PLEASE NOTE:  This document has been produced using voice recognition software. Unrecognized errors in transcription may be present.

## 2017-04-19 NOTE — MR AVS SNAPSHOT
Visit Information Date & Time Provider Department Dept. Phone Encounter #  
 4/19/2017 10:45 AM Melani Leonard MD [facility] [address] [id number] Follow-up Instructions Return in about 1 week (around 4/26/2017) for Symptom check. Follow-up and Disposition History Your Appointments 4/25/2017  3:00 PM  
Follow Up with MD ROSALES Bermudez [facility] App[facility] Note: 2 week f/u upper extremity AVF vs AVG creation; 2 week f/u upper extremity AVF vs AVG creation [address] [address] Upcoming Health Maintenance Date Due Pneumococcal 19-64 Highest Risk (1 of 3 - PCV13) 6/5/1992 DTaP/Tdap/Td series (1 - Tdap) 6/5/1994 PAP AKA CERVICAL CYTOLOGY 6/5/1994 INFLUENZA AGE 9 TO ADULT 8/1/2016 Allergies as of 4/19/2017  Review Complete On: 4/19/2017 By: Melani Leonard MD  
  
 Severity Noted Reaction Type Reactions Vancomycin High 12/25/2012    Other (comments) Felt like her body was burning Aspirin  02/21/2017    Nausea and Vomiting Pt reports aspirin gave her a stomach ulcer. Vicodin [Hydrocodone-acetaminophen]  12/25/2012    Nausea and Vomiting Current Immunizations  Never Reviewed No immunizations on file. Not reviewed this visit You Were Diagnosed With   
  
 Codes Comments ESRD on dialysis [facility])    -  Primary ICD-10-CM: N18.6, Z99.2 ICD-9-CM: 585.6, V45.11 Vitals BP Pulse Height(growth percentile) Weight(growth percentile) LMP BMI  
 130/74 72 5' 5\" (1.651 m) 200 lb (90.7 kg) 12/01/2012 33.28 kg/m2 OB Status Smoking Status Hysterectomy Never Smoker Vitals History BMI and BSA Data Body Mass Index Body Surface Area  
 33.28 kg/m 2 2.04 m 2 Preferred Pharmacy Pharmacy Name Phone [id number] [address] [facility] [phone] Your Updated Medication List  
  
   
This list is accurate as of: 4/19/17  1:23 PM.  Always use your most recent med list.  
  
  
  
  
 albuterol 90 mcg/actuation inhaler Commonly known as:  PROVENTIL HFA, VENTOLIN HFA, PROAIR HFA Take 2 Puffs by inhalation every six (6) hours as needed for Wheezing. AMBIEN 5 mg tablet Generic drug:  zolpidem Take 5 mg by mouth nightly as needed for Sleep. Indications: dialysis days BENADRYL 25 mg capsule Generic drug:  diphenhydrAMINE Take 25 mg by mouth See Admin Instructions. Dialysis pre med FLONASE 50 mcg/actuation nasal spray Generic drug:  fluticasone 2 Sprays by Both Nostrils route daily as needed for Rhinitis. oxyCODONE-acetaminophen 7.5-325 mg per tablet Commonly known as:  PERCOCET 7.5 Take 1 Tab by mouth every six (6) hours as needed for Pain. Max Daily Amount: 4 Tabs. pravastatin 20 mg tablet Commonly known as:  PRAVACHOL Take 20 mg by mouth nightly. Indications: hypercholesterolemia  
  
 promethazine 25 mg tablet Commonly known as:  PHENERGAN Take 25 mg by mouth See Admin Instructions. Dialysis premed SENSIPAR 30 mg tablet Generic drug:  cinacalcet Take 30 mg by mouth nightly. * sevelamer 800 mg tablet Commonly known as:  RENAGEL Take 2,400 mg by mouth three (3) times daily (with meals). * sevelamer 800 mg tablet Commonly known as:  RENAGEL Take 1,600 mg by mouth See Admin Instructions. 2 tabs if/when eating snack * Notice: This list has 2 medication(s) that are the same as other medications prescribed for you. Read the directions carefully, and ask your doctor or other care provider to review them with you. Prescriptions Printed  Refills  
 oxyCODONE-acetaminophen (PERCOCET 7.5) 7.5-325 mg per tablet 0  
 Sig: Take 1 Tab by mouth every six (6) hours as needed for Pain. Max Daily Amount: 4 Tabs. Class: Print Route: Oral  
  
Follow-up Instructions Return in about 1 week (around 4/26/2017) for Symptom check. Introducing [facility]! Toribio Nyhan introduces [employer] patient portal. Now you can access parts of your medical record, email your doctor's office, and request medication refills online. 1. In your internet browser, go to [email or web address] 2. Click on the First Time User? Click Here link in the Sign In box. You will see the New Member Sign Up page. 3. Enter your [employer] Access Code exactly as it appears below. You will not need to use this code after youve completed the sign-up process. If you do not sign up before the expiration date, you must request a new code. · [employer] Access Code: [email or web address] Expires: 5/22/2017  9:26 AM 
 
4. Enter the last four digits of your Social Security Number (xxxx) and Date of Birth (mm/dd/yyyy) as indicated and click Submit. You will be taken to the next sign-up page. 5. Create a [employer] ID. This will be your [employer] login ID and cannot be changed, so think of one that is secure and easy to remember. 6. Create a [employer] password. You can change your password at any time. 7. Enter your Password Reset Question and Answer. This can be used at a later time if you forget your password. 8. Enter your e-mail address. You will receive e-mail notification when new information is available in [address] 9. Click Sign Up. You can now view and download portions of your medical record. 10. Click the Download Summary menu link to download a portable copy of your medical information. If you have questions, please visit the Frequently Asked Questions section of the [employer] website. Remember, [employer] is NOT to be used for urgent needs. For medical emergencies, dial 911. Now available from your iPhone and Android! Please provide this summary of care documentation to your next provider. Your primary care clinician is listed as Eamon Currie. If you have any questions after today's visit, please call [phone].

## 2017-04-25 ENCOUNTER — OFFICE VISIT (OUTPATIENT)
Dept: VASCULAR SURGERY | Age: 42
End: 2017-04-25

## 2017-04-25 VITALS
DIASTOLIC BLOOD PRESSURE: 54 MMHG | WEIGHT: 200 LBS | SYSTOLIC BLOOD PRESSURE: 118 MMHG | BODY MASS INDEX: 33.32 KG/M2 | HEIGHT: 65 IN | RESPIRATION RATE: 18 BRPM | HEART RATE: 74 BPM

## 2017-04-25 DIAGNOSIS — Z99.2 ESRD ON DIALYSIS (HCC): Primary | ICD-10-CM

## 2017-04-25 DIAGNOSIS — N18.6 ESRD ON DIALYSIS (HCC): Primary | ICD-10-CM

## 2017-04-25 NOTE — MR AVS SNAPSHOT
Visit Information Date & Time Provider Department Dept. Phone Encounter #  
 4/25/2017  2:15 PM Lettie Aase, MD [facility] [address] [id number] Follow-up Instructions Return in about 1 week (around 5/2/2017). Follow-up and Disposition History Your Appointments 5/3/2017  1:30 PM  
Follow Up with Lettie Aase, MD  
[facility] Appt Note: 1 week follow up  
 [facility] [address] [facility]  
  
   
 [address] [place]  Upcoming Health Maintenance Date Due Pneumococcal 19-64 Highest Risk (1 of 3 - PCV13) 6/5/1992 DTaP/Tdap/Td series (1 - Tdap) 6/5/1994 PAP AKA CERVICAL CYTOLOGY 6/5/1994 INFLUENZA AGE 9 TO ADULT 8/1/2016 Allergies as of 4/25/2017  Review Complete On: 4/25/2017 By: Lettie Aase, MD  
  
 Severity Noted Reaction Type Reactions Vancomycin High 12/25/2012    Other (comments) Felt like her body was burning Aspirin  02/21/2017    Nausea and Vomiting Pt reports aspirin gave her a stomach ulcer. Vicodin [Hydrocodone-acetaminophen]  12/25/2012    Nausea and Vomiting Current Immunizations  Never Reviewed No immunizations on file. Not reviewed this visit You Were Diagnosed With   
  
 Codes Comments ESRD on dialysis [facility])    -  Primary ICD-10-CM: N18.6, Z99.2 ICD-9-CM: 585.6, V45.11 Vitals BP Pulse Resp Height(growth percentile) Weight(growth percentile) LMP  
 118/54 74 18 5' 5\" (1.651 m) 200 lb (90.7 kg) 12/01/2012 BMI OB Status Smoking Status 33.28 kg/m2 Hysterectomy Never Smoker Vitals History BMI and BSA Data Body Mass Index Body Surface Area  
 33.28 kg/m 2 2.04 m 2 Preferred Pharmacy Pharmacy Name Phone [id number] [address] [facility] [phone] Your Updated Medication List  
  
 This list is accurate as of: 4/25/17 11:59 PM.  Always use your most recent med list.  
  
  
  
  
 albuterol 90 mcg/actuation inhaler Commonly known as:  PROVENTIL HFA, VENTOLIN HFA, PROAIR HFA Take 2 Puffs by inhalation every six (6) hours as needed for Wheezing. AMBIEN 5 mg tablet Generic drug:  zolpidem Take 5 mg by mouth nightly as needed for Sleep. Indications: dialysis days BENADRYL 25 mg capsule Generic drug:  diphenhydrAMINE Take 25 mg by mouth See Admin Instructions. Dialysis pre med FLONASE 50 mcg/actuation nasal spray Generic drug:  fluticasone 2 Sprays by Both Nostrils route daily as needed for Rhinitis. oxyCODONE-acetaminophen 7.5-325 mg per tablet Commonly known as:  PERCOCET 7.5 Take 1 Tab by mouth every six (6) hours as needed for Pain. Max Daily Amount: 4 Tabs. pravastatin 20 mg tablet Commonly known as:  PRAVACHOL Take 20 mg by mouth nightly. Indications: hypercholesterolemia  
  
 promethazine 25 mg tablet Commonly known as:  PHENERGAN Take 25 mg by mouth See Admin Instructions. Dialysis premed SENSIPAR 30 mg tablet Generic drug:  cinacalcet Take 30 mg by mouth nightly. * sevelamer 800 mg tablet Commonly known as:  RENAGEL Take 2,400 mg by mouth three (3) times daily (with meals). * sevelamer 800 mg tablet Commonly known as:  RENAGEL Take 1,600 mg by mouth See Admin Instructions. 2 tabs if/when eating snack * Notice: This list has 2 medication(s) that are the same as other medications prescribed for you. Read the directions carefully, and ask your doctor or other care provider to review them with you. Follow-up Instructions Return in about 1 week (around 5/2/2017). Introducing [facility] [place] [facility] SERVICES! Nick Pinto introduces [employer] patient portal. Now you can access parts of your medical record, email your doctor's office, and request medication refills online. 1. In your internet browser, go to [email or web address] 2. Click on the First Time User? Click Here link in the Sign In box. You will see the New Member Sign Up page. 3. Enter your [employer] Access Code exactly as it appears below. You will not need to use this code after youve completed the sign-up process. If you do not sign up before the expiration date, you must request a new code. · [employer] Access Code: [email or web address] Expires: 5/22/2017  9:26 AM 
 
4. Enter the last four digits of your Social Security Number (xxxx) and Date of Birth (mm/dd/yyyy) as indicated and click Submit. You will be taken to the next sign-up page. 5. Create a Isotera ID. This will be your [employer] login ID and cannot be changed, so think of one that is secure and easy to remember. 6. Create a [employer] password. You can change your password at any time. 7. Enter your Password Reset Question and Answer. This can be used at a later time if you forget your password. 8. Enter your e-mail address. You will receive e-mail notification when new information is available in [address] 9. Click Sign Up. You can now view and download portions of your medical record. 10. Click the Download Summary menu link to download a portable copy of your medical information. If you have questions, please visit the Frequently Asked Questions section of the [employer] website. Remember, [employer] is NOT to be used for urgent needs. For medical emergencies, dial 911. Now available from your iPhone and Android! Please provide this summary of care documentation to your next provider. Your primary care clinician is listed as Eamon Currie. If you have any questions after today's visit, please call [phone].

## 2017-04-25 NOTE — PROGRESS NOTES
Ann Alvarado    Chief Complaint   Patient presents with    End Stage Renal Disease    Surgical Follow-up       History and Physical    Ms. Magda Moncada returns to our office for continued evaluation of her right arm AV graft. She states she still has pain in her arm but it is significantly improved. She has no new complaints. Past Medical History:   Diagnosis Date    Chronic kidney disease     ESRD    Dialysis patient [facility])      Patient Active Problem List   Diagnosis Code    CAP (community acquired pneumonia) J18.9    ESRD on dialysis ([address] N18.6, Z99.2    Sepsis ([address] A41.9    Anemia D64.9    Hyponatremia E87.1    Dehydration E86.0    Prolonged Q-T interval on ECG N84.18    Diastolic dysfunction Z41.4    Infected prosthetic vascular graft ([facility] T82. 7XXA    Bacteremia due to Staphylococcus aureus R78.81    C. difficile colitis A04.7    PNA (pneumonia) J18.9    Hypokalemia E87.6    Hypoglycemia E16.2    Constipation K59.00     Past Surgical History:   Procedure Laterality Date    HX CSF SHUNT      HX HYSTERECTOMY      HX OTHER SURGICAL      dialysis shunt left arm; removed    HX TONSILLECTOMY      HX TRANSPLANT      VASCULAR SURGERY PROCEDURE UNLIST       Current Outpatient Prescriptions   Medication Sig Dispense Refill    oxyCODONE-acetaminophen (PERCOCET 7.5) 7.5-325 mg per tablet Take 1 Tab by mouth every six (6) hours as needed for Pain. Max Daily Amount: 4 Tabs. 30 Tab 0    albuterol (PROVENTIL HFA, VENTOLIN HFA, PROAIR HFA) 90 mcg/actuation inhaler Take 2 Puffs by inhalation every six (6) hours as needed for Wheezing.  sevelamer (RENAGEL) 800 mg tablet Take 2,400 mg by mouth three (3) times daily (with meals).  sevelamer (RENAGEL) 800 mg tablet Take 1,600 mg by mouth See Admin Instructions. 2 tabs if/when eating snack      pravastatin (PRAVACHOL) 20 mg tablet Take 20 mg by mouth nightly.  Indications: hypercholesterolemia      zolpidem (AMBIEN) 5 mg tablet Take 5 mg by mouth nightly as needed for Sleep. Indications: dialysis days      cinacalcet (SENSIPAR) 30 mg tablet Take 30 mg by mouth nightly.  diphenhydrAMINE (BENADRYL) 25 mg capsule Take 25 mg by mouth See Admin Instructions. Dialysis pre med      promethazine (PHENERGAN) 25 mg tablet Take 25 mg by mouth See Admin Instructions. Dialysis premed      fluticasone (FLONASE) 50 mcg/actuation nasal spray 2 Sprays by Both Nostrils route daily as needed for Rhinitis. Allergies   Allergen Reactions    Vancomycin Other (comments)     Felt like her body was burning    Aspirin Nausea and Vomiting     Pt reports aspirin gave her a stomach ulcer.  Vicodin [Hydrocodone-Acetaminophen] Nausea and Vomiting     Social History     Social History    Marital status: UNKNOWN     Spouse name: N/A    Number of children: N/A    Years of education: N/A     Occupational History    Not on file. Social History Main Topics    Smoking status: Never Smoker    Smokeless tobacco: Never Used    Alcohol use No    Drug use: No    Sexual activity: Not on file     Other Topics Concern    Not on file     Social History Narrative      Family History   Problem Relation Age of Onset    Hypertension Brother     Diabetes Maternal Grandmother        Review of Systems    Review of Systems   Constitutional: Negative for chills, diaphoresis, fever, malaise/fatigue and weight loss. HENT: Negative for hearing loss and sore throat. Eyes: Negative for blurred vision, photophobia and redness. Respiratory: Negative for cough, hemoptysis, shortness of breath and wheezing. Cardiovascular: Negative for chest pain, palpitations and orthopnea. Gastrointestinal: Negative for abdominal pain, blood in stool, constipation, diarrhea, heartburn, nausea and vomiting. Genitourinary: Negative for dysuria, frequency, hematuria and urgency. Musculoskeletal: Negative for back pain and myalgias. Skin: Negative for itching and rash. Neurological: Negative for dizziness, speech change, focal weakness, weakness and headaches. Endo/Heme/Allergies: Does not bruise/bleed easily. Psychiatric/Behavioral: Negative for depression and suicidal ideas. Physical Exam:    Visit Vitals    /54    Pulse 74    Resp 18    Ht 5' 5\" (1.651 m)    Wt 200 lb (90.7 kg)    LMP 12/01/2012    BMI 33.28 kg/m2      Physical Examination: General appearance - alert, well appearing, and in no distress  Extremities - + thrill in AVG. Decreased arm edema. Stable ecchymosis lateral to graft. No wounds or ulcers      Impression and Plan:    ICD-10-CM ICD-9-CM    1. ESRD on dialysis ([email or web address] N18.6 585.6     Z99.2 V45.11      Will hold off on accessing graft until next week to continue to allow Ms. Schumacher Barechristian to heal.  Will see her in the office again next week to re evaluate and then clear her graft for use. Follow-up Disposition:  Return in about 1 week (around 5/2/2017). The treatment plan was reviewed with the patient in detail. The patient voiced understanding of this plan and all questions and concerns were addressed. The patient agrees with this plan. We discussed the signs and symptoms that would require earlier attention or intervention. The patient was given educational material related to his/her visit and the patient has voiced understanding of the material.     I appreciate the opportunity to participate in the care of your patient. I will be sure to keep you informed of any subsequent changes in the treatment plan. If you have any questions or concerns, please feel free to contact me. Carolina Figueroa MD    PLEASE NOTE:  This document has been produced using voice recognition software. Unrecognized errors in transcription may be present.

## 2017-05-03 ENCOUNTER — OFFICE VISIT (OUTPATIENT)
Dept: VASCULAR SURGERY | Age: 42
End: 2017-05-03

## 2017-05-03 VITALS
RESPIRATION RATE: 18 BRPM | HEART RATE: 78 BPM | WEIGHT: 200 LBS | DIASTOLIC BLOOD PRESSURE: 64 MMHG | HEIGHT: 65 IN | SYSTOLIC BLOOD PRESSURE: 100 MMHG | BODY MASS INDEX: 33.32 KG/M2

## 2017-05-03 DIAGNOSIS — Z99.2 ESRD ON DIALYSIS (HCC): Primary | ICD-10-CM

## 2017-05-03 DIAGNOSIS — N18.6 ESRD ON DIALYSIS (HCC): Primary | ICD-10-CM

## 2017-05-03 NOTE — PROGRESS NOTES
Juan Roland    Chief Complaint   Patient presents with    End Stage Renal Disease    Surgical Follow-up       History and Physical    Ms. Galindo Poe returns to our office for clearance to access her right arm graft. She states her pain is improved and she would like to wait until Tuesday when her regular nurse returns to access her graft. Past Medical History:   Diagnosis Date    Chronic kidney disease     ESRD    Dialysis patient [facility])      Patient Active Problem List   Diagnosis Code    CAP (community acquired pneumonia) J18.9    ESRD on dialysis ([address] N18.6, Z99.2    Sepsis ([facility] A41.9    Anemia D64.9    Hyponatremia E87.1    Dehydration E86.0    Prolonged Q-T interval on ECG V81.34    Diastolic dysfunction Q80.2    Infected prosthetic vascular graft ([facility] T82. 7XXA    Bacteremia due to Staphylococcus aureus R78.81    C. difficile colitis A04.7    PNA (pneumonia) J18.9    Hypokalemia E87.6    Hypoglycemia E16.2    Constipation K59.00     Past Surgical History:   Procedure Laterality Date    HX CSF SHUNT      HX HYSTERECTOMY      HX OTHER SURGICAL      dialysis shunt left arm; removed    HX TONSILLECTOMY      HX TRANSPLANT      VASCULAR SURGERY PROCEDURE UNLIST       Current Outpatient Prescriptions   Medication Sig Dispense Refill    oxyCODONE-acetaminophen (PERCOCET 7.5) 7.5-325 mg per tablet Take 1 Tab by mouth every six (6) hours as needed for Pain. Max Daily Amount: 4 Tabs. 30 Tab 0    albuterol (PROVENTIL HFA, VENTOLIN HFA, PROAIR HFA) 90 mcg/actuation inhaler Take 2 Puffs by inhalation every six (6) hours as needed for Wheezing.  sevelamer (RENAGEL) 800 mg tablet Take 2,400 mg by mouth three (3) times daily (with meals).  sevelamer (RENAGEL) 800 mg tablet Take 1,600 mg by mouth See Admin Instructions. 2 tabs if/when eating snack      pravastatin (PRAVACHOL) 20 mg tablet Take 20 mg by mouth nightly.  Indications: hypercholesterolemia      zolpidem (AMBIEN) 5 mg tablet Take 5 mg by mouth nightly as needed for Sleep. Indications: dialysis days      cinacalcet (SENSIPAR) 30 mg tablet Take 30 mg by mouth nightly.  diphenhydrAMINE (BENADRYL) 25 mg capsule Take 25 mg by mouth See Admin Instructions. Dialysis pre med      promethazine (PHENERGAN) 25 mg tablet Take 25 mg by mouth See Admin Instructions. Dialysis premed      fluticasone (FLONASE) 50 mcg/actuation nasal spray 2 Sprays by Both Nostrils route daily as needed for Rhinitis. Allergies   Allergen Reactions    Vancomycin Other (comments)     Felt like her body was burning    Aspirin Nausea and Vomiting     Pt reports aspirin gave her a stomach ulcer.  Vicodin [Hydrocodone-Acetaminophen] Nausea and Vomiting     Social History     Social History    Marital status: UNKNOWN     Spouse name: N/A    Number of children: N/A    Years of education: N/A     Occupational History    Not on file. Social History Main Topics    Smoking status: Never Smoker    Smokeless tobacco: Never Used    Alcohol use No    Drug use: No    Sexual activity: Not on file     Other Topics Concern    Not on file     Social History Narrative      Family History   Problem Relation Age of Onset    Hypertension Brother     Diabetes Maternal Grandmother        Review of Systems    Review of Systems   Constitutional: Negative for chills, diaphoresis, fever, malaise/fatigue and weight loss. HENT: Negative for hearing loss and sore throat. Eyes: Negative for blurred vision, photophobia and redness. Respiratory: Negative for cough, hemoptysis, shortness of breath and wheezing. Cardiovascular: Negative for chest pain, palpitations and orthopnea. Gastrointestinal: Negative for abdominal pain, blood in stool, constipation, diarrhea, heartburn, nausea and vomiting. Genitourinary: Negative for dysuria, frequency, hematuria and urgency. Musculoskeletal: Negative for back pain and myalgias. Skin: Negative for itching and rash. Neurological: Negative for dizziness, speech change, focal weakness, weakness and headaches. Endo/Heme/Allergies: Does not bruise/bleed easily. Psychiatric/Behavioral: Negative for depression and suicidal ideas. Physical Exam:    Visit Vitals    /64    Pulse 78    Resp 18    Ht 5' 5\" (1.651 m)    Wt 200 lb (90.7 kg)    LMP 12/01/2012    BMI 33.28 kg/m2      Physical Examination: General appearance - alert, well appearing, and in no distress  Mental status - alert, oriented to person, place, and time  Eyes - sclera anicteric, left eye normal, right eye normal  Ears - right ear normal, left ear normal  Nose - normal and patent, no erythema, discharge or polyps  Mouth - mucous membranes moist, pharynx normal without lesions  Extremities - Right arm graft with good thrill. Decreased ecchymosis. Non tender to palpation along graft. Some tenderness on underside of the arm. No wounds or ulcers. Impression and Plan:    ICD-10-CM ICD-9-CM    1. [facility] N18.6 585.6     Z99.2 V45.11      I told Ms. Maite Little that her graft is ready to be used. We will make sure her dialysis center has the instructions for accessing the Accuseal graft. We will see her in 3 weeks for a graft check and possible catheter removal.    Follow-up Disposition:  Return in about 3 weeks (around 5/24/2017) for catheter removal, Symptom check. The treatment plan was reviewed with the patient in detail. The patient voiced understanding of this plan and all questions and concerns were addressed. The patient agrees with this plan. We discussed the signs and symptoms that would require earlier attention or intervention. The patient was given educational material related to his/her visit and the patient has voiced understanding of the material.     I appreciate the opportunity to participate in the care of your patient.   I will be sure to keep you informed of any subsequent changes in the treatment plan.  If you have any questions or concerns, please feel free to contact me. Lettie Aase, MD    PLEASE NOTE:  This document has been produced using voice recognition software. Unrecognized errors in transcription may be present.

## 2017-05-09 NOTE — PROGRESS NOTES
PT aware of NPO status. PT aware of need to hold anticoagulants per protocol. PT aware of potential for sedation administration and need for  at discharge. PT aware of arrival time pre procedure, 1200. Pt states no questions at this time.

## 2017-05-12 ENCOUNTER — HOSPITAL ENCOUNTER (OUTPATIENT)
Dept: INTERVENTIONAL RADIOLOGY/VASCULAR | Age: 42
Discharge: HOME | End: 2017-05-12
Attending: SURGERY | Admitting: SURGERY
Payer: MEDICARE

## 2017-05-12 VITALS
WEIGHT: 199 LBS | TEMPERATURE: 97.8 F | DIASTOLIC BLOOD PRESSURE: 79 MMHG | SYSTOLIC BLOOD PRESSURE: 128 MMHG | HEART RATE: 66 BPM | BODY MASS INDEX: 33.15 KG/M2 | OXYGEN SATURATION: 96 % | RESPIRATION RATE: 16 BRPM | HEIGHT: 65 IN

## 2017-05-12 DIAGNOSIS — N18.6 ESRD (END STAGE RENAL DISEASE) (HCC): ICD-10-CM

## 2017-05-12 LAB
ANION GAP BLD CALC-SCNC: 11 MMOL/L (ref 3–18)
BASOPHILS # BLD AUTO: 0 K/UL (ref 0–0.06)
BASOPHILS # BLD: 1 % (ref 0–2)
BUN SERPL-MCNC: 42 MG/DL (ref 7–18)
BUN/CREAT SERPL: 4 (ref 12–20)
CALCIUM SERPL-MCNC: 10.9 MG/DL (ref 8.5–10.1)
CHLORIDE SERPL-SCNC: 101 MMOL/L (ref 100–108)
CO2 SERPL-SCNC: 29 MMOL/L (ref 21–32)
CREAT SERPL-MCNC: 10.38 MG/DL (ref 0.6–1.3)
DIFFERENTIAL METHOD BLD: NORMAL
EOSINOPHIL # BLD: 0.2 K/UL (ref 0–0.4)
EOSINOPHIL NFR BLD: 5 % (ref 0–5)
ERYTHROCYTE [DISTWIDTH] IN BLOOD BY AUTOMATED COUNT: 13.6 % (ref 11.6–14.5)
GLUCOSE SERPL-MCNC: 85 MG/DL (ref 74–99)
HCT VFR BLD AUTO: 43.1 % (ref 35–45)
HGB BLD-MCNC: 13.9 G/DL (ref 12–16)
INR PPP: 1 (ref 0.8–1.2)
LYMPHOCYTES # BLD AUTO: 36 % (ref 21–52)
LYMPHOCYTES # BLD: 1.8 K/UL (ref 0.9–3.6)
MCH RBC QN AUTO: 30.7 PG (ref 24–34)
MCHC RBC AUTO-ENTMCNC: 32.3 G/DL (ref 31–37)
MCV RBC AUTO: 95.1 FL (ref 74–97)
MONOCYTES # BLD: 0.4 K/UL (ref 0.05–1.2)
MONOCYTES NFR BLD AUTO: 9 % (ref 3–10)
NEUTS SEG # BLD: 2.4 K/UL (ref 1.8–8)
NEUTS SEG NFR BLD AUTO: 49 % (ref 40–73)
PLATELET # BLD AUTO: 247 K/UL (ref 135–420)
PMV BLD AUTO: 10.5 FL (ref 9.2–11.8)
POTASSIUM SERPL-SCNC: 5.3 MMOL/L (ref 3.5–5.5)
PROTHROMBIN TIME: 13 SEC (ref 11.5–15.2)
RBC # BLD AUTO: 4.53 M/UL (ref 4.2–5.3)
SODIUM SERPL-SCNC: 141 MMOL/L (ref 136–145)
WBC # BLD AUTO: 4.8 K/UL (ref 4.6–13.2)

## 2017-05-12 PROCEDURE — 36415 COLL VENOUS BLD VENIPUNCTURE: CPT | Performed by: SURGERY

## 2017-05-12 PROCEDURE — 99152 MOD SED SAME PHYS/QHP 5/>YRS: CPT

## 2017-05-12 PROCEDURE — 74011000250 HC RX REV CODE- 250: Performed by: SURGERY

## 2017-05-12 PROCEDURE — 74011636320 HC RX REV CODE- 636/320: Performed by: SURGERY

## 2017-05-12 PROCEDURE — 36904 THRMBC/NFS DIALYSIS CIRCUIT: CPT

## 2017-05-12 PROCEDURE — 77010033678 HC OXYGEN DAILY

## 2017-05-12 PROCEDURE — 99153 MOD SED SAME PHYS/QHP EA: CPT

## 2017-05-12 PROCEDURE — 74011250636 HC RX REV CODE- 250/636

## 2017-05-12 PROCEDURE — 80048 BASIC METABOLIC PNL TOTAL CA: CPT | Performed by: SURGERY

## 2017-05-12 PROCEDURE — 77030003629 IR THROMB PERC AV FISTULA W PTA

## 2017-05-12 PROCEDURE — 74011250636 HC RX REV CODE- 250/636: Performed by: SURGERY

## 2017-05-12 PROCEDURE — 85610 PROTHROMBIN TIME: CPT | Performed by: SURGERY

## 2017-05-12 PROCEDURE — 85025 COMPLETE CBC W/AUTO DIFF WBC: CPT | Performed by: SURGERY

## 2017-05-12 RX ORDER — SODIUM CHLORIDE 9 MG/ML
25 INJECTION, SOLUTION INTRAVENOUS CONTINUOUS
Status: DISCONTINUED | OUTPATIENT
Start: 2017-05-12 | End: 2017-05-12 | Stop reason: HOSPADM

## 2017-05-12 RX ORDER — HEPARIN SODIUM 200 [USP'U]/100ML
1000 INJECTION, SOLUTION INTRAVENOUS
Status: COMPLETED | OUTPATIENT
Start: 2017-05-12 | End: 2017-05-12

## 2017-05-12 RX ORDER — HYDROMORPHONE HYDROCHLORIDE 1 MG/ML
1 INJECTION, SOLUTION INTRAMUSCULAR; INTRAVENOUS; SUBCUTANEOUS
Status: DISCONTINUED | OUTPATIENT
Start: 2017-05-12 | End: 2017-05-12 | Stop reason: HOSPADM

## 2017-05-12 RX ORDER — OXYCODONE AND ACETAMINOPHEN 7.5; 325 MG/1; MG/1
1 TABLET ORAL
Qty: 40 TAB | Refills: 0 | Status: SHIPPED | OUTPATIENT
Start: 2017-05-12 | End: 2017-05-30

## 2017-05-12 RX ORDER — MIDAZOLAM HYDROCHLORIDE 1 MG/ML
1-4 INJECTION, SOLUTION INTRAMUSCULAR; INTRAVENOUS
Status: DISCONTINUED | OUTPATIENT
Start: 2017-05-12 | End: 2017-05-12 | Stop reason: HOSPADM

## 2017-05-12 RX ORDER — HEPARIN SODIUM 1000 [USP'U]/ML
INJECTION, SOLUTION INTRAVENOUS; SUBCUTANEOUS
Status: COMPLETED
Start: 2017-05-12 | End: 2017-05-12

## 2017-05-12 RX ORDER — FENTANYL CITRATE 50 UG/ML
25-200 INJECTION, SOLUTION INTRAMUSCULAR; INTRAVENOUS
Status: DISCONTINUED | OUTPATIENT
Start: 2017-05-12 | End: 2017-05-12 | Stop reason: HOSPADM

## 2017-05-12 RX ORDER — NALOXONE HYDROCHLORIDE 0.4 MG/ML
0.2 INJECTION, SOLUTION INTRAMUSCULAR; INTRAVENOUS; SUBCUTANEOUS AS NEEDED
Status: DISCONTINUED | OUTPATIENT
Start: 2017-05-12 | End: 2017-05-12 | Stop reason: HOSPADM

## 2017-05-12 RX ORDER — LIDOCAINE HYDROCHLORIDE 10 MG/ML
1-10 INJECTION, SOLUTION EPIDURAL; INFILTRATION; INTRACAUDAL; PERINEURAL
Status: DISCONTINUED | OUTPATIENT
Start: 2017-05-12 | End: 2017-05-12 | Stop reason: HOSPADM

## 2017-05-12 RX ORDER — FLUMAZENIL 0.1 MG/ML
0.2 INJECTION INTRAVENOUS
Status: DISCONTINUED | OUTPATIENT
Start: 2017-05-12 | End: 2017-05-12 | Stop reason: HOSPADM

## 2017-05-12 RX ORDER — DIPHENHYDRAMINE HYDROCHLORIDE 50 MG/ML
INJECTION, SOLUTION INTRAMUSCULAR; INTRAVENOUS
Status: COMPLETED
Start: 2017-05-12 | End: 2017-05-12

## 2017-05-12 RX ADMIN — DIPHENHYDRAMINE HYDROCHLORIDE 50 MG: 50 INJECTION, SOLUTION INTRAMUSCULAR; INTRAVENOUS at 14:38

## 2017-05-12 RX ADMIN — FENTANYL CITRATE 25 MCG: 50 INJECTION, SOLUTION INTRAMUSCULAR; INTRAVENOUS at 15:20

## 2017-05-12 RX ADMIN — FENTANYL CITRATE 25 MCG: 50 INJECTION, SOLUTION INTRAMUSCULAR; INTRAVENOUS at 15:32

## 2017-05-12 RX ADMIN — HYDROMORPHONE HYDROCHLORIDE 1 MG: 1 INJECTION, SOLUTION INTRAMUSCULAR; INTRAVENOUS; SUBCUTANEOUS at 15:58

## 2017-05-12 RX ADMIN — HEPARIN SODIUM 5000 UNITS: 1000 INJECTION, SOLUTION INTRAVENOUS; SUBCUTANEOUS at 14:43

## 2017-05-12 RX ADMIN — IOPAMIDOL 65 ML: 510 INJECTION, SOLUTION INTRAVASCULAR at 14:50

## 2017-05-12 RX ADMIN — FENTANYL CITRATE 25 MCG: 50 INJECTION, SOLUTION INTRAMUSCULAR; INTRAVENOUS at 14:38

## 2017-05-12 RX ADMIN — FENTANYL CITRATE 25 MCG: 50 INJECTION, SOLUTION INTRAMUSCULAR; INTRAVENOUS at 14:49

## 2017-05-12 RX ADMIN — HEPARIN SODIUM 2000 UNITS: 200 INJECTION, SOLUTION INTRAVENOUS at 14:38

## 2017-05-12 RX ADMIN — IOPAMIDOL 50 ML: 612 INJECTION, SOLUTION INTRAVENOUS at 15:00

## 2017-05-12 RX ADMIN — SODIUM CHLORIDE 25 ML/HR: 900 INJECTION, SOLUTION INTRAVENOUS at 13:50

## 2017-05-12 RX ADMIN — FENTANYL CITRATE 25 MCG: 50 INJECTION, SOLUTION INTRAMUSCULAR; INTRAVENOUS at 14:58

## 2017-05-12 RX ADMIN — ALTEPLASE 2 MG: 2.2 INJECTION, POWDER, LYOPHILIZED, FOR SOLUTION INTRAVENOUS at 15:27

## 2017-05-12 NOTE — DISCHARGE INSTRUCTIONS
Your Hemodialysis Access: Care Instructions  Your Care Instructions  Hemodialysis, or dialysis, is the use of a machine to remove wastes from your blood. You need it if your kidneys are not able to remove wastes on their own. A dialysis access is the place in your arm, or sometimes in your leg, where a doctor creates a blood vessel that carries a large flow of blood. When you have dialysis, two needles are placed in this blood vessel and are connected to the dialysis machine. Your blood flows out of one needle and into the machine to be cleaned. Then your cleaned blood flows back into your body through the other needle. Sometimes, a doctor makes a short-term access through a tube, called a catheter, placed in your neck, upper chest, or groin. Your doctor creates an access during a minor surgery. You need to take care of your access to keep it working and to prevent infection. Follow-up care is a key part of your treatment and safety. Be sure to make and go to all appointments, and call your doctor if you are having problems. Its also a good idea to know your test results and keep a list of the medicines you take. How can you care for yourself at home? · After your doctor creates an access, keep it dry for at least 2 days. · Squeeze a soft ball or other object as instructed after the access is placed. This will help blood flow through the access and help prevent blood clots. · After you have dialysis, check to see whether the access bleeds or swells. Let your doctor know if your arm bleeds or swells. · Do not lift anything heavy with the arm that has the access. · Do not bump your arm. · Do not wear tight clothing or jewelry over the access. · Do not sleep with your access arm under your body. · Have blood drawn or blood pressure taken from your other arm. · Keep the access clean and dry. · Do not put cream or lotion on or near the access. When should you call for help?   Call your doctor now or seek immediate medical care if:  · You have signs of infection, such as:  ¨ Increased pain, swelling, warmth, or redness around the access. ¨ Red streaks leading from the access. ¨ Pus draining from the access. ¨ Swollen lymph nodes in your neck, armpits, or groin. ¨ A fever. · You do not feel a pulse in your access. Watch closely for changes in your health, and be sure to contact your doctor if:  · You have pain, swelling, or bleeding. Some pain or swelling is normal after surgery to create the access. But pain and swelling should get better over time. Where can you learn more? Go to http://jaquan-treva.info/. Enter L169 in the search box to learn more about \"Your Hemodialysis Access: Care Instructions. \"  Current as of: November 20, 2015    DISCHARGE SUMMARY from Nurse    The following personal items are in your possession at time of discharge:        PATIENT INSTRUCTIONS:    After general anesthesia or intravenous sedation, for 24 hours or while taking prescription Narcotics:  Limit your activities  Do not drive and operate hazardous machinery  Do not make important personal or business decisions  Do  not drink alcoholic beverages  If you have not urinated within 8 hours after discharge, please contact your surgeon on call. Report the following to your surgeon:  Excessive pain, swelling, redness or odor of or around the surgical area  Temperature over 100.5  Nausea and vomiting lasting longer than 4 hours or if unable to take medications  Any signs of decreased circulation or nerve impairment to extremity: change in color, persistent  numbness, tingling, coldness or increase pain  Any questions        What to do at Home:  Recommended activity: Activity as tolerated and no driving for today,     *  Please give a list of your current medications to your Primary Care Provider.     *  Please update this list whenever your medications are discontinued, doses are      changed, or new medications (including over-the-counter products) are added. *  Please carry medication information at all times in case of emergency situations. These are general instructions for a healthy lifestyle:    No smoking/ No tobacco products/ Avoid exposure to second hand smoke    Surgeon General's Warning:  Quitting smoking now greatly reduces serious risk to your health. Obesity, smoking, and sedentary lifestyle greatly increases your risk for illness    A healthy diet, regular physical exercise & weight monitoring are important for maintaining a healthy lifestyle    You may be retaining fluid if you have a history of heart failure or if you experience any of the following symptoms:  Weight gain of 3 pounds or more overnight or 5 pounds in a week, increased swelling in our hands or feet or shortness of breath while lying flat in bed. Please call your doctor as soon as you notice any of these symptoms; do not wait until your next office visit. Recognize signs and symptoms of STROKE:    F-face looks uneven    A-arms unable to move or move unevenly    S-speech slurred or non-existent    T-time-call 911 as soon as signs and symptoms begin-DO NOT go       Back to bed or wait to see if you get better-TIME IS BRAIN. Warning Signs of HEART ATTACK     Call 911 if you have these symptoms:  Chest discomfort. Most heart attacks involve discomfort in the center of the chest that lasts more than a few minutes, or that goes away and comes back. It can feel like uncomfortable pressure, squeezing, fullness, or pain. Discomfort in other areas of the upper body. Symptoms can include pain or discomfort in one or both arms, the back, neck, jaw, or stomach. Shortness of breath with or without chest discomfort. Other signs may include breaking out in a cold sweat, nausea, or lightheadedness. Don't wait more than five minutes to call 911 - MINUTES MATTER! Fast action can save your life.  Calling 911 is almost always the fastest way to get lifesaving treatment. Emergency Medical Services staff can begin treatment when they arrive -- up to an hour sooner than if someone gets to the hospital by car.

## 2017-05-12 NOTE — H&P (VIEW-ONLY)
Jevon Broussard    Chief Complaint   Patient presents with    End Stage Renal Disease    Surgical Follow-up       History and Physical    Ms. Orlin Zimmerman returns to our office for clearance to access her right arm graft. She states her pain is improved and she would like to wait until Tuesday when her regular nurse returns to access her graft. Past Medical History:   Diagnosis Date    Chronic kidney disease     ESRD    Dialysis patient [facility])      Patient Active Problem List   Diagnosis Code    CAP (community acquired pneumonia) J18.9    ESRD on dialysis ([address] N18.6, Z99.2    Sepsis ([address] A41.9    Anemia D64.9    Hyponatremia E87.1    Dehydration E86.0    Prolonged Q-T interval on ECG Q40.47    Diastolic dysfunction V19.7    Infected prosthetic vascular graft ([facility] T82. 7XXA    Bacteremia due to Staphylococcus aureus R78.81    C. difficile colitis A04.7    PNA (pneumonia) J18.9    Hypokalemia E87.6    Hypoglycemia E16.2    Constipation K59.00     Past Surgical History:   Procedure Laterality Date    HX CSF SHUNT      HX HYSTERECTOMY      HX OTHER SURGICAL      dialysis shunt left arm; removed    HX TONSILLECTOMY      HX TRANSPLANT      VASCULAR SURGERY PROCEDURE UNLIST       Current Outpatient Prescriptions   Medication Sig Dispense Refill    oxyCODONE-acetaminophen (PERCOCET 7.5) 7.5-325 mg per tablet Take 1 Tab by mouth every six (6) hours as needed for Pain. Max Daily Amount: 4 Tabs. 30 Tab 0    albuterol (PROVENTIL HFA, VENTOLIN HFA, PROAIR HFA) 90 mcg/actuation inhaler Take 2 Puffs by inhalation every six (6) hours as needed for Wheezing.  sevelamer (RENAGEL) 800 mg tablet Take 2,400 mg by mouth three (3) times daily (with meals).  sevelamer (RENAGEL) 800 mg tablet Take 1,600 mg by mouth See Admin Instructions. 2 tabs if/when eating snack      pravastatin (PRAVACHOL) 20 mg tablet Take 20 mg by mouth nightly.  Indications: hypercholesterolemia      zolpidem (AMBIEN) 5 mg tablet Take 5 mg by mouth nightly as needed for Sleep. Indications: dialysis days      cinacalcet (SENSIPAR) 30 mg tablet Take 30 mg by mouth nightly.  diphenhydrAMINE (BENADRYL) 25 mg capsule Take 25 mg by mouth See Admin Instructions. Dialysis pre med      promethazine (PHENERGAN) 25 mg tablet Take 25 mg by mouth See Admin Instructions. Dialysis premed      fluticasone (FLONASE) 50 mcg/actuation nasal spray 2 Sprays by Both Nostrils route daily as needed for Rhinitis. Allergies   Allergen Reactions    Vancomycin Other (comments)     Felt like her body was burning    Aspirin Nausea and Vomiting     Pt reports aspirin gave her a stomach ulcer.  Vicodin [Hydrocodone-Acetaminophen] Nausea and Vomiting     Social History     Social History    Marital status: UNKNOWN     Spouse name: N/A    Number of children: N/A    Years of education: N/A     Occupational History    Not on file. Social History Main Topics    Smoking status: Never Smoker    Smokeless tobacco: Never Used    Alcohol use No    Drug use: No    Sexual activity: Not on file     Other Topics Concern    Not on file     Social History Narrative      Family History   Problem Relation Age of Onset    Hypertension Brother     Diabetes Maternal Grandmother        Review of Systems    Review of Systems   Constitutional: Negative for chills, diaphoresis, fever, malaise/fatigue and weight loss. HENT: Negative for hearing loss and sore throat. Eyes: Negative for blurred vision, photophobia and redness. Respiratory: Negative for cough, hemoptysis, shortness of breath and wheezing. Cardiovascular: Negative for chest pain, palpitations and orthopnea. Gastrointestinal: Negative for abdominal pain, blood in stool, constipation, diarrhea, heartburn, nausea and vomiting. Genitourinary: Negative for dysuria, frequency, hematuria and urgency. Musculoskeletal: Negative for back pain and myalgias. Skin: Negative for itching and rash. Neurological: Negative for dizziness, speech change, focal weakness, weakness and headaches. Endo/Heme/Allergies: Does not bruise/bleed easily. Psychiatric/Behavioral: Negative for depression and suicidal ideas. Physical Exam:    Visit Vitals    /64    Pulse 78    Resp 18    Ht 5' 5\" (1.651 m)    Wt 200 lb (90.7 kg)    LMP 12/01/2012    BMI 33.28 kg/m2      Physical Examination: General appearance - alert, well appearing, and in no distress  Mental status - alert, oriented to person, place, and time  Eyes - sclera anicteric, left eye normal, right eye normal  Ears - right ear normal, left ear normal  Nose - normal and patent, no erythema, discharge or polyps  Mouth - mucous membranes moist, pharynx normal without lesions  Extremities - Right arm graft with good thrill. Decreased ecchymosis. Non tender to palpation along graft. Some tenderness on underside of the arm. No wounds or ulcers. Impression and Plan:    ICD-10-CM ICD-9-CM    1. ESRD on dialysis ([facility] N18.6 585.6     Z99.2 V45.11      I told Ms. Jaylene Germainshannon that her graft is ready to be used. We will make sure her dialysis center has the instructions for accessing the Accuseal graft. We will see her in 3 weeks for a graft check and possible catheter removal.    Follow-up Disposition:  Return in about 3 weeks (around 5/24/2017) for catheter removal, Symptom check. The treatment plan was reviewed with the patient in detail. The patient voiced understanding of this plan and all questions and concerns were addressed. The patient agrees with this plan. We discussed the signs and symptoms that would require earlier attention or intervention. The patient was given educational material related to his/her visit and the patient has voiced understanding of the material.     I appreciate the opportunity to participate in the care of your patient.   I will be sure to keep you informed of any subsequent changes in the treatment plan.  If you have any questions or concerns, please feel free to contact me. Kirsten Alvarez MD    PLEASE NOTE:  This document has been produced using voice recognition software. Unrecognized errors in transcription may be present.

## 2017-05-12 NOTE — PROGRESS NOTES
TRANSFER - OUT REPORT:    Verbal report given Katia KITCHEN on Jessee Dakin  being transferred to [facility](unit) for ordered procedure       Report consisted of patients Situation, Background, Assessment and   Recommendations(SBAR). Information from the following report(s) SBAR, Kardex and MAR was reviewed with the receiving nurse. Lines:   Peripheral IV 05/12/17 Left Hand (Active)   Site Assessment Clean, dry, & intact 5/12/2017  1:42 PM   Phlebitis Assessment 0 5/12/2017  1:42 PM   Infiltration Assessment 0 5/12/2017  1:42 PM   Dressing Status Clean, dry, & intact 5/12/2017  1:42 PM   Dressing Type Transparent 5/12/2017  1:42 PM        Opportunity for questions and clarification was provided.       Patient transported with:   Registered Nurse

## 2017-05-12 NOTE — IP AVS SNAPSHOT
Megha Broussard 
 
 
 [address] [address] 
[phone] Patient: Boone Douglas MRN: [id number] QNX:4[birth year]15 You are allergic to the following Allergen Reactions Vancomycin Other (comments) Felt like her body was burning Aspirin Nausea and Vomiting Pt reports aspirin gave her a stomach ulcer. Vicodin (Hydrocodone-Acetaminophen) Nausea and Vomiting Recent Documentation Height Weight Breastfeeding? BMI OB Status Smoking Status 1.651 m 90.3 kg No 33.12 kg/m2 Hysterectomy Never Smoker Emergency Contacts Name Discharge Info Relation Home Work Mobile Marlys CAREGIVER [3] Daughter [21] [phone] Simona Bradley  Child [2] [phone] About your hospitalization You were admitted on:  May 12, 2017 You last received care in the:  [address] You were discharged on:  May 12, 2017 Unit phone number:  [phone] Why you were hospitalized Your primary diagnosis was:  Not on File Providers Seen During Your Hospitalizations Provider Role Specialty Primary office phone Michael Best MD Attending Provider Vascular Surgery [phone] Your Primary Care Physician (PCP) Primary Care Physician Office Phone Office Fax Alvarado Christal [phone] Follow-up Information Follow up With Details Comments Contact Info Michael Best MD In 2 weeks  [address] [place], [place] [address] [phone] Floridalma Kent MD   [address] [place] [address] 150 
[phone] Michael Best MD Schedule an appointment as soon as possible for a visit in 2 weeks   Jenna David [facility] 150 
[phone] Your Appointments Wednesday May 31, 2017 10:30 AM EDT Follow Up with Michael Best MD  
[facility] ([address])  
 One [address] [place]  
[phone] Current Discharge Medication List  
  
START taking these medications Dose & Instructions Dispensing Information Comments Morning Noon Evening Bedtime  
 oxyCODONE-acetaminophen 7.5-325 mg per tablet Commonly known as:  PERCOCET 7.5 Your last dose was: Your next dose is:    
   
   
 Dose:  1 Tab Take 1 Tab by mouth every six (6) hours as needed for Pain. Max Daily Amount: 4 Tabs. Quantity:  40 Tab Refills:  0 CONTINUE these medications which have NOT CHANGED Dose & Instructions Dispensing Information Comments Morning Noon Evening Bedtime  
 albuterol 90 mcg/actuation inhaler Commonly known as:  PROVENTIL HFA, VENTOLIN HFA, PROAIR HFA Your last dose was: Your next dose is:    
   
   
 Dose:  2 Puff Take 2 Puffs by inhalation every six (6) hours as needed for Wheezing. Refills:  0 AMBIEN 5 mg tablet Generic drug:  zolpidem Your last dose was: Your next dose is:    
   
   
 Dose:  5 mg Take 5 mg by mouth nightly as needed for Sleep. Indications: dialysis days Refills:  0  
     
   
   
   
  
 BENADRYL 25 mg capsule Generic drug:  diphenhydrAMINE Your last dose was: Your next dose is:    
   
   
 Dose:  25 mg Take 25 mg by mouth See Admin Instructions. Dialysis pre med Refills:  0  
     
   
   
   
  
 pravastatin 20 mg tablet Commonly known as:  PRAVACHOL Your last dose was: Your next dose is:    
   
   
 Dose:  20 mg Take 20 mg by mouth nightly. Indications: hypercholesterolemia Refills:  0  
     
   
   
   
  
 promethazine 25 mg tablet Commonly known as:  PHENERGAN Your last dose was: Your next dose is:    
   
   
 Dose:  25 mg Take 25 mg by mouth See Admin Instructions. Dialysis premed Refills:  0  
     
   
   
   
  
 sevelamer 800 mg tablet Commonly known as:  RENAGEL Your last dose was: Your next dose is:    
   
   
 Dose:  2400 mg Take 2,400 mg by mouth three (3) times daily (with meals). Refills:  0 Where to Get Your Medications Information on where to get these meds will be given to you by the nurse or doctor. ! Ask your nurse or doctor about these medications  
  oxyCODONE-acetaminophen 7.5-325 mg per tablet Discharge Instructions Your Hemodialysis Access: Care Instructions Your Care Instructions Hemodialysis, or dialysis, is the use of a machine to remove wastes from your blood. You need it if your kidneys are not able to remove wastes on their own. A dialysis access is the place in your arm, or sometimes in your leg, where a doctor creates a blood vessel that carries a large flow of blood. When you have dialysis, two needles are placed in this blood vessel and are connected to the dialysis machine. Your blood flows out of one needle and into the machine to be cleaned. Then your cleaned blood flows back into your body through the other needle. Sometimes, a doctor makes a short-term access through a tube, called a catheter, placed in your neck, upper chest, or groin. Your doctor creates an access during a minor surgery. You need to take care of your access to keep it working and to prevent infection. Follow-up care is a key part of your treatment and safety. Be sure to make and go to all appointments, and call your doctor if you are having problems. Its also a good idea to know your test results and keep a list of the medicines you take. How can you care for yourself at home? · After your doctor creates an access, keep it dry for at least 2 days. · Squeeze a soft ball or other object as instructed after the access is placed. This will help blood flow through the access and help prevent blood clots. · After you have dialysis, check to see whether the access bleeds or swells. Let your doctor know if your arm bleeds or swells. · Do not lift anything heavy with the arm that has the access. · Do not bump your arm. · Do not wear tight clothing or jewelry over the access. · Do not sleep with your access arm under your body. · Have blood drawn or blood pressure taken from your other arm. · Keep the access clean and dry. · Do not put cream or lotion on or near the access. When should you call for help? Call your doctor now or seek immediate medical care if: 
· You have signs of infection, such as: 
¨ Increased pain, swelling, warmth, or redness around the access. ¨ Red streaks leading from the access. ¨ Pus draining from the access. ¨ Swollen lymph nodes in your neck, armpits, or groin. ¨ A fever. · You do not feel a pulse in your access. Watch closely for changes in your health, and be sure to contact your doctor if: 
· You have pain, swelling, or bleeding. Some pain or swelling is normal after surgery to create the access. But pain and swelling should get better over time. Where can you learn more? Go to http://jaquan-treva.info/. Enter L169 in the search box to learn more about \"Your Hemodialysis Access: Care Instructions. \" Current as of: November 20, 2015 DISCHARGE SUMMARY from Nurse The following personal items are in your possession at time of discharge: 
  
  
PATIENT INSTRUCTIONS: 
 
After general anesthesia or intravenous sedation, for 24 hours or while taking prescription Narcotics: 
Limit your activities Do not drive and operate hazardous machinery Do not make important personal or business decisions Do  not drink alcoholic beverages If you have not urinated within 8 hours after discharge, please contact your surgeon on call. Report the following to your surgeon: Excessive pain, swelling, redness or odor of or around the surgical area Temperature over 100.5 Nausea and vomiting lasting longer than 4 hours or if unable to take medications Any signs of decreased circulation or nerve impairment to extremity: change in color, persistent  numbness, tingling, coldness or increase pain Any questions What to do at Home: 
Recommended activity: Activity as tolerated and no driving for today, *  Please give a list of your current medications to your Primary Care Provider. *  Please update this list whenever your medications are discontinued, doses are 
    changed, or new medications (including over-the-counter products) are added. *  Please carry medication information at all times in case of emergency situations. These are general instructions for a healthy lifestyle: No smoking/ No tobacco products/ Avoid exposure to second hand smoke Surgeon General's Warning:  Quitting smoking now greatly reduces serious risk to your health. Obesity, smoking, and sedentary lifestyle greatly increases your risk for illness A healthy diet, regular physical exercise & weight monitoring are important for maintaining a healthy lifestyle You may be retaining fluid if you have a history of heart failure or if you experience any of the following symptoms:  Weight gain of 3 pounds or more overnight or 5 pounds in a week, increased swelling in our hands or feet or shortness of breath while lying flat in bed. Please call your doctor as soon as you notice any of these symptoms; do not wait until your next office visit. Recognize signs and symptoms of STROKE: 
 
F-face looks uneven A-arms unable to move or move unevenly S-speech slurred or non-existent T-time-call 911 as soon as signs and symptoms begin-DO NOT go Back to bed or wait to see if you get better-TIME IS BRAIN. Warning Signs of HEART ATTACK Call 911 if you have these symptoms: 
Chest discomfort.  Most heart attacks involve discomfort in the center of the chest that lasts more than a few minutes, or that goes away and comes back. It can feel like uncomfortable pressure, squeezing, fullness, or pain. Discomfort in other areas of the upper body. Symptoms can include pain or discomfort in one or both arms, the back, neck, jaw, or stomach. Shortness of breath with or without chest discomfort. Other signs may include breaking out in a cold sweat, nausea, or lightheadedness. Don't wait more than five minutes to call [address]! Fast action can save your life. Calling 911 is almost always the fastest way to get lifesaving treatment. Emergency Medical Services staff can begin treatment when they arrive  up to an hour sooner than if someone gets to the hospital by car. Discharge Orders None Introducing [facility]! Armand Ordoñez introduces [employer] patient portal. Now you can access parts of your medical record, email your doctor's office, and request medication refills online. 1. In your internet browser, go to [email or web address] 2. Click on the First Time User? Click Here link in the Sign In box. You will see the New Member Sign Up page. 3. Enter your [employer] Access Code exactly as it appears below. You will not need to use this code after youve completed the sign-up process. If you do not sign up before the expiration date, you must request a new code. · [employer] Access Code: [email or web address] Expires: 5/22/2017  9:26 AM 
 
4. Enter the last four digits of your Social Security Number (xxxx) and Date of Birth (mm/dd/yyyy) as indicated and click Submit. You will be taken to the next sign-up page. 5. Create a [employer] ID. This will be your [employer] login ID and cannot be changed, so think of one that is secure and easy to remember. 6. Create a [employer] password. You can change your password at any time. 7. Enter your Password Reset Question and Answer.  This can be used at a later time if you forget your password. 8. Enter your e-mail address. You will receive e-mail notification when new information is available in [address] 9. Click Sign Up. You can now view and download portions of your medical record. 10. Click the Download Summary menu link to download a portable copy of your medical information. If you have questions, please visit the Frequently Asked Questions section of the [employer] website. Remember, [employer] is NOT to be used for urgent needs. For medical emergencies, dial 911. Now available from your iPhone and Android! General Information Please provide this summary of care documentation to your next provider. Patient Signature:  ____________________________________________________________ Date:  ____________________________________________________________  
  
Ramiro Melchor Provider Signature:  ____________________________________________________________ Date:  ____________________________________________________________

## 2017-05-12 NOTE — INTERVAL H&P NOTE
H&P Update:  Jo Moncada was seen and examined. History and physical has been reviewed. Significant clinical changes have occurred as noted: Dialysis called and is concerned that her graft is occluded. Will perform a shuntogram today and possible thrombectomy.      Signed By: Ana Gallegos MD     May 12, 2017 2:14 PM

## 2017-05-16 NOTE — OP NOTES
[address] [place] [address]  OPERATIVE REPORT    Name:  Zayda Garcia  MR#:  [id number]  :  1975  Account #:  [de-identified]  Date of Adm:  2017  Date of Surgery:  2017      PREOPERATIVE DIAGNOSIS: Thrombosed right upper extremity  brachiocephalic arteriovenous graft. POSTOPERATIVE DIAGNOSIS: Thrombosed right upper extremity  brachiocephalic arteriovenous graft. PROCEDURES PERFORMED  1. Percutaneous access of right upper extremity arteriovenous graft x2.  2. Balloon angioplasty of venous outflow with an 8 mm balloon. 3. AngioJet thrombectomy of venous outflow with AngioJet  thrombectomy device. 4. Over the wire thrombectomy of arterial anastomosis with over the  wire Patricio device. 5. AngioJet thrombectomy of the brachial artery and radial artery. 6. Catheter directed t-PA injection. ESTIMATED BLOOD LOSS: Minimal    SPECIMENS REMOVED: None. ANESTHESIA: Moderate sedation with local.    SURGEON: Timbo Decker MD    PACKS AND DRAINS: None. IMPLANTS: None. COMPLICATIONS: None. CONDITION: To recovery stable. FINDINGS: Thrombosed arteriovenous graft with very diminutive  venous outflow, despite angioplasty, thrombus burden within the  brachial artery. Satisfactory appearance after AngioJet thrombectomy  and t-PA injection. INDICATIONS FOR PROCEDURE: The patient is a 68-year-old  female with past history significant for end-stage renal disease, having  undergone multiple previous dialysis access procedures. The patient  had recently undergone a right upper extremity AV graft and after 2  weeks, was having issues with the graft thrombosed. Given these  findings, decision was made to take the patient to the catheterization  suite for diagnostic shuntogram and possible intervention. Informed  consent was obtained.     DESCRIPTION OF PROCEDURE: On 2016, the patient  presented to the catheterization suite, identified by name and ID  bracelet by myself and entire operative team. Once this was done, the  patient was placed on the catheterization table in supine position. After  appropriate depth of anesthesia obtained, the patient was prepped and  draped and time-out performed. At this point, percutaneous access of  right upper extremity arteriovenous graft was obtained, we advanced  Bentson wire up to the venous outflow and performed a shuntogram  that showed thrombus within the venous outflow, with no visualized  direct connection to the arteriovenous graft. Given these findings,  decision was made to treat, then advanced a wire distally into the  venous outflow as much as possible, and then used an 8 mm balloon  to angioplasty this area. We then used over-wire, and used the  AngioJet thrombectomy device to thrombectomize the area. The  patient was then heparinized appropriately. Once this was completed,  we noted that we had good venous outflow and then we decided to  treat our arterial inflow. We then again obtained percutaneous access  of the graft and then over-wire Patricio device was then used to  thrombectomize the arterial venous anastomosis. Once this was  completed, we then noticed that there was some thrombus within the  brachial artery that looked like it had become larger. Given these  findings, decision was made to treat with an AngioJet device. We then  AngioJet both proximally and distally to the brachial artery, and up the  radial artery. We then injected t-PA. Completion angiogram showed  satisfactory appearance of the arterial inflow as well as arterial outflow  to the hand, with palpable flow within the graft. At this point, we  decided to complete our procedure, we removed the wire and balloon  and held manual pressure for hemostasis. At the end of the procedure,  I was present and scrubbed for the entire procedure.         MD Kate Lloyd  D:  05/15/2017   14:10  T:  05/15/2017   21:14  Job #:  [id number]

## 2017-05-17 ENCOUNTER — OFFICE VISIT (OUTPATIENT)
Dept: VASCULAR SURGERY | Age: 42
End: 2017-05-17

## 2017-05-17 VITALS
SYSTOLIC BLOOD PRESSURE: 108 MMHG | HEIGHT: 65 IN | BODY MASS INDEX: 33.15 KG/M2 | DIASTOLIC BLOOD PRESSURE: 66 MMHG | RESPIRATION RATE: 18 BRPM | HEART RATE: 72 BPM | WEIGHT: 199 LBS

## 2017-05-17 DIAGNOSIS — Z99.2 ESRD ON DIALYSIS (HCC): Primary | ICD-10-CM

## 2017-05-17 DIAGNOSIS — N18.6 ESRD ON DIALYSIS (HCC): Primary | ICD-10-CM

## 2017-05-17 NOTE — PROGRESS NOTES
Ricky Corley    Chief Complaint   Patient presents with    End Stage Renal Disease       History and Physical    Ms. Dulce Altamirano returns to our office for follow up after undergoing a right upper extremity graft thrombectomy. At this procedure it was noted that her venous outflow in her arm was very small and it was not likely that her graft would stay open for long. She says her graft is occluded now. She states she still has some soreness in her axillary area where her vein was ballooned. She has no other complaints. Past Medical History:   Diagnosis Date    Chronic kidney disease     ESRD    Dialysis patient [facility])      Patient Active Problem List   Diagnosis Code    CAP (community acquired pneumonia) J18.9    ESRD on dialysis ([address] N18.6, Z99.2    Sepsis ([address] A41.9    Anemia D64.9    Hyponatremia E87.1    Dehydration E86.0    Prolonged Q-T interval on ECG V49.33    Diastolic dysfunction L25.1    Infected prosthetic vascular graft ([address] T82. 7XXA    Bacteremia due to Staphylococcus aureus R78.81    C. difficile colitis A04.7    PNA (pneumonia) J18.9    Hypokalemia E87.6    Hypoglycemia E16.2    Constipation K59.00     Past Surgical History:   Procedure Laterality Date    HX CSF SHUNT      HX HYSTERECTOMY      HX OTHER SURGICAL      dialysis shunt left arm; removed    HX TONSILLECTOMY      HX TRANSPLANT      VASCULAR SURGERY PROCEDURE UNLIST       Current Outpatient Prescriptions   Medication Sig Dispense Refill    oxyCODONE-acetaminophen (PERCOCET 7.5) 7.5-325 mg per tablet Take 1 Tab by mouth every six (6) hours as needed for Pain. Max Daily Amount: 4 Tabs. 40 Tab 0    albuterol (PROVENTIL HFA, VENTOLIN HFA, PROAIR HFA) 90 mcg/actuation inhaler Take 2 Puffs by inhalation every six (6) hours as needed for Wheezing.  sevelamer (RENAGEL) 800 mg tablet Take 2,400 mg by mouth three (3) times daily (with meals).  pravastatin (PRAVACHOL) 20 mg tablet Take 20 mg by mouth nightly. Indications: hypercholesterolemia      zolpidem (AMBIEN) 5 mg tablet Take 5 mg by mouth nightly as needed for Sleep. Indications: dialysis days      diphenhydrAMINE (BENADRYL) 25 mg capsule Take 25 mg by mouth See Admin Instructions. Dialysis pre med      promethazine (PHENERGAN) 25 mg tablet Take 25 mg by mouth See Admin Instructions. Dialysis premed       Allergies   Allergen Reactions    Vancomycin Other (comments)     Felt like her body was burning    Aspirin Nausea and Vomiting     Pt reports aspirin gave her a stomach ulcer.  Vicodin [Hydrocodone-Acetaminophen] Nausea and Vomiting     Social History     Social History    Marital status: UNKNOWN     Spouse name: N/A    Number of children: N/A    Years of education: N/A     Occupational History    Not on file. Social History Main Topics    Smoking status: Never Smoker    Smokeless tobacco: Never Used    Alcohol use No    Drug use: No    Sexual activity: Not on file     Other Topics Concern    Not on file     Social History Narrative      Family History   Problem Relation Age of Onset    Hypertension Brother     Diabetes Maternal Grandmother        Review of Systems    Review of Systems   Constitutional: Negative for chills, diaphoresis, fever, malaise/fatigue and weight loss. HENT: Negative for hearing loss and sore throat. Eyes: Negative for blurred vision, photophobia and redness. Respiratory: Negative for cough, hemoptysis, shortness of breath and wheezing. Cardiovascular: Negative for chest pain, palpitations and orthopnea. Gastrointestinal: Negative for abdominal pain, blood in stool, constipation, diarrhea, heartburn, nausea and vomiting. Genitourinary: Negative for dysuria, frequency, hematuria and urgency. Musculoskeletal: Negative for back pain and myalgias. Skin: Negative for itching and rash. Neurological: Negative for dizziness, speech change, focal weakness, weakness and headaches.    Endo/Heme/Allergies: Does not bruise/bleed easily. Psychiatric/Behavioral: Negative for depression and suicidal ideas. Physical Exam:    Visit Vitals    /66    Pulse 72    Resp 18    Ht 5' 5\" (1.651 m)    Wt 199 lb (90.3 kg)    LMP 12/01/2012    BMI 33.12 kg/m2      Physical Examination: General appearance - alert, well appearing, and in no distress  Mental status - alert, oriented to person, place, and time  Eyes - sclera anicteric, left eye normal, right eye normal  Ears - right ear normal, left ear normal  Nose - normal and patent, no erythema, discharge or polyps  Mouth - mucous membranes moist, pharynx normal without lesions  Extremities - RUE graft thrombosed. B/L arm and forearms with multiple scars from previous failed dialysis accesses. No significant arm swelling. Impression and Plan:    ICD-10-CM ICD-9-CM    1. ESRD on dialysis ([facility] [address] N18.6 585.6 IR VENOGRAPHY EXT LT    Z99.2 V45.11 IR VENOGRAPHY EXT RT     Orders Placed This Encounter    IR VENOGRAPHY EXT LT    IR VENOGRAPHY EXT RT     I explained to Ms. Sunitha Peter that she has a complicated problem in regards to her dialysis access. I assumed her care after multiple failed accesses and at this point its difficult to tell where a reasonable access can be achieved. I think the most reasonable next step would be to perform bilateral venograms to evaluate her venous outflow to see if there is a site in her arms to place her access. If not, we will entertain possibly placing a hero graft. Follow-up Disposition:  Return in about 2 weeks (around 5/31/2017). The treatment plan was reviewed with the patient in detail. The patient voiced understanding of this plan and all questions and concerns were addressed. The patient agrees with this plan. We discussed the signs and symptoms that would require earlier attention or intervention.      The patient was given educational material related to his/her visit and the patient has voiced understanding of the material.     I appreciate the opportunity to participate in the care of your patient. I will be sure to keep you informed of any subsequent changes in the treatment plan. If you have any questions or concerns, please feel free to contact me. Ezekiel Navarro MD    PLEASE NOTE:  This document has been produced using voice recognition software. Unrecognized errors in transcription may be present.

## 2017-05-18 RX ORDER — NALOXONE HYDROCHLORIDE 0.4 MG/ML
0.2 INJECTION, SOLUTION INTRAMUSCULAR; INTRAVENOUS; SUBCUTANEOUS
Status: CANCELLED | OUTPATIENT
Start: 2017-05-19

## 2017-05-18 RX ORDER — FLUMAZENIL 0.1 MG/ML
0.2 INJECTION INTRAVENOUS AS NEEDED
Status: CANCELLED | OUTPATIENT
Start: 2017-05-19

## 2017-05-18 RX ORDER — SODIUM CHLORIDE 9 MG/ML
25 INJECTION, SOLUTION INTRAVENOUS CONTINUOUS
Status: CANCELLED | OUTPATIENT
Start: 2017-05-19

## 2017-05-18 RX ORDER — MIDAZOLAM HYDROCHLORIDE 1 MG/ML
1-4 INJECTION, SOLUTION INTRAMUSCULAR; INTRAVENOUS
Status: CANCELLED | OUTPATIENT
Start: 2017-05-19

## 2017-05-18 RX ORDER — FENTANYL CITRATE 50 UG/ML
25-200 INJECTION, SOLUTION INTRAMUSCULAR; INTRAVENOUS
Status: CANCELLED | OUTPATIENT
Start: 2017-05-19

## 2017-05-18 NOTE — PROGRESS NOTES
PT aware of arrival time pre procedure. Arrive at THE [facility] pt registration at 0900 on 5/19/17 for scheduled procedure to occur at 1000. Pt states no questions at this time. Gave pt [facility] panfilo RN phone number [phone].

## 2017-05-19 ENCOUNTER — HOSPITAL ENCOUNTER (OUTPATIENT)
Dept: INTERVENTIONAL RADIOLOGY/VASCULAR | Age: 42
Discharge: HOME | End: 2017-05-19
Attending: SURGERY | Admitting: SURGERY
Payer: MEDICARE

## 2017-05-19 ENCOUNTER — HOSPITAL ENCOUNTER (OUTPATIENT)
Dept: INTERVENTIONAL RADIOLOGY/VASCULAR | Age: 42
Discharge: HOME | End: 2017-05-19
Attending: SURGERY
Payer: MEDICARE

## 2017-05-19 VITALS
HEIGHT: 65 IN | BODY MASS INDEX: 32.82 KG/M2 | OXYGEN SATURATION: 96 % | DIASTOLIC BLOOD PRESSURE: 82 MMHG | SYSTOLIC BLOOD PRESSURE: 120 MMHG | TEMPERATURE: 99.2 F | WEIGHT: 197 LBS | RESPIRATION RATE: 16 BRPM | HEART RATE: 85 BPM

## 2017-05-19 DIAGNOSIS — N18.6 ESRD ON DIALYSIS (HCC): ICD-10-CM

## 2017-05-19 DIAGNOSIS — Z99.2 ESRD ON DIALYSIS (HCC): ICD-10-CM

## 2017-05-19 PROCEDURE — 75820 VEIN X-RAY ARM/LEG: CPT

## 2017-05-19 PROCEDURE — 74011636320 HC RX REV CODE- 636/320: Performed by: SURGERY

## 2017-05-19 RX ADMIN — IOPAMIDOL 25 ML: 510 INJECTION, SOLUTION INTRAVASCULAR at 10:25

## 2017-05-19 NOTE — INTERVAL H&P NOTE
H&P Update:  Bear Ford was seen and examined. History and physical has been reviewed. The patient has been examined.  There have been no significant clinical changes since the completion of the originally dated History and Physical.    Signed By: Manoj Carreno MD     May 19, 2017 12:36 PM

## 2017-05-19 NOTE — OP NOTES
[address] [place] [address]  OPERATIVE REPORT    Name:  Rashi Paulino  MR#:  [id number]  :  1975  Account #:  [de-identified]  Date of Adm:  2017  Date of Surgery:  2017      PREOPERATIVE DIAGNOSIS: End-stage renal disease with multiple  failed bilateral upper extremity dialysis access. POSTOPERATIVE DIAGNOSIS: End-stage renal disease with multiple  failed bilateral upper extremity dialysis access. PROCEDURES PERFORMED: Bilateral upper extremity venogram.    SURGEON: Lupillo Fitzpatrick MD    ANESTHESIA: None. PACKS AND DRAINS: None. IMPLANTS: None. SPECIMENS REMOVED: None. COMPLICATIONS: None. ESTIMATED BLOOD LOSS: None. CONDITION: Transferred to recovery, stable. FINDINGS: On the left upper extremity in the forearm area, there is no  anatomical vein identified, only collaterals. Just at the elbow, there is  the beginning of a basilic vein that drains into the upper arm, up in the  axillary area. There is a previous brachial vein stent that is occluded  and the brachial vein appears to be occluded throughout the arm. The  basilic vein then dumps into the axillary vein above this brachial vein  stent. There is a stenosis in the basilic vein just proximal to the stent. In  the right upper extremity, there are only venous collaterals and no  anatomic vein. The collaterals drain upper arm along the chest wall,  then dump back into the subclavian vein and then drain into the SVC. INDICATIONS FOR PROCEDURE: The patient is a 39-year-old  female with past medical history significant for end-stage renal disease  with multiple failed dialysis access and presents to our office for long-  term dialysis access. The patient previously just had a right upper  extremity arteriovenous graft placed that subsequently thrombosed. Given these findings, the decision was made to take the patient for a  diagnostic venogram. Informed consent was obtained.     DESCRIPTION OF PROCEDURE: On 05/19/2017, the patient  presented to the catheterization suite, identified by name and ID  bracelet by myself and entire operative team. A time-out was  performed. At this point, starting with the left upper extremity, contrast  was injected in the patient's left hand IV and a upper extremity  venogram was obtained. This showed the findings dictated above. We  then repeated the same process on the right hand IV. After  completion, the patient was transferred back to the cardiac care unit  and IVs were removed. At the end of procedure, I was present and  scrubbed for the entire procedure.         MD Bhavna Stallings  D:  05/19/2017   10:39  T:  05/19/2017   16:04  Job #:  [id number]

## 2017-05-19 NOTE — IP AVS SNAPSHOT
[address] [place] [address] 
 
 
 [address] [address] 
[phone] Patient: Laura Angel MRN: [id number] LOC:4/4/1354 You are allergic to the following Allergen Reactions Vancomycin Other (comments) Felt like her body was burning Aspirin Nausea and Vomiting Pt reports aspirin gave her a stomach ulcer. Vicodin (Hydrocodone-Acetaminophen) Nausea and Vomiting Recent Documentation Height Weight BMI OB Status Smoking Status 1.651 m 89.4 kg 32.78 kg/m2 Hysterectomy Never Smoker Emergency Contacts Name Discharge Info Relation Home Work Mobile Marlys CAREGIVER [3] Daughter [21] [phone] Sherlyn Jeffries  Child [2] [phone] About your hospitalization You were admitted on:  May 19, 2017 You last received care in the:  [facility] ANGIO IR You were discharged on:  May 19, 2017 Unit phone number:  [phone] Why you were hospitalized Your primary diagnosis was:  Not on File Providers Seen During Your Hospitalizations Provider Role Specialty Primary office phone Lettie Aase, MD Attending Provider Vascular Surgery [phone] Your Primary Care Physician (PCP) Primary Care Physician Office Phone Office Fax Ailyn Patton [phone] Follow-up Information None Your Appointments Friday May 19, 2017 12:30 PM EDT  
HR VASCULAR/PAIN 30 with THE [facility] IR RM 1, [facility] RAD NO ANESTHESIA, [facility] CARE UNIT ROOM  
[facility] ANGIO IR [facility] [address] [address] [phone] OUTSIDE FILMS  - Any outside films related to the study being scheduled should be brought with you on the day of the exam.  If this cannot be done there may be a delay in the reading of the study.   MEDICATIONS  - Patient must bring a complete list of all medications currently taking to include prescriptions, over-the-counter meds, herbals, vitamins & any dietary supplements  GENERAL INSTRUCTIONS  - Must have someone to drive you home after the procedure, unless instructed otherwise by the Angio department. QUESTIONS  - Notify the Angio department if there are questions. [facility] [phone] [address] THE [facility] [phone] CHECK IN:  Care Unit 39 Jenna Michel, [address] Wednesday May 24, 2017  1:00 PM EDT Follow Up with Delilah Leyva MD  
BS Vein/Vascular Spec [facility] ([facility])  
 [address] [place] [address]  
[phone] Current Discharge Medication List  
  
Notice This visit is during an admission. Changes to the med list made in this visit will be reflected in the After Visit Summary of the admission. Discharge Instructions DISCHARGE SUMMARY from Nurse The following personal items are in your possession at time of discharge: 
 
  
Visual Aid: None PATIENT INSTRUCTIONS: 
 
 
F-face looks uneven A-arms unable to move or move unevenly S-speech slurred or non-existent T-time-call 911 as soon as signs and symptoms begin-DO NOT go Back to bed or wait to see if you get better-TIME IS BRAIN. Warning Signs of HEART ATTACK Call 911 if you have these symptoms: 
? Chest discomfort. Most heart attacks involve discomfort in the center of the chest that lasts more than a few minutes, or that goes away and comes back. It can feel like uncomfortable pressure, squeezing, fullness, or pain. ? Discomfort in other areas of the upper body. Symptoms can include pain or discomfort in one or both arms, the back, neck, jaw, or stomach. ? Shortness of breath with or without chest discomfort. ? Other signs may include breaking out in a cold sweat, nausea, or lightheadedness. Don't wait more than five minutes to call [address]! Fast action can save your life. Calling 911 is almost always the fastest way to get lifesaving treatment. Emergency Medical Services staff can begin treatment when they arrive  up to an hour sooner than if someone gets to the hospital by car. The discharge information has been reviewed with the patient. The patient verbalized understanding. Discharge medications reviewed with the patient and appropriate educational materials and side effects teaching were provided. Discharge Orders None Introducing [facility] SERVICES! [employer] introduces [employer] patient portal. Now you can access parts of your medical record, email your doctor's office, and request medication refills online. 1. In your internet browser, go to [email or web address] 2. Click on the First Time User? Click Here link in the Sign In box. You will see the New Member Sign Up page. 3. Enter your K[employer] Access Code exactly as it appears below. You will not need to use this code after youve completed the sign-up process. If you do not sign up before the expiration date, you must request a new code. · [employer] Access Code: [email or web address] Expires: 5/22/2017  9:26 AM 
 
4. Enter the last four digits of your Social Security Number (xxxx) and Date of Birth (mm/dd/yyyy) as indicated and click Submit. You will be taken to the next sign-up page. 5. Create a [employer] ID. This will be your K[employer] login ID and cannot be changed, so think of one that is secure and easy to remember. 6. Create a K[employer] password. You can change your password at any time. 7. Enter your Password Reset Question and Answer. This can be used at a later time if you forget your password. 8. Enter your e-mail address.  You will receive e-mail notification when new information is available in [employer]. 9. Click Sign Up. You can now view and download portions of your medical record. 10. Click the Download Summary menu link to download a portable copy of your medical information. If you have questions, please visit the Frequently Asked Questions section of the Yellow Pages website. Remember, Yellow Pages is NOT to be used for urgent needs. For medical emergencies, dial 911. Now available from your iPhone and Android! General Information Please provide this summary of care documentation to your next provider. Patient Signature:  ____________________________________________________________ Date:  ____________________________________________________________  
  
Delores Rod Provider Signature:  ____________________________________________________________ Date:  ____________________________________________________________

## 2017-05-19 NOTE — PROGRESS NOTES
Pt discharged to home under care of parent. Denies any pain or concerns. Patient armband removed and shredded.

## 2017-05-19 NOTE — DISCHARGE INSTRUCTIONS
DISCHARGE SUMMARY from Nurse    The following personal items are in your possession at time of discharge:       Visual Aid: None                            PATIENT INSTRUCTIONS:    After general anesthesia or intravenous sedation, for 24 hours or while taking prescription Narcotics:  · Limit your activities  · Do not drive and operate hazardous machinery  · Do not make important personal or business decisions  · Do  not drink alcoholic beverages  · If you have not urinated within 8 hours after discharge, please contact your surgeon on call. Report the following to your surgeon:  · Excessive pain, swelling, redness or odor of or around the surgical area  · Temperature over 100.5  · Nausea and vomiting lasting longer than 4 hours or if unable to take medications  · Any signs of decreased circulation or nerve impairment to extremity: change in color, persistent  numbness, tingling, coldness or increase pain  · Any questions        What to do at Home:  Recommended activity: Activity as tolerated      *  Please give a list of your current medications to your Primary Care Provider. *  Please update this list whenever your medications are discontinued, doses are      changed, or new medications (including over-the-counter products) are added. *  Please carry medication information at all times in case of emergency situations. These are general instructions for a healthy lifestyle:    No smoking/ No tobacco products/ Avoid exposure to second hand smoke    Surgeon General's Warning:  Quitting smoking now greatly reduces serious risk to your health.     Obesity, smoking, and sedentary lifestyle greatly increases your risk for illness    A healthy diet, regular physical exercise & weight monitoring are important for maintaining a healthy lifestyle    You may be retaining fluid if you have a history of heart failure or if you experience any of the following symptoms:  Weight gain of 3 pounds or more overnight or 5 pounds in a week, increased swelling in our hands or feet or shortness of breath while lying flat in bed. Please call your doctor as soon as you notice any of these symptoms; do not wait until your next office visit. Recognize signs and symptoms of STROKE:    F-face looks uneven    A-arms unable to move or move unevenly    S-speech slurred or non-existent    T-time-call 911 as soon as signs and symptoms begin-DO NOT go       Back to bed or wait to see if you get better-TIME IS BRAIN. Warning Signs of HEART ATTACK     Call 911 if you have these symptoms:   Chest discomfort. Most heart attacks involve discomfort in the center of the chest that lasts more than a few minutes, or that goes away and comes back. It can feel like uncomfortable pressure, squeezing, fullness, or pain.  Discomfort in other areas of the upper body. Symptoms can include pain or discomfort in one or both arms, the back, neck, jaw, or stomach.  Shortness of breath with or without chest discomfort.  Other signs may include breaking out in a cold sweat, nausea, or lightheadedness. Don't wait more than five minutes to call 911 - MINUTES MATTER! Fast action can save your life. Calling 911 is almost always the fastest way to get lifesaving treatment. Emergency Medical Services staff can begin treatment when they arrive -- up to an hour sooner than if someone gets to the hospital by car. The discharge information has been reviewed with the patient. The patient verbalized understanding. Discharge medications reviewed with the patient and appropriate educational materials and side effects teaching were provided.

## 2017-05-19 NOTE — H&P (VIEW-ONLY)
Pravin Douglas    Chief Complaint   Patient presents with    End Stage Renal Disease       History and Physical    Ms. Luis Brown returns to our office for follow up after undergoing a right upper extremity graft thrombectomy. At this procedure it was noted that her venous outflow in her arm was very small and it was not likely that her graft would stay open for long. She says her graft is occluded now. She states she still has some soreness in her axillary area where her vein was ballooned. She has no other complaints. Past Medical History:   Diagnosis Date    Chronic kidney disease     ESRD    Dialysis patient [facility])      Patient Active Problem List   Diagnosis Code    CAP (community acquired pneumonia) J18.9    ESRD on dialysis ([address] N18.6, Z99.2    Sepsis ([address] A41.9    Anemia D64.9    Hyponatremia E87.1    Dehydration E86.0    Prolonged Q-T interval on ECG Q05.74    Diastolic dysfunction P74.7    Infected prosthetic vascular graft ([address] T82. 7XXA    Bacteremia due to Staphylococcus aureus R78.81    C. difficile colitis A04.7    PNA (pneumonia) J18.9    Hypokalemia E87.6    Hypoglycemia E16.2    Constipation K59.00     Past Surgical History:   Procedure Laterality Date    HX CSF SHUNT      HX HYSTERECTOMY      HX OTHER SURGICAL      dialysis shunt left arm; removed    HX TONSILLECTOMY      HX TRANSPLANT      VASCULAR SURGERY PROCEDURE UNLIST       Current Outpatient Prescriptions   Medication Sig Dispense Refill    oxyCODONE-acetaminophen (PERCOCET 7.5) 7.5-325 mg per tablet Take 1 Tab by mouth every six (6) hours as needed for Pain. Max Daily Amount: 4 Tabs. 40 Tab 0    albuterol (PROVENTIL HFA, VENTOLIN HFA, PROAIR HFA) 90 mcg/actuation inhaler Take 2 Puffs by inhalation every six (6) hours as needed for Wheezing.  sevelamer (RENAGEL) 800 mg tablet Take 2,400 mg by mouth three (3) times daily (with meals).  pravastatin (PRAVACHOL) 20 mg tablet Take 20 mg by mouth nightly. Indications: hypercholesterolemia      zolpidem (AMBIEN) 5 mg tablet Take 5 mg by mouth nightly as needed for Sleep. Indications: dialysis days      diphenhydrAMINE (BENADRYL) 25 mg capsule Take 25 mg by mouth See Admin Instructions. Dialysis pre med      promethazine (PHENERGAN) 25 mg tablet Take 25 mg by mouth See Admin Instructions. Dialysis premed       Allergies   Allergen Reactions    Vancomycin Other (comments)     Felt like her body was burning    Aspirin Nausea and Vomiting     Pt reports aspirin gave her a stomach ulcer.  Vicodin [Hydrocodone-Acetaminophen] Nausea and Vomiting     Social History     Social History    Marital status: UNKNOWN     Spouse name: N/A    Number of children: N/A    Years of education: N/A     Occupational History    Not on file. Social History Main Topics    Smoking status: Never Smoker    Smokeless tobacco: Never Used    Alcohol use No    Drug use: No    Sexual activity: Not on file     Other Topics Concern    Not on file     Social History Narrative      Family History   Problem Relation Age of Onset    Hypertension Brother     Diabetes Maternal Grandmother        Review of Systems    Review of Systems   Constitutional: Negative for chills, diaphoresis, fever, malaise/fatigue and weight loss. HENT: Negative for hearing loss and sore throat. Eyes: Negative for blurred vision, photophobia and redness. Respiratory: Negative for cough, hemoptysis, shortness of breath and wheezing. Cardiovascular: Negative for chest pain, palpitations and orthopnea. Gastrointestinal: Negative for abdominal pain, blood in stool, constipation, diarrhea, heartburn, nausea and vomiting. Genitourinary: Negative for dysuria, frequency, hematuria and urgency. Musculoskeletal: Negative for back pain and myalgias. Skin: Negative for itching and rash. Neurological: Negative for dizziness, speech change, focal weakness, weakness and headaches.    Endo/Heme/Allergies: Does not bruise/bleed easily. Psychiatric/Behavioral: Negative for depression and suicidal ideas. Physical Exam:    Visit Vitals    /66    Pulse 72    Resp 18    Ht 5' 5\" (1.651 m)    Wt 199 lb (90.3 kg)    LMP 12/01/2012    BMI 33.12 kg/m2      Physical Examination: General appearance - alert, well appearing, and in no distress  Mental status - alert, oriented to person, place, and time  Eyes - sclera anicteric, left eye normal, right eye normal  Ears - right ear normal, left ear normal  Nose - normal and patent, no erythema, discharge or polyps  Mouth - mucous membranes moist, pharynx normal without lesions  Extremities - RUE graft thrombosed. B/L arm and forearms with multiple scars from previous failed dialysis accesses. No significant arm swelling. Impression and Plan:    ICD-10-CM ICD-9-CM    1. ESRD on dialysis ([facility] [address] N18.6 585.6 IR VENOGRAPHY EXT LT    Z99.2 V45.11 IR VENOGRAPHY EXT RT     Orders Placed This Encounter    IR VENOGRAPHY EXT LT    IR VENOGRAPHY EXT RT     I explained to Ms. Nathan Marcelino that she has a complicated problem in regards to her dialysis access. I assumed her care after multiple failed accesses and at this point its difficult to tell where a reasonable access can be achieved. I think the most reasonable next step would be to perform bilateral venograms to evaluate her venous outflow to see if there is a site in her arms to place her access. If not, we will entertain possibly placing a hero graft. Follow-up Disposition:  Return in about 2 weeks (around 5/31/2017). The treatment plan was reviewed with the patient in detail. The patient voiced understanding of this plan and all questions and concerns were addressed. The patient agrees with this plan. We discussed the signs and symptoms that would require earlier attention or intervention.      The patient was given educational material related to his/her visit and the patient has voiced understanding of the material.     I appreciate the opportunity to participate in the care of your patient. I will be sure to keep you informed of any subsequent changes in the treatment plan. If you have any questions or concerns, please feel free to contact me. Michael Daly MD    PLEASE NOTE:  This document has been produced using voice recognition software. Unrecognized errors in transcription may be present.

## 2017-05-24 ENCOUNTER — OFFICE VISIT (OUTPATIENT)
Dept: VASCULAR SURGERY | Age: 42
End: 2017-05-24

## 2017-05-24 VITALS
HEART RATE: 82 BPM | SYSTOLIC BLOOD PRESSURE: 138 MMHG | DIASTOLIC BLOOD PRESSURE: 72 MMHG | HEIGHT: 65 IN | WEIGHT: 197 LBS | RESPIRATION RATE: 18 BRPM | BODY MASS INDEX: 32.82 KG/M2

## 2017-05-24 DIAGNOSIS — Z99.2 ESRD ON DIALYSIS (HCC): Primary | ICD-10-CM

## 2017-05-24 DIAGNOSIS — N18.6 ESRD ON DIALYSIS (HCC): Primary | ICD-10-CM

## 2017-05-24 NOTE — MR AVS SNAPSHOT
Visit Information Date & Time Provider Department Dept. Phone Encounter #  
 5/24/2017  1:00 PM Kirsten Alvarez MD [facility] [address] [id number] Upcoming Health Maintenance Date Due Pneumococcal 19-64 Highest Risk (1 of 3 - PCV13) 6/5/1992 DTaP/Tdap/Td series (1 - Tdap) 6/5/1994 PAP AKA CERVICAL CYTOLOGY 6/5/1994 INFLUENZA AGE 9 TO ADULT 8/1/2017 Allergies as of 5/24/2017  Review Complete On: 5/24/2017 By: Sara Pepe RN Severity Noted Reaction Type Reactions Vancomycin High 12/25/2012    Other (comments) Felt like her body was burning Aspirin  02/21/2017    Nausea and Vomiting Pt reports aspirin gave her a stomach ulcer. Vicodin [Hydrocodone-acetaminophen]  12/25/2012    Nausea and Vomiting Current Immunizations  Never Reviewed No immunizations on file. Not reviewed this visit Vitals BP Pulse Resp Height(growth percentile) Weight(growth percentile) LMP  
 138/72 82 18 5' 5\" (1.651 m) 197 lb (89.4 kg) 12/01/2012 BMI OB Status Smoking Status 32.78 kg/m2 Hysterectomy Never Smoker Vitals History BMI and BSA Data Body Mass Index Body Surface Area 32.78 kg/m 2 2.02 m 2 Preferred Pharmacy [phone] [place] [id number] [facility] [phone] Your Updated Medication List  
  
   
This list is accurate as of: 5/24/17  4:45 PM.  Always use your most recent med list.  
  
  
  
  
 albuterol 90 mcg/actuation inhaler Commonly known as:  PROVENTIL HFA, VENTOLIN HFA, PROAIR HFA Take 2 Puffs by inhalation every six (6) hours as needed for Wheezing. AMBIEN 5 mg tablet Generic drug:  zolpidem Take 5 mg by mouth nightly as needed for Sleep. Indications: dialysis days BENADRYL 25 mg capsule Generic drug:  diphenhydrAMINE Take 25 mg by mouth See Admin Instructions. Dialysis pre med oxyCODONE-acetaminophen 7.5-325 mg per tablet Commonly known as:  PERCOCET 7.5 Take 1 Tab by mouth every six (6) hours as needed for Pain. Max Daily Amount: 4 Tabs. pravastatin 20 mg tablet Commonly known as:  PRAVACHOL Take 20 mg by mouth nightly. Indications: hypercholesterolemia  
  
 promethazine 25 mg tablet Commonly known as:  PHENERGAN Take 25 mg by mouth See Admin Instructions. Dialysis premed  
  
 sevelamer 800 mg tablet Commonly known as:  RENAGEL Take 2,400 mg by mouth three (3) times daily (with meals). Introducing [facility]! Semaj Ryan introduces [employer] patient portal. Now you can access parts of your medical record, email your doctor's office, and request medication refills online. 1. In your internet browser, go to [email or web address] 2. Click on the First Time User? Click Here link in the Sign In box. You will see the New Member Sign Up page. 3. Enter your [employer] Access Code exactly as it appears below. You will not need to use this code after youve completed the sign-up process. If you do not sign up before the expiration date, you must request a new code. · [employer] Access Code: [email or web address] Expires: 8/22/2017  1:07 PM 
 
4. Enter the last four digits of your Social Security Number (xxxx) and Date of Birth (mm/dd/yyyy) as indicated and click Submit. You will be taken to the next sign-up page. 5. Create a Connect[employer] ID. This will be your [employer] login ID and cannot be changed, so think of one that is secure and easy to remember. 6. Create a [employer] password. You can change your password at any time. 7. Enter your Password Reset Question and Answer. This can be used at a later time if you forget your password. 8. Enter your e-mail address. You will receive e-mail notification when new information is available in [address] 9. Click Sign Up. You can now view and download portions of your medical record. 10. Click the Download Summary menu link to download a portable copy of your medical information. If you have questions, please visit the Frequently Asked Questions section of the [employer] website. Remember, [employer] is NOT to be used for urgent needs. For medical emergencies, dial 911. Now available from your iPhone and Android! Please provide this summary of care documentation to your next provider. Your primary care clinician is listed as Eamon Currie. If you have any questions after today's visit, please call [phone].

## 2017-05-25 NOTE — PROGRESS NOTES
Ricky Corley    Chief Complaint   Patient presents with    End Stage Renal Disease       History and Physical    Ms. Dulce Altamirano returns to our office today after undergoing bilateral venograms to evaluate her access options. She has no new complaints. Past Medical History:   Diagnosis Date    Chronic kidney disease     ESRD    Dialysis patient [facility])      Patient Active Problem List   Diagnosis Code    CAP (community acquired pneumonia) J18.9    ESRD on dialysis ([address] N18.6, Z99.2    Sepsis ([address] A41.9    Anemia D64.9    Hyponatremia E87.1    Dehydration E86.0    Prolonged Q-T interval on ECG V41.97    Diastolic dysfunction B71.3    Infected prosthetic vascular graft ([address] T82. 7XXA    Bacteremia due to Staphylococcus aureus R78.81    C. difficile colitis A04.7    PNA (pneumonia) J18.9    Hypokalemia E87.6    Hypoglycemia E16.2    Constipation K59.00     Past Surgical History:   Procedure Laterality Date    HX CSF SHUNT      HX HYSTERECTOMY      HX OTHER SURGICAL      dialysis shunt left arm; removed    HX TONSILLECTOMY      HX TRANSPLANT  2004    renal transplant    VASCULAR SURGERY PROCEDURE UNLIST       Current Outpatient Prescriptions   Medication Sig Dispense Refill    oxyCODONE-acetaminophen (PERCOCET 7.5) 7.5-325 mg per tablet Take 1 Tab by mouth every six (6) hours as needed for Pain. Max Daily Amount: 4 Tabs. 40 Tab 0    albuterol (PROVENTIL HFA, VENTOLIN HFA, PROAIR HFA) 90 mcg/actuation inhaler Take 2 Puffs by inhalation every six (6) hours as needed for Wheezing.  sevelamer (RENAGEL) 800 mg tablet Take 2,400 mg by mouth three (3) times daily (with meals).  pravastatin (PRAVACHOL) 20 mg tablet Take 20 mg by mouth nightly. Indications: hypercholesterolemia      zolpidem (AMBIEN) 5 mg tablet Take 5 mg by mouth nightly as needed for Sleep. Indications: dialysis days      diphenhydrAMINE (BENADRYL) 25 mg capsule Take 25 mg by mouth See Admin Instructions.  Dialysis pre med  promethazine (PHENERGAN) 25 mg tablet Take 25 mg by mouth See Admin Instructions. Dialysis premed       Allergies   Allergen Reactions    Vancomycin Other (comments)     Felt like her body was burning    Aspirin Nausea and Vomiting     Pt reports aspirin gave her a stomach ulcer.  Vicodin [Hydrocodone-Acetaminophen] Nausea and Vomiting     Social History     Social History    Marital status:      Spouse name: N/A    Number of children: N/A    Years of education: N/A     Occupational History    Not on file. Social History Main Topics    Smoking status: Never Smoker    Smokeless tobacco: Never Used    Alcohol use No    Drug use: No    Sexual activity: Not on file     Other Topics Concern    Not on file     Social History Narrative      Family History   Problem Relation Age of Onset    Hypertension Brother     Diabetes Maternal Grandmother        Review of Systems    Review of Systems   Constitutional: Negative for chills, diaphoresis, fever, malaise/fatigue and weight loss. HENT: Negative for hearing loss and sore throat. Eyes: Negative for blurred vision, photophobia and redness. Respiratory: Negative for cough, hemoptysis, shortness of breath and wheezing. Cardiovascular: Negative for chest pain, palpitations and orthopnea. Gastrointestinal: Negative for abdominal pain, blood in stool, constipation, diarrhea, heartburn, nausea and vomiting. Genitourinary: Negative for dysuria, frequency, hematuria and urgency. Musculoskeletal: Negative for back pain and myalgias. Skin: Negative for itching and rash. Neurological: Negative for dizziness, speech change, focal weakness, weakness and headaches. Endo/Heme/Allergies: Does not bruise/bleed easily. Psychiatric/Behavioral: Negative for depression and suicidal ideas.             Physical Exam:    Visit Vitals    /72    Pulse 82    Resp 18    Ht 5' 5\" (1.651 m)    Wt 197 lb (89.4 kg)    LMP 12/01/2012    BMI 32.78 kg/m2      Physical Examination: General appearance - alert, well appearing, and in no distress  Mental status - alert, oriented to person, place, and time  Eyes - sclera anicteric, left eye normal, right eye normal  Ears - right ear normal, left ear normal  Nose - normal and patent, no erythema, discharge or polyps  Mouth - mucous membranes moist, pharynx normal without lesions  Extremities - Palpable radial pulses bilaterally, no significant edema. No erythema. No wounds. Impression and Plan:    ICD-10-CM ICD-9-CM    1. ESRD on dialysis ([facility] [address] N18.6 585.6     Z99.2 V45.11      Again, Ms. Mini Bermudez and I reviewed her venogram images together so she can better understand her situation. Given the multitude of previous dialysis access procedures, she does not have many access options. On her right side, she does not have named veins in her upper extremity. She only has venous collaterals which drain into her chest.  These are not viable options for venous access and this is why her most recent graft occluded. On the left, she only has a basilic vein that drains into her brachial vein. Her brachial vein is occluded throughout her arm and only reconstitutes above an occluded venous stent where her basilic vein drains into it. At this time, I think her only real option is a left upper arm graft to her basilic vein and then angioplasty of her basilic vein as it enters this brachial vein above her venous stent. There is a stenosis there so the angioplasty will treat this. Ms. Mini Bermudez understands this plan. We will place a graft so it will be able to be used earlier and we can get her catheter out sooner. Also given the amount of scars on her upper arm, tunneling her basilic vein may prove challenging. Follow-up Disposition:  Return in about 2 weeks (around 6/7/2017) for post procedure. The treatment plan was reviewed with the patient in detail.   The patient voiced understanding of this plan and all questions and concerns were addressed. The patient agrees with this plan. We discussed the signs and symptoms that would require earlier attention or intervention. The patient was given educational material related to his/her visit and the patient has voiced understanding of the material.     I appreciate the opportunity to participate in the care of your patient. I will be sure to keep you informed of any subsequent changes in the treatment plan. If you have any questions or concerns, please feel free to contact me. Robyn Solomon MD    PLEASE NOTE:  This document has been produced using voice recognition software. Unrecognized errors in transcription may be present.

## 2017-05-31 ENCOUNTER — HOSPITAL ENCOUNTER (OUTPATIENT)
Dept: INTERVENTIONAL RADIOLOGY/VASCULAR | Age: 42
Discharge: HOME | End: 2017-05-31
Attending: SURGERY | Admitting: SURGERY
Payer: MEDICARE

## 2017-05-31 ENCOUNTER — APPOINTMENT (OUTPATIENT)
Dept: PREADMISSION TESTING | Age: 42
End: 2017-05-31
Attending: SURGERY
Payer: MEDICARE

## 2017-05-31 VITALS
HEART RATE: 78 BPM | DIASTOLIC BLOOD PRESSURE: 77 MMHG | HEIGHT: 65 IN | TEMPERATURE: 98 F | SYSTOLIC BLOOD PRESSURE: 126 MMHG | OXYGEN SATURATION: 96 % | WEIGHT: 196 LBS | RESPIRATION RATE: 16 BRPM | BODY MASS INDEX: 32.65 KG/M2

## 2017-05-31 DIAGNOSIS — N18.6 ESRD (END STAGE RENAL DISEASE) (HCC): ICD-10-CM

## 2017-05-31 LAB
ABO + RH BLD: NORMAL
BLOOD GROUP ANTIBODIES SERPL: NORMAL
SPECIMEN EXP DATE BLD: NORMAL

## 2017-05-31 PROCEDURE — 36415 COLL VENOUS BLD VENIPUNCTURE: CPT | Performed by: SURGERY

## 2017-05-31 PROCEDURE — 74011250636 HC RX REV CODE- 250/636: Performed by: SURGERY

## 2017-05-31 PROCEDURE — 86900 BLOOD TYPING SEROLOGIC ABO: CPT | Performed by: SURGERY

## 2017-05-31 RX ORDER — MIDAZOLAM HYDROCHLORIDE 1 MG/ML
1-4 INJECTION, SOLUTION INTRAMUSCULAR; INTRAVENOUS
Status: DISCONTINUED | OUTPATIENT
Start: 2017-05-31 | End: 2017-05-31 | Stop reason: HOSPADM

## 2017-05-31 RX ORDER — FENTANYL CITRATE 50 UG/ML
25-200 INJECTION, SOLUTION INTRAMUSCULAR; INTRAVENOUS
Status: DISCONTINUED | OUTPATIENT
Start: 2017-05-31 | End: 2017-05-31 | Stop reason: HOSPADM

## 2017-05-31 RX ORDER — FLUMAZENIL 0.1 MG/ML
0.2 INJECTION INTRAVENOUS AS NEEDED
Status: DISCONTINUED | OUTPATIENT
Start: 2017-05-31 | End: 2017-05-31 | Stop reason: HOSPADM

## 2017-05-31 RX ORDER — HEPARIN SODIUM 200 [USP'U]/100ML
500 INJECTION, SOLUTION INTRAVENOUS
Status: DISCONTINUED | OUTPATIENT
Start: 2017-05-31 | End: 2017-05-31 | Stop reason: HOSPADM

## 2017-05-31 RX ORDER — SODIUM CHLORIDE 9 MG/ML
25 INJECTION, SOLUTION INTRAVENOUS CONTINUOUS
Status: DISCONTINUED | OUTPATIENT
Start: 2017-05-31 | End: 2017-05-31 | Stop reason: HOSPADM

## 2017-05-31 RX ORDER — LIDOCAINE HYDROCHLORIDE 10 MG/ML
1-20 INJECTION INFILTRATION; PERINEURAL
Status: DISCONTINUED | OUTPATIENT
Start: 2017-05-31 | End: 2017-05-31 | Stop reason: HOSPADM

## 2017-05-31 RX ORDER — NALOXONE HYDROCHLORIDE 0.4 MG/ML
0.2 INJECTION, SOLUTION INTRAMUSCULAR; INTRAVENOUS; SUBCUTANEOUS
Status: DISCONTINUED | OUTPATIENT
Start: 2017-05-31 | End: 2017-05-31 | Stop reason: HOSPADM

## 2017-05-31 RX ORDER — HEPARIN SODIUM 1000 [USP'U]/ML
10000 INJECTION, SOLUTION INTRAVENOUS; SUBCUTANEOUS
Status: COMPLETED | OUTPATIENT
Start: 2017-05-31 | End: 2017-05-31

## 2017-05-31 RX ADMIN — HEPARIN SODIUM 4000 UNITS: 1000 INJECTION, SOLUTION INTRAVENOUS; SUBCUTANEOUS at 12:32

## 2017-05-31 NOTE — DISCHARGE INSTRUCTIONS
Your Hemodialysis Access: Care Instructions  Your Care Instructions  Hemodialysis, or dialysis, is the use of a machine to remove wastes from your blood. You need it if your kidneys are not able to remove wastes on their own. A dialysis access is the place in your arm, or sometimes in your leg, where a doctor creates a blood vessel that carries a large flow of blood. When you have dialysis, two needles are placed in this blood vessel and are connected to the dialysis machine. Your blood flows out of one needle and into the machine to be cleaned. Then your cleaned blood flows back into your body through the other needle. Sometimes, a doctor makes a short-term access through a tube, called a catheter, placed in your neck, upper chest, or groin. Your doctor creates an access during a minor surgery. You need to take care of your access to keep it working and to prevent infection. Follow-up care is a key part of your treatment and safety. Be sure to make and go to all appointments, and call your doctor if you are having problems. Its also a good idea to know your test results and keep a list of the medicines you take. How can you care for yourself at home? · After your doctor creates an access, keep it dry for at least 2 days. · Squeeze a soft ball or other object as instructed after the access is placed. This will help blood flow through the access and help prevent blood clots. · After you have dialysis, check to see whether the access bleeds or swells. Let your doctor know if your arm bleeds or swells. · Do not lift anything heavy with the arm that has the access. · Do not bump your arm. · Do not wear tight clothing or jewelry over the access. · Do not sleep with your access arm under your body. · Have blood drawn or blood pressure taken from your other arm. · Keep the access clean and dry. · Do not put cream or lotion on or near the access. When should you call for help?   Call your doctor now or seek immediate medical care if:  · You have signs of infection, such as:  ¨ Increased pain, swelling, warmth, or redness around the access. ¨ Red streaks leading from the access. ¨ Pus draining from the access. ¨ Swollen lymph nodes in your neck, armpits, or groin. ¨ A fever. · You do not feel a pulse in your access. Watch closely for changes in your health, and be sure to contact your doctor if:  · You have pain, swelling, or bleeding. Some pain or swelling is normal after surgery to create the access. But pain and swelling should get better over time. Where can you learn more? Go to http://jaquan-treva.info/. Enter L169 in the search box to learn more about \"Your Hemodialysis Access: Care Instructions. \"  Current as of: November 20, 2015  Content Version: 11.2  © 4294-7613 [employer]. Care instructions adapted under license by [employer] (which disclaims liability or warranty for this information). If you have questions about a medical condition or this instruction, always ask your healthcare professional. [place] [address] any warranty or liability for your use of this information. PATIENT INSTRUCTIONS:    After general anesthesia or intravenous sedation, for 24 hours or while taking prescription Narcotics:  · Limit your activities  · Do not drive and operate hazardous machinery  · Do not make important personal or business decisions  · Do  not drink alcoholic beverages  · If you have not urinated within 8 hours after discharge, please contact your surgeon on call.     Report the following to your surgeon:  · Excessive pain, swelling, redness or odor of or around the surgical area  · Temperature over 100.5  · Nausea and vomiting lasting longer than 4 hours or if unable to take medications  · Any signs of decreased circulation or nerve impairment to extremity: change in color, persistent  numbness, tingling, coldness or increase pain  · Any questions        What to do at Home:  Recommended activity: Activity as tolerated and no driving for today,     *  Please give a list of your current medications to your Primary Care Provider. *  Please update this list whenever your medications are discontinued, doses are      changed, or new medications (including over-the-counter products) are added. *  Please carry medication information at all times in case of emergency situations. These are general instructions for a healthy lifestyle:    No smoking/ No tobacco products/ Avoid exposure to second hand smoke    Surgeon General's Warning:  Quitting smoking now greatly reduces serious risk to your health. Obesity, smoking, and sedentary lifestyle greatly increases your risk for illness    A healthy diet, regular physical exercise & weight monitoring are important for maintaining a healthy lifestyle    You may be retaining fluid if you have a history of heart failure or if you experience any of the following symptoms:  Weight gain of 3 pounds or more overnight or 5 pounds in a week, increased swelling in our hands or feet or shortness of breath while lying flat in bed. Please call your doctor as soon as you notice any of these symptoms; do not wait until your next office visit. Recognize signs and symptoms of STROKE:    F-face looks uneven    A-arms unable to move or move unevenly    S-speech slurred or non-existent    T-time-call 911 as soon as signs and symptoms begin-DO NOT go       Back to bed or wait to see if you get better-TIME IS BRAIN. Warning Signs of HEART ATTACK     Call 911 if you have these symptoms:   Chest discomfort. Most heart attacks involve discomfort in the center of the chest that lasts more than a few minutes, or that goes away and comes back. It can feel like uncomfortable pressure, squeezing, fullness, or pain.  Discomfort in other areas of the upper body.  Symptoms can include pain or discomfort in one or both arms, the back, neck, jaw, or stomach.  Shortness of breath with or without chest discomfort.  Other signs may include breaking out in a cold sweat, nausea, or lightheadedness. Don't wait more than five minutes to call 911 - MINUTES MATTER! Fast action can save your life. Calling 911 is almost always the fastest way to get lifesaving treatment. Emergency Medical Services staff can begin treatment when they arrive -- up to an hour sooner than if someone gets to the hospital by car. The discharge information has been reviewed with the patient. The patient verbalized understanding. Discharge medications reviewed with the patient and appropriate educational materials and side effects teaching were provided. Central Venous Line: Care Instructions  Your Care Instructions  A central venous line is a thin, flexible tube placed in a vein in your arm or chest. The line can deliver medicine, liquids, or nutrients into a blood vessel. A few stitches keep the line in place. The line also can be used to collect blood samples. A central venous line can be used for several months. Tell your doctor if you take aspirin or some other blood thinner. These medicines can increase the chance of bleeding. Caring for a central venous line focuses on preventing infection. You may need to limit some of your activities while you have the line in. Follow-up care is a key part of your treatment and safety. Be sure to make and go to all appointments, and call your doctor if you are having problems. It's also a good idea to know your test results and keep a list of the medicines you take. How can you care for yourself at home? · To help prevent infection, take a shower instead of a bath. Do not go swimming with a central venous line. · Clean the area around the line with soap and water at least one time a day. · Do not wear jewelry, such as necklaces, that can catch on the line.   · Talk to your doctor about what activities you can do. You may not be able to do sports or exercises that use the upper body, such as tennis or weight lifting. · Clamp or tie off the line if it breaks. Then, go see a doctor as soon as possible. · Go to all appointments to flush the line. This keeps it open. A nurse or other health professional will flush the line. When should you call for help? Call your doctor now or seek immediate medical care if:  · You have signs of infection, such as:  ¨ Increased pain, swelling, warmth, or redness around the line. ¨ Red streaks leading from the area around the line. ¨ Pus draining from the area around the line. ¨ A fever. · You have liquid leaking from around the line. · There are cracks or leaks in the tube. · You have pain or swelling in your neck or arm. · The line becomes clogged. Watch closely for changes in your health, and be sure to contact your doctor if you have any problems. Where can you learn more? Go to http://jaquan-treva.info/. Enter P159 in the search box to learn more about \"Central Venous Line: Care Instructions. \"  Current as of: May 27, 2016  Content Version: 11.2  © 6204-1211 [employer] Care instructions adapted under license by [employer] (which disclaims liability or warranty for this information). If you have questions about a medical condition or this instruction, always ask your healthcare professional. [place] [address] any warranty or liability for your use of this information.

## 2017-05-31 NOTE — PROGRESS NOTES
TRANSFER - OUT REPORT:    Verbal report given Milena Wynn  on Clifton Winter  being transferred to [facility](unit) for ordered procedure       Report consisted of patients Situation, Background, Assessment and   Recommendations(SBAR). Information from the following report(s) SBAR, Kardex and MAR was reviewed with the receiving nurse. Lines:   Peripheral IV 05/31/17 Right Forearm (Active)   Site Assessment Clean, dry, & intact 5/31/2017 11:55 AM   Phlebitis Assessment 0 5/31/2017 11:55 AM   Infiltration Assessment 0 5/31/2017 11:55 AM   Dressing Status Clean, dry, & intact 5/31/2017 11:55 AM   Dressing Type Transparent 5/31/2017 11:55 AM   Hub Color/Line Status Yellow 5/31/2017 11:55 AM        Opportunity for questions and clarification was provided. On arrival to unit Dr. Saira Feliciano accessed catheter and multiple times able to draw blood and flush. Patient has dialysis tomorrow and back with Dr Saira Feliciano on Friday. 2mg of versed wasted and 100 mcg of fentanyl. Procedure cancelled.      Patient transported with:   Registered Nurse

## 2017-05-31 NOTE — PROGRESS NOTES
200  Pt brought back to care unit,  Catheter worked well in  IR no need to change it out at this time,  Procedure CX. Pt states hungry,  lunch box given,  Pt tolerated well,  Pt discharge in care of self,  Pt going to admissions to have pre testing done for surgery Friday,  Denies any distress.  Arm band removed and shredded

## 2017-06-01 ENCOUNTER — ANESTHESIA EVENT (OUTPATIENT)
Dept: SURGERY | Age: 42
End: 2017-06-01
Payer: MEDICARE

## 2017-06-02 ENCOUNTER — APPOINTMENT (OUTPATIENT)
Dept: GENERAL RADIOLOGY | Age: 42
End: 2017-06-02
Attending: SURGERY
Payer: MEDICARE

## 2017-06-02 ENCOUNTER — ANESTHESIA (OUTPATIENT)
Dept: SURGERY | Age: 42
End: 2017-06-02
Payer: MEDICARE

## 2017-06-02 ENCOUNTER — HOSPITAL ENCOUNTER (OUTPATIENT)
Age: 42
Setting detail: OUTPATIENT SURGERY
Discharge: HOME | End: 2017-06-02
Attending: SURGERY | Admitting: SURGERY
Payer: MEDICARE

## 2017-06-02 VITALS
RESPIRATION RATE: 16 BRPM | DIASTOLIC BLOOD PRESSURE: 95 MMHG | HEART RATE: 89 BPM | SYSTOLIC BLOOD PRESSURE: 157 MMHG | OXYGEN SATURATION: 99 % | TEMPERATURE: 97.7 F

## 2017-06-02 LAB — POTASSIUM SERPL-SCNC: 5.2 MMOL/L (ref 3.5–5.5)

## 2017-06-02 PROCEDURE — 74011250636 HC RX REV CODE- 250/636

## 2017-06-02 PROCEDURE — 74011000250 HC RX REV CODE- 250: Performed by: SURGERY

## 2017-06-02 PROCEDURE — 77030011267 HC ELECTRD BLD COVD -A: Performed by: SURGERY

## 2017-06-02 PROCEDURE — 74011636320 HC RX REV CODE- 636/320: Performed by: SURGERY

## 2017-06-02 PROCEDURE — 74011000272 HC RX REV CODE- 272: Performed by: SURGERY

## 2017-06-02 PROCEDURE — 76010000134 HC OR TIME 3.5 TO 4 HR: Performed by: SURGERY

## 2017-06-02 PROCEDURE — 77030011640 HC PAD GRND REM COVD -A: Performed by: SURGERY

## 2017-06-02 PROCEDURE — 76060000038 HC ANESTHESIA 3.5 TO 4 HR: Performed by: SURGERY

## 2017-06-02 PROCEDURE — 77030020782 HC GWN BAIR PAWS FLX 3M -B: Performed by: SURGERY

## 2017-06-02 PROCEDURE — 77030018719 HC DRSG PTCH ANTIMIC J&J -A: Performed by: SURGERY

## 2017-06-02 PROCEDURE — C1725 CATH, TRANSLUMIN NON-LASER: HCPCS | Performed by: SURGERY

## 2017-06-02 PROCEDURE — 36415 COLL VENOUS BLD VENIPUNCTURE: CPT | Performed by: SURGERY

## 2017-06-02 PROCEDURE — 77030002986 HC SUT PROL J&J -A: Performed by: SURGERY

## 2017-06-02 PROCEDURE — 76210000016 HC OR PH I REC 1 TO 1.5 HR: Performed by: SURGERY

## 2017-06-02 PROCEDURE — C1769 GUIDE WIRE: HCPCS | Performed by: SURGERY

## 2017-06-02 PROCEDURE — 77030010512 HC APPL CLP LIG J&J -C: Performed by: SURGERY

## 2017-06-02 PROCEDURE — 76210000021 HC REC RM PH II 0.5 TO 1 HR: Performed by: SURGERY

## 2017-06-02 PROCEDURE — 74011250636 HC RX REV CODE- 250/636: Performed by: SURGERY

## 2017-06-02 PROCEDURE — 77030012508 HC MSK AIRWY LMA AMBU -A: Performed by: ANESTHESIOLOGY

## 2017-06-02 PROCEDURE — 77030018831 HC SOL IRR H20 BAXT -A: Performed by: SURGERY

## 2017-06-02 PROCEDURE — 77030031139 HC SUT VCRL2 J&J -A: Performed by: SURGERY

## 2017-06-02 PROCEDURE — 77030018836 HC SOL IRR NACL ICUM -A: Performed by: SURGERY

## 2017-06-02 PROCEDURE — 75710 ARTERY X-RAYS ARM/LEG: CPT

## 2017-06-02 PROCEDURE — 77030010507 HC ADH SKN DERMBND J&J -B: Performed by: SURGERY

## 2017-06-02 PROCEDURE — 77030002524 HC INSTR CLMP FGRTY EDWD -B: Performed by: SURGERY

## 2017-06-02 PROCEDURE — 74011000250 HC RX REV CODE- 250

## 2017-06-02 PROCEDURE — 77030013516 HC DEV INFL ANGI MRTM -B: Performed by: SURGERY

## 2017-06-02 PROCEDURE — C1887 CATHETER, GUIDING: HCPCS | Performed by: SURGERY

## 2017-06-02 PROCEDURE — 77030002935 HC SUT MCRYL J&J -C: Performed by: SURGERY

## 2017-06-02 PROCEDURE — 74011250636 HC RX REV CODE- 250/636: Performed by: ANESTHESIOLOGY

## 2017-06-02 PROCEDURE — 84132 ASSAY OF SERUM POTASSIUM: CPT | Performed by: SURGERY

## 2017-06-02 PROCEDURE — 77030018390 HC SPNG HEMSTAT2 J&J -B: Performed by: SURGERY

## 2017-06-02 PROCEDURE — 77030002996 HC SUT SLK J&J -A: Performed by: SURGERY

## 2017-06-02 PROCEDURE — 77030032490 HC SLV COMPR SCD KNE COVD -B: Performed by: SURGERY

## 2017-06-02 PROCEDURE — 77030014008 HC SPNG HEMSTAT J&J -C: Performed by: SURGERY

## 2017-06-02 PROCEDURE — C1894 INTRO/SHEATH, NON-LASER: HCPCS | Performed by: SURGERY

## 2017-06-02 PROCEDURE — 77030018843 HC SOL IRR SOD CL 9% SLSH BAXT -B: Performed by: SURGERY

## 2017-06-02 PROCEDURE — 77030008584 HC TOOL GDWRE DEV TERU -A: Performed by: SURGERY

## 2017-06-02 PROCEDURE — C1768 GRAFT, VASCULAR: HCPCS | Performed by: SURGERY

## 2017-06-02 PROCEDURE — 77010033678 HC OXYGEN DAILY

## 2017-06-02 PROCEDURE — 77030002924 HC SUT GORTX WLGO -B: Performed by: SURGERY

## 2017-06-02 PROCEDURE — 77030002987 HC SUT PROL J&J -B: Performed by: SURGERY

## 2017-06-02 DEVICE — GRAFT VASC L45CM DIA4-7MM PTFE CBAS HEP SURF STD WALLED: Type: IMPLANTABLE DEVICE | Site: ARM | Status: FUNCTIONAL

## 2017-06-02 RX ORDER — DIPHENHYDRAMINE HYDROCHLORIDE 50 MG/ML
12.5 INJECTION, SOLUTION INTRAMUSCULAR; INTRAVENOUS
Status: DISCONTINUED | OUTPATIENT
Start: 2017-06-02 | End: 2017-06-02 | Stop reason: HOSPADM

## 2017-06-02 RX ORDER — HYDROCODONE BITARTRATE AND ACETAMINOPHEN 5; 325 MG/1; MG/1
1 TABLET ORAL AS NEEDED
Status: DISCONTINUED | OUTPATIENT
Start: 2017-06-02 | End: 2017-06-02 | Stop reason: HOSPADM

## 2017-06-02 RX ORDER — HYDROMORPHONE HYDROCHLORIDE 1 MG/ML
0.5 INJECTION, SOLUTION INTRAMUSCULAR; INTRAVENOUS; SUBCUTANEOUS
Status: DISCONTINUED | OUTPATIENT
Start: 2017-06-02 | End: 2017-06-02 | Stop reason: SDUPTHER

## 2017-06-02 RX ORDER — DEXTROSE 50 % IN WATER (D50W) INTRAVENOUS SYRINGE
25-50 AS NEEDED
Status: DISCONTINUED | OUTPATIENT
Start: 2017-06-02 | End: 2017-06-02 | Stop reason: SDUPTHER

## 2017-06-02 RX ORDER — ONDANSETRON 2 MG/ML
4 INJECTION INTRAMUSCULAR; INTRAVENOUS ONCE
Status: DISCONTINUED | OUTPATIENT
Start: 2017-06-02 | End: 2017-06-02 | Stop reason: HOSPADM

## 2017-06-02 RX ORDER — MAGNESIUM SULFATE 100 %
4 CRYSTALS MISCELLANEOUS AS NEEDED
Status: DISCONTINUED | OUTPATIENT
Start: 2017-06-02 | End: 2017-06-02 | Stop reason: HOSPADM

## 2017-06-02 RX ORDER — SODIUM CHLORIDE, SODIUM LACTATE, POTASSIUM CHLORIDE, CALCIUM CHLORIDE 600; 310; 30; 20 MG/100ML; MG/100ML; MG/100ML; MG/100ML
150 INJECTION, SOLUTION INTRAVENOUS CONTINUOUS
Status: DISCONTINUED | OUTPATIENT
Start: 2017-06-02 | End: 2017-06-02 | Stop reason: SDUPTHER

## 2017-06-02 RX ORDER — OXYCODONE AND ACETAMINOPHEN 7.5; 325 MG/1; MG/1
1 TABLET ORAL
Qty: 40 TAB | Refills: 0 | Status: SHIPPED | OUTPATIENT
Start: 2017-06-02 | End: 2017-06-14 | Stop reason: SDUPTHER

## 2017-06-02 RX ORDER — ALBUTEROL SULFATE 0.83 MG/ML
2.5 SOLUTION RESPIRATORY (INHALATION) AS NEEDED
Status: DISCONTINUED | OUTPATIENT
Start: 2017-06-02 | End: 2017-06-02 | Stop reason: HOSPADM

## 2017-06-02 RX ORDER — MAGNESIUM SULFATE 100 %
4 CRYSTALS MISCELLANEOUS AS NEEDED
Status: DISCONTINUED | OUTPATIENT
Start: 2017-06-02 | End: 2017-06-02 | Stop reason: SDUPTHER

## 2017-06-02 RX ORDER — HEPARIN SODIUM 1000 [USP'U]/ML
INJECTION, SOLUTION INTRAVENOUS; SUBCUTANEOUS AS NEEDED
Status: DISCONTINUED | OUTPATIENT
Start: 2017-06-02 | End: 2017-06-02 | Stop reason: HOSPADM

## 2017-06-02 RX ORDER — FENTANYL CITRATE 50 UG/ML
INJECTION, SOLUTION INTRAMUSCULAR; INTRAVENOUS AS NEEDED
Status: DISCONTINUED | OUTPATIENT
Start: 2017-06-02 | End: 2017-06-02 | Stop reason: HOSPADM

## 2017-06-02 RX ORDER — DEXTROSE 50 % IN WATER (D50W) INTRAVENOUS SYRINGE
25-50 AS NEEDED
Status: DISCONTINUED | OUTPATIENT
Start: 2017-06-02 | End: 2017-06-02 | Stop reason: HOSPADM

## 2017-06-02 RX ORDER — DIPHENHYDRAMINE HYDROCHLORIDE 50 MG/ML
12.5 INJECTION, SOLUTION INTRAMUSCULAR; INTRAVENOUS
Status: DISCONTINUED | OUTPATIENT
Start: 2017-06-02 | End: 2017-06-02 | Stop reason: SDUPTHER

## 2017-06-02 RX ORDER — HYDROMORPHONE HYDROCHLORIDE 1 MG/ML
0.5 INJECTION, SOLUTION INTRAMUSCULAR; INTRAVENOUS; SUBCUTANEOUS
Status: DISCONTINUED | OUTPATIENT
Start: 2017-06-02 | End: 2017-06-02 | Stop reason: HOSPADM

## 2017-06-02 RX ORDER — SODIUM CHLORIDE 9 MG/ML
50 INJECTION, SOLUTION INTRAVENOUS CONTINUOUS
Status: DISCONTINUED | OUTPATIENT
Start: 2017-06-02 | End: 2017-06-02 | Stop reason: HOSPADM

## 2017-06-02 RX ORDER — LIDOCAINE HYDROCHLORIDE 20 MG/ML
INJECTION, SOLUTION EPIDURAL; INFILTRATION; INTRACAUDAL; PERINEURAL AS NEEDED
Status: DISCONTINUED | OUTPATIENT
Start: 2017-06-02 | End: 2017-06-02 | Stop reason: HOSPADM

## 2017-06-02 RX ORDER — PROPOFOL 10 MG/ML
INJECTION, EMULSION INTRAVENOUS AS NEEDED
Status: DISCONTINUED | OUTPATIENT
Start: 2017-06-02 | End: 2017-06-02 | Stop reason: HOSPADM

## 2017-06-02 RX ORDER — CEFAZOLIN SODIUM 2 G/50ML
2 SOLUTION INTRAVENOUS ONCE
Status: COMPLETED | OUTPATIENT
Start: 2017-06-02 | End: 2017-06-02

## 2017-06-02 RX ORDER — MIDAZOLAM HYDROCHLORIDE 1 MG/ML
INJECTION, SOLUTION INTRAMUSCULAR; INTRAVENOUS AS NEEDED
Status: DISCONTINUED | OUTPATIENT
Start: 2017-06-02 | End: 2017-06-02 | Stop reason: HOSPADM

## 2017-06-02 RX ORDER — INSULIN LISPRO 100 [IU]/ML
INJECTION, SOLUTION INTRAVENOUS; SUBCUTANEOUS ONCE
Status: DISCONTINUED | OUTPATIENT
Start: 2017-06-02 | End: 2017-06-02 | Stop reason: HOSPADM

## 2017-06-02 RX ORDER — ALBUTEROL SULFATE 0.83 MG/ML
2.5 SOLUTION RESPIRATORY (INHALATION) AS NEEDED
Status: DISCONTINUED | OUTPATIENT
Start: 2017-06-02 | End: 2017-06-02 | Stop reason: SDUPTHER

## 2017-06-02 RX ORDER — SODIUM CHLORIDE 0.9 % (FLUSH) 0.9 %
5-10 SYRINGE (ML) INJECTION AS NEEDED
Status: DISCONTINUED | OUTPATIENT
Start: 2017-06-02 | End: 2017-06-02 | Stop reason: HOSPADM

## 2017-06-02 RX ORDER — SODIUM CHLORIDE, SODIUM LACTATE, POTASSIUM CHLORIDE, CALCIUM CHLORIDE 600; 310; 30; 20 MG/100ML; MG/100ML; MG/100ML; MG/100ML
150 INJECTION, SOLUTION INTRAVENOUS CONTINUOUS
Status: DISCONTINUED | OUTPATIENT
Start: 2017-06-02 | End: 2017-06-02 | Stop reason: HOSPADM

## 2017-06-02 RX ORDER — HYDROCODONE BITARTRATE AND ACETAMINOPHEN 5; 325 MG/1; MG/1
1 TABLET ORAL AS NEEDED
Status: DISCONTINUED | OUTPATIENT
Start: 2017-06-02 | End: 2017-06-02 | Stop reason: SDUPTHER

## 2017-06-02 RX ORDER — NALOXONE HYDROCHLORIDE 0.4 MG/ML
0.1 INJECTION, SOLUTION INTRAMUSCULAR; INTRAVENOUS; SUBCUTANEOUS AS NEEDED
Status: DISCONTINUED | OUTPATIENT
Start: 2017-06-02 | End: 2017-06-02 | Stop reason: SDUPTHER

## 2017-06-02 RX ORDER — ONDANSETRON 2 MG/ML
4 INJECTION INTRAMUSCULAR; INTRAVENOUS ONCE
Status: DISCONTINUED | OUTPATIENT
Start: 2017-06-02 | End: 2017-06-02 | Stop reason: SDUPTHER

## 2017-06-02 RX ORDER — ONDANSETRON 2 MG/ML
INJECTION INTRAMUSCULAR; INTRAVENOUS AS NEEDED
Status: DISCONTINUED | OUTPATIENT
Start: 2017-06-02 | End: 2017-06-02 | Stop reason: HOSPADM

## 2017-06-02 RX ORDER — NALOXONE HYDROCHLORIDE 0.4 MG/ML
0.1 INJECTION, SOLUTION INTRAMUSCULAR; INTRAVENOUS; SUBCUTANEOUS AS NEEDED
Status: DISCONTINUED | OUTPATIENT
Start: 2017-06-02 | End: 2017-06-02 | Stop reason: HOSPADM

## 2017-06-02 RX ORDER — EPHEDRINE SULFATE/0.9% NACL/PF 25 MG/5 ML
SYRINGE (ML) INTRAVENOUS AS NEEDED
Status: DISCONTINUED | OUTPATIENT
Start: 2017-06-02 | End: 2017-06-02 | Stop reason: HOSPADM

## 2017-06-02 RX ORDER — SODIUM CHLORIDE 0.9 % (FLUSH) 0.9 %
5-10 SYRINGE (ML) INJECTION AS NEEDED
Status: DISCONTINUED | OUTPATIENT
Start: 2017-06-02 | End: 2017-06-02 | Stop reason: SDUPTHER

## 2017-06-02 RX ADMIN — HYDROMORPHONE HYDROCHLORIDE 0.5 MG: 1 INJECTION, SOLUTION INTRAMUSCULAR; INTRAVENOUS; SUBCUTANEOUS at 13:53

## 2017-06-02 RX ADMIN — PROPOFOL 100 MG: 10 INJECTION, EMULSION INTRAVENOUS at 09:46

## 2017-06-02 RX ADMIN — ONDANSETRON 4 MG: 2 INJECTION INTRAMUSCULAR; INTRAVENOUS at 10:47

## 2017-06-02 RX ADMIN — HEPARIN SODIUM 5000 UNITS: 1000 INJECTION, SOLUTION INTRAVENOUS; SUBCUTANEOUS at 11:20

## 2017-06-02 RX ADMIN — FENTANYL CITRATE 25 MCG: 50 INJECTION, SOLUTION INTRAMUSCULAR; INTRAVENOUS at 11:47

## 2017-06-02 RX ADMIN — Medication 5 MG: at 10:09

## 2017-06-02 RX ADMIN — FENTANYL CITRATE 25 MCG: 50 INJECTION, SOLUTION INTRAMUSCULAR; INTRAVENOUS at 12:16

## 2017-06-02 RX ADMIN — LIDOCAINE HYDROCHLORIDE 60 MG: 20 INJECTION, SOLUTION EPIDURAL; INFILTRATION; INTRACAUDAL; PERINEURAL at 09:45

## 2017-06-02 RX ADMIN — MIDAZOLAM HYDROCHLORIDE 2 MG: 1 INJECTION, SOLUTION INTRAMUSCULAR; INTRAVENOUS at 09:41

## 2017-06-02 RX ADMIN — FENTANYL CITRATE 25 MCG: 50 INJECTION, SOLUTION INTRAMUSCULAR; INTRAVENOUS at 11:08

## 2017-06-02 RX ADMIN — HYDROMORPHONE HYDROCHLORIDE 0.5 MG: 1 INJECTION, SOLUTION INTRAMUSCULAR; INTRAVENOUS; SUBCUTANEOUS at 14:06

## 2017-06-02 RX ADMIN — FENTANYL CITRATE 25 MCG: 50 INJECTION, SOLUTION INTRAMUSCULAR; INTRAVENOUS at 10:58

## 2017-06-02 RX ADMIN — SODIUM CHLORIDE 50 ML/HR: 900 INJECTION, SOLUTION INTRAVENOUS at 08:21

## 2017-06-02 RX ADMIN — CEFAZOLIN SODIUM 2 G: 2 SOLUTION INTRAVENOUS at 09:50

## 2017-06-02 RX ADMIN — FENTANYL CITRATE 25 MCG: 50 INJECTION, SOLUTION INTRAMUSCULAR; INTRAVENOUS at 13:21

## 2017-06-02 RX ADMIN — FENTANYL CITRATE 25 MCG: 50 INJECTION, SOLUTION INTRAMUSCULAR; INTRAVENOUS at 10:15

## 2017-06-02 RX ADMIN — FENTANYL CITRATE 25 MCG: 50 INJECTION, SOLUTION INTRAMUSCULAR; INTRAVENOUS at 12:49

## 2017-06-02 RX ADMIN — PROPOFOL 100 MG: 10 INJECTION, EMULSION INTRAVENOUS at 09:45

## 2017-06-02 RX ADMIN — FENTANYL CITRATE 50 MCG: 50 INJECTION, SOLUTION INTRAMUSCULAR; INTRAVENOUS at 09:56

## 2017-06-02 RX ADMIN — Medication 10 MG: at 10:24

## 2017-06-02 RX ADMIN — FENTANYL CITRATE 25 MCG: 50 INJECTION, SOLUTION INTRAMUSCULAR; INTRAVENOUS at 13:05

## 2017-06-02 RX ADMIN — FENTANYL CITRATE 25 MCG: 50 INJECTION, SOLUTION INTRAMUSCULAR; INTRAVENOUS at 10:06

## 2017-06-02 RX ADMIN — FENTANYL CITRATE 25 MCG: 50 INJECTION, SOLUTION INTRAMUSCULAR; INTRAVENOUS at 10:46

## 2017-06-02 RX ADMIN — HYDROMORPHONE HYDROCHLORIDE 0.5 MG: 1 INJECTION, SOLUTION INTRAMUSCULAR; INTRAVENOUS; SUBCUTANEOUS at 13:42

## 2017-06-02 NOTE — ANESTHESIA POSTPROCEDURE EVALUATION
Post-Anesthesia Evaluation and Assessment    Patient: Rock Jacobs MRN: [id number]  SSN: [id number]    YOB: 1975  Age: 35 y.o. Sex: female       Cardiovascular Function/Vital Signs  Visit Vitals    /80    Pulse 85    Temp 36.5 °C (97.7 °F)    Resp 13    SpO2 94%       Patient is status post general anesthesia for Procedure(s):  LEFT UPPER EXTREMITY ARTERIO VENOUS GRAFT PLACEMENT, VENOGRAM WITH BRACHIAL VEIN ANGIOPLASTY. Nausea/Vomiting: None    Postoperative hydration reviewed and adequate. Pain:  Pain Scale 1: FLACC (06/02/17 1427)  Pain Intensity 1: 1 (06/02/17 1427)   Managed    Neurological Status:   Neuro (WDL): Within Defined Limits (06/02/17 1410)   At baseline    Mental Status and Level of Consciousness: Alert and oriented     Pulmonary Status:   O2 Device: Room air (06/02/17 1433)   Adequate oxygenation and airway patent    Complications related to anesthesia: None    Post-anesthesia assessment completed.  No concerns    Signed By: Ashleigh Limon CRNA     June 2, 2017

## 2017-06-02 NOTE — ANESTHESIA PREPROCEDURE EVALUATION
Anesthetic History   No history of anesthetic complications            Review of Systems / Medical History  Patient summary reviewed, nursing notes reviewed and pertinent labs reviewed    Pulmonary  Within defined limits                 Neuro/Psych   Within defined limits           Cardiovascular                  Exercise tolerance: >4 METS     GI/Hepatic/Renal           PUD     Endo/Other  Within defined limits           Other Findings              Physical Exam    Airway  Mallampati: II  TM Distance: 4 - 6 cm  Neck ROM: normal range of motion   Mouth opening: Normal     Cardiovascular  Regular rate and rhythm,  S1 and S2 normal,  no murmur, click, rub, or gallop             Dental  No notable dental hx       Pulmonary  Breath sounds clear to auscultation               Abdominal  GI exam deferred       Other Findings            Anesthetic Plan    ASA: 4  Anesthesia type: general          Induction: Intravenous  Anesthetic plan and risks discussed with: Patient

## 2017-06-02 NOTE — H&P (VIEW-ONLY)
[facility]    Chief Complaint   Patient presents with    End Stage Renal Disease       History and Physical    Ms. Diana Baker returns to our office today after undergoing bilateral venograms to evaluate her access options. She has no new complaints. Past Medical History:   Diagnosis Date    Chronic kidney disease     ESRD    Dialysis patient [facility])      Patient Active Problem List   Diagnosis Code    CAP (community acquired pneumonia) J18.9    ESRD on dialysis ([address] N18.6, Z99.2    Sepsis ([address] A41.9    Anemia D64.9    Hyponatremia E87.1    Dehydration E86.0    Prolonged Q-T interval on ECG K00.20    Diastolic dysfunction K59.1    Infected prosthetic vascular graft ([facility] [address] T82. 7XXA    Bacteremia due to Staphylococcus aureus R78.81    C. difficile colitis A04.7    PNA (pneumonia) J18.9    Hypokalemia E87.6    Hypoglycemia E16.2    Constipation K59.00     Past Surgical History:   Procedure Laterality Date    HX CSF SHUNT      HX HYSTERECTOMY      HX OTHER SURGICAL      dialysis shunt left arm; removed    HX TONSILLECTOMY      HX TRANSPLANT  2004    renal transplant    VASCULAR SURGERY PROCEDURE UNLIST       Current Outpatient Prescriptions   Medication Sig Dispense Refill    oxyCODONE-acetaminophen (PERCOCET 7.5) 7.5-325 mg per tablet Take 1 Tab by mouth every six (6) hours as needed for Pain. Max Daily Amount: 4 Tabs. 40 Tab 0    albuterol (PROVENTIL HFA, VENTOLIN HFA, PROAIR HFA) 90 mcg/actuation inhaler Take 2 Puffs by inhalation every six (6) hours as needed for Wheezing.  sevelamer (RENAGEL) 800 mg tablet Take 2,400 mg by mouth three (3) times daily (with meals).  pravastatin (PRAVACHOL) 20 mg tablet Take 20 mg by mouth nightly. Indications: hypercholesterolemia      zolpidem (AMBIEN) 5 mg tablet Take 5 mg by mouth nightly as needed for Sleep. Indications: dialysis days      diphenhydrAMINE (BENADRYL) 25 mg capsule Take 25 mg by mouth See Admin Instructions.  Dialysis pre med  promethazine (PHENERGAN) 25 mg tablet Take 25 mg by mouth See Admin Instructions. Dialysis premed       Allergies   Allergen Reactions    Vancomycin Other (comments)     Felt like her body was burning    Aspirin Nausea and Vomiting     Pt reports aspirin gave her a stomach ulcer.  Vicodin [Hydrocodone-Acetaminophen] Nausea and Vomiting     Social History     Social History    Marital status:      Spouse name: N/A    Number of children: N/A    Years of education: N/A     Occupational History    Not on file. Social History Main Topics    Smoking status: Never Smoker    Smokeless tobacco: Never Used    Alcohol use No    Drug use: No    Sexual activity: Not on file     Other Topics Concern    Not on file     Social History Narrative      Family History   Problem Relation Age of Onset    Hypertension Brother     Diabetes Maternal Grandmother        Review of Systems    Review of Systems   Constitutional: Negative for chills, diaphoresis, fever, malaise/fatigue and weight loss. HENT: Negative for hearing loss and sore throat. Eyes: Negative for blurred vision, photophobia and redness. Respiratory: Negative for cough, hemoptysis, shortness of breath and wheezing. Cardiovascular: Negative for chest pain, palpitations and orthopnea. Gastrointestinal: Negative for abdominal pain, blood in stool, constipation, diarrhea, heartburn, nausea and vomiting. Genitourinary: Negative for dysuria, frequency, hematuria and urgency. Musculoskeletal: Negative for back pain and myalgias. Skin: Negative for itching and rash. Neurological: Negative for dizziness, speech change, focal weakness, weakness and headaches. Endo/Heme/Allergies: Does not bruise/bleed easily. Psychiatric/Behavioral: Negative for depression and suicidal ideas.             Physical Exam:    Visit Vitals    /72    Pulse 82    Resp 18    Ht 5' 5\" (1.651 m)    Wt 197 lb (89.4 kg)    LMP 12/01/2012    BMI 32.78 kg/m2      Physical Examination: General appearance - alert, well appearing, and in no distress  Mental status - alert, oriented to person, place, and time  Eyes - sclera anicteric, left eye normal, right eye normal  Ears - right ear normal, left ear normal  Nose - normal and patent, no erythema, discharge or polyps  Mouth - mucous membranes moist, pharynx normal without lesions  Extremities - Palpable radial pulses bilaterally, no significant edema. No erythema. No wounds. Impression and Plan:    ICD-10-CM ICD-9-CM    1. ESRD on dialysis ([facility] [address] N18.6 585.6     Z99.2 V45.11      Again, Ms. Georgiana Pace and I reviewed her venogram images together so she can better understand her situation. Given the multitude of previous dialysis access procedures, she does not have many access options. On her right side, she does not have named veins in her upper extremity. She only has venous collaterals which drain into her chest.  These are not viable options for venous access and this is why her most recent graft occluded. On the left, she only has a basilic vein that drains into her brachial vein. Her brachial vein is occluded throughout her arm and only reconstitutes above an occluded venous stent where her basilic vein drains into it. At this time, I think her only real option is a left upper arm graft to her basilic vein and then angioplasty of her basilic vein as it enters this brachial vein above her venous stent. There is a stenosis there so the angioplasty will treat this. Ms. Georgiana Pace understands this plan. We will place a graft so it will be able to be used earlier and we can get her catheter out sooner. Also given the amount of scars on her upper arm, tunneling her basilic vein may prove challenging. Follow-up Disposition:  Return in about 2 weeks (around 6/7/2017) for post procedure. The treatment plan was reviewed with the patient in detail.   The patient voiced understanding of this plan and all questions and concerns were addressed. The patient agrees with this plan. We discussed the signs and symptoms that would require earlier attention or intervention. The patient was given educational material related to his/her visit and the patient has voiced understanding of the material.     I appreciate the opportunity to participate in the care of your patient. I will be sure to keep you informed of any subsequent changes in the treatment plan. If you have any questions or concerns, please feel free to contact me. Hunter Gordillo MD    PLEASE NOTE:  This document has been produced using voice recognition software. Unrecognized errors in transcription may be present.

## 2017-06-02 NOTE — OP NOTES
[address] [place] [address]  OPERATIVE REPORT    Name:  Orville Best  MR#:  [id number]  :  1975  Account #:  [de-identified]  Date of Adm:  2017  Date of Surgery:  2017      PREOPERATIVE DIAGNOSIS: End-stage renal disease. POSTOPERATIVE DIAGNOSIS: End-stage renal disease. PROCEDURES PERFORMED  1. Extensive lysis of adhesions and redo dissection of left brachial  artery and left basilic vein. 2. Placement of a 4-7 mm tapered Propaten arteriovenous graft with 4  mm end towards the artery. 3. Venogram with balloon angioplasty of left basilic vein with 8 mm  balloon. SURGEON: Sd Hurley MD.    ANESTHESIA: General.    ESTIMATED BLOOD LOSS: 75 mL. SPECIMENS REMOVED: None    PACKS AND DRAINS: None. IMPLANTS: A 4-7 tapered Propaten graft. COMPLICATIONS: None. CONDITION: To recovery stable. FINDINGS: Good thrill throughout the AV graft after completion with  hemostatic closure, palpable radial pulse. Satisfactory outflow after  venous angioplasty. INDICATIONS FOR PROCEDURE: The patient is a 42-year-old  female with end-stage renal disease who presents to our office after  undergoing multiple dialysis accesses. After another failed access,  decision was made to take the patient to the operating room for left  upper extremity left brachial to basilic vein arteriovenous graft after  undergoing a previous venogram. Informed consent was obtained. DESCRIPTION OF PROCEDURE: On 2016, the patient  presented to the operating room, identified by name and ID bracelet by  myself and entire operative team. The patient was placed on the  operating table in supine position, and appropriate depth of general  anesthesia obtained, the patient intubated without any complications. The patient received preoperative dose of antibiotics and was prepped  and draped in standard sterile fashion. Time-out performed.  At this  point, using a previous incision, we then made an incision overlying the  brachial artery and after extensive dissection and lysis of adhesions,  we were able to dissect out the brachial artery and placed vessel loops  for proximal and distal control. Once this was completed, we then  turned our attention to the venous outflow. We again went through a  previous incision again after an extensive dissection, we were able to  dissect out the basilic vein and place vessel loops for proximal and  distal control. We then tunneled a 4-7 mm Propaten graft with the 7  mm end towards the venous outflow and the 4 mm end towards the  brachial artery. Once this was completed, we heparinized the patient  appropriately and then performed a longitudinal arteriotomy. We then  performed an end-to-side anastomosis between the 4 mm end of the  graft and the brachial artery using CV-6 sutures. We then flushed the  brachial artery through the graft in order to restore flow to the hand. After this was completed, we then made a longitudinal venotomy and  performed an end-to-side anastomosis between the 7 mm end the  graft and the basilic vein. Prior to completion, we then advanced a  [employer] wire up through the vein into the obvious outflow. We then  performed a venogram, which showed that the basilic vein branched  into 2 branches, one branch going continuing up into the chest and one  branch diving into the brachial vein. The brachial vein was occluded  distally where previous brachial vein stent had been placed. There was  stenosis in both these branches. Then using an 8 mm balloon, we then  angioplastied both these stenotic areas in the venous outflow. Once  this was completed, we removed the wire, balloon and sheath and  completed our anastomosis. Prior to completing our anastomosis, we  flushed the graft, as well as the basilic vein for the opening. We then  instilled heparinized saline.  We then completed our anastomosis and  then after completion, we had a palpable thrill throughout the graft, a  palpable radial pulse and hemostatic closure. We then irrigated both  incisions and closed the incisions with interrupted Vicryl, followed by a  running 4-0 Monocryl for the skin. I then wrapped the arm in gentle  compression with a 4 x 4. At the end of the procedure all counts were correct x2. I was  present and scrubbed throughout the entire procedure.         MD Irvin Arana  D:  06/02/2017   14:01  T:  06/02/2017   14:58  Job #:  [id number]

## 2017-06-02 NOTE — PERIOP NOTES
TRANSFER - OUT REPORT:    Verbal report given to Fuentes Sinha RN on Shara Bradley  being transferred to Phase II for routine progression of care       Report consisted of patients Situation, Background, Assessment and   Recommendations(SBAR). Information from the following report(s) SBAR, OR Summary, Procedure Summary, Intake/Output, MAR, Recent Results and Med Rec Status was reviewed with the receiving nurse. Lines:   Peripheral IV 06/02/17 Right;Upper Arm (Active)   Site Assessment Clean, dry, & intact 6/2/2017  2:10 PM   Phlebitis Assessment 0 6/2/2017  2:10 PM   Infiltration Assessment 0 6/2/2017  2:10 PM   Dressing Status Clean, dry, & intact 6/2/2017  2:10 PM   Dressing Type Tape;Transparent 6/2/2017  2:10 PM   Hub Color/Line Status Pink; Infusing;Patent 6/2/2017  2:10 PM   Alcohol Cap Used No 6/2/2017  2:10 PM        Opportunity for questions and clarification was provided.       Patient transported with:   [employer]

## 2017-06-02 NOTE — PERIOP NOTES
TRANSFER - IN REPORT:    Verbal report received from BIPIN Dunn on Darvin Stain  being received from OR for routine post - op      Report consisted of patients Situation, Background, Assessment and   Recommendations(SBAR). Information from the following report(s) SBAR, OR Summary, Procedure Summary, Intake/Output, MAR, Recent Results and Med Rec Status was reviewed with the receiving nurse. Opportunity for questions and clarification was provided. Assessment completed upon patients arrival to unit and care assumed.

## 2017-06-02 NOTE — DISCHARGE INSTRUCTIONS
DISCHARGE SUMMARY from Nurse    The following personal items are in your possession at time of discharge:    Dental Appliances: None  Visual Aid: Magnifying glass (locker)     Home Medications: None  Jewelry: Ring (hosp safe)  Clothing: Pants, Shirt, Undergarments, Footwear, Socks, Jacket/Coat (locker 12)  Other Valuables: Eyeglasses (otc READERS IN LOCKER)  Personal Items Sent to Safe: 1 ring, cell, keys ID #[id number]          PATIENT INSTRUCTIONS:    After general anesthesia or intravenous sedation, for 24 hours or while taking prescription Narcotics:  · Limit your activities  · Do not drive and operate hazardous machinery  · Do not make important personal or business decisions  · Do  not drink alcoholic beverages  · If you have not urinated within 8 hours after discharge, please contact your surgeon on call. Report the following to your surgeon:  · Excessive pain, swelling, redness or odor of or around the surgical area  · Temperature over 100.5  · Nausea and vomiting lasting longer than 4 hours or if unable to take medications  · Any signs of decreased circulation or nerve impairment to extremity: change in color, persistent  numbness, tingling, coldness or increase pain  · Any questions        What to do at Home:  Regular diet  Elevate left arm  Ice to left arm if desired  Keep dressing clean and dry  Return to office in 1 week call for appt    If you experience any of the following symptoms heavy bleeding, fevers, severe pain, circulation changes, please follow up with dr Moriah Saavedra. *  Please give a list of your current medications to your Primary Care Provider. *  Please update this list whenever your medications are discontinued, doses are      changed, or new medications (including over-the-counter products) are added. *  Please carry medication information at all times in case of emergency situations.           These are general instructions for a healthy lifestyle:    No smoking/ No tobacco products/ Avoid exposure to second hand smoke    Surgeon General's Warning:  Quitting smoking now greatly reduces serious risk to your health. Obesity, smoking, and sedentary lifestyle greatly increases your risk for illness    A healthy diet, regular physical exercise & weight monitoring are important for maintaining a healthy lifestyle    You may be retaining fluid if you have a history of heart failure or if you experience any of the following symptoms:  Weight gain of 3 pounds or more overnight or 5 pounds in a week, increased swelling in our hands or feet or shortness of breath while lying flat in bed. Please call your doctor as soon as you notice any of these symptoms; do not wait until your next office visit. Recognize signs and symptoms of STROKE:    F-face looks uneven    A-arms unable to move or move unevenly    S-speech slurred or non-existent    T-time-call 911 as soon as signs and symptoms begin-DO NOT go       Back to bed or wait to see if you get better-TIME IS BRAIN. Warning Signs of HEART ATTACK     Call 911 if you have these symptoms:   Chest discomfort. Most heart attacks involve discomfort in the center of the chest that lasts more than a few minutes, or that goes away and comes back. It can feel like uncomfortable pressure, squeezing, fullness, or pain.  Discomfort in other areas of the upper body. Symptoms can include pain or discomfort in one or both arms, the back, neck, jaw, or stomach.  Shortness of breath with or without chest discomfort.  Other signs may include breaking out in a cold sweat, nausea, or lightheadedness. Don't wait more than five minutes to call 911 - MINUTES MATTER! Fast action can save your life. Calling 911 is almost always the fastest way to get lifesaving treatment. Emergency Medical Services staff can begin treatment when they arrive -- up to an hour sooner than if someone gets to the hospital by car.        The discharge information has been reviewed with the patient and caregiver. The patient and caregiver verbalized understanding. Discharge medications reviewed with the patient and caregiver and appropriate educational materials and side effects teaching were provided. You can remove ACE wrap tomorrow  Hemodialysis Access: What to Expect at [address] [place] [address]  Hemodialysis is a way to remove wastes from the blood when your kidneys can no longer do the job. It is not a cure, but it can help you live longer and feel better. It is a lifesaving treatment when you have kidney failure. Hemodialysis is often called dialysis. Your doctor created a place (called an access) in your arm for your blood to flow in and out of your body during your dialysis sessions. Your arm will probably be bruised and swollen. It may hurt. The cut (incision) may bleed. The pain and bleeding will get better over several days. You will probably need only over-the-counter pain medicine. You can reduce swelling by propping your arm on 1 or 2 pillows and keeping your elbow straight. You will have stitches. These may dissolve on their own, or your doctor will tell you when to come in to have them removed. You should also be able to return to work in a few days. You may feel some coolness or numbness in your hand. These feelings usually go away in a few weeks. Your doctor may suggest squeezing a soft object. This will strengthen your access and may make hemodialysis faster and easier. You should always be able to feel blood rushing through the fistula or graft. It feels like a slight vibration when you put your fingers on the skin over the fistula or graft. This feeling is called a thrill or pulse. This care sheet gives you a general idea about how long it will take for you to recover. But each person recovers at a different pace. Follow the steps below to get better as quickly as possible. How can you care for yourself at home? Activity  · Rest when you feel tired. Getting enough sleep will help you recover. Do not lie on or sleep on the arm with the access. · Avoid activities such as washing windows or gardening that put stress on the arm with the access. · You may use your arm, but do not lift anything that weighs more than about 15 pounds. This may include a child, heavy grocery bags, a heavy briefcase or backpack, cat litter or dog food bags, or a vacuum . · You can shower, but keep the access dry for the first 2 days. Cover the area with a plastic bag to keep it dry. · Do not soak or scrub the incision until it has healed. · Wear an arm guard to protect the area if you play sports or work with your arms. · You may drive when your doctor says it is okay. This is usually in 1 to 2 days. · Most people are able to return to work about 1 or 2 days after surgery. Diet  · Follow an eating plan that is good for your kidneys. A registered dietitian can help you make a meal plan that is right for you. You may need to limit protein, salt, fluids, and certain foods. Medicines  · Your doctor will tell you if and when you can restart your medicines. He or she will also give you instructions about taking any new medicines. · If you take blood thinners, such as warfarin (Coumadin), clopidogrel (Plavix), or aspirin, be sure to talk to your doctor. He or she will tell you if and when to start taking those medicines again. Make sure that you understand exactly what your doctor wants you to do. · Take pain medicines exactly as directed. ¨ If the doctor gave you a prescription medicine for pain, take it as prescribed. ¨ If you are not taking a prescription pain medicine, ask your doctor if you can take acetaminophen (Tylenol). Do not take ibuprofen (Advil, Motrin) or naproxen (Aleve), or similar medicines, unless your doctor tells you to. They may make chronic kidney disease worse. ¨ Do not take two or more pain medicines at the same time unless the doctor told you to. Many pain medicines have acetaminophen, which is Tylenol. Too much acetaminophen (Tylenol) can be harmful. · If you think your pain medicine is making you sick to your stomach:  ¨ Take your medicine after meals (unless your doctor has told you not to). ¨ Ask your doctor for a different pain medicine. · If your doctor prescribed antibiotics, take them as directed. Do not stop taking them just because you feel better. You need to take the full course of antibiotics. Incision care  · Keep the area dry for 2 days. After 2 days, wash the area with soap and water every day, and always before dialysis. · Do not soak or scrub the incision until it has healed. · If you have a bandage, change it every day or as your doctor recommends. Your doctor will tell you when you can remove it. Exercise  · Squeeze a soft ball or other object as your doctor tells you. This will help blood flow through the access and help prevent blood clots. Elevation  · Prop up the sore arm on a pillow anytime you sit or lie down during the next 3 days. Try to keep it above the level of your heart. This will help reduce swelling. Other instructions  · Every day, check your access for a pulse or thrill in the fistula or graft area. A thrill is a vibration. To feel a pulse or thrill, place the first two fingers of your hand over the access. · Do not bump your arm. · Do not wear tight clothing, jewelry, or anything else that may squeeze the access. · Use your other arm to have blood drawn or blood pressure taken. · Do not put cream or lotion on or near the access. · Make sure all doctors you deal with know you have a vascular access. Follow-up care is a key part of your treatment and safety. Be sure to make and go to all appointments, and call your doctor if you are having problems. It's also a good idea to know your test results and keep a list of the medicines you take. When should you call for help?   Call 911 anytime you think you may need emergency care. For example, call if:  · You passed out (lost consciousness). · You have severe trouble breathing. · You have sudden chest pain and shortness of breath, or you cough up blood. · You have a rapid pulse or heartbeat. Call your doctor now or seek immediate medical care if:  · Your hand or arm is cold or dark-colored. · You have sudden bulging around your access. · You have no pulse or thrill in your access, or you hear no sound of blood in your access. · Bleeding after dialysis lasts longer than usual.  · You have severe pain that does not get better after you take pain medicine. · You have a fever over 100°F.  · You have loose stitches, or your incision comes open. · You are bleeding from the incision more than you had been. · You have signs of infection, such as:  ¨ Increased pain, swelling, warmth, or redness. ¨ Red streaks leading from the incision. ¨ Pus draining from the incision. ¨ Swollen lymph nodes in your neck, armpits, or groin. ¨ A fever. Watch closely for changes in your health, and be sure to contact your doctor if you have any problems. Where can you learn more? Go to http://jaquan-treva.info/. Enter P616 in the search box to learn more about \"Hemodialysis Access: What to Expect at Home. \"  Current as of: November 16, 2016  Content Version: 11.2  © 2870-0746 [employer]. Care instructions adapted under license by [employer] (which disclaims liability or warranty for this information). If you have questions about a medical condition or this instruction, always ask your healthcare professional. [place] [address] any warranty or liability for your use of this information.     Patient armband removed and shredded

## 2017-06-02 NOTE — INTERVAL H&P NOTE
H&P Update:  Bear Ford was seen and examined. History and physical has been reviewed. The patient has been examined.  There have been no significant clinical changes since the completion of the originally dated History and Physical.    Signed By: Manoj Carreno MD     June 2, 2017 7:13 AM

## 2017-06-02 NOTE — BRIEF OP NOTE
BRIEF OPERATIVE NOTE    Date of Procedure: 6/2/2017   Preoperative Diagnosis: END STAGE RENAL DISEASE  Postoperative Diagnosis: END STAGE RENAL DISEASE    Procedure(s):  LEFT UPPER EXTREMITY ARTERIO VENOUS GRAFT PLACEMENT, VENOGRAM WITH BRACHIAL VEIN ANGIOPLASTY  Surgeon(s) and Role:     * Heri Alva MD - Primary         Assistant Staff:       Surgical Staff:  Circ-1: Alexandr Mckee  Radiology Technician: Carrington Barry  Scrub Tech-1: Denisa Estrada  Scrub Tech-Relief: Buzzy Galeazzi  Surg Asst-1: Esau Vang  Float Staff: Beatris Dominguez; Elisa oRss RN  Event Time In   Incision Start 1014   Incision Close      Anesthesia: General   Estimated Blood Loss: 75mL  Specimens: * No specimens in log *   Findings: Good thrill and good venous outflow after graft placement and angioplasty. + radial pulse   Complications: None  Implants:   Implant Name Type Inv.  Item Serial No.  Lot No. LRB No. Used Action   GRAFT EPTFE-HEPRN TAPR [id number] -- [employer] - [id number]   GRAFT EPTFE-HEPRN TAPR 4-7X10 -- [employer] [id number]  [employer]   Left 1 Implanted

## 2017-06-02 NOTE — IP AVS SNAPSHOT
Saadia Jett 
 
 
 [address] [facility] [address] Patient: Juan Roland MRN: [id number] You are allergic to the following Allergen Reactions Vancomycin Other (comments) Felt like her body was burning Aspirin Nausea and Vomiting Pt reports aspirin gave her a stomach ulcer. Vicodin (Hydrocodone-Acetaminophen) Nausea and Vomiting Recent Documentation OB Status Smoking Status Hysterectomy Never Smoker Emergency Contacts Name Discharge Info Relation Home Work Mobile [address] [facility] CAREGIVER [3] Mother [14]   [phone] Marlys CAREGIVER [3] Daughter [21] [phone] About your hospitalization You were admitted on:  June 2, 2017 You last received care in the:  [facility] PHASE 2 RECOVERY You were discharged on:  June 2, 2017 Unit phone number:    
  
Why you were hospitalized Your primary diagnosis was:  Not on File Providers Seen During Your Hospitalizations Provider Role Specialty Primary office phone Amy Alvarado MD Attending Provider Vascular Surgery [phone] Your Primary Care Physician (PCP) Primary Care Physician Office Phone Office Fax Sandy Hawally [phone] Follow-up Information Follow up With Details Comments Contact Info Mone Fierro MD   [address] [place] [address] [address] [facility] [address] 
[phone] Your Appointments Wednesday June 14, 2017  1:00 PM EDT Follow Up with Amy Alvarado MD  
BS Vein/Vascular Spec [facility] (Author [facility])  
 [address] [facility] [address]  
[phone] Current Discharge Medication List  
  
START taking these medications Dose & Instructions Dispensing Information Comments Morning Noon Evening Bedtime  
 oxyCODONE-acetaminophen 7.5-325 mg per tablet Commonly known as:  PERCOCET 7.5 Your last dose was: Your next dose is:    
   
   
 Dose:  1 Tab Take 1 Tab by mouth every six (6) hours as needed for Pain. Max Daily Amount: 4 Tabs. Quantity:  40 Tab Refills:  0 CONTINUE these medications which have NOT CHANGED Dose & Instructions Dispensing Information Comments Morning Noon Evening Bedtime  
 albuterol 90 mcg/actuation inhaler Commonly known as:  PROVENTIL HFA, VENTOLIN HFA, PROAIR HFA Your last dose was: Your next dose is:    
   
   
 Dose:  2 Puff Take 2 Puffs by inhalation every six (6) hours as needed for Wheezing. Refills:  0 AMBIEN 5 mg tablet Generic drug:  zolpidem Your last dose was: Your next dose is:    
   
   
 Dose:  5 mg Take 5 mg by mouth nightly as needed for Sleep. Indications: dialysis days Refills:  0  
     
   
   
   
  
 BENADRYL 25 mg capsule Generic drug:  diphenhydrAMINE Your last dose was: Your next dose is:    
   
   
 Dose:  25 mg Take 25 mg by mouth every four (4) hours as needed. Dialysis pre med Refills:  0  
     
   
   
   
  
 pravastatin 20 mg tablet Commonly known as:  PRAVACHOL Your last dose was: Your next dose is:    
   
   
 Dose:  20 mg Take 20 mg by mouth nightly. Indications: hypercholesterolemia Refills:  0  
     
   
   
   
  
 promethazine 25 mg tablet Commonly known as:  PHENERGAN Your last dose was: Your next dose is:    
   
   
 Dose:  25 mg Take 25 mg by mouth every six (6) hours as needed. Dialysis premed Refills:  0  
     
   
   
   
  
 sevelamer 800 mg tablet Commonly known as:  RENAGEL Your last dose was: Your next dose is:    
   
   
 Dose:  2400 mg Take 2,400 mg by mouth three (3) times daily (with meals). Refills:  0 Where to Get Your Medications Information on where to get these meds will be given to you by the nurse or doctor. ! Ask your nurse or doctor about these medications  
  oxyCODONE-acetaminophen 7.5-325 mg per tablet Discharge Instructions DISCHARGE SUMMARY from Nurse The following personal items are in your possession at time of discharge: 
 
Dental Appliances: None Visual Aid: Magnifying glass (locker) Home Medications: None Jewelry: Ring (hosp safe) Clothing: Pants, Shirt, Undergarments, Footwear, Socks, Jacket/Coat (locker 12) Other Valuables: Eyeglasses (otc READERS IN LOCKER) Personal Items Sent to Safe: 1 ring, cell, keys ID #[id number] PATIENT INSTRUCTIONS: 
 
After general anesthesia or intravenous sedation, for 24 hours or while taking prescription Narcotics: · Limit your activities · Do not drive and operate hazardous machinery · Do not make important personal or business decisions · Do  not drink alcoholic beverages · If you have not urinated within 8 hours after discharge, please contact your surgeon on call. Report the following to your surgeon: 
· Excessive pain, swelling, redness or odor of or around the surgical area · Temperature over 100.5 · Nausea and vomiting lasting longer than 4 hours or if unable to take medications · Any signs of decreased circulation or nerve impairment to extremity: change in color, persistent  numbness, tingling, coldness or increase pain · Any questions What to do at Home: 
Regular diet Elevate left arm Ice to left arm if desired Keep dressing clean and dry Return to office in 1 week call for appt If you experience any of the following symptoms heavy bleeding, fevers, severe pain, circulation changes, please follow up with dr Valerie Quiñonez. *  Please give a list of your current medications to your Primary Care Provider.  
 
*  Please update this list whenever your medications are discontinued, doses are 
 changed, or new medications (including over-the-counter products) are added. *  Please carry medication information at all times in case of emergency situations. These are general instructions for a healthy lifestyle: No smoking/ No tobacco products/ Avoid exposure to second hand smoke Surgeon General's Warning:  Quitting smoking now greatly reduces serious risk to your health. Obesity, smoking, and sedentary lifestyle greatly increases your risk for illness A healthy diet, regular physical exercise & weight monitoring are important for maintaining a healthy lifestyle You may be retaining fluid if you have a history of heart failure or if you experience any of the following symptoms:  Weight gain of 3 pounds or more overnight or 5 pounds in a week, increased swelling in our hands or feet or shortness of breath while lying flat in bed. Please call your doctor as soon as you notice any of these symptoms; do not wait until your next office visit. Recognize signs and symptoms of STROKE: 
 
F-face looks uneven A-arms unable to move or move unevenly S-speech slurred or non-existent T-time-call 911 as soon as signs and symptoms begin-DO NOT go Back to bed or wait to see if you get better-TIME IS BRAIN. Warning Signs of HEART ATTACK Call 911 if you have these symptoms: 
? Chest discomfort. Most heart attacks involve discomfort in the center of the chest that lasts more than a few minutes, or that goes away and comes back. It can feel like uncomfortable pressure, squeezing, fullness, or pain. ? Discomfort in other areas of the upper body. Symptoms can include pain or discomfort in one or both arms, the back, neck, jaw, or stomach. ? Shortness of breath with or without chest discomfort. ? Other signs may include breaking out in a cold sweat, nausea, or lightheadedness. Don't wait more than five minutes to call [address]!  Fast action can save your life. Calling 911 is almost always the fastest way to get lifesaving treatment. Emergency Medical Services staff can begin treatment when they arrive  up to an hour sooner than if someone gets to the hospital by car. The discharge information has been reviewed with the patient and caregiver. The patient and caregiver verbalized understanding. Discharge medications reviewed with the patient and caregiver and appropriate educational materials and side effects teaching were provided. You can remove ACE wrap tomorrow Hemodialysis Access: What to Expect at [facility] Your Recovery Hemodialysis is a way to remove wastes from the blood when your kidneys can no longer do the job. It is not a cure, but it can help you live longer and feel better. It is a lifesaving treatment when you have kidney failure. Hemodialysis is often called dialysis. Your doctor created a place (called an access) in your arm for your blood to flow in and out of your body during your dialysis sessions. Your arm will probably be bruised and swollen. It may hurt. The cut (incision) may bleed. The pain and bleeding will get better over several days. You will probably need only over-the-counter pain medicine. You can reduce swelling by propping your arm on 1 or 2 pillows and keeping your elbow straight. You will have stitches. These may dissolve on their own, or your doctor will tell you when to come in to have them removed. You should also be able to return to work in a few days. You may feel some coolness or numbness in your hand. These feelings usually go away in a few weeks. Your doctor may suggest squeezing a soft object. This will strengthen your access and may make hemodialysis faster and easier. You should always be able to feel blood rushing through the fistula or graft.  It feels like a slight vibration when you put your fingers on the skin over the fistula or graft. This feeling is called a thrill or pulse. This care sheet gives you a general idea about how long it will take for you to recover. But each person recovers at a different pace. Follow the steps below to get better as quickly as possible. How can you care for yourself at home? Activity · Rest when you feel tired. Getting enough sleep will help you recover. Do not lie on or sleep on the arm with the access. · Avoid activities such as washing windows or gardening that put stress on the arm with the access. · You may use your arm, but do not lift anything that weighs more than about 15 pounds. This may include a child, heavy grocery bags, a heavy briefcase or backpack, cat litter or dog food bags, or a vacuum . · You can shower, but keep the access dry for the first 2 days. Cover the area with a plastic bag to keep it dry. · Do not soak or scrub the incision until it has healed. · Wear an arm guard to protect the area if you play sports or work with your arms. · You may drive when your doctor says it is okay. This is usually in 1 to 2 days. · Most people are able to return to work about 1 or 2 days after surgery. Diet · Follow an eating plan that is good for your kidneys. A registered dietitian can help you make a meal plan that is right for you. You may need to limit protein, salt, fluids, and certain foods. Medicines · Your doctor will tell you if and when you can restart your medicines. He or she will also give you instructions about taking any new medicines. · If you take blood thinners, such as warfarin (Coumadin), clopidogrel (Plavix), or aspirin, be sure to talk to your doctor. He or she will tell you if and when to start taking those medicines again. Make sure that you understand exactly what your doctor wants you to do. · Take pain medicines exactly as directed. ¨ If the doctor gave you a prescription medicine for pain, take it as prescribed. ¨ If you are not taking a prescription pain medicine, ask your doctor if you can take acetaminophen (Tylenol). Do not take ibuprofen (Advil, Motrin) or naproxen (Aleve), or similar medicines, unless your doctor tells you to. They may make chronic kidney disease worse. ¨ Do not take two or more pain medicines at the same time unless the doctor told you to. Many pain medicines have acetaminophen, which is Tylenol. Too much acetaminophen (Tylenol) can be harmful. · If you think your pain medicine is making you sick to your stomach: 
¨ Take your medicine after meals (unless your doctor has told you not to). ¨ Ask your doctor for a different pain medicine. · If your doctor prescribed antibiotics, take them as directed. Do not stop taking them just because you feel better. You need to take the full course of antibiotics. Incision care · Keep the area dry for 2 days. After 2 days, wash the area with soap and water every day, and always before dialysis. · Do not soak or scrub the incision until it has healed. · If you have a bandage, change it every day or as your doctor recommends. Your doctor will tell you when you can remove it. Exercise · Squeeze a soft ball or other object as your doctor tells you. This will help blood flow through the access and help prevent blood clots. Elevation · Prop up the sore arm on a pillow anytime you sit or lie down during the next 3 days. Try to keep it above the level of your heart. This will help reduce swelling. Other instructions · Every day, check your access for a pulse or thrill in the fistula or graft area. A thrill is a vibration. To feel a pulse or thrill, place the first two fingers of your hand over the access. · Do not bump your arm. · Do not wear tight clothing, jewelry, or anything else that may squeeze the access. · Use your other arm to have blood drawn or blood pressure taken. · Do not put cream or lotion on or near the access. · Make sure all doctors you deal with know you have a vascular access. Follow-up care is a key part of your treatment and safety. Be sure to make and go to all appointments, and call your doctor if you are having problems. It's also a good idea to know your test results and keep a list of the medicines you take. When should you call for help? Call 911 anytime you think you may need emergency care. For example, call if: 
· You passed out (lost consciousness). · You have severe trouble breathing. · You have sudden chest pain and shortness of breath, or you cough up blood. · You have a rapid pulse or heartbeat. Call your doctor now or seek immediate medical care if: 
· Your hand or arm is cold or dark-colored. · You have sudden bulging around your access. · You have no pulse or thrill in your access, or you hear no sound of blood in your access. · Bleeding after dialysis lasts longer than usual. 
· You have severe pain that does not get better after you take pain medicine. · You have a fever over 100°F. 
· You have loose stitches, or your incision comes open. · You are bleeding from the incision more than you had been. · You have signs of infection, such as: 
¨ Increased pain, swelling, warmth, or redness. ¨ Red streaks leading from the incision. ¨ Pus draining from the incision. ¨ Swollen lymph nodes in your neck, armpits, or groin. ¨ A fever. Watch closely for changes in your health, and be sure to contact your doctor if you have any problems. Where can you learn more? Go to http://jaquan-treva.info/. Enter P616 in the search box to learn more about \"Hemodialysis Access: What to Expect at Home. \" Current as of: November 16, 2016 Content Version: 11.2 © 2422-1987 [employer]. Care instructions adapted under license by [employer] (which disclaims liability or warranty for this information).  If you have questions about a medical condition or this instruction, always ask your healthcare professional. [place] [address] any warranty or liability for your use of this information. Patient armband removed and shredded Discharge Orders None Introducing [facility]! Semaj Ryan introduces [employer] patient portal. Now you can access parts of your medical record, email your doctor's office, and request medication refills online. 1. In your internet browser, go to [email or web address] 2. Click on the First Time User? Click Here link in the Sign In box. You will see the New Member Sign Up page. 3. Enter your [employer] Access Code exactly as it appears below. You will not need to use this code after youve completed the sign-up process. If you do not sign up before the expiration date, you must request a new code. · [employer] Access Code: [email or web address] Expires: 8/22/2017  1:07 PM 
 
4. Enter the last four digits of your Social Security Number (xxxx) and Date of Birth (mm/dd/yyyy) as indicated and click Submit. You will be taken to the next sign-up page. 5. Create a [employer] ID. This will be your [employer] login ID and cannot be changed, so think of one that is secure and easy to remember. 6. Create a [employer] password. You can change your password at any time. 7. Enter your Password Reset Question and Answer. This can be used at a later time if you forget your password. 8. Enter your e-mail address. You will receive e-mail notification when new information is available in [address] 9. Click Sign Up. You can now view and download portions of your medical record. 10. Click the Download Summary menu link to download a portable copy of your medical information. If you have questions, please visit the Frequently Asked Questions section of the [employer] website. Remember, [employer] is NOT to be used for urgent needs. For medical emergencies, dial 911. Now available from your iPhone and Android! General Information Please provide this summary of care documentation to your next provider. Patient Signature:  ____________________________________________________________ Date:  ____________________________________________________________  
  
Byron Mo Provider Signature:  ____________________________________________________________ Date:  ____________________________________________________________

## 2017-06-14 ENCOUNTER — OFFICE VISIT (OUTPATIENT)
Dept: VASCULAR SURGERY | Age: 42
End: 2017-06-14

## 2017-06-14 VITALS
HEIGHT: 65 IN | DIASTOLIC BLOOD PRESSURE: 86 MMHG | WEIGHT: 196 LBS | SYSTOLIC BLOOD PRESSURE: 124 MMHG | RESPIRATION RATE: 18 BRPM | HEART RATE: 66 BPM | BODY MASS INDEX: 32.65 KG/M2

## 2017-06-14 DIAGNOSIS — N18.6 ESRD ON DIALYSIS (HCC): Primary | ICD-10-CM

## 2017-06-14 DIAGNOSIS — Z99.2 ESRD ON DIALYSIS (HCC): Primary | ICD-10-CM

## 2017-06-14 RX ORDER — OXYCODONE AND ACETAMINOPHEN 7.5; 325 MG/1; MG/1
1 TABLET ORAL
Qty: 40 TAB | Refills: 0 | Status: SHIPPED | OUTPATIENT
Start: 2017-06-14 | End: 2017-06-28

## 2017-06-14 NOTE — PROGRESS NOTES
Sunny Tang is a 38 y.o. female    Chief Complaint   Patient presents with    Post OP Follow Up     patient states she is still experiencing pain along the upper portion of her left arm. 1. Have you been to the ER, urgent care clinic or hospitalized since your last visit? NO.     2. Have you seen or consulted any other health care providers outside of the [address] [place] [address] since your last visit (Include any pap smears or colon screening)?  NO  Learning Assessment 5/17/2017   PRIMARY LEARNER Patient   HIGHEST LEVEL OF EDUCATION - PRIMARY LEARNER  GRADUATED HIGH SCHOOL OR GED   BARRIERS PRIMARY LEARNER NONE   CO-LEARNER CAREGIVER No   PRIMARY LANGUAGE ENGLISH   LEARNER PREFERENCE PRIMARY LISTENING   ANSWERED BY patient   RELATIONSHIP SELF

## 2017-06-15 NOTE — PROGRESS NOTES
Rowan Rodriguez    Chief Complaint   Patient presents with    Post OP Follow Up     patient states she is still experiencing pain along the upper portion of her left arm. History and Physical    MsLaura Singer returns to our office for follow up after undergoing a left upper arm AV graft placement. She states her arm is somewhat sore, but she has been listening to her graft and it sounds good. She also states that she has been getting dialysis via her [facility] without issues. Past Medical History:   Diagnosis Date    Chronic kidney disease     ESRD    [facility] [address] [facility]     PUD (peptic ulcer disease) 2004     Patient Active Problem List   Diagnosis Code    CAP (community acquired pneumonia) J18.9    ESRD on dialysis ([facility] [address] N18.6, Z99.2    Sepsis ([facility] [address] A41.9    Anemia D64.9    Hyponatremia E87.1    Dehydration E86.0    Prolonged Q-T interval on ECG P10.16    Diastolic dysfunction G26.3    Infected prosthetic vascular graft ([facility] [address] T82. 7XXA    Bacteremia due to Staphylococcus aureus R78.81    C. difficile colitis A04.7    PNA (pneumonia) J18.9    Hypokalemia E87.6    Hypoglycemia E16.2    Constipation K59.00     Past Surgical History:   Procedure Laterality Date    HX HYSTERECTOMY      HX OTHER SURGICAL Left     dialysis graft arm; removed    HX TONSILLECTOMY      HX TRANSPLANT  2004    renal transplant    HX VASCULAR ACCESS Right     Dialysis catheter    HX VASCULAR ACCESS Left     AV graft, (\"does not work\")    VASCULAR SURGERY PROCEDURE UNLIST       Current Outpatient Prescriptions   Medication Sig Dispense Refill    oxyCODONE-acetaminophen (PERCOCET 7.5) 7.5-325 mg per tablet Take 1 Tab by mouth every six (6) hours as needed for Pain. Max Daily Amount: 4 Tabs. 40 Tab 0    albuterol (PROVENTIL HFA, VENTOLIN HFA, PROAIR HFA) 90 mcg/actuation inhaler Take 2 Puffs by inhalation every six (6) hours as needed for Wheezing.       sevelamer (RENAGEL) 800 mg tablet Take 2,400 mg by mouth two (2) times a day.  pravastatin (PRAVACHOL) 20 mg tablet Take 20 mg by mouth nightly. Indications: hypercholesterolemia      diphenhydrAMINE (BENADRYL) 25 mg capsule Take 25 mg by mouth every four (4) hours as needed. Dialysis pre med       promethazine (PHENERGAN) 25 mg tablet Take 25 mg by mouth every six (6) hours as needed. Dialysis premed       zolpidem (AMBIEN) 5 mg tablet Take 5 mg by mouth nightly as needed for Sleep. Indications: dialysis days       Allergies   Allergen Reactions    Vancomycin Other (comments)     Felt like her body was burning    Aspirin Nausea and Vomiting     Pt reports aspirin gave her a stomach ulcer.  Vicodin [Hydrocodone-Acetaminophen] Nausea and Vomiting     Social History     Social History    Marital status: UNKNOWN     Spouse name: N/A    Number of children: N/A    Years of education: N/A     Occupational History    Not on file. Social History Main Topics    Smoking status: Never Smoker    Smokeless tobacco: Never Used    Alcohol use No    Drug use: No    Sexual activity: No     Other Topics Concern    Not on file     Social History Narrative      Family History   Problem Relation Age of Onset    Hypertension Brother     Diabetes Maternal Grandmother        Review of Systems    Review of Systems   Constitutional: Negative for chills, diaphoresis, fever, malaise/fatigue and weight loss. HENT: Negative for hearing loss and sore throat. Eyes: Negative for blurred vision, photophobia and redness. Respiratory: Negative for cough, hemoptysis, shortness of breath and wheezing. Cardiovascular: Negative for chest pain, palpitations and orthopnea. Gastrointestinal: Negative for abdominal pain, blood in stool, constipation, diarrhea, heartburn, nausea and vomiting. Genitourinary: Negative for dysuria, frequency, hematuria and urgency. Musculoskeletal: Negative for back pain and myalgias. Skin: Negative for itching and rash.    Neurological: Negative for dizziness, speech change, focal weakness, weakness and headaches. Endo/Heme/Allergies: Does not bruise/bleed easily. Psychiatric/Behavioral: Negative for depression and suicidal ideas. Physical Exam:    Visit Vitals    /86 (BP 1 Location: Right arm, BP Patient Position: Sitting)    Pulse 66    Resp 18    Ht 5' 5\" (1.651 m)    Wt 196 lb (88.9 kg)    LMP 12/01/2012    BMI 32.62 kg/m2      Physical Examination: General appearance - alert, well appearing, and in no distress  Mental status - alert, oriented to person, place, and time  Eyes - sclera anicteric, left eye normal, right eye normal  Ears - right ear normal, left ear normal  Nose - normal and patent, no erythema, discharge or polyps  Mouth - mucous membranes moist, pharynx normal without lesions  Neck - supple, no significant adenopathy  Lymphatics - no palpable lymphadenopathy  Chest - clear to auscultation, no wheezes, rales or rhonchi, symmetric air entry  Heart - normal rate and regular rhythm  Abdomen - soft, nontender, nondistended, no masses or organomegaly  Extremities - Left upper extremity incisions well healed. Graft with palpable thrill, somewhat less than expected. No edema      Impression and Plan:    ICD-10-CM ICD-9-CM    1. ESRD on dialysis ([facility]) N18.6 585.6 [facility]    Z99.2 V45.11      Orders Placed This Encounter    [facility]    oxyCODONE-acetaminophen (PERCOCET 7.5) 7.5-325 mg per tablet     I told Ms. Domingo Singer that given her history of graft problems, although her graft is patent, I want to perform a shuntogram to make sure everything looks good prior to use to avoid thrombosis. She understands this plan. We will perform the shuntogram next week. Follow-up Disposition:  Return in about 2 weeks (around 6/28/2017) for post procedure. The treatment plan was reviewed with the patient in detail.   The patient voiced understanding of this plan and all questions and concerns were addressed. The patient agrees with this plan. We discussed the signs and symptoms that would require earlier attention or intervention. The patient was given educational material related to his/her visit and the patient has voiced understanding of the material.     I appreciate the opportunity to participate in the care of your patient. I will be sure to keep you informed of any subsequent changes in the treatment plan. If you have any questions or concerns, please feel free to contact me. Timothy Rust MD    PLEASE NOTE:  This document has been produced using voice recognition software. Unrecognized errors in transcription may be present.

## 2017-06-16 NOTE — PROGRESS NOTES
PT aware of NPO status. PT aware of need to hold anticoagulants per protocol. PT aware of potential for sedation administration and need for  at discharge. PT aware of arrival time pre procedure, 1130. Pt states no questions at this time.

## 2017-06-21 ENCOUNTER — HOSPITAL ENCOUNTER (INPATIENT)
Dept: INTERVENTIONAL RADIOLOGY/VASCULAR | Age: 42
LOS: 4 days | Discharge: HOME | DRG: 907 | End: 2017-06-28
Attending: SURGERY | Admitting: SURGERY
Payer: MEDICARE

## 2017-06-21 DIAGNOSIS — Z99.2 ESRD ON DIALYSIS (HCC): ICD-10-CM

## 2017-06-21 DIAGNOSIS — N18.6 ESRD ON DIALYSIS (HCC): ICD-10-CM

## 2017-06-21 LAB
ANION GAP BLD CALC-SCNC: 13 MMOL/L (ref 3–18)
ANION GAP BLD CALC-SCNC: 14 MMOL/L (ref 3–18)
BASOPHILS # BLD AUTO: 0 K/UL (ref 0–0.06)
BASOPHILS # BLD: 0 % (ref 0–2)
BUN SERPL-MCNC: 48 MG/DL (ref 7–18)
BUN SERPL-MCNC: 52 MG/DL (ref 7–18)
BUN/CREAT SERPL: 5 (ref 12–20)
BUN/CREAT SERPL: 5 (ref 12–20)
CALCIUM SERPL-MCNC: 9.4 MG/DL (ref 8.5–10.1)
CALCIUM SERPL-MCNC: 9.9 MG/DL (ref 8.5–10.1)
CHLORIDE SERPL-SCNC: 97 MMOL/L (ref 100–108)
CHLORIDE SERPL-SCNC: 98 MMOL/L (ref 100–108)
CO2 SERPL-SCNC: 24 MMOL/L (ref 21–32)
CO2 SERPL-SCNC: 26 MMOL/L (ref 21–32)
CREAT SERPL-MCNC: 10.2 MG/DL (ref 0.6–1.3)
CREAT SERPL-MCNC: 10.78 MG/DL (ref 0.6–1.3)
DIFFERENTIAL METHOD BLD: ABNORMAL
EOSINOPHIL # BLD: 0.3 K/UL (ref 0–0.4)
EOSINOPHIL NFR BLD: 7 % (ref 0–5)
ERYTHROCYTE [DISTWIDTH] IN BLOOD BY AUTOMATED COUNT: 14.2 % (ref 11.6–14.5)
ERYTHROCYTE [DISTWIDTH] IN BLOOD BY AUTOMATED COUNT: 14.3 % (ref 11.6–14.5)
GLUCOSE SERPL-MCNC: 147 MG/DL (ref 74–99)
GLUCOSE SERPL-MCNC: 79 MG/DL (ref 74–99)
HCT VFR BLD AUTO: 33.4 % (ref 35–45)
HCT VFR BLD AUTO: 37.5 % (ref 35–45)
HGB BLD-MCNC: 11 G/DL (ref 12–16)
HGB BLD-MCNC: 12.5 G/DL (ref 12–16)
INR PPP: 1.1 (ref 0.8–1.2)
LYMPHOCYTES # BLD AUTO: 39 % (ref 21–52)
LYMPHOCYTES # BLD: 1.8 K/UL (ref 0.9–3.6)
MCH RBC QN AUTO: 29.5 PG (ref 24–34)
MCH RBC QN AUTO: 30 PG (ref 24–34)
MCHC RBC AUTO-ENTMCNC: 32.9 G/DL (ref 31–37)
MCHC RBC AUTO-ENTMCNC: 33.3 G/DL (ref 31–37)
MCV RBC AUTO: 89.5 FL (ref 74–97)
MCV RBC AUTO: 90.1 FL (ref 74–97)
MONOCYTES # BLD: 0.4 K/UL (ref 0.05–1.2)
MONOCYTES NFR BLD AUTO: 9 % (ref 3–10)
NEUTS SEG # BLD: 2.2 K/UL (ref 1.8–8)
NEUTS SEG NFR BLD AUTO: 45 % (ref 40–73)
PLATELET # BLD AUTO: 242 K/UL (ref 135–420)
PLATELET # BLD AUTO: 246 K/UL (ref 135–420)
PMV BLD AUTO: 10 FL (ref 9.2–11.8)
PMV BLD AUTO: 10.2 FL (ref 9.2–11.8)
POTASSIUM SERPL-SCNC: 4.6 MMOL/L (ref 3.5–5.5)
POTASSIUM SERPL-SCNC: 5 MMOL/L (ref 3.5–5.5)
PROTHROMBIN TIME: 13.4 SEC (ref 11.5–15.2)
RBC # BLD AUTO: 3.73 M/UL (ref 4.2–5.3)
RBC # BLD AUTO: 4.16 M/UL (ref 4.2–5.3)
SODIUM SERPL-SCNC: 136 MMOL/L (ref 136–145)
SODIUM SERPL-SCNC: 136 MMOL/L (ref 136–145)
WBC # BLD AUTO: 4.8 K/UL (ref 4.6–13.2)
WBC # BLD AUTO: 6.2 K/UL (ref 4.6–13.2)

## 2017-06-21 PROCEDURE — 99153 MOD SED SAME PHYS/QHP EA: CPT

## 2017-06-21 PROCEDURE — 85027 COMPLETE CBC AUTOMATED: CPT | Performed by: SURGERY

## 2017-06-21 PROCEDURE — 85610 PROTHROMBIN TIME: CPT | Performed by: SURGERY

## 2017-06-21 PROCEDURE — 36901 INTRO CATH DIALYSIS CIRCUIT: CPT

## 2017-06-21 PROCEDURE — 85025 COMPLETE CBC W/AUTO DIFF WBC: CPT | Performed by: SURGERY

## 2017-06-21 PROCEDURE — 99218 HC RM OBSERVATION: CPT

## 2017-06-21 PROCEDURE — 74011636320 HC RX REV CODE- 636/320: Performed by: SURGERY

## 2017-06-21 PROCEDURE — 037Y3ZZ DILATION OF UPPER ARTERY, PERCUTANEOUS APPROACH: ICD-10-PCS | Performed by: SURGERY

## 2017-06-21 PROCEDURE — 36902 INTRO CATH DIALYSIS CIRCUIT: CPT

## 2017-06-21 PROCEDURE — 057C3ZZ DILATION OF LEFT BASILIC VEIN, PERCUTANEOUS APPROACH: ICD-10-PCS | Performed by: SURGERY

## 2017-06-21 PROCEDURE — 36415 COLL VENOUS BLD VENIPUNCTURE: CPT | Performed by: SURGERY

## 2017-06-21 PROCEDURE — C1773 RET DEV, INSERTABLE: HCPCS

## 2017-06-21 PROCEDURE — 74011250636 HC RX REV CODE- 250/636

## 2017-06-21 PROCEDURE — 80048 BASIC METABOLIC PNL TOTAL CA: CPT | Performed by: SURGERY

## 2017-06-21 PROCEDURE — 74011000250 HC RX REV CODE- 250: Performed by: SURGERY

## 2017-06-21 PROCEDURE — B51W1ZZ FLUOROSCOPY OF DIALYSIS SHUNT/FISTULA USING LOW OSMOLAR CONTRAST: ICD-10-PCS | Performed by: SURGERY

## 2017-06-21 PROCEDURE — 74011250637 HC RX REV CODE- 250/637: Performed by: SURGERY

## 2017-06-21 PROCEDURE — 05743ZZ DILATION OF LEFT INNOMINATE VEIN, PERCUTANEOUS APPROACH: ICD-10-PCS | Performed by: SURGERY

## 2017-06-21 PROCEDURE — 99152 MOD SED SAME PHYS/QHP 5/>YRS: CPT

## 2017-06-21 PROCEDURE — 74011250636 HC RX REV CODE- 250/636: Performed by: SURGERY

## 2017-06-21 RX ORDER — NALOXONE HYDROCHLORIDE 0.4 MG/ML
0.2 INJECTION, SOLUTION INTRAMUSCULAR; INTRAVENOUS; SUBCUTANEOUS AS NEEDED
Status: DISCONTINUED | OUTPATIENT
Start: 2017-06-21 | End: 2017-06-22 | Stop reason: ALTCHOICE

## 2017-06-21 RX ORDER — MORPHINE SULFATE 2 MG/ML
2 INJECTION, SOLUTION INTRAMUSCULAR; INTRAVENOUS
Status: DISCONTINUED | OUTPATIENT
Start: 2017-06-21 | End: 2017-06-28 | Stop reason: HOSPADM

## 2017-06-21 RX ORDER — PRAVASTATIN SODIUM 20 MG/1
20 TABLET ORAL
Status: DISCONTINUED | OUTPATIENT
Start: 2017-06-21 | End: 2017-06-28 | Stop reason: HOSPADM

## 2017-06-21 RX ORDER — ASPIRIN 81 MG/1
81 TABLET ORAL DAILY
Status: DISCONTINUED | OUTPATIENT
Start: 2017-06-22 | End: 2017-06-23

## 2017-06-21 RX ORDER — ALBUMIN HUMAN 250 G/1000ML
12.5 SOLUTION INTRAVENOUS
Status: DISCONTINUED | OUTPATIENT
Start: 2017-06-21 | End: 2017-06-28 | Stop reason: HOSPADM

## 2017-06-21 RX ORDER — FENTANYL CITRATE 50 UG/ML
25-200 INJECTION, SOLUTION INTRAMUSCULAR; INTRAVENOUS
Status: DISCONTINUED | OUTPATIENT
Start: 2017-06-21 | End: 2017-06-22 | Stop reason: ALTCHOICE

## 2017-06-21 RX ORDER — ALBUTEROL SULFATE 90 UG/1
2 AEROSOL, METERED RESPIRATORY (INHALATION)
Status: DISCONTINUED | OUTPATIENT
Start: 2017-06-21 | End: 2017-06-28 | Stop reason: HOSPADM

## 2017-06-21 RX ORDER — CARVEDILOL 3.12 MG/1
3.12 TABLET ORAL 2 TIMES DAILY WITH MEALS
Status: ON HOLD | COMMUNITY
End: 2017-06-21

## 2017-06-21 RX ORDER — HEPARIN SODIUM 1000 [USP'U]/ML
INJECTION, SOLUTION INTRAVENOUS; SUBCUTANEOUS
Status: COMPLETED
Start: 2017-06-21 | End: 2017-06-21

## 2017-06-21 RX ORDER — HEPARIN SODIUM 200 [USP'U]/100ML
INJECTION, SOLUTION INTRAVENOUS
Status: COMPLETED
Start: 2017-06-21 | End: 2017-06-21

## 2017-06-21 RX ORDER — PROMETHAZINE HYDROCHLORIDE 25 MG/1
25 TABLET ORAL
Status: DISCONTINUED | OUTPATIENT
Start: 2017-06-21 | End: 2017-06-28 | Stop reason: HOSPADM

## 2017-06-21 RX ORDER — ASPIRIN 81 MG/1
81 TABLET ORAL DAILY
Status: ON HOLD | COMMUNITY
End: 2017-06-21

## 2017-06-21 RX ORDER — SODIUM CHLORIDE 9 MG/ML
100 INJECTION, SOLUTION INTRAVENOUS
Status: DISCONTINUED | OUTPATIENT
Start: 2017-06-21 | End: 2017-06-26

## 2017-06-21 RX ORDER — OXYCODONE AND ACETAMINOPHEN 7.5; 325 MG/1; MG/1
1 TABLET ORAL
Status: DISCONTINUED | OUTPATIENT
Start: 2017-06-21 | End: 2017-06-22

## 2017-06-21 RX ORDER — MIDAZOLAM HYDROCHLORIDE 1 MG/ML
1-4 INJECTION, SOLUTION INTRAMUSCULAR; INTRAVENOUS
Status: DISCONTINUED | OUTPATIENT
Start: 2017-06-21 | End: 2017-06-22 | Stop reason: ALTCHOICE

## 2017-06-21 RX ORDER — FLUMAZENIL 0.1 MG/ML
0.2 INJECTION INTRAVENOUS
Status: DISCONTINUED | OUTPATIENT
Start: 2017-06-21 | End: 2017-06-22 | Stop reason: ALTCHOICE

## 2017-06-21 RX ORDER — SODIUM CHLORIDE 9 MG/ML
25 INJECTION, SOLUTION INTRAVENOUS CONTINUOUS
Status: DISCONTINUED | OUTPATIENT
Start: 2017-06-21 | End: 2017-06-23

## 2017-06-21 RX ORDER — HEPARIN SODIUM 200 [USP'U]/100ML
500 INJECTION, SOLUTION INTRAVENOUS
Status: COMPLETED | OUTPATIENT
Start: 2017-06-21 | End: 2017-06-21

## 2017-06-21 RX ORDER — DOCUSATE SODIUM 100 MG/1
100 CAPSULE, LIQUID FILLED ORAL 2 TIMES DAILY
Status: DISCONTINUED | OUTPATIENT
Start: 2017-06-21 | End: 2017-06-28 | Stop reason: HOSPADM

## 2017-06-21 RX ORDER — LISINOPRIL 5 MG/1
5 TABLET ORAL DAILY
Status: DISCONTINUED | OUTPATIENT
Start: 2017-06-22 | End: 2017-06-28 | Stop reason: HOSPADM

## 2017-06-21 RX ORDER — DOCUSATE SODIUM 100 MG/1
100 CAPSULE, LIQUID FILLED ORAL 2 TIMES DAILY
Status: ON HOLD | COMMUNITY
End: 2017-06-21

## 2017-06-21 RX ORDER — CLOPIDOGREL BISULFATE 75 MG/1
75 TABLET ORAL
Status: ON HOLD | COMMUNITY
End: 2017-06-21

## 2017-06-21 RX ORDER — LISINOPRIL 5 MG/1
TABLET ORAL DAILY
Status: ON HOLD | COMMUNITY
End: 2017-06-21

## 2017-06-21 RX ORDER — CARVEDILOL 3.12 MG/1
3.12 TABLET ORAL 2 TIMES DAILY WITH MEALS
Status: DISCONTINUED | OUTPATIENT
Start: 2017-06-21 | End: 2017-06-28 | Stop reason: HOSPADM

## 2017-06-21 RX ORDER — LIDOCAINE HYDROCHLORIDE 10 MG/ML
1-20 INJECTION INFILTRATION; PERINEURAL
Status: COMPLETED | OUTPATIENT
Start: 2017-06-21 | End: 2017-06-21

## 2017-06-21 RX ORDER — CLOPIDOGREL BISULFATE 75 MG/1
75 TABLET ORAL DAILY
Status: DISCONTINUED | OUTPATIENT
Start: 2017-06-22 | End: 2017-06-28 | Stop reason: HOSPADM

## 2017-06-21 RX ORDER — SEVELAMER CARBONATE 800 MG/1
2400 TABLET, FILM COATED ORAL 2 TIMES DAILY
Status: DISCONTINUED | OUTPATIENT
Start: 2017-06-21 | End: 2017-06-28 | Stop reason: HOSPADM

## 2017-06-21 RX ADMIN — LIDOCAINE HYDROCHLORIDE 3 ML: 10 INJECTION, SOLUTION INFILTRATION; PERINEURAL at 14:00

## 2017-06-21 RX ADMIN — FENTANYL CITRATE 25 MCG: 50 INJECTION, SOLUTION INTRAMUSCULAR; INTRAVENOUS at 14:34

## 2017-06-21 RX ADMIN — MIDAZOLAM HYDROCHLORIDE 0.5 MG: 1 INJECTION, SOLUTION INTRAMUSCULAR; INTRAVENOUS at 15:13

## 2017-06-21 RX ADMIN — OXYCODONE HYDROCHLORIDE AND ACETAMINOPHEN 1 TABLET: 7.5; 325 TABLET ORAL at 21:42

## 2017-06-21 RX ADMIN — PRAVASTATIN SODIUM 20 MG: 20 TABLET ORAL at 21:34

## 2017-06-21 RX ADMIN — SODIUM CHLORIDE 25 ML/HR: 900 INJECTION, SOLUTION INTRAVENOUS at 14:00

## 2017-06-21 RX ADMIN — MIDAZOLAM HYDROCHLORIDE 0.5 MG: 1 INJECTION, SOLUTION INTRAMUSCULAR; INTRAVENOUS at 14:30

## 2017-06-21 RX ADMIN — MIDAZOLAM HYDROCHLORIDE 0.5 MG: 1 INJECTION, SOLUTION INTRAMUSCULAR; INTRAVENOUS at 14:22

## 2017-06-21 RX ADMIN — FENTANYL CITRATE 25 MCG: 50 INJECTION, SOLUTION INTRAMUSCULAR; INTRAVENOUS at 15:26

## 2017-06-21 RX ADMIN — Medication 2 MG: at 19:44

## 2017-06-21 RX ADMIN — MIDAZOLAM HYDROCHLORIDE 0.5 MG: 1 INJECTION, SOLUTION INTRAMUSCULAR; INTRAVENOUS at 15:26

## 2017-06-21 RX ADMIN — FENTANYL CITRATE 25 MCG: 50 INJECTION, SOLUTION INTRAMUSCULAR; INTRAVENOUS at 15:15

## 2017-06-21 RX ADMIN — IOPAMIDOL 85 ML: 510 INJECTION, SOLUTION INTRAVASCULAR at 15:52

## 2017-06-21 RX ADMIN — HEPARIN SODIUM 1000 UNITS: 200 INJECTION, SOLUTION INTRAVENOUS at 14:10

## 2017-06-21 RX ADMIN — FENTANYL CITRATE 25 MCG: 50 INJECTION, SOLUTION INTRAMUSCULAR; INTRAVENOUS at 14:58

## 2017-06-21 RX ADMIN — MIDAZOLAM HYDROCHLORIDE 2 MG: 1 INJECTION, SOLUTION INTRAMUSCULAR; INTRAVENOUS at 14:45

## 2017-06-21 RX ADMIN — HEPARIN SODIUM 3000 UNITS: 1000 INJECTION, SOLUTION INTRAVENOUS; SUBCUTANEOUS at 14:15

## 2017-06-21 RX ADMIN — OXYCODONE HYDROCHLORIDE AND ACETAMINOPHEN 1 TABLET: 7.5; 325 TABLET ORAL at 17:14

## 2017-06-21 RX ADMIN — FENTANYL CITRATE 25 MCG: 50 INJECTION, SOLUTION INTRAMUSCULAR; INTRAVENOUS at 15:13

## 2017-06-21 RX ADMIN — FENTANYL CITRATE 25 MCG: 50 INJECTION, SOLUTION INTRAMUSCULAR; INTRAVENOUS at 14:22

## 2017-06-21 RX ADMIN — Medication 2 MG: at 16:27

## 2017-06-21 RX ADMIN — HEPARIN SODIUM 1000 UNITS: 200 INJECTION, SOLUTION INTRAVENOUS at 15:10

## 2017-06-21 RX ADMIN — FENTANYL CITRATE 25 MCG: 50 INJECTION, SOLUTION INTRAMUSCULAR; INTRAVENOUS at 14:30

## 2017-06-21 RX ADMIN — FENTANYL CITRATE 100 MCG: 50 INJECTION, SOLUTION INTRAMUSCULAR; INTRAVENOUS at 14:45

## 2017-06-21 RX ADMIN — FENTANYL CITRATE 25 MCG: 50 INJECTION, SOLUTION INTRAMUSCULAR; INTRAVENOUS at 14:10

## 2017-06-21 RX ADMIN — MIDAZOLAM HYDROCHLORIDE 1 MG: 1 INJECTION, SOLUTION INTRAMUSCULAR; INTRAVENOUS at 14:10

## 2017-06-21 RX ADMIN — MIDAZOLAM HYDROCHLORIDE 1 MG: 1 INJECTION, SOLUTION INTRAMUSCULAR; INTRAVENOUS at 14:58

## 2017-06-21 NOTE — PROGRESS NOTES
Pt transferred to [address] [place] [address]. Medicated for pain x2 while in recovery on care unit. Ice pack applied to left arm site prior to discharge. Plan of care reviewed with pt and family members at bedside prior to transfer.

## 2017-06-21 NOTE — INTERVAL H&P NOTE
H&P Update:  Oziel Bauman was seen and examined. History and physical has been reviewed. The patient has been examined.  There have been no significant clinical changes since the completion of the originally dated History and Physical.    Signed By: Shanthi Stinson MD     June 21, 2017 1:57 PM

## 2017-06-21 NOTE — IP AVS SNAPSHOT
Current Discharge Medication List  
  
START taking these medications Dose & Instructions Dispensing Information Comments Morning Noon Evening Bedtime  
 docusate sodium 100 mg capsule Commonly known as:  Breana Leyva Your last dose was: Your next dose is:    
   
   
 Dose:  100 mg Take 1 Cap by mouth two (2) times a day for 90 days. Quantity:  60 Cap Refills:  2  
     
   
   
   
  
 oxyCODONE-acetaminophen  mg per tablet Commonly known as:  PERCOCET 10 Replaces:  oxyCODONE-acetaminophen 7.5-325 mg per tablet Your last dose was: Your next dose is:    
   
   
 Dose:  1 Tab Take 1 Tab by mouth every six (6) hours as needed. Max Daily Amount: 4 Tabs. Quantity:  40 Tab Refills:  0 CONTINUE these medications which have NOT CHANGED Dose & Instructions Dispensing Information Comments Morning Noon Evening Bedtime  
 albuterol 90 mcg/actuation inhaler Commonly known as:  PROVENTIL HFA, VENTOLIN HFA, PROAIR HFA Your last dose was: Your next dose is:    
   
   
 Dose:  2 Puff Take 2 Puffs by inhalation every six (6) hours as needed for Wheezing. Refills:  0 AMBIEN 5 mg tablet Generic drug:  zolpidem Your last dose was: Your next dose is:    
   
   
 Dose:  5 mg Take 5 mg by mouth nightly as needed for Sleep. Indications: dialysis days Refills:  0  
     
   
   
   
  
 diphenhydrAMINE 50 mg tablet Commonly known as:  BENADRYL Your last dose was: Your next dose is:    
   
   
 Dose:  50 mg Take 50 mg by mouth Q TU, TH & SAT. Indications: predialysis med Refills:  0  
     
   
   
   
  
 pravastatin 20 mg tablet Commonly known as:  PRAVACHOL Your last dose was: Your next dose is:    
   
   
 Dose:  20 mg Take 20 mg by mouth nightly. Indications: hypercholesterolemia Refills:  0 promethazine 25 mg tablet Commonly known as:  PHENERGAN Your last dose was: Your next dose is:    
   
   
 Dose:  25 mg Take 25 mg by mouth every six (6) hours as needed. Dialysis premed Refills:  0  
     
   
   
   
  
 sevelamer 800 mg tablet Commonly known as:  RENAGEL Your last dose was: Your next dose is:    
   
   
 Dose:  2400 mg Take 2,400 mg by mouth two (2) times a day. Refills:  0 STOP taking these medications   
 oxyCODONE-acetaminophen 7.5-325 mg per tablet Commonly known as:  PERCOCET 7.5 Replaced by:  oxyCODONE-acetaminophen  mg per tablet ASK your doctor about these medications Dose & Instructions Dispensing Information Comments Morning Noon Evening Bedtime  
 aspirin delayed-release 81 mg tablet Your last dose was: Your next dose is:    
   
   
 Dose:  81 mg Take 81 mg by mouth daily. Refills:  0 Where to Get Your Medications Information on where to get these meds will be given to you by the nurse or doctor. ! Ask your nurse or doctor about these medications  
  docusate sodium 100 mg capsule  
 oxyCODONE-acetaminophen  mg per tablet

## 2017-06-21 NOTE — PROGRESS NOTES
TRANSFER - OUT REPORT:    Verbal report given to 58Charli Arden Goss RN(name) on Bruna Virgen  being transferred to Care Unit(unit) for routine progression of care       Report consisted of patients Situation, Background, Assessment and   Recommendations(SBAR). Information from the following report(s) SBAR, Kardex and MAR was reviewed with the receiving nurse. Lines:   Peripheral IV 06/21/17 Right Arm (Active)   Site Assessment Clean, dry, & intact 6/21/2017 11:59 AM   Phlebitis Assessment 0 6/21/2017 11:59 AM   Infiltration Assessment 0 6/21/2017 11:59 AM   Dressing Status Clean, dry, & intact 6/21/2017 11:59 AM   Dressing Type Transparent 6/21/2017 11:59 AM   Hub Color/Line Status Blue 6/21/2017 11:59 AM        Opportunity for questions and clarification was provided.       Patient transported with:   Registered Nurse

## 2017-06-21 NOTE — IP AVS SNAPSHOT
[address] [address] 
[phone] Patient: Ann Alvarado MRN: [id number] IQS:[birth year] You are allergic to the following Allergen Reactions Vancomycin Other (comments) Felt like her body was burning Aspirin Nausea and Vomiting Pt reports aspirin gave her a stomach ulcer. Vicodin (Hydrocodone-Acetaminophen) Nausea and Vomiting Recent Documentation Height Weight Breastfeeding? BMI OB Status Smoking Status 1.651 m 93.4 kg No 34.28 kg/m2 Hysterectomy Never Smoker Emergency Contacts Name Discharge Info Relation Home Work Mobile [address] [facility] CAREGIVER [3] Mother [14]   [phone] Marlys CAREGIVER [3] Daughter [21] [phone] About your hospitalization You were admitted on:  June 21, 2017 You last received care in the:  [address] [place] [address] You were discharged on:  June 28, 2017 Unit phone number:  [phone] Why you were hospitalized Your primary diagnosis was:  Not on File Your diagnoses also included:  Esrd (End Stage Renal Disease) (Hcc) Providers Seen During Your Hospitalizations Provider Role Specialty Primary office phone Mario Pathak MD Attending Provider Vascular Surgery [phone] Your Primary Care Physician (PCP) Primary Care Physician Office Phone Office Fax Kera Gibbs [phone] Follow-up Information Follow up With Details Comments Contact Info Mario Pathak MD On 7/12/2017 Follow up appointment scheduled for July 12, 2017 at 9:00 a.m. [address] [place] [address] [address] [facility] 
[phone] Tika Wills MD   [facility] [address] [facility] 150 
[phone] Your Appointments Wednesday July 12, 2017  9:00 AM EDT Follow Up with Mario Pathak MD  
 BS Vein/Vascular Spec [facility] (ISIDRO SCHEDULING)  
 [address] [place] [address],[address] [place]  
[phone] Current Discharge Medication List  
  
START taking these medications Dose & Instructions Dispensing Information Comments Morning Noon Evening Bedtime  
 docusate sodium 100 mg capsule Commonly known as:  Shan Zoroastrianism Your last dose was: Your next dose is:    
   
   
 Dose:  100 mg Take 1 Cap by mouth two (2) times a day for 90 days. Quantity:  60 Cap Refills:  2  
     
   
   
   
  
 oxyCODONE-acetaminophen  mg per tablet Commonly known as:  PERCOCET 10 Replaces:  oxyCODONE-acetaminophen 7.5-325 mg per tablet Your last dose was: Your next dose is:    
   
   
 Dose:  1 Tab Take 1 Tab by mouth every six (6) hours as needed. Max Daily Amount: 4 Tabs. Quantity:  40 Tab Refills:  0 CONTINUE these medications which have NOT CHANGED Dose & Instructions Dispensing Information Comments Morning Noon Evening Bedtime  
 albuterol 90 mcg/actuation inhaler Commonly known as:  PROVENTIL HFA, VENTOLIN HFA, PROAIR HFA Your last dose was: Your next dose is:    
   
   
 Dose:  2 Puff Take 2 Puffs by inhalation every six (6) hours as needed for Wheezing. Refills:  0 AMBIEN 5 mg tablet Generic drug:  zolpidem Your last dose was: Your next dose is:    
   
   
 Dose:  5 mg Take 5 mg by mouth nightly as needed for Sleep. Indications: dialysis days Refills:  0  
     
   
   
   
  
 diphenhydrAMINE 50 mg tablet Commonly known as:  BENADRYL Your last dose was: Your next dose is:    
   
   
 Dose:  50 mg Take 50 mg by mouth Q TU, TH & SAT. Indications: predialysis med Refills:  0  
     
   
   
   
  
 pravastatin 20 mg tablet Commonly known as:  PRAVACHOL Your last dose was: Your next dose is: Dose:  20 mg Take 20 mg by mouth nightly. Indications: hypercholesterolemia Refills:  0  
     
   
   
   
  
 promethazine 25 mg tablet Commonly known as:  PHENERGAN Your last dose was: Your next dose is:    
   
   
 Dose:  25 mg Take 25 mg by mouth every six (6) hours as needed. Dialysis premed Refills:  0  
     
   
   
   
  
 sevelamer 800 mg tablet Commonly known as:  RENAGEL Your last dose was: Your next dose is:    
   
   
 Dose:  2400 mg Take 2,400 mg by mouth two (2) times a day. Refills:  0 STOP taking these medications   
 oxyCODONE-acetaminophen 7.5-325 mg per tablet Commonly known as:  PERCOCET 7.5 Replaced by:  oxyCODONE-acetaminophen  mg per tablet ASK your doctor about these medications Dose & Instructions Dispensing Information Comments Morning Noon Evening Bedtime  
 aspirin delayed-release 81 mg tablet Your last dose was: Your next dose is:    
   
   
 Dose:  81 mg Take 81 mg by mouth daily. Refills:  0 Where to Get Your Medications Information on where to get these meds will be given to you by the nurse or doctor. ! Ask your nurse or doctor about these medications  
  docusate sodium 100 mg capsule  
 oxyCODONE-acetaminophen  mg per tablet Discharge Instructions Your Hemodialysis Access: Care Instructions Your Care Instructions Hemodialysis, or dialysis, is the use of a machine to remove wastes from your blood. You need it if your kidneys are not able to remove wastes on their own. A dialysis access is the place in your arm, or sometimes in your leg, where a doctor creates a blood vessel that carries a large flow of blood. When you have dialysis, two needles are placed in this blood vessel and are connected to the dialysis machine.  Your blood flows out of one needle and into the machine to be cleaned. Then your cleaned blood flows back into your body through the other needle. Sometimes, a doctor makes a short-term access through a tube, called a catheter, placed in your neck, upper chest, or groin. Your doctor creates an access during a minor surgery. You need to take care of your access to keep it working and to prevent infection. Follow-up care is a key part of your treatment and safety. Be sure to make and go to all appointments, and call your doctor if you are having problems. Its also a good idea to know your test results and keep a list of the medicines you take. How can you care for yourself at home? · After your doctor creates an access, keep it dry for at least 2 days. · Squeeze a soft ball or other object as instructed after the access is placed. This will help blood flow through the access and help prevent blood clots. · After you have dialysis, check to see whether the access bleeds or swells. Let your doctor know if your arm bleeds or swells. · Do not lift anything heavy with the arm that has the access. · Do not bump your arm. · Do not wear tight clothing or jewelry over the access. · Do not sleep with your access arm under your body. · Have blood drawn or blood pressure taken from your other arm. · Keep the access clean and dry. · Do not put cream or lotion on or near the access. When should you call for help? Call your doctor now or seek immediate medical care if: 
· You have signs of infection, such as: 
¨ Increased pain, swelling, warmth, or redness around the access. ¨ Red streaks leading from the access. ¨ Pus draining from the access. ¨ Swollen lymph nodes in your neck, armpits, or groin. ¨ A fever. · You do not feel a pulse in your access. Watch closely for changes in your health, and be sure to contact your doctor if: 
· You have pain, swelling, or bleeding.  Some pain or swelling is normal after surgery to create the access. But pain and swelling should get better over time. Where can you learn more? Go to http://jaquan-treva.info/. Enter L169 in the search box to learn more about \"Your Hemodialysis Access: Care Instructions. \" Current as of: April 3, 2017 Content Version: 11.3 © 6652-3728 [employer]. Care instructions adapted under license by [employer] (which disclaims liability or warranty for this information). If you have questions about a medical condition or this instruction, always ask your healthcare professional. [place] [address] any warranty or liability for your use of this information. DISCHARGE SUMMARY from Nurse PATIENT INSTRUCTIONS: 
 
 
F-face looks uneven A-arms unable to move or move unevenly S-speech slurred or non-existent T-time-call 911 as soon as signs and symptoms begin-DO NOT go Back to bed or wait to see if you get better-TIME IS BRAIN. Warning Signs of HEART ATTACK Call 911 if you have these symptoms: 
? Chest discomfort. Most heart attacks involve discomfort in the center of the chest that lasts more than a few minutes, or that goes away and comes back. It can feel like uncomfortable pressure, squeezing, fullness, or pain. ? Discomfort in other areas of the upper body. Symptoms can include pain or discomfort in one or both arms, the back, neck, jaw, or stomach. ? Shortness of breath with or without chest discomfort. ? Other signs may include breaking out in a cold sweat, nausea, or lightheadedness. Don't wait more than five minutes to call [address]! Fast action can save your life. Calling 911 is almost always the fastest way to get lifesaving treatment.  Emergency Medical Services staff can begin treatment when they arrive  up to an hour sooner than if someone gets to the hospital by car. The discharge information has been reviewed with the patient. The patient verbalized understanding. Discharge medications reviewed with the patient and appropriate educational materials and side effects teaching were provided. Discharge Orders None Winster Announcement We are excited to announce that we are making your provider's discharge notes available to you in Winster. You will see these notes when they are completed and signed by the physician that discharged you from your recent hospital stay. If you have any questions or concerns about any information you see in Winster, please call the Health Information Department where you were seen or reach out to your Primary Care Provider for more information about your plan of care. Introducing [facility] SERVICES! Hansel Ruby introduces Winster patient portal. Now you can access parts of your medical record, email your doctor's office, and request medication refills online. 1. In your internet browser, go to [email or web address] 2. Click on the First Time User? Click Here link in the Sign In box. You will see the New Member Sign Up page. 3. Enter your Winster Access Code exactly as it appears below. You will not need to use this code after youve completed the sign-up process. If you do not sign up before the expiration date, you must request a new code. · Winster Access Code: [email or web address] Expires: 8/22/2017  1:07 PM 
 
4. Enter the last four digits of your Social Security Number (xxxx) and Date of Birth (mm/dd/yyyy) as indicated and click Submit. You will be taken to the next sign-up page. 5. Create a [employer] ID. This will be your Winster login ID and cannot be changed, so think of one that is secure and easy to remember. 6. Create a [employer] password. You can change your password at any time. 7. Enter your Password Reset Question and Answer. This can be used at a later time if you forget your password. 8. Enter your e-mail address. You will receive e-mail notification when new information is available in [address] 9. Click Sign Up. You can now view and download portions of your medical record. 10. Click the Download Summary menu link to download a portable copy of your medical information. If you have questions, please visit the Frequently Asked Questions section of the [employer] website. Remember, [employer] is NOT to be used for urgent needs. For medical emergencies, dial 911. Now available from your iPhone and Android! General Information Please provide this summary of care documentation to your next provider. Patient Signature:  ____________________________________________________________ Date:  ____________________________________________________________  
  
[facility] Signature:  ____________________________________________________________ Date:  ____________________________________________________________

## 2017-06-21 NOTE — H&P (VIEW-ONLY)
Gabby Townsend    Chief Complaint   Patient presents with    Post OP Follow Up     patient states she is still experiencing pain along the upper portion of her left arm. History and Physical    Ms. Debbie Keller returns to our office for follow up after undergoing a left upper arm AV graft placement. She states her arm is somewhat sore, but she has been listening to her graft and it sounds good. She also states that she has been getting dialysis via her [facility] without issues. Past Medical History:   Diagnosis Date    Chronic kidney disease     ESRD    [facility] ([facility]     PUD (peptic ulcer disease) 2004     Patient Active Problem List   Diagnosis Code    CAP (community acquired pneumonia) J18.9    ESRD on dialysis ([facility] N18.6, Z99.2    Sepsis ([facility] A41.9    Anemia D64.9    Hyponatremia E87.1    Dehydration E86.0    Prolonged Q-T interval on ECG H52.38    Diastolic dysfunction D63.8    Infected prosthetic vascular graft ([facility] T82. 7XXA    Bacteremia due to Staphylococcus aureus R78.81    C. difficile colitis A04.7    PNA (pneumonia) J18.9    Hypokalemia E87.6    Hypoglycemia E16.2    Constipation K59.00     Past Surgical History:   Procedure Laterality Date    HX HYSTERECTOMY      HX OTHER SURGICAL Left     dialysis graft arm; removed    HX TONSILLECTOMY      HX TRANSPLANT  2004    renal transplant    HX VASCULAR ACCESS Right     Dialysis catheter    HX VASCULAR ACCESS Left     AV graft, (\"does not work\")    VASCULAR SURGERY PROCEDURE UNLIST       Current Outpatient Prescriptions   Medication Sig Dispense Refill    oxyCODONE-acetaminophen (PERCOCET 7.5) 7.5-325 mg per tablet Take 1 Tab by mouth every six (6) hours as needed for Pain. Max Daily Amount: 4 Tabs. 40 Tab 0    albuterol (PROVENTIL HFA, VENTOLIN HFA, PROAIR HFA) 90 mcg/actuation inhaler Take 2 Puffs by inhalation every six (6) hours as needed for Wheezing.       sevelamer (RENAGEL) 800 mg tablet Take 2,400 mg by mouth two (2) times a day.  pravastatin (PRAVACHOL) 20 mg tablet Take 20 mg by mouth nightly. Indications: hypercholesterolemia      diphenhydrAMINE (BENADRYL) 25 mg capsule Take 25 mg by mouth every four (4) hours as needed. Dialysis pre med       promethazine (PHENERGAN) 25 mg tablet Take 25 mg by mouth every six (6) hours as needed. Dialysis premed       zolpidem (AMBIEN) 5 mg tablet Take 5 mg by mouth nightly as needed for Sleep. Indications: dialysis days       Allergies   Allergen Reactions    Vancomycin Other (comments)     Felt like her body was burning    Aspirin Nausea and Vomiting     Pt reports aspirin gave her a stomach ulcer.  Vicodin [Hydrocodone-Acetaminophen] Nausea and Vomiting     Social History     Social History    Marital status: UNKNOWN     Spouse name: N/A    Number of children: N/A    Years of education: N/A     Occupational History    Not on file. Social History Main Topics    Smoking status: Never Smoker    Smokeless tobacco: Never Used    Alcohol use No    Drug use: No    Sexual activity: No     Other Topics Concern    Not on file     Social History Narrative      Family History   Problem Relation Age of Onset    Hypertension Brother     Diabetes Maternal Grandmother        Review of Systems    Review of Systems   Constitutional: Negative for chills, diaphoresis, fever, malaise/fatigue and weight loss. HENT: Negative for hearing loss and sore throat. Eyes: Negative for blurred vision, photophobia and redness. Respiratory: Negative for cough, hemoptysis, shortness of breath and wheezing. Cardiovascular: Negative for chest pain, palpitations and orthopnea. Gastrointestinal: Negative for abdominal pain, blood in stool, constipation, diarrhea, heartburn, nausea and vomiting. Genitourinary: Negative for dysuria, frequency, hematuria and urgency. Musculoskeletal: Negative for back pain and myalgias. Skin: Negative for itching and rash.    Neurological: Negative for dizziness, speech change, focal weakness, weakness and headaches. Endo/Heme/Allergies: Does not bruise/bleed easily. Psychiatric/Behavioral: Negative for depression and suicidal ideas. Physical Exam:    Visit Vitals    /86 (BP 1 Location: Right arm, BP Patient Position: Sitting)    Pulse 66    Resp 18    Ht 5' 5\" (1.651 m)    Wt 196 lb (88.9 kg)    LMP 12/01/2012    BMI 32.62 kg/m2      Physical Examination: General appearance - alert, well appearing, and in no distress  Mental status - alert, oriented to person, place, and time  Eyes - sclera anicteric, left eye normal, right eye normal  Ears - right ear normal, left ear normal  Nose - normal and patent, no erythema, discharge or polyps  Mouth - mucous membranes moist, pharynx normal without lesions  Neck - supple, no significant adenopathy  Lymphatics - no palpable lymphadenopathy  Chest - clear to auscultation, no wheezes, rales or rhonchi, symmetric air entry  Heart - normal rate and regular rhythm  Abdomen - soft, nontender, nondistended, no masses or organomegaly  Extremities - Left upper extremity incisions well healed. Graft with palpable thrill, somewhat less than expected. No edema      Impression and Plan:    ICD-10-CM ICD-9-CM    1. ESRD on dialysis ([facility]) N18.6 585.6 [facility]    Z99.2 V45.11      Orders Placed This Encounter    [facility]    oxyCODONE-acetaminophen (PERCOCET 7.5) 7.5-325 mg per tablet     I told Ms. Galindo Poe that given her history of graft problems, although her graft is patent, I want to perform a shuntogram to make sure everything looks good prior to use to avoid thrombosis. She understands this plan. We will perform the shuntogram next week. Follow-up Disposition:  Return in about 2 weeks (around 6/28/2017) for post procedure. The treatment plan was reviewed with the patient in detail.   The patient voiced understanding of this plan and all questions and concerns were addressed. The patient agrees with this plan. We discussed the signs and symptoms that would require earlier attention or intervention. The patient was given educational material related to his/her visit and the patient has voiced understanding of the material.     I appreciate the opportunity to participate in the care of your patient. I will be sure to keep you informed of any subsequent changes in the treatment plan. If you have any questions or concerns, please feel free to contact me. Fracisco Bruno MD    PLEASE NOTE:  This document has been produced using voice recognition software. Unrecognized errors in transcription may be present.

## 2017-06-21 NOTE — ROUTINE PROCESS
TRANSFER - IN REPORT:    Verbal report received from [facility] Arden Goss RN(name) on Sunny Tang  being received from Cath/Cardio(unit) for ordered procedure      Report consisted of patients Situation, Background, Assessment and   Recommendations(SBAR). Information from the following report(s) SBAR, Procedure Summary, Intake/Output and MAR was reviewed with the receiving nurse. Opportunity for questions and clarification was provided. Assessment completed upon patients arrival to unit and care assumed.

## 2017-06-22 ENCOUNTER — APPOINTMENT (OUTPATIENT)
Dept: INTERVENTIONAL RADIOLOGY/VASCULAR | Age: 42
DRG: 907 | End: 2017-06-22
Attending: SURGERY
Payer: MEDICARE

## 2017-06-22 LAB
ALBUMIN SERPL BCP-MCNC: 3.1 G/DL (ref 3.4–5)
ANION GAP BLD CALC-SCNC: 11 MMOL/L (ref 3–18)
BUN SERPL-MCNC: 60 MG/DL (ref 7–18)
BUN/CREAT SERPL: 5 (ref 12–20)
CALCIUM SERPL-MCNC: 9.2 MG/DL (ref 8.5–10.1)
CHLORIDE SERPL-SCNC: 100 MMOL/L (ref 100–108)
CO2 SERPL-SCNC: 26 MMOL/L (ref 21–32)
CREAT SERPL-MCNC: 11.75 MG/DL (ref 0.6–1.3)
ERYTHROCYTE [DISTWIDTH] IN BLOOD BY AUTOMATED COUNT: 14.6 % (ref 11.6–14.5)
GLUCOSE BLD STRIP.AUTO-MCNC: 65 MG/DL (ref 70–110)
GLUCOSE BLD STRIP.AUTO-MCNC: 73 MG/DL (ref 70–110)
GLUCOSE BLD STRIP.AUTO-MCNC: 86 MG/DL (ref 70–110)
GLUCOSE SERPL-MCNC: 63 MG/DL (ref 74–99)
HCT VFR BLD AUTO: 31.6 % (ref 35–45)
HGB BLD-MCNC: 10.3 G/DL (ref 12–16)
MCH RBC QN AUTO: 29.7 PG (ref 24–34)
MCHC RBC AUTO-ENTMCNC: 32.6 G/DL (ref 31–37)
MCV RBC AUTO: 91.1 FL (ref 74–97)
PHOSPHATE SERPL-MCNC: 8.1 MG/DL (ref 2.5–4.9)
PLATELET # BLD AUTO: 219 K/UL (ref 135–420)
PMV BLD AUTO: 10.2 FL (ref 9.2–11.8)
POTASSIUM SERPL-SCNC: 5.5 MMOL/L (ref 3.5–5.5)
RBC # BLD AUTO: 3.47 M/UL (ref 4.2–5.3)
SODIUM SERPL-SCNC: 137 MMOL/L (ref 136–145)
WBC # BLD AUTO: 4.7 K/UL (ref 4.6–13.2)

## 2017-06-22 PROCEDURE — 36415 COLL VENOUS BLD VENIPUNCTURE: CPT | Performed by: SURGERY

## 2017-06-22 PROCEDURE — 74011000258 HC RX REV CODE- 258: Performed by: SURGERY

## 2017-06-22 PROCEDURE — 5A1D60Z PERFORMANCE OF URINARY FILTRATION, MULTIPLE: ICD-10-PCS | Performed by: INTERNAL MEDICINE

## 2017-06-22 PROCEDURE — C1894 INTRO/SHEATH, NON-LASER: HCPCS

## 2017-06-22 PROCEDURE — 80069 RENAL FUNCTION PANEL: CPT | Performed by: SURGERY

## 2017-06-22 PROCEDURE — 77010033678 HC OXYGEN DAILY

## 2017-06-22 PROCEDURE — 74011636320 HC RX REV CODE- 636/320: Performed by: SURGERY

## 2017-06-22 PROCEDURE — 85027 COMPLETE CBC AUTOMATED: CPT | Performed by: SURGERY

## 2017-06-22 PROCEDURE — 74011250637 HC RX REV CODE- 250/637: Performed by: SURGERY

## 2017-06-22 PROCEDURE — 74011250636 HC RX REV CODE- 250/636: Performed by: SURGERY

## 2017-06-22 PROCEDURE — 74011000250 HC RX REV CODE- 250: Performed by: SURGERY

## 2017-06-22 PROCEDURE — 82962 GLUCOSE BLOOD TEST: CPT

## 2017-06-22 PROCEDURE — 99218 HC RM OBSERVATION: CPT

## 2017-06-22 PROCEDURE — B51W1ZZ FLUOROSCOPY OF DIALYSIS SHUNT/FISTULA USING LOW OSMOLAR CONTRAST: ICD-10-PCS | Performed by: SURGERY

## 2017-06-22 RX ORDER — HEPARIN SODIUM 200 [USP'U]/100ML
500 INJECTION, SOLUTION INTRAVENOUS
Status: COMPLETED | OUTPATIENT
Start: 2017-06-22 | End: 2017-06-22

## 2017-06-22 RX ORDER — NALOXONE HYDROCHLORIDE 0.4 MG/ML
0.2 INJECTION, SOLUTION INTRAMUSCULAR; INTRAVENOUS; SUBCUTANEOUS AS NEEDED
Status: DISCONTINUED | OUTPATIENT
Start: 2017-06-22 | End: 2017-06-28 | Stop reason: HOSPADM

## 2017-06-22 RX ORDER — DEXTROSE MONOHYDRATE AND SODIUM CHLORIDE 5; .9 G/100ML; G/100ML
75 INJECTION, SOLUTION INTRAVENOUS CONTINUOUS
Status: DISCONTINUED | OUTPATIENT
Start: 2017-06-22 | End: 2017-06-23

## 2017-06-22 RX ORDER — SODIUM CHLORIDE 9 MG/ML
25 INJECTION, SOLUTION INTRAVENOUS CONTINUOUS
Status: DISCONTINUED | OUTPATIENT
Start: 2017-06-22 | End: 2017-06-23

## 2017-06-22 RX ORDER — FENTANYL CITRATE 50 UG/ML
25-200 INJECTION, SOLUTION INTRAMUSCULAR; INTRAVENOUS
Status: DISCONTINUED | OUTPATIENT
Start: 2017-06-22 | End: 2017-06-28 | Stop reason: HOSPADM

## 2017-06-22 RX ORDER — MIDAZOLAM HYDROCHLORIDE 1 MG/ML
1-4 INJECTION, SOLUTION INTRAMUSCULAR; INTRAVENOUS
Status: DISCONTINUED | OUTPATIENT
Start: 2017-06-22 | End: 2017-06-28 | Stop reason: HOSPADM

## 2017-06-22 RX ORDER — HEPARIN SODIUM 1000 [USP'U]/ML
10000 INJECTION, SOLUTION INTRAVENOUS; SUBCUTANEOUS
Status: DISCONTINUED | OUTPATIENT
Start: 2017-06-22 | End: 2017-06-28 | Stop reason: HOSPADM

## 2017-06-22 RX ORDER — LIDOCAINE HYDROCHLORIDE 10 MG/ML
1-10 INJECTION, SOLUTION EPIDURAL; INFILTRATION; INTRACAUDAL; PERINEURAL
Status: COMPLETED | OUTPATIENT
Start: 2017-06-22 | End: 2017-06-22

## 2017-06-22 RX ORDER — OXYCODONE AND ACETAMINOPHEN 10; 325 MG/1; MG/1
1 TABLET ORAL
Status: DISCONTINUED | OUTPATIENT
Start: 2017-06-22 | End: 2017-06-28 | Stop reason: HOSPADM

## 2017-06-22 RX ORDER — FLUMAZENIL 0.1 MG/ML
0.2 INJECTION INTRAVENOUS
Status: DISCONTINUED | OUTPATIENT
Start: 2017-06-22 | End: 2017-06-28 | Stop reason: HOSPADM

## 2017-06-22 RX ADMIN — PRAVASTATIN SODIUM 20 MG: 20 TABLET ORAL at 21:02

## 2017-06-22 RX ADMIN — FENTANYL CITRATE 50 MCG: 50 INJECTION, SOLUTION INTRAMUSCULAR; INTRAVENOUS at 15:32

## 2017-06-22 RX ADMIN — HEPARIN SODIUM 1000 UNITS: 200 INJECTION, SOLUTION INTRAVENOUS at 15:43

## 2017-06-22 RX ADMIN — OXYCODONE HYDROCHLORIDE AND ACETAMINOPHEN 1 TABLET: 10; 325 TABLET ORAL at 20:52

## 2017-06-22 RX ADMIN — IOPAMIDOL 5 ML: 510 INJECTION, SOLUTION INTRAVASCULAR at 15:33

## 2017-06-22 RX ADMIN — Medication 2 MG: at 03:37

## 2017-06-22 RX ADMIN — Medication 2 MG: at 16:09

## 2017-06-22 RX ADMIN — Medication 2 MG: at 08:43

## 2017-06-22 RX ADMIN — CLOPIDOGREL BISULFATE 75 MG: 75 TABLET ORAL at 13:36

## 2017-06-22 RX ADMIN — LIDOCAINE HYDROCHLORIDE 2 ML: 10 INJECTION, SOLUTION EPIDURAL; INFILTRATION; INTRACAUDAL; PERINEURAL at 15:32

## 2017-06-22 RX ADMIN — DOCUSATE SODIUM 100 MG: 100 CAPSULE, LIQUID FILLED ORAL at 20:53

## 2017-06-22 RX ADMIN — CARVEDILOL 3.12 MG: 3.12 TABLET, FILM COATED ORAL at 08:42

## 2017-06-22 RX ADMIN — DEXTROSE MONOHYDRATE AND SODIUM CHLORIDE 75 ML/HR: 5; .9 INJECTION, SOLUTION INTRAVENOUS at 09:00

## 2017-06-22 NOTE — ROUTINE PROCESS
Bedside and Verbal shift change report given to DRAKE Dixon RN (oncoming nurse) by DRAKE Calvin RN (offgoing nurse). Report included the following information SBAR, Kardex, Intake/Output and MAR.

## 2017-06-22 NOTE — OP NOTES
[address] [place] [address]  OPERATIVE REPORT    Name:  Maddi Hernandez  MR#:  [id number]  :  1975  Account #:  [de-identified]  Date of Adm:  2017  Date of Surgery:  2017      PREOPERATIVE DIAGNOSIS: Left upper extremity arteriovenous  graft with decreased thrill. POSTOPERATIVE DIAGNOSIS: Left upper extremity arteriovenous  graft with decreased thrill with venous outflow stenosis. PROCEDURES PERFORMED  1. Percutaneous access of left upper extremity arteriovenous graft. 2. Balloon angioplasty of left basilic and innominate veins with a 10  mm balloon. ESTIMATED BLOOD LOSS: 30mL    SPECIMENS REMOVED: None. ANESTHESIA: Moderate sedation with local.    SURGEON: Karthik Garcia MD    PACKS AND DRAINS: None. IMPLANTS: None. COMPLICATIONS: Retained foreign object. CONDITION: Transferred to Recovery, stable. FINDINGS: High-grade stenosis within the innominate and the basilic  vein with satisfactory appearance after balloon angioplasty. The  angioplasty balloon got stuck in the patient's basilic vein and could not  be removed. A part of the balloon broke off from the catheter, and we  were unable to retrieve this balloon with a snare device. The balloon tip  that was in the basilic vein collateral was thrombosed in its area. INDICATIONS FOR PROCEDURE: The patient is a 23-year-old  female with end-stage renal disease who has undergone multiple  dialysis access procedures. The patient most recently had a left  brachial artery to basilic vein arteriovenous graft placement and was  noted to have some venous outflow stenosis. Given these findings,  decision was made to take the patient to the catheterization suite for a  diagnostic fistulogram and possible intervention. Informed consent was  obtained.     PROCEDURE IN DETAIL: On 2017, the patient presented to the  catheterization suite, identified by name ID bracelet by myself and  entire operative team. Once this was done, the patient was placed on  the catheterization table in supine position. After appropriate depth of  anesthesia was obtained, the patient was prepped and draped, and  time-out was performed. At this point, using an 18-gauge needle, we  were able to obtain percutaneous access in the arteriovenous graft  after the patient's arm was anesthetized with 1% lidocaine. We then  advanced a Bentson wire through the basilic vein and performed a  shuntogram. This showed a stenosis at the arterial graft venous  anastomosis, as well as the venous outflow. We then advanced an 8  mm balloon and angioplastied the anastomosis and had good results  after ballooning this area. We then felt there was high-grade outflow  stenosis within the innominate vein and the basilic vein. We advanced  a 10 mm balloon. Of note, the patient was heparinized prior to  ballooning these areas. We then angioplastied the innominate vein and  pulled back to angioplasty the basilic vein. There was a high-grade  stenosis. We tried to angioplasty this area. The balloon ruptured. We  then tried to back the balloon out to where the balloon would not move. It was stuck in the patient's arm. After several attempts to pull the  balloon out, the tip of the balloon broke off  the catheter, and the  catheter was removed. We then tried several different snares and  different techniques to retrieve the tip of the fractured balloon;  however, this was not able to be retrieved. Several different  angiograms were obtained that showed that the tip of the catheter  initially appeared to be within the lumen and then eventually the vein  thrombosed. There was no flow around the retained foreign object. Given these results, a decision was made to admit the patient to  attempt to retrieve the tip of the balloon the following day versus  placing a stent over this area to leave it as endo trash.  At the end of  the procedure, I was present and scrubbed for the entire procedure.         Lyndall Montenegro, MD Wolfgang Sandhoff / PENNY  D:  06/22/2017   07:38  T:  06/22/2017   08:13  Job #:  [id number]

## 2017-06-22 NOTE — PROGRESS NOTES
TRANSFER - OUT REPORT:    Verbal report given to Meghann Broussard RN on [place]  being transferred to [address] [place] [address](unit) for ordered procedure       Report consisted of patients Situation, Background, Assessment and   Recommendations(SBAR). Information from the following report(s) SBAR, Kardex and MAR was reviewed with the receiving nurse. Lines:   Peripheral IV 06/21/17 Right Arm (Active)   Site Assessment Clean, dry, & intact 6/21/2017 11:59 AM   Phlebitis Assessment 0 6/21/2017 11:59 AM   Infiltration Assessment 0 6/21/2017 11:59 AM   Dressing Status Clean, dry, & intact 6/21/2017 11:59 AM   Dressing Type Transparent 6/21/2017 11:59 AM   Hub Color/Line Status Blue 6/21/2017 11:59 AM        Opportunity for questions and clarification was provided.       Patient transported with:   [employer]

## 2017-06-22 NOTE — PROGRESS NOTES
conducted an initial consultation and Spiritual Assessment for Krystyna Mcnally, who is a 38 y.o.,female. Patients Primary Language is: [place]. According to the patients EMR Sikhism Affiliation is: [place]. The reason the Patient came to the hospital is:   Patient Active Problem List    Diagnosis Date Noted    Constipation 02/26/2017    Hypoglycemia 02/25/2017    Hypokalemia 02/24/2017    Bacteremia due to Staphylococcus aureus 02/23/2017    C. difficile colitis 02/23/2017    PNA (pneumonia) 02/23/2017    Prolonged Q-T interval on ECG 92/98/0387    Diastolic dysfunction 75/39/6029    Infected prosthetic vascular graft ([facility] 02/22/2017    CAP (community acquired pneumonia) 02/21/2017    ESRD [facility] ([facility] 02/21/2017    [facility] 02/21/2017    Anemia 02/21/2017    Hyponatremia 02/21/2017    Dehydration 02/21/2017        The  provided the following Interventions:  Initiated a relationship of care and support. Explored issues of dale, belief, spirituality and Buddhist/ritual needs while hospitalized. Listened empathically. Provided chaplaincy education. Provided information about Spiritual Care Services. Offered assurance of continued prayers on patients behalf. Chart reviewed. The following outcomes were achieved:  Patient shared limited information about both their medical narrative and spiritual journey/beliefs. Patient processed feeling about current hospitalization. Patient expressed gratitude for pastoral care visit. Assessment:  Patient does not have any Buddhist/cultural needs that will affect patients preferences in health care. There are no further spiritual or Buddhist issues which require intervention at this time. Plan:  Chaplains will continue to follow and will provide pastoral care on an as needed/requested basis.    recommends bedside caregivers page  on duty if patient shows signs of acute spiritual or emotional distress.       Sister David Villarreal, [place], [address]  [phone]

## 2017-06-22 NOTE — PROGRESS NOTES
0830 AM-BS 65 , asymptomatic. NPO for now. Will call Doctor Maria Del Carmen Burgos. D5 NS at 75 cc/hr started. Spoke to Glo GonzalezRN in radiology department. 0900 AM- NEW ORDER given for OJ 120cc. Will recheck BS. bs 73. Will continue to monitor BS.   1322 PM- spoke To Glo Gonzalez re- b/p reading 90/s - asymptomatic. Help  Aspirin - see allergy and pt refused. plavix given. Still NPO.   1400 pm . Pre checklist for Pre op done at bedside. All clothing removed / earrings- daughter at bedside. Will sign consent with Doctor Maria Del Carmen Burgos -   To procedure room via stretcher. Daughter is very supportive. No distress. 1520 pm Bedside and Verbal shift change report given to                       Gallo Felder RN (oncoming nurse) by Olga Guillaume RN   (offgoing nurse). Report included the following information SBAR, Kardex, Intake/Output, MAR, Recent Results and Med Rec Status.

## 2017-06-22 NOTE — PROGRESS NOTES
Pt admitted due to left upper extremity arteriovenous graft with decreased thrill. Given this pt will have surgical intervention to correct this issue. Pt with a history of ESRD and goes to dialysis Tuesday, Thursday and Saturday ad [facility]. Prior to admission pt was independent and did not use an assistive devise. Pt's daughter, Taylor Kemp ([phone]), will assist pt as needed. Readmission Risk Assessment:     Moderate Risk and MSSP/Good Help ACO patients    RRAT Score:  13-20    Initial Assessment:     Emergency Contact:      Pertinent Medical Hx:         PCP/Specialists:       Community Services:       DME:          Moderate Risk Care Transition Plan:  1. Evaluate for [facility] or [facility], SNF, acute rehab, community care coordination of resources. 2. Involve patient/caregiver in assessment, planning, education and implement of intervention. 3. CM daily patient care huddles/interdisciplinary rounds. 4. PCP/Specialist appointment within 5 - 7 days made prior to discharge. 5. Facilitate transportation and logistics for follow-up appointments. 6. Medication reconciliation - Pharmacy  7. Formal handoff between hospital provider and post-acute provider to transition patient  Handoff to [address] [place] [address] Nurse Navigator or PCP practice. Care Management Interventions  PCP Verified by CM: Yes  Mode of Transport at Discharge: Other (see comment) (Daughter)  Transition of Care Consult (CM Consult): Discharge Planning  Health Maintenance Reviewed: Yes  Current Support Network:  Other, Family Lives Sigrid Miranda (daughter lives with pt)  Confirm Follow Up Transport: Family  Plan discussed with Pt/Family/Caregiver: Yes  Discharge Location  Discharge Placement: Home

## 2017-06-22 NOTE — INTERVAL H&P NOTE
H&P Update:  Olivia Martin was seen and examined. History and physical has been reviewed. Significant clinical changes have occurred as noted:  A piece of the angioplasty balloon was dislodged during ballooning yesterday. Will perform a repeat shuntogram to ensure that the vein the balloon is in is thrombosed.       Signed By: Timothy Rust MD     June 22, 2017 3:20 PM

## 2017-06-22 NOTE — PROGRESS NOTES
Nephrology Progress Note    Subjective:     Katarzyna Cuba is a 38 y.o. AA female with Anemia, ESRD on HD TTS who was admitted s/p a vascular procedure. A piece of the angioplasty balloon was dislodged during ballooning and pt had a repeat shuntogram to ensure that the vein the balloon is in is thrombosed. Pt this afternoon reports no cp/sob      Admit Date: 6/21/2017  Patient Active Problem List   Diagnosis Code    CAP (community acquired pneumonia) J18.9    ESRD on dialysis ([facility] N18.6, Z99.2    Sepsis ([facility] A41.9    Anemia D64.9    Hyponatremia E87.1    Dehydration E86.0    Prolonged Q-T interval on ECG B14.85    Diastolic dysfunction K35.4    Infected prosthetic vascular graft ([facility] T82. 7XXA    Bacteremia due to Staphylococcus aureus R78.81    C. difficile colitis A04.7    PNA (pneumonia) J18.9    Hypokalemia E87.6    Hypoglycemia E16.2    Constipation K59.00     Current Facility-Administered Medications   Medication Dose Route Frequency    0.9% sodium chloride infusion  25 mL/hr IntraVENous CONTINUOUS    fentaNYL citrate (PF) injection  mcg   mcg IntraVENous Rad Multiple    flumazenil (ROMAZICON) 0.1 mg/mL injection 0.2 mg  0.2 mg IntraVENous Rad Multiple    heparin (porcine) 1,000 unit/mL injection 10,000 Units  10,000 Units IntraVENous Rad Multiple    midazolam (VERSED) injection 1-4 mg  1-4 mg IntraVENous Rad Multiple    naloxone (NARCAN) injection 0.2 mg  0.2 mg IntraVENous PRN    dextrose 5% and 0.9% NaCl infusion  75 mL/hr IntraVENous CONTINUOUS    0.9% sodium chloride infusion  25 mL/hr IntraVENous CONTINUOUS    albuterol (PROVENTIL HFA, VENTOLIN HFA, PROAIR HFA) inhaler 2 Puff  2 Puff Inhalation Q6H PRN    aspirin delayed-release tablet 81 mg  81 mg Oral DAILY    carvedilol (COREG) tablet 3.125 mg  3.125 mg Oral BID WITH MEALS    clopidogrel (PLAVIX) tablet 75 mg  75 mg Oral DAILY    docusate sodium (COLACE) capsule 100 mg  100 mg Oral BID    lisinopril (PRINIVIL, ZESTRIL) tablet 5 mg  5 mg Oral DAILY    oxyCODONE-acetaminophen (PERCOCET 7.5) 7.5-325 mg per tablet 1 Tab  1 Tab Oral Q6H PRN    pravastatin (PRAVACHOL) tablet 20 mg  20 mg Oral QHS    promethazine (PHENERGAN) tablet 25 mg  25 mg Oral Q6H PRN    sevelamer carbonate (RENVELA) tab 2,400 mg  2,400 mg Oral BID    morphine injection 2 mg  2 mg IntraVENous Q3H PRN    0.9% sodium chloride infusion  100 mL/hr IntraVENous DIALYSIS PRN    albumin human 25% (BUMINATE) solution 12.5 g  12.5 g IntraVENous DIALYSIS PRN       Allergy:   Allergies   Allergen Reactions    Vancomycin Other (comments)     Felt like her body was burning    Aspirin Nausea and Vomiting     Pt reports aspirin gave her a stomach ulcer.      Vicodin [Hydrocodone-Acetaminophen] Nausea and Vomiting        Objective:     Visit Vitals    /82 (BP 1 Location: Right arm, BP Patient Position: At rest)    Pulse 96    Temp 97.7 °F (36.5 °C)    Resp 14    Ht 5' 5\" (1.651 m)    Wt 95.7 kg (210 lb 15.7 oz)    LMP 12/01/2012    SpO2 100%    Breastfeeding No    BMI 35.11 kg/m2       Intake/Output Summary (Last 24 hours) at 06/22/17 1549  Last data filed at 06/22/17 0900   Gross per 24 hour   Intake              120 ml   Output                0 ml   Net              120 ml       Physical Exam:       General: No acute distress   HENT: Atraumatic and normocephalic   Eyes: Normal conjunctiva   Neck: Supple    Cardiovascular: Normal S1 & S2, no m/r/g   Pulmonary/Chest Wall: Clear to auscultation bilaterally   Abdominal: Soft and non-tender   Musculoskeletal: + edema LE bilaterally   Neurological: No focal deficits     Access:  No issues    Data Review:  Recent Labs      06/22/17   0505  06/21/17   1818   NA  137  136   K  5.5  4.6   CL  100  98*   CO2  26  24   BUN  60*  52*   CREA  11.75*  10.78*   GLU  63*  147*   PHOS  8.1*   --    CA  9.2  9.4     Recent Labs      06/22/17   0505  06/21/17   1818   WBC  4.7  6.2   HGB  10.3*  11.0* HCT  31.6*  33.4*   PLT  219  246     Recent Labs      06/22/17   0505   ALB  3.1*     Recent Labs      06/21/17   1144   INR  1.1   PTP  13.4      No results for input(s): IRON, FE, TIBC, IBCT, PSAT, FERR in the last 72 hours. Most recent labs: reviewed.     Impression:   ESRD on HD TTS  Anemia in CKD  HTN    Plan:     S/p dialysis earlier today  Cont current care   D/w pt    MD Gaudencio Marx  [phone]

## 2017-06-22 NOTE — PROGRESS NOTES
Shift summary: Pt A&Ox4, up ad cheryle. Pt denies nausea and dizziness. Appears to be resting comfortably. No visible signs of distress. 2350: Dr. Dana Jacobson made aware of pt BP 90/32 and pt pain of 8/10. Received orders to not give patient IV morphine until blood pressure increases.          Patient Vitals for the past 12 hrs:   Temp Pulse Resp BP SpO2   06/22/17 0421 97.6 °F (36.4 °C) 79 16 107/64 100 %   06/22/17 0320 - - - 103/61 -   06/22/17 0124 - - - 99/58 -   06/21/17 2320 97 °F (36.1 °C) 79 16 (!) 90/32 98 %   06/21/17 1955 97.2 °F (36.2 °C) 86 16 94/57 100 %

## 2017-06-22 NOTE — ROUTINE PROCESS
Bedside and Verbal shift change report given to Beba Dewitt RN (oncoming nurse) by Yue Recio   (offgoing nurse). Report included the following information SBAR, Procedure Summary, Intake/Output and MAR. Pt had uneventful shift and tolerated medications well.  Reviewed PTA medications, adjusted medications that pt is not taking and those she has allergy to, notified night FIDELINA Fragoso Muss

## 2017-06-22 NOTE — PROGRESS NOTES
1600 patient backed on the floor. Complaining of pain on her left arm and requesting pain medicine. 1909 pain medicine given as requested. No bleeding noted and no distress noted.

## 2017-06-23 LAB
ERYTHROCYTE [DISTWIDTH] IN BLOOD BY AUTOMATED COUNT: 14.6 % (ref 11.6–14.5)
HCT VFR BLD AUTO: 30.9 % (ref 35–45)
HGB BLD-MCNC: 10 G/DL (ref 12–16)
MCH RBC QN AUTO: 29.2 PG (ref 24–34)
MCHC RBC AUTO-ENTMCNC: 32.4 G/DL (ref 31–37)
MCV RBC AUTO: 90.4 FL (ref 74–97)
PLATELET # BLD AUTO: 211 K/UL (ref 135–420)
PMV BLD AUTO: 10 FL (ref 9.2–11.8)
RBC # BLD AUTO: 3.42 M/UL (ref 4.2–5.3)
WBC # BLD AUTO: 4.6 K/UL (ref 4.6–13.2)

## 2017-06-23 PROCEDURE — 74011250637 HC RX REV CODE- 250/637: Performed by: SURGERY

## 2017-06-23 PROCEDURE — 36415 COLL VENOUS BLD VENIPUNCTURE: CPT | Performed by: SURGERY

## 2017-06-23 PROCEDURE — 99218 HC RM OBSERVATION: CPT

## 2017-06-23 PROCEDURE — 74011250636 HC RX REV CODE- 250/636: Performed by: SURGERY

## 2017-06-23 PROCEDURE — 85027 COMPLETE CBC AUTOMATED: CPT | Performed by: SURGERY

## 2017-06-23 RX ADMIN — Medication 2 MG: at 08:17

## 2017-06-23 RX ADMIN — CLOPIDOGREL BISULFATE 75 MG: 75 TABLET ORAL at 10:50

## 2017-06-23 RX ADMIN — OXYCODONE HYDROCHLORIDE AND ACETAMINOPHEN 1 TABLET: 10; 325 TABLET ORAL at 17:35

## 2017-06-23 RX ADMIN — OXYCODONE HYDROCHLORIDE AND ACETAMINOPHEN 1 TABLET: 10; 325 TABLET ORAL at 04:29

## 2017-06-23 RX ADMIN — Medication 2 MG: at 01:04

## 2017-06-23 RX ADMIN — DOCUSATE SODIUM 100 MG: 100 CAPSULE, LIQUID FILLED ORAL at 10:50

## 2017-06-23 RX ADMIN — OXYCODONE HYDROCHLORIDE AND ACETAMINOPHEN 1 TABLET: 10; 325 TABLET ORAL at 10:47

## 2017-06-23 NOTE — PROGRESS NOTES
Bedside and Verbal shift change report given to JEFFERSON Mora RN (oncoming nurse) by John Sebastian RN (offgoing nurse). Report included the following information SBAR and Kardex.      Patient Vitals for the past 12 hrs:   Temp Pulse Resp BP SpO2   06/22/17 1954 98 °F (36.7 °C) 85 18 114/73 99 %   06/22/17 1704 97.8 °F (36.6 °C) 94 15 121/69 99 %   06/22/17 1600 97.9 °F (36.6 °C) 94 16 124/69 100 %   06/22/17 1540 - 96 14 141/82 100 %   06/22/17 1535 - 96 12 149/89 100 %   06/22/17 1530 - 94 14 141/82 100 %   06/22/17 1525 - (!) 102 14 140/84 100 %   06/22/17 1500 - - - - 100 %   06/22/17 1303 - 77 18 92/55 -   06/22/17 1128 97.7 °F (36.5 °C) 77 18 (!) 86/51 96 %

## 2017-06-23 NOTE — PROGRESS NOTES
Pt with significant arm swelling in LUE now that brachial vein collateral is thrombosed. Will place LUE in compression and perform a fistulogram with anesthesia.

## 2017-06-23 NOTE — ROUTINE PROCESS
Bedside and Verbal shift change report given to SERGIO Lamb RN (oncoming nurse). Report included the following information SBAR, Kardex, Intake/Output and MAR.

## 2017-06-23 NOTE — PROGRESS NOTES
Please have pt ambulate or order PT when appropriate to assist with determining an appropriate plan of care. No needs have been identified at this time. CM remains available to assist as needed.

## 2017-06-23 NOTE — PROGRESS NOTES
Bedside and Verbal shift change report received from [address] [place] [address] (offgoing nurse). Report included the following information SBAR, Kardex, Intake/Output and [facility]     2057 Shift assessment complete, see EMR.     8757 Reassessment completed, no change noted    Patient had an unevenful night.

## 2017-06-23 NOTE — PROGRESS NOTES
Nephrology Progress Note    Subjective:     Laura Angel is a 38 y.o. AA female with Anemia, ESRD on HD TTS who was admitted s/p a vascular procedure. A piece of the angioplasty balloon was dislodged during ballooning and pt had a repeat shuntogram to ensure that the vein the balloon is in is thrombosed. Underwent HD yesterday, stable    Admit Date: 6/21/2017  Patient Active Problem List   Diagnosis Code    CAP (community acquired pneumonia) J18.9    ESRD on dialysis ([facility] [address] N18.6, Z99.2    Sepsis ([facility] [address] A41.9    Anemia D64.9    Hyponatremia E87.1    Dehydration E86.0    Prolonged Q-T interval on ECG C16.40    Diastolic dysfunction H14.6    Infected prosthetic vascular graft ([facility] [address] T82. 7XXA    Bacteremia due to Staphylococcus aureus R78.81    C. difficile colitis A04.7    PNA (pneumonia) J18.9    Hypokalemia E87.6    Hypoglycemia E16.2    Constipation K59.00     Current Facility-Administered Medications   Medication Dose Route Frequency    fentaNYL citrate (PF) injection  mcg   mcg IntraVENous Rad Multiple    flumazenil (ROMAZICON) 0.1 mg/mL injection 0.2 mg  0.2 mg IntraVENous Rad Multiple    heparin (porcine) 1,000 unit/mL injection 10,000 Units  10,000 Units IntraVENous Rad Multiple    midazolam (VERSED) injection 1-4 mg  1-4 mg IntraVENous Rad Multiple    naloxone (NARCAN) injection 0.2 mg  0.2 mg IntraVENous PRN    oxyCODONE-acetaminophen (PERCOCET 10)  mg per tablet 1 Tab  1 Tab Oral Q6H PRN    albuterol (PROVENTIL HFA, VENTOLIN HFA, PROAIR HFA) inhaler 2 Puff  2 Puff Inhalation Q6H PRN    carvedilol (COREG) tablet 3.125 mg  3.125 mg Oral BID WITH MEALS    clopidogrel (PLAVIX) tablet 75 mg  75 mg Oral DAILY    docusate sodium (COLACE) capsule 100 mg  100 mg Oral BID    lisinopril (PRINIVIL, ZESTRIL) tablet 5 mg  5 mg Oral DAILY    pravastatin (PRAVACHOL) tablet 20 mg  20 mg Oral QHS    promethazine (PHENERGAN) tablet 25 mg  25 mg Oral Q6H PRN    sevelamer carbonate (RENVELA) tab 2,400 mg  2,400 mg Oral BID    morphine injection 2 mg  2 mg IntraVENous Q3H PRN    0.9% sodium chloride infusion  100 mL/hr IntraVENous DIALYSIS PRN    albumin human 25% (BUMINATE) solution 12.5 g  12.5 g IntraVENous DIALYSIS PRN       Allergy:   Allergies   Allergen Reactions    Vancomycin Other (comments)     Felt like her body was burning    Aspirin Nausea and Vomiting     Pt reports aspirin gave her a stomach ulcer.  Vicodin [Hydrocodone-Acetaminophen] Nausea and Vomiting        Objective:     Visit Vitals    /75 (BP 1 Location: Right arm, BP Patient Position: At rest)    Pulse 80    Temp 97.4 °F (36.3 °C)    Resp 18    Ht 5' 5\" (1.651 m)    Wt 94 kg (207 lb 3.2 oz)    LMP 12/01/2012    SpO2 100%    Breastfeeding No    BMI 34.48 kg/m2       Intake/Output Summary (Last 24 hours) at 06/23/17 1723  Last data filed at 06/23/17 1331   Gross per 24 hour   Intake              720 ml   Output                0 ml   Net              720 ml       Physical Exam:       General: No acute distress   HENT: Atraumatic and normocephalic   Eyes: Normal conjunctiva   Neck: Supple    Cardiovascular: Normal S1 & S2, no m/r/g   Pulmonary/Chest Wall: Clear to auscultation bilaterally   Abdominal: Soft and non-tender   Musculoskeletal: + edema LE bilaterally   Neurological: No focal deficits       Data Review:  Recent Labs      06/22/17   0505  06/21/17   1818   NA  137  136   K  5.5  4.6   CL  100  98*   CO2  26  24   BUN  60*  52*   CREA  11.75*  10.78*   GLU  63*  147*   PHOS  8.1*   --    CA  9.2  9.4     Recent Labs      06/23/17   0324  06/22/17   0505   WBC  4.6  4.7   HGB  10.0*  10.3*   HCT  30.9*  31.6*   PLT  211  219     Recent Labs      06/22/17   0505   ALB  3.1*     Recent Labs      06/21/17   1144   INR  1.1   PTP  13.4      No results for input(s): IRON, FE, TIBC, IBCT, PSAT, FERR in the last 72 hours. Most recent labs: reviewed.     Impression:   ESRD on HD TTS  Anemia in CKD  HTN    Plan:     Next HD tomorrow  Cont current care   Vascular following    MD Gaudencio Magallon  [phone]

## 2017-06-23 NOTE — OP NOTES
[address] [place] [address]  OPERATIVE REPORT    Name:  Taylor Trinh  MR#:  [id number]  :  1975  Account #:  [de-identified]  Date of Adm:  2017  Date of Surgery:  2017      PREOPERATIVE DIAGNOSES:  1. End-stage renal disease. 2. Left upper extremity arteriovenous graft with retained foreign object. POSTOPERATIVE DIAGNOSES:  1. End-stage renal disease. 2. Left upper extremity arteriovenous graft with retained foreign object. 3. Thrombosed venous branch with retained foreign object. SURGEON: Alexus Garsia MD.    ANESTHESIA: Monitored sedation with local.    PACKS AND DRAINS: None. IMPLANTS: None. ESTIMATED BLOOD LOSS: None    SPECIMENS REMOVED: None. COMPLICATIONS: None. CONDITION: Discharged to recovery stable. FINDINGS: Tip of the balloon catheter that had broken off the catheter  yesterday remains in the branch of the basilic vein. There is no flow  within the vein branch, it is completely thrombosed with no flow around  the dislodged tip of the balloon catheter. INDICATION FOR PROCEDURE: The patient is a 77-year-old female  who presented to [facility] on  to undergo a  diagnostic angiogram and possible intervention. During her procedure  we angioplastied her basilic vein and the tip of the balloon became  dislodged, after balloon was stuck in her vein after angioplasty. At this  point after several attempts to retrieve the tip of the balloon catheter,  appeared that the vein was thrombosed and appeared that this would  be safe for the balloon catheter tip as would not embolize, and so  decision made to admit the patient to perform a repeat shuntogram the  following day. Informed consent was obtained.     PROCEDURE IN DETAIL: On 2017 the patient presented to the angio suite and was identified by name and ID bracelet by myself and the angio team.  Once this was done the patient was placed in the supine position and appropriate depth of  anesthesia was obtained with 50 of fentanyl. Patient was prepped and  draped and time-out was performed. At this point a MicroSheath was  placed into the patient's arteriovenous graft and a shuntogram was  then obtained. This showed flow through the basilic vein down into the  deep branch into the brachial vein with no flow in the previous basilic  vein branch that contained the retained foreign object. Happy that  this now was thrombosed, there was no flow through this area, with no  potential for embolization. We removed the sheath and held manual  pressure for hemostasis. At the end of procedure I was present and  scrubbed for entire procedure.         MD Artemio Hernándezs / Skylar Macedo  D:  06/22/2017   16:11  T:  06/22/2017   22:38  Job #:  [id number]

## 2017-06-24 PROBLEM — N18.6 ESRD (END STAGE RENAL DISEASE) (HCC): Status: ACTIVE | Noted: 2017-06-24

## 2017-06-24 LAB
ERYTHROCYTE [DISTWIDTH] IN BLOOD BY AUTOMATED COUNT: 14.7 % (ref 11.6–14.5)
HCT VFR BLD AUTO: 30.1 % (ref 35–45)
HGB BLD-MCNC: 9.8 G/DL (ref 12–16)
MCH RBC QN AUTO: 29.5 PG (ref 24–34)
MCHC RBC AUTO-ENTMCNC: 32.6 G/DL (ref 31–37)
MCV RBC AUTO: 90.7 FL (ref 74–97)
PLATELET # BLD AUTO: 215 K/UL (ref 135–420)
PMV BLD AUTO: 10 FL (ref 9.2–11.8)
RBC # BLD AUTO: 3.32 M/UL (ref 4.2–5.3)
WBC # BLD AUTO: 5 K/UL (ref 4.6–13.2)

## 2017-06-24 PROCEDURE — 36415 COLL VENOUS BLD VENIPUNCTURE: CPT | Performed by: SURGERY

## 2017-06-24 PROCEDURE — 74011250636 HC RX REV CODE- 250/636: Performed by: SURGERY

## 2017-06-24 PROCEDURE — 85027 COMPLETE CBC AUTOMATED: CPT | Performed by: SURGERY

## 2017-06-24 PROCEDURE — 74011250637 HC RX REV CODE- 250/637: Performed by: SURGERY

## 2017-06-24 PROCEDURE — 99218 HC RM OBSERVATION: CPT

## 2017-06-24 PROCEDURE — 65270000029 HC RM PRIVATE

## 2017-06-24 RX ORDER — HEPARIN SODIUM 1000 [USP'U]/ML
1000 INJECTION, SOLUTION INTRAVENOUS; SUBCUTANEOUS AS NEEDED
Status: DISCONTINUED | OUTPATIENT
Start: 2017-06-24 | End: 2017-06-24

## 2017-06-24 RX ORDER — HEPARIN SODIUM 1000 [USP'U]/ML
3200 INJECTION, SOLUTION INTRAVENOUS; SUBCUTANEOUS AS NEEDED
Status: DISCONTINUED | OUTPATIENT
Start: 2017-06-24 | End: 2017-06-28 | Stop reason: HOSPADM

## 2017-06-24 RX ADMIN — Medication 2 MG: at 00:09

## 2017-06-24 RX ADMIN — PRAVASTATIN SODIUM 20 MG: 20 TABLET ORAL at 20:44

## 2017-06-24 RX ADMIN — DOCUSATE SODIUM 100 MG: 100 CAPSULE, LIQUID FILLED ORAL at 00:01

## 2017-06-24 RX ADMIN — DOCUSATE SODIUM 100 MG: 100 CAPSULE, LIQUID FILLED ORAL at 08:46

## 2017-06-24 RX ADMIN — PRAVASTATIN SODIUM 20 MG: 20 TABLET ORAL at 00:01

## 2017-06-24 RX ADMIN — OXYCODONE HYDROCHLORIDE AND ACETAMINOPHEN 1 TABLET: 10; 325 TABLET ORAL at 08:46

## 2017-06-24 RX ADMIN — CLOPIDOGREL BISULFATE 75 MG: 75 TABLET ORAL at 08:46

## 2017-06-24 RX ADMIN — OXYCODONE HYDROCHLORIDE AND ACETAMINOPHEN 1 TABLET: 10; 325 TABLET ORAL at 03:15

## 2017-06-24 RX ADMIN — OXYCODONE HYDROCHLORIDE AND ACETAMINOPHEN 1 TABLET: 10; 325 TABLET ORAL at 20:44

## 2017-06-24 RX ADMIN — SEVELAMER CARBONATE 2400 MG: 800 TABLET, FILM COATED ORAL at 08:46

## 2017-06-24 RX ADMIN — DOCUSATE SODIUM 100 MG: 100 CAPSULE, LIQUID FILLED ORAL at 20:44

## 2017-06-24 RX ADMIN — SEVELAMER CARBONATE 2400 MG: 800 TABLET, FILM COATED ORAL at 00:00

## 2017-06-24 NOTE — ROUTINE PROCESS
Bedside and Verbal shift change report given to SERGIO Lamb RN  (oncoming nurse) by  DRAKE Block RN  (offgoing nurse). Report included the following information SBAR, Kardex, Recent Results and Med Rec Status.

## 2017-06-24 NOTE — ROUTINE PROCESS
Bedside and Verbal shift change report given to KIRILL Monterroso RN (oncoming nurse) by SERGIO Benitez RN (offgoing nurse). Report included the following information SBAR, MAR and Recent Results.     Alarm parameters reviewed, on and audible Appropriate for patient clinical condition

## 2017-06-24 NOTE — ROUTINE PROCESS
Bedside and Verbal shift change report given to Efraín Silva RN (oncoming nurse) by SERGIO Ramírez RN (offgoing nurse). Report included the following information SBAR, Kardex, MAR and Recent Results.

## 2017-06-24 NOTE — PROGRESS NOTES
Pt seen and examined. Arm swelling seems to have improved with tubigrip. Still believe pt would benefit from a repeat shuntogram to hopefully place a covered stent in occluded vein to help improve outflow vs cross occluded brachial vein stent that is jailing deep basilic vein branch to aid with venous outflow. Plan is for Monday.

## 2017-06-24 NOTE — ROUTINE PROCESS
Verbal shift change report given to DRAKE Woodall RN (oncoming nurse) by Jose Guadalupe Chase   (offgoing nurse). Report included the following information SBAR, Kardex and MAR.

## 2017-06-24 NOTE — PROGRESS NOTES
Shift Summary :  Slept most of night with pain medication ( IV and oral )  Effective. Left arm continues swollen. Extremity elevated. INT in place.

## 2017-06-24 NOTE — PROGRESS NOTES
Nephrology Progress Note    Subjective:     Lorri Lloyd is a 38 y.o. AA female with Anemia, ESRD on HD TTS who was admitted s/p a vascular procedure. A piece of the angioplasty balloon was dislodged during ballooning and pt had a repeat shuntogram to ensure that the vein the balloon is in is thrombosed. Underwent HD earlier today, tolerated dialysis well    Admit Date: 6/21/2017  Patient Active Problem List   Diagnosis Code    CAP (community acquired pneumonia) J18.9    ESRD on dialysis ([facility] [address] N18.6, Z99.2    Sepsis ([facility] [address] A41.9    Anemia D64.9    Hyponatremia E87.1    Dehydration E86.0    Prolonged Q-T interval on ECG C88.68    Diastolic dysfunction B28.8    Infected prosthetic vascular graft ([facility] [address] T82. 7XXA    Bacteremia due to Staphylococcus aureus R78.81    C. difficile colitis A04.7    PNA (pneumonia) J18.9    Hypokalemia E87.6    Hypoglycemia E16.2    Constipation K59.00     Current Facility-Administered Medications   Medication Dose Route Frequency    heparin (porcine) pf 1,000 Units  1,000 Units InterCATHeter PRN    fentaNYL citrate (PF) injection  mcg   mcg IntraVENous Rad Multiple    flumazenil (ROMAZICON) 0.1 mg/mL injection 0.2 mg  0.2 mg IntraVENous Rad Multiple    heparin (porcine) 1,000 unit/mL injection 10,000 Units  10,000 Units IntraVENous Rad Multiple    midazolam (VERSED) injection 1-4 mg  1-4 mg IntraVENous Rad Multiple    naloxone (NARCAN) injection 0.2 mg  0.2 mg IntraVENous PRN    oxyCODONE-acetaminophen (PERCOCET 10)  mg per tablet 1 Tab  1 Tab Oral Q6H PRN    albuterol (PROVENTIL HFA, VENTOLIN HFA, PROAIR HFA) inhaler 2 Puff  2 Puff Inhalation Q6H PRN    carvedilol (COREG) tablet 3.125 mg  3.125 mg Oral BID WITH MEALS    clopidogrel (PLAVIX) tablet 75 mg  75 mg Oral DAILY    docusate sodium (COLACE) capsule 100 mg  100 mg Oral BID    lisinopril (PRINIVIL, ZESTRIL) tablet 5 mg  5 mg Oral DAILY    pravastatin (PRAVACHOL) tablet 20 mg  20 mg Oral QHS    promethazine (PHENERGAN) tablet 25 mg  25 mg Oral Q6H PRN    sevelamer carbonate (RENVELA) tab 2,400 mg  2,400 mg Oral BID    morphine injection 2 mg  2 mg IntraVENous Q3H PRN    0.9% sodium chloride infusion  100 mL/hr IntraVENous DIALYSIS PRN    albumin human 25% (BUMINATE) solution 12.5 g  12.5 g IntraVENous DIALYSIS PRN       Allergy:   Allergies   Allergen Reactions    Vancomycin Other (comments)     Felt like her body was burning    Aspirin Nausea and Vomiting     Pt reports aspirin gave her a stomach ulcer.  Vicodin [Hydrocodone-Acetaminophen] Nausea and Vomiting        Objective:     Visit Vitals    /71 (BP 1 Location: Right arm, BP Patient Position: Sitting)    Pulse 94    Temp 97.2 °F (36.2 °C)    Resp 18    Ht 5' 5\" (1.651 m)    Wt 93 kg (205 lb 0.4 oz)    LMP 12/01/2012    SpO2 99%    Breastfeeding No    BMI 34.12 kg/m2       Intake/Output Summary (Last 24 hours) at 06/24/17 1521  Last data filed at 06/24/17 0957   Gross per 24 hour   Intake              760 ml   Output                0 ml   Net              760 ml       Physical Exam:       General: No acute distress   HENT: Atraumatic and normocephalic   Eyes: Normal conjunctiva   Neck: Supple    Cardiovascular: Normal S1 & S2, no m/r/g   Pulmonary/Chest Wall: Clear to auscultation bilaterally   Abdominal: Soft and non-tender   Musculoskeletal: + edema LE bilaterally   Neurological: No focal deficits   Access: +TDC    Data Review:  Recent Labs      06/22/17   0505  06/21/17   1818   NA  137  136   K  5.5  4.6   CL  100  98*   CO2  26  24   BUN  60*  52*   CREA  11.75*  10.78*   GLU  63*  147*   PHOS  8.1*   --    CA  9.2  9.4     Recent Labs      06/24/17   0444  06/23/17   0324   WBC  5.0  4.6   HGB  9.8*  10.0*   HCT  30.1*  30.9*   PLT  215  211     Recent Labs      06/22/17   0505   ALB  3.1*     No results for input(s): INR, PTP, APTT in the last 72 hours.     No lab exists for component: INREXT, INREXT   No results for input(s): IRON, FE, TIBC, IBCT, PSAT, FERR in the last 72 hours. Most recent labs: reviewed.     Impression:   ESRD on HD TTS  Anemia in CKD  HTN    Plan:     Cont dialysis on TTS schedule  Cont current care   Vascular following    MD Gaudencio Espinoza  [phone]

## 2017-06-24 NOTE — PROGRESS NOTES
1414- Pt concerned about port not being hep locked. Message not delivered from dialysis tech during SBAR. Left a message with Ann in dialysis at this time.

## 2017-06-25 PROCEDURE — 74011250637 HC RX REV CODE- 250/637: Performed by: SURGERY

## 2017-06-25 PROCEDURE — 65270000029 HC RM PRIVATE

## 2017-06-25 RX ADMIN — OXYCODONE HYDROCHLORIDE AND ACETAMINOPHEN 1 TABLET: 10; 325 TABLET ORAL at 17:26

## 2017-06-25 RX ADMIN — OXYCODONE HYDROCHLORIDE AND ACETAMINOPHEN 1 TABLET: 10; 325 TABLET ORAL at 23:23

## 2017-06-25 RX ADMIN — DOCUSATE SODIUM 100 MG: 100 CAPSULE, LIQUID FILLED ORAL at 12:45

## 2017-06-25 RX ADMIN — OXYCODONE HYDROCHLORIDE AND ACETAMINOPHEN 1 TABLET: 10; 325 TABLET ORAL at 04:41

## 2017-06-25 RX ADMIN — PRAVASTATIN SODIUM 20 MG: 20 TABLET ORAL at 23:23

## 2017-06-25 RX ADMIN — OXYCODONE HYDROCHLORIDE AND ACETAMINOPHEN 1 TABLET: 10; 325 TABLET ORAL at 11:26

## 2017-06-25 RX ADMIN — DOCUSATE SODIUM 100 MG: 100 CAPSULE, LIQUID FILLED ORAL at 23:23

## 2017-06-25 RX ADMIN — SEVELAMER CARBONATE 2400 MG: 800 TABLET, FILM COATED ORAL at 12:45

## 2017-06-25 NOTE — PROGRESS NOTES
Pt seen and examined. Arm swelling is stable and pain levels are stable. Plan for a shuntogram tomorrow to try and improve venous outflow.  If successful, would like graft accessed on Tuesday prior to d/c

## 2017-06-25 NOTE — ROUTINE PROCESS
Bedside shift change report given to Drew Herr RN (oncoming nurse) by Debby Vale RN (offgoing nurse). Report included the following information SBAR, Kardex, MAR and Recent Results.

## 2017-06-25 NOTE — PROGRESS NOTES
Shift Summary- Pt had an uneventful shift. Pt medicated for pain to left arm. Bruit heard and thrill felt to left upper arm, though very low sounding and faint. Swelling to left arm continues.        Patient Vitals for the past 12 hrs:   Temp Pulse Resp BP SpO2   06/25/17 0438 - - - 108/59 -   06/25/17 0323 97.9 °F (36.6 °C) 82 16 95/55 99 %   06/24/17 2326 98.5 °F (36.9 °C) 92 18 96/50 100 %   06/24/17 2041 - - - 111/63 -   06/24/17 1955 98.5 °F (36.9 °C) 99 20 119/72 100 %

## 2017-06-25 NOTE — PROGRESS NOTES
Nephrology Progress Note    Subjective:     Bear Ford is a 38 y.o. AA female with Anemia, ESRD on HD TTS who was admitted s/p a vascular procedure. A piece of the angioplasty balloon was dislodged during ballooning and pt had a repeat shuntogram to ensure that the vein with the balloon is in is thrombosed. Tolerated dialysis on Sat. Stable overnight    Admit Date: 6/21/2017  Patient Active Problem List   Diagnosis Code    CAP (community acquired pneumonia) J18.9    ESRD on dialysis ([facility] [address] N18.6, Z99.2    Sepsis ([facility] [address] A41.9    Anemia D64.9    Hyponatremia E87.1    Dehydration E86.0    Prolonged Q-T interval on ECG S46.15    Diastolic dysfunction G80.6    Infected prosthetic vascular graft ([facility] [address] T82. 7XXA    Bacteremia due to Staphylococcus aureus R78.81    C. difficile colitis A04.7    PNA (pneumonia) J18.9    Hypokalemia E87.6    Hypoglycemia E16.2    Constipation K59.00    ESRD (end stage renal disease) ([facility] N18.6     Current Facility-Administered Medications   Medication Dose Route Frequency    heparin (porcine) 1,000 unit/mL injection 3,200 Units  3,200 Units InterCATHeter PRN    fentaNYL citrate (PF) injection  mcg   mcg IntraVENous Rad Multiple    flumazenil (ROMAZICON) 0.1 mg/mL injection 0.2 mg  0.2 mg IntraVENous Rad Multiple    heparin (porcine) 1,000 unit/mL injection 10,000 Units  10,000 Units IntraVENous Rad Multiple    midazolam (VERSED) injection 1-4 mg  1-4 mg IntraVENous Rad Multiple    naloxone (NARCAN) injection 0.2 mg  0.2 mg IntraVENous PRN    oxyCODONE-acetaminophen (PERCOCET 10)  mg per tablet 1 Tab  1 Tab Oral Q6H PRN    albuterol (PROVENTIL HFA, VENTOLIN HFA, PROAIR HFA) inhaler 2 Puff  2 Puff Inhalation Q6H PRN    carvedilol (COREG) tablet 3.125 mg  3.125 mg Oral BID WITH MEALS    clopidogrel (PLAVIX) tablet 75 mg  75 mg Oral DAILY    docusate sodium (COLACE) capsule 100 mg  100 mg Oral BID    lisinopril (PRINIVIL, ZESTRIL) tablet 5 mg  5 mg Oral DAILY    pravastatin (PRAVACHOL) tablet 20 mg  20 mg Oral QHS    promethazine (PHENERGAN) tablet 25 mg  25 mg Oral Q6H PRN    sevelamer carbonate (RENVELA) tab 2,400 mg  2,400 mg Oral BID    morphine injection 2 mg  2 mg IntraVENous Q3H PRN    0.9% sodium chloride infusion  100 mL/hr IntraVENous DIALYSIS PRN    albumin human 25% (BUMINATE) solution 12.5 g  12.5 g IntraVENous DIALYSIS PRN       Allergy:   Allergies   Allergen Reactions    Vancomycin Other (comments)     Felt like her body was burning    Aspirin Nausea and Vomiting     Pt reports aspirin gave her a stomach ulcer.  Vicodin [Hydrocodone-Acetaminophen] Nausea and Vomiting        Objective:     Visit Vitals    /79 (BP 1 Location: Right arm, BP Patient Position: At rest)    Pulse 91    Temp 98.1 °F (36.7 °C)    Resp 18    Ht 5' 5\" (1.651 m)    Wt 93.8 kg (206 lb 14.4 oz)    LMP 12/01/2012    SpO2 100%    Breastfeeding No    BMI 34.43 kg/m2       Intake/Output Summary (Last 24 hours) at 06/25/17 0820  Last data filed at 06/24/17 1853   Gross per 24 hour   Intake              480 ml   Output                0 ml   Net              480 ml       Physical Exam:       General: No acute distress   HENT: Atraumatic and normocephalic   Eyes: Normal conjunctiva   Neck: Supple    Cardiovascular: Normal S1 & S2, no m/r/g   Pulmonary/Chest Wall: Clear to auscultation bilaterally   Abdominal: Soft and non-tender   Musculoskeletal: + edema LE bilaterally   Neurological: No focal deficits   Access: +TDC    Data Review:  No results for input(s): NA, K, CL, CO2, BUN, CREA, GLU, PHOS, CA, PTH in the last 72 hours. Recent Labs      06/24/17   0444  06/23/17   0324   WBC  5.0  4.6   HGB  9.8*  10.0*   HCT  30.1*  30.9*   PLT  215  211     No results for input(s): SGOT, GPT, AP, TBIL, TP, ALB, GLOB, GGT in the last 72 hours. No results for input(s): INR, PTP, APTT in the last 72 hours.     No lab exists for component: INREXT, INREXT   No results for input(s): IRON, FE, TIBC, IBCT, PSAT, FERR in the last 72 hours. Most recent labs: reviewed.     Impression:   ESRD on HD TTS  Anemia in CKD  HTN  Access malfunction    Plan:     Cont dialysis on TTS schedule  Cont current care   Vascular following, plan to repeat shuntogram on Monday    MD Gaudencio Richards  [phone]

## 2017-06-26 ENCOUNTER — ANESTHESIA EVENT (OUTPATIENT)
Dept: INTERVENTIONAL RADIOLOGY/VASCULAR | Age: 42
DRG: 907 | End: 2017-06-26
Payer: MEDICARE

## 2017-06-26 ENCOUNTER — APPOINTMENT (OUTPATIENT)
Dept: INTERVENTIONAL RADIOLOGY/VASCULAR | Age: 42
DRG: 907 | End: 2017-06-26
Attending: SURGERY
Payer: MEDICARE

## 2017-06-26 ENCOUNTER — ANESTHESIA (OUTPATIENT)
Dept: INTERVENTIONAL RADIOLOGY/VASCULAR | Age: 42
DRG: 907 | End: 2017-06-26
Payer: MEDICARE

## 2017-06-26 LAB — POTASSIUM SERPL-SCNC: 5.1 MMOL/L (ref 3.5–5.5)

## 2017-06-26 PROCEDURE — 36901 INTRO CATH DIALYSIS CIRCUIT: CPT

## 2017-06-26 PROCEDURE — 74011250636 HC RX REV CODE- 250/636

## 2017-06-26 PROCEDURE — 36415 COLL VENOUS BLD VENIPUNCTURE: CPT | Performed by: SURGERY

## 2017-06-26 PROCEDURE — 74011250637 HC RX REV CODE- 250/637: Performed by: SURGERY

## 2017-06-26 PROCEDURE — 65270000029 HC RM PRIVATE

## 2017-06-26 PROCEDURE — 74011250636 HC RX REV CODE- 250/636: Performed by: SURGERY

## 2017-06-26 PROCEDURE — 84132 ASSAY OF SERUM POTASSIUM: CPT | Performed by: SURGERY

## 2017-06-26 PROCEDURE — 74011000250 HC RX REV CODE- 250: Performed by: SURGERY

## 2017-06-26 PROCEDURE — 74011000250 HC RX REV CODE- 250

## 2017-06-26 PROCEDURE — 74011636320 HC RX REV CODE- 636/320: Performed by: SURGERY

## 2017-06-26 PROCEDURE — B51W1ZZ FLUOROSCOPY OF DIALYSIS SHUNT/FISTULA USING LOW OSMOLAR CONTRAST: ICD-10-PCS | Performed by: SURGERY

## 2017-06-26 RX ORDER — HEPARIN SODIUM 200 [USP'U]/100ML
500 INJECTION, SOLUTION INTRAVENOUS
Status: COMPLETED | OUTPATIENT
Start: 2017-06-26 | End: 2017-06-26

## 2017-06-26 RX ORDER — MIDODRINE HYDROCHLORIDE 2.5 MG/1
5 TABLET ORAL ONCE
Status: COMPLETED | OUTPATIENT
Start: 2017-06-26 | End: 2017-06-26

## 2017-06-26 RX ORDER — DEXTROSE 50 % IN WATER (D50W) INTRAVENOUS SYRINGE
25-50 AS NEEDED
Status: CANCELLED | OUTPATIENT
Start: 2017-06-26

## 2017-06-26 RX ORDER — FLUMAZENIL 0.1 MG/ML
0.2 INJECTION INTRAVENOUS
Status: DISCONTINUED | OUTPATIENT
Start: 2017-06-26 | End: 2017-06-28 | Stop reason: HOSPADM

## 2017-06-26 RX ORDER — INSULIN LISPRO 100 [IU]/ML
INJECTION, SOLUTION INTRAVENOUS; SUBCUTANEOUS ONCE
Status: CANCELLED | OUTPATIENT
Start: 2017-06-26 | End: 2017-06-27

## 2017-06-26 RX ORDER — HEPARIN SODIUM 1000 [USP'U]/ML
10000 INJECTION, SOLUTION INTRAVENOUS; SUBCUTANEOUS
Status: DISCONTINUED | OUTPATIENT
Start: 2017-06-26 | End: 2017-06-28 | Stop reason: HOSPADM

## 2017-06-26 RX ORDER — SODIUM CHLORIDE 0.9 % (FLUSH) 0.9 %
5-10 SYRINGE (ML) INJECTION AS NEEDED
Status: CANCELLED | OUTPATIENT
Start: 2017-06-26

## 2017-06-26 RX ORDER — LIDOCAINE HYDROCHLORIDE 20 MG/ML
INJECTION, SOLUTION EPIDURAL; INFILTRATION; INTRACAUDAL; PERINEURAL AS NEEDED
Status: DISCONTINUED | OUTPATIENT
Start: 2017-06-26 | End: 2017-06-26 | Stop reason: HOSPADM

## 2017-06-26 RX ORDER — MIDAZOLAM HYDROCHLORIDE 1 MG/ML
INJECTION, SOLUTION INTRAMUSCULAR; INTRAVENOUS AS NEEDED
Status: DISCONTINUED | OUTPATIENT
Start: 2017-06-26 | End: 2017-06-26 | Stop reason: HOSPADM

## 2017-06-26 RX ORDER — MAGNESIUM SULFATE 100 %
4 CRYSTALS MISCELLANEOUS AS NEEDED
Status: CANCELLED | OUTPATIENT
Start: 2017-06-26

## 2017-06-26 RX ORDER — NALOXONE HYDROCHLORIDE 0.4 MG/ML
0.1 INJECTION, SOLUTION INTRAMUSCULAR; INTRAVENOUS; SUBCUTANEOUS AS NEEDED
Status: CANCELLED | OUTPATIENT
Start: 2017-06-26

## 2017-06-26 RX ORDER — MIDAZOLAM HYDROCHLORIDE 1 MG/ML
1-4 INJECTION, SOLUTION INTRAMUSCULAR; INTRAVENOUS
Status: DISCONTINUED | OUTPATIENT
Start: 2017-06-26 | End: 2017-06-28 | Stop reason: HOSPADM

## 2017-06-26 RX ORDER — FENTANYL CITRATE 50 UG/ML
25-200 INJECTION, SOLUTION INTRAMUSCULAR; INTRAVENOUS
Status: DISCONTINUED | OUTPATIENT
Start: 2017-06-26 | End: 2017-06-28 | Stop reason: HOSPADM

## 2017-06-26 RX ORDER — PROPOFOL 10 MG/ML
INJECTION, EMULSION INTRAVENOUS
Status: DISCONTINUED | OUTPATIENT
Start: 2017-06-26 | End: 2017-06-26 | Stop reason: HOSPADM

## 2017-06-26 RX ORDER — LIDOCAINE HYDROCHLORIDE 10 MG/ML
1-10 INJECTION, SOLUTION EPIDURAL; INFILTRATION; INTRACAUDAL; PERINEURAL
Status: COMPLETED | OUTPATIENT
Start: 2017-06-26 | End: 2017-06-26

## 2017-06-26 RX ORDER — NALOXONE HYDROCHLORIDE 0.4 MG/ML
0.2 INJECTION, SOLUTION INTRAMUSCULAR; INTRAVENOUS; SUBCUTANEOUS AS NEEDED
Status: DISCONTINUED | OUTPATIENT
Start: 2017-06-26 | End: 2017-06-28 | Stop reason: HOSPADM

## 2017-06-26 RX ORDER — FLUMAZENIL 0.1 MG/ML
0.2 INJECTION INTRAVENOUS
Status: CANCELLED | OUTPATIENT
Start: 2017-06-26

## 2017-06-26 RX ORDER — SODIUM CHLORIDE 9 MG/ML
25 INJECTION, SOLUTION INTRAVENOUS CONTINUOUS
Status: DISCONTINUED | OUTPATIENT
Start: 2017-06-26 | End: 2017-06-26

## 2017-06-26 RX ORDER — SODIUM CHLORIDE, SODIUM LACTATE, POTASSIUM CHLORIDE, CALCIUM CHLORIDE 600; 310; 30; 20 MG/100ML; MG/100ML; MG/100ML; MG/100ML
1000 INJECTION, SOLUTION INTRAVENOUS CONTINUOUS
Status: CANCELLED | OUTPATIENT
Start: 2017-06-26

## 2017-06-26 RX ADMIN — PRAVASTATIN SODIUM 20 MG: 20 TABLET ORAL at 21:14

## 2017-06-26 RX ADMIN — SODIUM CHLORIDE: 900 INJECTION, SOLUTION INTRAVENOUS at 14:41

## 2017-06-26 RX ADMIN — OXYCODONE HYDROCHLORIDE AND ACETAMINOPHEN 1 TABLET: 10; 325 TABLET ORAL at 17:06

## 2017-06-26 RX ADMIN — MIDAZOLAM HYDROCHLORIDE 1 MG: 1 INJECTION, SOLUTION INTRAMUSCULAR; INTRAVENOUS at 13:58

## 2017-06-26 RX ADMIN — MIDAZOLAM HYDROCHLORIDE 1 MG: 1 INJECTION, SOLUTION INTRAMUSCULAR; INTRAVENOUS at 13:55

## 2017-06-26 RX ADMIN — OXYCODONE HYDROCHLORIDE AND ACETAMINOPHEN 1 TABLET: 10; 325 TABLET ORAL at 07:58

## 2017-06-26 RX ADMIN — LIDOCAINE HYDROCHLORIDE 40 MG: 20 INJECTION, SOLUTION EPIDURAL; INFILTRATION; INTRACAUDAL; PERINEURAL at 13:58

## 2017-06-26 RX ADMIN — HEPARIN SODIUM 1000 UNITS: 200 INJECTION, SOLUTION INTRAVENOUS at 14:05

## 2017-06-26 RX ADMIN — IOPAMIDOL 50 ML: 510 INJECTION, SOLUTION INTRAVASCULAR at 14:10

## 2017-06-26 RX ADMIN — DOCUSATE SODIUM 100 MG: 100 CAPSULE, LIQUID FILLED ORAL at 21:14

## 2017-06-26 RX ADMIN — Medication 2 MG: at 03:02

## 2017-06-26 RX ADMIN — DOCUSATE SODIUM 100 MG: 100 CAPSULE, LIQUID FILLED ORAL at 09:48

## 2017-06-26 RX ADMIN — CARVEDILOL 3.12 MG: 3.12 TABLET, FILM COATED ORAL at 17:00

## 2017-06-26 RX ADMIN — MIDODRINE HYDROCHLORIDE 5 MG: 2.5 TABLET ORAL at 21:14

## 2017-06-26 RX ADMIN — LIDOCAINE HYDROCHLORIDE 1 ML: 10 INJECTION, SOLUTION EPIDURAL; INFILTRATION; INTRACAUDAL; PERINEURAL at 14:05

## 2017-06-26 RX ADMIN — SODIUM CHLORIDE: 900 INJECTION, SOLUTION INTRAVENOUS at 13:48

## 2017-06-26 RX ADMIN — PROPOFOL 100 MCG/KG/MIN: 10 INJECTION, EMULSION INTRAVENOUS at 13:55

## 2017-06-26 NOTE — ANESTHESIA POSTPROCEDURE EVALUATION
Post-Anesthesia Evaluation & Assessment    Visit Vitals    /80 (BP 1 Location: Right arm, BP Patient Position: At rest)    Pulse 90    Temp 36.2 °C (97.1 °F)    Resp 16    Ht 5' 5\" (1.651 m)    Wt 93.4 kg (206 lb)    SpO2 98%    Breastfeeding No    BMI 34.28 kg/m2       No untreated/active PONV    Post-operative hydration adequate. Adequate post-operative analgesia per PACU discharge criteria    Mental status & level of consciousness: alert and oriented x 3    Respiratory status: patent unassisted airway     No apparent anesthetic complications requiring additional post-anesthetic care    Patient has met all discharge requirements.             Nasrin Gordon MD

## 2017-06-26 NOTE — ROUTINE PROCESS
Bedside and Verbal shift change report given to ODILON Kyle RN (oncoming nurse) by DRAKE Reeder RN (offgoing nurse). Report included the following information SBAR, Kardex, Intake/Output and MAR.

## 2017-06-26 NOTE — PROGRESS NOTES
Nephrology Progress Note    Subjective:     Lorri Lloyd is a 38 y.o. AA female with Anemia, ESRD on HD TTS who was admitted s/p a vascular procedure. A piece of the angioplasty balloon was dislodged during ballooning and pt had a repeat shuntogram to ensure that the vein with the balloon is in is thrombosed. Tolerated dialysis on Sat. Stable overnight    Admit Date: 6/21/2017  Patient Active Problem List   Diagnosis Code    CAP (community acquired pneumonia) J18.9    ESRD on dialysis ([facility] N18.6, Z99.2    Sepsis ([facility] A41.9    Anemia D64.9    Hyponatremia E87.1    Dehydration E86.0    Prolonged Q-T interval on ECG L98.71    Diastolic dysfunction Q88.0    Infected prosthetic vascular graft ([facility] T82. 7XXA    Bacteremia due to Staphylococcus aureus R78.81    C. difficile colitis A04.7    PNA (pneumonia) J18.9    Hypokalemia E87.6    Hypoglycemia E16.2    Constipation K59.00    ESRD (end stage renal disease) ([facility]) N18.6     Current Facility-Administered Medications   Medication Dose Route Frequency    heparin (porcine) 1,000 unit/mL injection 3,200 Units  3,200 Units InterCATHeter PRN    fentaNYL citrate (PF) injection  mcg   mcg IntraVENous Rad Multiple    flumazenil (ROMAZICON) 0.1 mg/mL injection 0.2 mg  0.2 mg IntraVENous Rad Multiple    heparin (porcine) 1,000 unit/mL injection 10,000 Units  10,000 Units IntraVENous Rad Multiple    midazolam (VERSED) injection 1-4 mg  1-4 mg IntraVENous Rad Multiple    naloxone (NARCAN) injection 0.2 mg  0.2 mg IntraVENous PRN    oxyCODONE-acetaminophen (PERCOCET 10)  mg per tablet 1 Tab  1 Tab Oral Q6H PRN    albuterol (PROVENTIL HFA, VENTOLIN HFA, PROAIR HFA) inhaler 2 Puff  2 Puff Inhalation Q6H PRN    carvedilol (COREG) tablet 3.125 mg  3.125 mg Oral BID WITH MEALS    clopidogrel (PLAVIX) tablet 75 mg  75 mg Oral DAILY    docusate sodium (COLACE) capsule 100 mg  100 mg Oral BID    lisinopril (PRINIVIL, ZESTRIL) tablet 5 mg  5 mg Oral DAILY    pravastatin (PRAVACHOL) tablet 20 mg  20 mg Oral QHS    promethazine (PHENERGAN) tablet 25 mg  25 mg Oral Q6H PRN    sevelamer carbonate (RENVELA) tab 2,400 mg  2,400 mg Oral BID    morphine injection 2 mg  2 mg IntraVENous Q3H PRN    0.9% sodium chloride infusion  100 mL/hr IntraVENous DIALYSIS PRN    albumin human 25% (BUMINATE) solution 12.5 g  12.5 g IntraVENous DIALYSIS PRN       Allergy:   Allergies   Allergen Reactions    Vancomycin Other (comments)     Felt like her body was burning    Aspirin Nausea and Vomiting     Pt reports aspirin gave her a stomach ulcer.  Vicodin [Hydrocodone-Acetaminophen] Nausea and Vomiting        Objective:     Visit Vitals    /79 (BP 1 Location: Right arm, BP Patient Position: At rest)    Pulse 80    Temp 97.8 °F (36.6 °C)    Resp 16    Ht 5' 5\" (1.651 m)    Wt 93.8 kg (206 lb 14.4 oz)    LMP 12/01/2012    SpO2 99%    Breastfeeding No    BMI 34.43 kg/m2       Intake/Output Summary (Last 24 hours) at 06/26/17 0958  Last data filed at 06/25/17 1247   Gross per 24 hour   Intake              250 ml   Output                0 ml   Net              250 ml       Physical Exam:       General: No acute distress   HENT: Atraumatic and normocephalic   Eyes: Normal conjunctiva   Neck: Supple    Cardiovascular: Normal S1 & S2, no m/r/g   Pulmonary/Chest Wall: Clear to auscultation bilaterally   Abdominal: Soft and non-tender   Musculoskeletal: + edema LE bilaterally   Neurological: No focal deficits   Access: +TDC    Data Review:  No results for input(s): NA, K, CL, CO2, BUN, CREA, GLU, PHOS, CA, PTH in the last 72 hours. Recent Labs      06/24/17   0444   WBC  5.0   HGB  9.8*   HCT  30.1*   PLT  215     No results for input(s): SGOT, GPT, AP, TBIL, TP, ALB, GLOB, GGT in the last 72 hours. No results for input(s): INR, PTP, APTT in the last 72 hours.     No lab exists for component: INREXT, INREXT   No results for input(s): IRON, FE, TIBC, IBCT, PSAT, FERR in the last 72 hours. Most recent labs: reviewed.     Impression:   ESRD on HD TTS  Anemia in CKD  HTN  Access malfunction    Plan:     Cont dialysis on TTS schedule  Cont current care   Vascular following, plan to repeat shuntogram on today    MD Gaudencio Church  [phone]

## 2017-06-26 NOTE — PROGRESS NOTES
Shuntogram scheduled for patient at 1230. Patient is alert and oriented, vital signs are 123/75, P85, T98.1, 02 100% R17, complained of pain to abdominal area earlier  which she was medicated for and slight relief was verbalized by patient.  Will continue to monitor

## 2017-06-26 NOTE — PROGRESS NOTES
Pt arrived on unit; Pt Alert and Oriented; Consent signed; See MAC_lab for sedation report and/or vital signs. Pt transferred to treatment table for procedure. Labs are needed per Dr. Matthew Fierro, orders placed.

## 2017-06-26 NOTE — PROGRESS NOTES
TRANSFER - OUT REPORT:    Verbal report given to FIDELINA Mckinnon on Di Shen  being transferred to [address] [place] [address] for routine progression of care       Report consisted of patients Situation, Background, Assessment and   Recommendations(SBAR). Information from the following report(s) Kardex, Procedure Summary, Intake/Output and MAR was reviewed with the receiving nurse. Lines:   Triple Lumen 06/26/17 (Active)       Peripheral IV 06/21/17 Right Arm (Active)   Site Assessment Clean, dry, & intact 6/26/2017  9:00 AM   Phlebitis Assessment 0 6/26/2017  9:00 AM   Infiltration Assessment 0 6/26/2017  9:00 AM   Dressing Status Clean, dry, & intact 6/26/2017  9:00 AM   Dressing Type Transparent 6/26/2017  9:00 AM   Hub Color/Line Status Blue 6/26/2017  9:00 AM   Action Taken Open ports on tubing capped 6/26/2017  9:00 AM   Alcohol Cap Used Yes 6/26/2017  9:00 AM        Opportunity for questions and clarification was provided.       Patient transported with:   Registered Nurse

## 2017-06-26 NOTE — PROGRESS NOTES
Shift summary: Pt A&Ox4, up ad cheryle, NPO since midnight. Pain managed per MAR. Pt slept through most of the night. No visible signs of distress, appears to be resting comfortably.

## 2017-06-26 NOTE — INTERVAL H&P NOTE
H&P Update:  Dar Piper was seen and examined. History and physical has been reviewed. Significant clinical changes have occurred as noted: The patient has developed significant arm swelling since the vein thrombosed.   Will attempt to angioplasty today    Signed By: Carolina Figueroa MD     June 26, 2017 1:49 PM

## 2017-06-26 NOTE — PROGRESS NOTES
Noted plan for shuntogram today (6/26/17) to attempt to improve venous outflow. Anticipate discharge with in the next 24-48 hours pending successful out come of procedure today. Please have pt ambulate to assist with plan of care. CM to continue to follow and assist as needed.

## 2017-06-26 NOTE — ANESTHESIA PREPROCEDURE EVALUATION
Anesthetic History   No history of anesthetic complications            Review of Systems / Medical History  Patient summary reviewed, nursing notes reviewed and pertinent labs reviewed    Pulmonary  Within defined limits                 Neuro/Psych   Within defined limits           Cardiovascular    Hypertension: well controlled              Exercise tolerance: >4 METS     GI/Hepatic/Renal         Renal disease: ESRD and dialysis  PUD  Pertinent negatives: No GERD   Endo/Other  Within defined limits           Other Findings            Physical Exam    Airway  Mallampati: II  TM Distance: 4 - 6 cm  Neck ROM: normal range of motion   Mouth opening: Normal     Cardiovascular      Rate: normal         Dental  No notable dental hx       Pulmonary  Breath sounds clear to auscultation               Abdominal  GI exam deferred       Other Findings            Anesthetic Plan    ASA: 4  Anesthesia type: MAC          Induction: Intravenous  Anesthetic plan and risks discussed with: Patient

## 2017-06-26 NOTE — PROGRESS NOTES
TRANSFER - OUT REPORT:    Verbal report given to Hernando Christine. RN on Klaudia Ross  being transferred to [facility] [address](unit) for routine post - op       Report consisted of patients Situation, Background, Assessment and   Recommendations(SBAR). Information from the following report(s) SBAR, Kardex and MAR was reviewed with the receiving nurse. Lines:   Triple Lumen 06/26/17 (Active)       Peripheral IV 06/21/17 Right Arm (Active)   Site Assessment Clean, dry, & intact 6/26/2017  9:00 AM   Phlebitis Assessment 0 6/26/2017  9:00 AM   Infiltration Assessment 0 6/26/2017  9:00 AM   Dressing Status Clean, dry, & intact 6/26/2017  9:00 AM   Dressing Type Transparent 6/26/2017  9:00 AM   Hub Color/Line Status Blue 6/26/2017  9:00 AM   Action Taken Open ports on tubing capped 6/26/2017  9:00 AM   Alcohol Cap Used Yes 6/26/2017  9:00 AM        Opportunity for questions and clarification was provided. CRNA with patient in [address] [place] [address] giving report.          Patient transported with:   Registered Nurse

## 2017-06-27 LAB
ALBUMIN SERPL BCP-MCNC: 3.3 G/DL (ref 3.4–5)
ALBUMIN/GLOB SERPL: 0.8 {RATIO} (ref 0.8–1.7)
ALP SERPL-CCNC: 325 U/L (ref 45–117)
ALT SERPL-CCNC: 9 U/L (ref 13–56)
ANION GAP BLD CALC-SCNC: 10 MMOL/L (ref 3–18)
AST SERPL W P-5'-P-CCNC: 14 U/L (ref 15–37)
BILIRUB SERPL-MCNC: 0.4 MG/DL (ref 0.2–1)
BUN SERPL-MCNC: 46 MG/DL (ref 7–18)
BUN/CREAT SERPL: 4 (ref 12–20)
CALCIUM SERPL-MCNC: 9.6 MG/DL (ref 8.5–10.1)
CHLORIDE SERPL-SCNC: 95 MMOL/L (ref 100–108)
CO2 SERPL-SCNC: 31 MMOL/L (ref 21–32)
CREAT SERPL-MCNC: 12.21 MG/DL (ref 0.6–1.3)
ERYTHROCYTE [DISTWIDTH] IN BLOOD BY AUTOMATED COUNT: 14.7 % (ref 11.6–14.5)
GLOBULIN SER CALC-MCNC: 4.1 G/DL (ref 2–4)
GLUCOSE SERPL-MCNC: 82 MG/DL (ref 74–99)
HCT VFR BLD AUTO: 29.4 % (ref 35–45)
HGB BLD-MCNC: 9.8 G/DL (ref 12–16)
MCH RBC QN AUTO: 30.1 PG (ref 24–34)
MCHC RBC AUTO-ENTMCNC: 33.3 G/DL (ref 31–37)
MCV RBC AUTO: 90.2 FL (ref 74–97)
PLATELET # BLD AUTO: 238 K/UL (ref 135–420)
PMV BLD AUTO: 9.8 FL (ref 9.2–11.8)
POTASSIUM SERPL-SCNC: 5.1 MMOL/L (ref 3.5–5.5)
PROT SERPL-MCNC: 7.4 G/DL (ref 6.4–8.2)
RBC # BLD AUTO: 3.26 M/UL (ref 4.2–5.3)
SODIUM SERPL-SCNC: 136 MMOL/L (ref 136–145)
WBC # BLD AUTO: 7 K/UL (ref 4.6–13.2)

## 2017-06-27 PROCEDURE — 36415 COLL VENOUS BLD VENIPUNCTURE: CPT | Performed by: INTERNAL MEDICINE

## 2017-06-27 PROCEDURE — 80053 COMPREHEN METABOLIC PANEL: CPT | Performed by: INTERNAL MEDICINE

## 2017-06-27 PROCEDURE — 74011250637 HC RX REV CODE- 250/637: Performed by: SURGERY

## 2017-06-27 PROCEDURE — 77010033678 HC OXYGEN DAILY

## 2017-06-27 PROCEDURE — 65270000029 HC RM PRIVATE

## 2017-06-27 PROCEDURE — 90935 HEMODIALYSIS ONE EVALUATION: CPT

## 2017-06-27 PROCEDURE — 85027 COMPLETE CBC AUTOMATED: CPT | Performed by: INTERNAL MEDICINE

## 2017-06-27 RX ORDER — MIDODRINE HYDROCHLORIDE 2.5 MG/1
5 TABLET ORAL ONCE
Status: COMPLETED | OUTPATIENT
Start: 2017-06-27 | End: 2017-06-27

## 2017-06-27 RX ADMIN — OXYCODONE HYDROCHLORIDE AND ACETAMINOPHEN 1 TABLET: 10; 325 TABLET ORAL at 04:12

## 2017-06-27 RX ADMIN — DOCUSATE SODIUM 100 MG: 100 CAPSULE, LIQUID FILLED ORAL at 08:51

## 2017-06-27 RX ADMIN — OXYCODONE HYDROCHLORIDE AND ACETAMINOPHEN 1 TABLET: 10; 325 TABLET ORAL at 17:30

## 2017-06-27 RX ADMIN — SEVELAMER CARBONATE 2400 MG: 800 TABLET, FILM COATED ORAL at 08:51

## 2017-06-27 RX ADMIN — PRAVASTATIN SODIUM 20 MG: 20 TABLET ORAL at 22:25

## 2017-06-27 RX ADMIN — OXYCODONE HYDROCHLORIDE AND ACETAMINOPHEN 1 TABLET: 10; 325 TABLET ORAL at 09:50

## 2017-06-27 RX ADMIN — MIDODRINE HYDROCHLORIDE 5 MG: 2.5 TABLET ORAL at 04:12

## 2017-06-27 RX ADMIN — DOCUSATE SODIUM 100 MG: 100 CAPSULE, LIQUID FILLED ORAL at 22:25

## 2017-06-27 RX ADMIN — OXYCODONE HYDROCHLORIDE AND ACETAMINOPHEN 1 TABLET: 10; 325 TABLET ORAL at 22:58

## 2017-06-27 NOTE — ROUTINE PROCESS
Bedside shift change report given to Darnell Harmon (oncoming nurse) by Arnold Meier RN (offgoing nurse). Report included the following information SBAR, Kardex, Procedure Summary, MAR, Recent Results and Med Rec Status.

## 2017-06-27 NOTE — PROGRESS NOTES
Bedside and Verbal shift change report given to ODILON Castro RN (oncoming nurse) by DRAKE Genao RN (offgoing nurse). Report included the following information SBAR, Kardex, Intake/Output and MAR.

## 2017-06-27 NOTE — PROGRESS NOTES
Shift summary: Pt A&Ox4, blood pressures low during shift. Pt asymptomatic. Dr. Gunjan Shine made aware. Received orders for minodrine 5 mg.     1945: BP 86/50  2015: RN made aware. Rechecked BP manually- 88/66.   2040: Dr. Gunjan Shine made aware. Received orders for PO minodrine 5mg once. 0315: BP 84/50. Pt reports pain to LUE 9/10.   0400: Dr. Gunjan Shine made aware. Received orders to administer minodrine 5 mg once and to administer patient's PRN PO pain medication.

## 2017-06-27 NOTE — PROGRESS NOTES
Nephrology Progress/HD Note    Subjective:     Krystyna Mcnally is a 38 y.o. AA female with Anemia, ESRD on HD TTS who was admitted s/p a vascular procedure. A piece of the angioplasty balloon was dislodged during ballooning and pt had a repeat shuntogram to ensure that the vein with the balloon is in is thrombosed. Tolerated dialysis on Sat. Stable overnight, sen getting dialysis today. No new complts    Admit Date: 6/21/2017  Patient Active Problem List   Diagnosis Code    CAP (community acquired pneumonia) J18.9    ESRD on dialysis ([address] N18.6, Z99.2    Sepsis ([facility] A41.9    Anemia D64.9    Hyponatremia E87.1    Dehydration E86.0    Prolonged Q-T interval on ECG S71.23    Diastolic dysfunction W90.6    Infected prosthetic vascular graft ([facility] T82. 7XXA    Bacteremia due to Staphylococcus aureus R78.81    C. difficile colitis A04.7    PNA (pneumonia) J18.9    Hypokalemia E87.6    Hypoglycemia E16.2    Constipation K59.00    ESRD (end stage renal disease) ([facility]) N18.6     Current Facility-Administered Medications   Medication Dose Route Frequency    fentaNYL citrate (PF) injection  mcg   mcg IntraVENous Rad Multiple    flumazenil (ROMAZICON) 0.1 mg/mL injection 0.2 mg  0.2 mg IntraVENous Rad Multiple    heparin (porcine) 1,000 unit/mL injection 10,000 Units  10,000 Units IntraVENous Rad Multiple    midazolam (VERSED) injection 1-4 mg  1-4 mg IntraVENous Rad Multiple    naloxone (NARCAN) injection 0.2 mg  0.2 mg IntraVENous PRN    heparin (porcine) 1,000 unit/mL injection 3,200 Units  3,200 Units InterCATHeter PRN    fentaNYL citrate (PF) injection  mcg   mcg IntraVENous Rad Multiple    flumazenil (ROMAZICON) 0.1 mg/mL injection 0.2 mg  0.2 mg IntraVENous Rad Multiple    heparin (porcine) 1,000 unit/mL injection 10,000 Units  10,000 Units IntraVENous Rad Multiple    midazolam (VERSED) injection 1-4 mg  1-4 mg IntraVENous Rad Multiple    naloxone (NARCAN) injection 0.2 mg  0.2 mg IntraVENous PRN    oxyCODONE-acetaminophen (PERCOCET 10)  mg per tablet 1 Tab  1 Tab Oral Q6H PRN    albuterol (PROVENTIL HFA, VENTOLIN HFA, PROAIR HFA) inhaler 2 Puff  2 Puff Inhalation Q6H PRN    carvedilol (COREG) tablet 3.125 mg  3.125 mg Oral BID WITH MEALS    clopidogrel (PLAVIX) tablet 75 mg  75 mg Oral DAILY    docusate sodium (COLACE) capsule 100 mg  100 mg Oral BID    lisinopril (PRINIVIL, ZESTRIL) tablet 5 mg  5 mg Oral DAILY    pravastatin (PRAVACHOL) tablet 20 mg  20 mg Oral QHS    promethazine (PHENERGAN) tablet 25 mg  25 mg Oral Q6H PRN    sevelamer carbonate (RENVELA) tab 2,400 mg  2,400 mg Oral BID    morphine injection 2 mg  2 mg IntraVENous Q3H PRN    albumin human 25% (BUMINATE) solution 12.5 g  12.5 g IntraVENous DIALYSIS PRN       Allergy:   Allergies   Allergen Reactions    Vancomycin Other (comments)     Felt like her body was burning    Aspirin Nausea and Vomiting     Pt reports aspirin gave her a stomach ulcer.      Vicodin [Hydrocodone-Acetaminophen] Nausea and Vomiting        Objective:     Visit Vitals    /66 (BP 1 Location: Right arm, BP Patient Position: At rest)    Pulse 84    Temp 98.6 °F (37 °C)    Resp 18    Ht 5' 5\" (1.651 m)    Wt 93.4 kg (206 lb)    LMP 12/01/2012    SpO2 96%    Breastfeeding No    BMI 34.28 kg/m2       Intake/Output Summary (Last 24 hours) at 06/27/17 1705  Last data filed at 06/26/17 1829   Gross per 24 hour   Intake              120 ml   Output                0 ml   Net              120 ml       Physical Exam:       General: No acute distress   HENT: Atraumatic and normocephalic   Eyes: Normal conjunctiva   Neck: Supple    Cardiovascular: Normal S1 & S2, no m/r/g   Pulmonary/Chest Wall: Clear to auscultation bilaterally   Abdominal: Soft and non-tender   Musculoskeletal: + edema LE bilaterally   Neurological: No focal deficits   Access: +TDC    Data Review:  Recent Labs      06/27/17   1400  06/26/17   1309 NA  136   --    K  5.1  5.1   CL  95*   --    CO2  31   --    BUN  46*   --    CREA  12.21*   --    GLU  82   --    CA  9.6   --      Recent Labs      06/27/17   1400   WBC  7.0   HGB  9.8*   HCT  29.4*   PLT  238     Recent Labs      06/27/17   1400   SGOT  14*   AP  325*   TP  7.4   ALB  3.3*   GLOB  4.1*     No results for input(s): INR, PTP, APTT in the last 72 hours. No lab exists for component: INREXT, INREXT   No results for input(s): IRON, FE, TIBC, IBCT, PSAT, FERR in the last 72 hours. Most recent labs: reviewed.     Impression:   ESRD on HD TTS  Anemia in CKD  History of hypotension, on midodrine  Access malfunction    Plan:     HD today for clearance  Goal UF 1L if tolerated  Cont midodrine for hypotension  Cont current care   Vascular following, s/p shuntogram  D/w dialysis staff    MD Gaudencio Richards  [phone]

## 2017-06-28 VITALS
HEART RATE: 89 BPM | TEMPERATURE: 97.8 F | BODY MASS INDEX: 34.32 KG/M2 | WEIGHT: 206 LBS | HEIGHT: 65 IN | OXYGEN SATURATION: 98 % | DIASTOLIC BLOOD PRESSURE: 70 MMHG | SYSTOLIC BLOOD PRESSURE: 140 MMHG | RESPIRATION RATE: 20 BRPM

## 2017-06-28 PROCEDURE — 74011250637 HC RX REV CODE- 250/637: Performed by: SURGERY

## 2017-06-28 RX ORDER — DOCUSATE SODIUM 100 MG/1
100 CAPSULE, LIQUID FILLED ORAL 2 TIMES DAILY
Qty: 60 CAP | Refills: 2 | Status: SHIPPED | OUTPATIENT
Start: 2017-06-28 | End: 2017-08-21

## 2017-06-28 RX ORDER — OXYCODONE AND ACETAMINOPHEN 10; 325 MG/1; MG/1
1 TABLET ORAL
Qty: 40 TAB | Refills: 0 | Status: SHIPPED | OUTPATIENT
Start: 2017-06-28 | End: 2017-07-12 | Stop reason: SDUPTHER

## 2017-06-28 RX ADMIN — SEVELAMER CARBONATE 2400 MG: 800 TABLET, FILM COATED ORAL at 09:34

## 2017-06-28 RX ADMIN — DOCUSATE SODIUM 100 MG: 100 CAPSULE, LIQUID FILLED ORAL at 09:34

## 2017-06-28 RX ADMIN — OXYCODONE HYDROCHLORIDE AND ACETAMINOPHEN 1 TABLET: 10; 325 TABLET ORAL at 07:23

## 2017-06-28 NOTE — ROUTINE PROCESS
Dual AVS reviewed with Nyasia Hoover RN. All medications reviewed individually with patient. Opportunities for questions and concerns provided. Patient discharged via (mode of transport ie. Car, ambulance or air transport) car  Patient's arm band appropriately discarded.

## 2017-06-28 NOTE — PROGRESS NOTES
Pt is stable with no signs of distress. Ready to be discharged. Called Dr. Harris Gilford to come see pt prior to discharge and states he will be there.

## 2017-06-28 NOTE — ROUTINE PROCESS
Bedside and Verbal shift change report given to Wang Valenzuela RN (oncoming nurse) by Tiarra Layne RN (offgoing nurse). Report included the following information SBAR, Kardex, ED Summary, Procedure Summary, Intake/Output, MAR, Recent Results and Med Rec Status.

## 2017-06-28 NOTE — PROGRESS NOTES
Nephrology Progress/HD Note    Subjective:     Bear Ford is a 38 y.o. AA female with Anemia, ESRD on HD TTS who was admitted s/p a vascular procedure. A piece of the angioplasty balloon was dislodged during ballooning and pt had a repeat shuntogram to ensure that the vein with the balloon is in is thrombosed. Tolerated dialysis yesterday 6/27  Admit Date: 6/21/2017  Patient Active Problem List   Diagnosis Code    CAP (community acquired pneumonia) J18.9    ESRD on dialysis ([address] N18.6, Z99.2    Sepsis ([facility] A41.9    Anemia D64.9    Hyponatremia E87.1    Dehydration E86.0    Prolonged Q-T interval on ECG V61.75    Diastolic dysfunction C26.8    Infected prosthetic vascular graft ([facility] T82. 7XXA    Bacteremia due to Staphylococcus aureus R78.81    C. difficile colitis A04.7    PNA (pneumonia) J18.9    Hypokalemia E87.6    Hypoglycemia E16.2    Constipation K59.00    ESRD (end stage renal disease) ([facility]) N18.6     Current Facility-Administered Medications   Medication Dose Route Frequency    fentaNYL citrate (PF) injection  mcg   mcg IntraVENous Rad Multiple    flumazenil (ROMAZICON) 0.1 mg/mL injection 0.2 mg  0.2 mg IntraVENous Rad Multiple    heparin (porcine) 1,000 unit/mL injection 10,000 Units  10,000 Units IntraVENous Rad Multiple    midazolam (VERSED) injection 1-4 mg  1-4 mg IntraVENous Rad Multiple    naloxone (NARCAN) injection 0.2 mg  0.2 mg IntraVENous PRN    heparin (porcine) 1,000 unit/mL injection 3,200 Units  3,200 Units InterCATHeter PRN    fentaNYL citrate (PF) injection  mcg   mcg IntraVENous Rad Multiple    flumazenil (ROMAZICON) 0.1 mg/mL injection 0.2 mg  0.2 mg IntraVENous Rad Multiple    heparin (porcine) 1,000 unit/mL injection 10,000 Units  10,000 Units IntraVENous Rad Multiple    midazolam (VERSED) injection 1-4 mg  1-4 mg IntraVENous Rad Multiple    naloxone (NARCAN) injection 0.2 mg  0.2 mg IntraVENous PRN    oxyCODONE-acetaminophen (PERCOCET 10)  mg per tablet 1 Tab  1 Tab Oral Q6H PRN    albuterol (PROVENTIL HFA, VENTOLIN HFA, PROAIR HFA) inhaler 2 Puff  2 Puff Inhalation Q6H PRN    carvedilol (COREG) tablet 3.125 mg  3.125 mg Oral BID WITH MEALS    clopidogrel (PLAVIX) tablet 75 mg  75 mg Oral DAILY    docusate sodium (COLACE) capsule 100 mg  100 mg Oral BID    lisinopril (PRINIVIL, ZESTRIL) tablet 5 mg  5 mg Oral DAILY    pravastatin (PRAVACHOL) tablet 20 mg  20 mg Oral QHS    promethazine (PHENERGAN) tablet 25 mg  25 mg Oral Q6H PRN    sevelamer carbonate (RENVELA) tab 2,400 mg  2,400 mg Oral BID    morphine injection 2 mg  2 mg IntraVENous Q3H PRN    albumin human 25% (BUMINATE) solution 12.5 g  12.5 g IntraVENous DIALYSIS PRN       Allergy:   Allergies   Allergen Reactions    Vancomycin Other (comments)     Felt like her body was burning    Aspirin Nausea and Vomiting     Pt reports aspirin gave her a stomach ulcer.      Vicodin [Hydrocodone-Acetaminophen] Nausea and Vomiting        Objective:     Visit Vitals    /48 (BP 1 Location: Right arm, BP Patient Position: At rest)    Pulse 80    Temp 97.5 °F (36.4 °C)    Resp 20    Ht 5' 5\" (1.651 m)    Wt 93.4 kg (206 lb)    LMP 12/01/2012    SpO2 98%    Breastfeeding No    BMI 34.28 kg/m2     No intake or output data in the 24 hours ending 06/28/17 0856    Physical Exam:       General: No acute distress   HENT: Atraumatic and normocephalic   Eyes: Normal conjunctiva   Neck: Supple    Cardiovascular: Normal S1 & S2, no m/r/g   Pulmonary/Chest Wall: Clear to auscultation bilaterally   Abdominal: Soft and non-tender   Musculoskeletal: + edema LE bilaterally  LUE AVG with + bruit   Neurological: No focal deficits   Access: +TDC    Data Review:  Recent Labs      06/27/17   1400  06/26/17   1309   NA  136   --    K  5.1  5.1   CL  95*   --    CO2  31   --    BUN  46*   --    CREA  12.21*   --    GLU  82   --    CA  9.6   --      Recent Labs      06/27/17   1400 WBC  7.0   HGB  9.8*   HCT  29.4*   PLT  238     Recent Labs      06/27/17   1400   SGOT  14*   AP  325*   TP  7.4   ALB  3.3*   GLOB  4.1*     No results for input(s): INR, PTP, APTT in the last 72 hours. No lab exists for component: INREXT, INREXT   No results for input(s): IRON, FE, TIBC, IBCT, PSAT, FERR in the last 72 hours. Most recent labs: reviewed.     Impression:   ESRD on HD TTS  Anemia in CKD  History of hypotension, on midodrine  Access malfunction    Plan:     HD tomorrow as outpatient      MD Gaudencio Siegel  [phone]

## 2017-06-30 NOTE — OP NOTES
[address] [place] [address]  OPERATIVE REPORT    Name:  Elisa Giordano  MR#:  [id number]  :  1975  Account #:  [de-identified]  Date of Adm:  2017  Date of Surgery:  2017      PREOPERATIVE DIAGNOSIS: Left upper extremity arteriovenous  graft with left arm swelling. POSTOPERATIVE DIAGNOSIS: Left upper extremity arteriovenous  graft with left arm swelling. PROCEDURES PERFORMED:  Left upper extremity shuntogram.    ESTIMATED BLOOD LOSS: Minimal    SPECIMENS REMOVED:  None. ANESTHESIA:  Monitored anesthesia care. DRAINS: None. COMPLICATIONS: None. DISPOSITION: To recovery stable. FINDINGS: Unable to cross the distal branch of the basilic vein that the  previous balloon tip had occluded. Large brachial vein outflow  reconstituted by the deep branch of the basilic vein, unable to get into  this basilic vein into brachial vein branch. No evidence of significant  venous outflow stenosis visualized on shuntogram.    INDICATIONS FOR PROCEDURE: The patient is a 57-year-old  female with a complex medical history who agreed to undergo a left  upper extremity shuntogram where the tip of an annuloplasty balloon  catheter had broken off and occluded the patient's distal basilic vein  branch. The patient then concurrently developed arm swelling, and so  the decision made to take the patient back to the catheterization suite  to see if we could reopen this occluded venous branch and place a  stent to trap the balloon fragment tip in the basilic vein to improve  venous outflow. Informed consent was obtained.     PROCEDURE IN DETAIL: On 2017 the patient came to the  catheterization suite and identified by name and ID bracelet by myself  and the entire team. Once this was done, the patient placed on the  catheterization table in supine position, the left arm extended, and after  appropriate depth of monitored anesthesia care was obtained, the  patient was prepped and draped and timeout was performed. At this point, percutaneous access to the left upper extremity arterial  venous graft was obtained. We obtained a shuntogram, which showed  that the distal basilic vein branch was occluded and the catheter  fragment was still I place without any evidence of movement. We tried  to reenter the venous collateral; however, we were unable to  successfully reenter this. We then tried to follow the pathway of the  deep branch of the basilic vein down to the brachial vein to look for any  evidence of any venous stenosis; however, we were unable to get the  wire to catheterize this area. We tried multiple different angles to look  for any way to gain access. We were not able to cross the venous stent  that was stented across the branch. Of note, during the shuntogram,  we did not visualize any areas where she had any evidence of  significant venous outflow stenosis. Her arm swelling was not  explained by the shuntogram. We also noted that there were several  venous collaterals that had since developed since the last shuntogram.  Unable to cross this area, it was decided to complete the procedure, so  the wire and sheath were removed, and manual pressure held for  hemostasis. At the end of the procedure I was present for the  entire procedure.                                 MD Renetta Lomeli  D:  06/29/2017   15:05  T:  06/30/2017   13:49  Job #:  [id number]

## 2017-07-12 ENCOUNTER — OFFICE VISIT (OUTPATIENT)
Dept: VASCULAR SURGERY | Age: 42
End: 2017-07-12

## 2017-07-12 VITALS
SYSTOLIC BLOOD PRESSURE: 124 MMHG | HEIGHT: 65 IN | HEART RATE: 88 BPM | BODY MASS INDEX: 34.32 KG/M2 | WEIGHT: 206 LBS | DIASTOLIC BLOOD PRESSURE: 72 MMHG | RESPIRATION RATE: 18 BRPM

## 2017-07-12 DIAGNOSIS — N18.6 ESRD ON DIALYSIS (HCC): Primary | ICD-10-CM

## 2017-07-12 DIAGNOSIS — M79.89 LEFT ARM SWELLING: ICD-10-CM

## 2017-07-12 DIAGNOSIS — Z99.2 ESRD ON DIALYSIS (HCC): Primary | ICD-10-CM

## 2017-07-12 RX ORDER — OXYCODONE AND ACETAMINOPHEN 10; 325 MG/1; MG/1
1 TABLET ORAL
Qty: 40 TAB | Refills: 0 | Status: SHIPPED | OUTPATIENT
Start: 2017-07-12 | End: 2017-07-26 | Stop reason: SDUPTHER

## 2017-07-12 NOTE — MR AVS SNAPSHOT
Visit Information Date & Time Provider Department Dept. Phone Encounter #  
 7/12/2017  9:00 AM Alina Wilson MD [facility] [address] [id number] Your Appointments 7/26/2017  2:30 PM  
PROCEDURE with Alina Wilson MD  
[facility] Appt Note: Fup and possible cath removal  
 [place] [address] [place] [address] Drive [place] [address] Upcoming Health Maintenance Date Due Pneumococcal 19-64 Highest Risk (1 of 3 - PCV13) 6/5/1992 DTaP/Tdap/Td series (1 - Tdap) 6/5/1994 PAP AKA CERVICAL CYTOLOGY 6/5/1994 INFLUENZA AGE 9 TO ADULT 8/1/2017 Allergies as of 7/12/2017  Review Complete On: 7/12/2017 By: Henry Kenney RN Severity Noted Reaction Type Reactions Vancomycin High 12/25/2012    Other (comments) Felt like her body was burning Aspirin  02/21/2017    Nausea and Vomiting Pt reports aspirin gave her a stomach ulcer. Vicodin [Hydrocodone-acetaminophen]  12/25/2012    Nausea and Vomiting Current Immunizations  Reviewed on 6/26/2017 No immunizations on file. Not reviewed this visit Vitals BP Pulse Resp Height(growth percentile) Weight(growth percentile) LMP  
 124/72 88 18 5' 5\" (1.651 m) 206 lb (93.4 kg) 12/01/2012 BMI OB Status Smoking Status 34.28 kg/m2 Hysterectomy Never Smoker Vitals History BMI and BSA Data Body Mass Index Body Surface Area  
 34.28 kg/m 2 2.07 m 2 Preferred Pharmacy Pharmacy Name Phone [id number] [address] [facility] [phone] Your Updated Medication List  
  
   
This list is accurate as of: 7/12/17  9:14 AM.  Always use your most recent med list.  
  
  
  
  
 albuterol 90 mcg/actuation inhaler Commonly known as:  PROVENTIL HFA, VENTOLIN HFA, PROAIR HFA  
 Take 2 Puffs by inhalation every six (6) hours as needed for Wheezing. AMBIEN 5 mg tablet Generic drug:  zolpidem Take 5 mg by mouth nightly as needed for Sleep. Indications: dialysis days diphenhydrAMINE 50 mg tablet Commonly known as:  BENADRYL Take 50 mg by mouth Q TU, TH & SAT. Indications: predialysis med  
  
 docusate sodium 100 mg capsule Commonly known as:  Vero Officer Take 1 Cap by mouth two (2) times a day for 90 days. oxyCODONE-acetaminophen  mg per tablet Commonly known as:  PERCOCET 10 Take 1 Tab by mouth every six (6) hours as needed. Max Daily Amount: 4 Tabs. pravastatin 20 mg tablet Commonly known as:  PRAVACHOL Take 20 mg by mouth nightly. Indications: hypercholesterolemia  
  
 promethazine 25 mg tablet Commonly known as:  PHENERGAN Take 25 mg by mouth every six (6) hours as needed. Dialysis premed  
  
 sevelamer 800 mg tablet Commonly known as:  RENAGEL Take 2,400 mg by mouth two (2) times a day. Prescriptions Printed Refills  
 oxyCODONE-acetaminophen (PERCOCET 10)  mg per tablet 0 Sig: Take 1 Tab by mouth every six (6) hours as needed. Max Daily Amount: 4 Tabs. Class: Print Route: Oral  
  
Introducing [facility]! Kaitlynn Mast introduces [employer] patient portal. Now you can access parts of your medical record, email your doctor's office, and request medication refills online. 1. In your internet browser, go to [email or web address] 2. Click on the First Time User? Click Here link in the Sign In box. You will see the New Member Sign Up page. 3. Enter your [employer] Access Code exactly as it appears below. You will not need to use this code after youve completed the sign-up process. If you do not sign up before the expiration date, you must request a new code. · [employer] Access Code: [email or web address] Expires: 8/22/2017  1:07 PM 
 
 4. Enter the last four digits of your Social Security Number (xxxx) and Date of Birth (mm/dd/yyyy) as indicated and click Submit. You will be taken to the next sign-up page. 5. Create a iPositioning ID. This will be your iPositioning login ID and cannot be changed, so think of one that is secure and easy to remember. 6. Create a iPositioning password. You can change your password at any time. 7. Enter your Password Reset Question and Answer. This can be used at a later time if you forget your password. 8. Enter your e-mail address. You will receive e-mail notification when new information is available in [address] 9. Click Sign Up. You can now view and download portions of your medical record. 10. Click the Download Summary menu link to download a portable copy of your medical information. If you have questions, please visit the Frequently Asked Questions section of the iPositioning website. Remember, iPositioning is NOT to be used for urgent needs. For medical emergencies, dial 911. Now available from your iPhone and Android! Please provide this summary of care documentation to your next provider. Your primary care clinician is listed as Eamon Currie. If you have any questions after today's visit, please call [phone].

## 2017-07-12 NOTE — PROGRESS NOTES
Alec Garcia    Chief Complaint   Patient presents with    End Stage Renal Disease    Surgical Follow-up       History and Physical    Ms. Eleazar Gee returns to our office for continued follow up and management of her complex dialysis access. She states that she has been unable to wear the compression sleeve because it is too painful for her. She states her arm swelling has improved though. She states she still has pain in her should area occasionally. She has no new complaints. Past Medical History:   Diagnosis Date    Chronic kidney disease     ESRD    Dialysis patient ([facility]     PUD (peptic ulcer disease) 2004     Patient Active Problem List   Diagnosis Code    CAP (community acquired pneumonia) J18.9    ESRD on dialysis ([facility] N18.6, Z99.2    Sepsis ([facility] A41.9    Anemia D64.9    Hyponatremia E87.1    Dehydration E86.0    Prolonged Q-T interval on ECG K19.55    Diastolic dysfunction Z65.0    Infected prosthetic vascular graft ([facility] T82. 7XXA    Bacteremia due to Staphylococcus aureus R78.81    C. difficile colitis A04.7    PNA (pneumonia) J18.9    Hypokalemia E87.6    Hypoglycemia E16.2    Constipation K59.00    ESRD (end stage renal disease) ([facility]) N18.6     Past Surgical History:   Procedure Laterality Date    HX HYSTERECTOMY      HX OTHER SURGICAL Left     dialysis graft arm; removed    HX TONSILLECTOMY      HX TRANSPLANT  2004    renal transplant    HX VASCULAR ACCESS Right     Dialysis catheter    HX VASCULAR ACCESS Left     AV graft, (\"does not work\")    VASCULAR SURGERY PROCEDURE UNLIST       Current Outpatient Prescriptions   Medication Sig Dispense Refill    oxyCODONE-acetaminophen (PERCOCET 10)  mg per tablet Take 1 Tab by mouth every six (6) hours as needed. Max Daily Amount: 4 Tabs. 40 Tab 0    docusate sodium (COLACE) 100 mg capsule Take 1 Cap by mouth two (2) times a day for 90 days.  60 Cap 2    diphenhydrAMINE (BENADRYL) 50 mg tablet Take 50 mg by mouth Q TU, TH & SAT. Indications: predialysis med      albuterol (PROVENTIL HFA, VENTOLIN HFA, PROAIR HFA) 90 mcg/actuation inhaler Take 2 Puffs by inhalation every six (6) hours as needed for Wheezing.  sevelamer (RENAGEL) 800 mg tablet Take 2,400 mg by mouth two (2) times a day.  pravastatin (PRAVACHOL) 20 mg tablet Take 20 mg by mouth nightly. Indications: hypercholesterolemia      zolpidem (AMBIEN) 5 mg tablet Take 5 mg by mouth nightly as needed for Sleep. Indications: dialysis days      promethazine (PHENERGAN) 25 mg tablet Take 25 mg by mouth every six (6) hours as needed. Dialysis premed        Allergies   Allergen Reactions    Vancomycin Other (comments)     Felt like her body was burning    Aspirin Nausea and Vomiting     Pt reports aspirin gave her a stomach ulcer.  Vicodin [Hydrocodone-Acetaminophen] Nausea and Vomiting     Social History     Social History    Marital status: UNKNOWN     Spouse name: N/A    Number of children: N/A    Years of education: N/A     Occupational History    Not on file. Social History Main Topics    Smoking status: Never Smoker    Smokeless tobacco: Never Used    Alcohol use No    Drug use: No    Sexual activity: No     Other Topics Concern    Not on file     Social History Narrative      Family History   Problem Relation Age of Onset    Hypertension Brother     Diabetes Maternal Grandmother        Review of Systems    Review of Systems   Constitutional: Negative for chills, diaphoresis, fever, malaise/fatigue and weight loss. HENT: Negative for hearing loss and sore throat. Eyes: Negative for blurred vision, photophobia and redness. Respiratory: Negative for cough, hemoptysis, shortness of breath and wheezing. Cardiovascular: Negative for chest pain, palpitations and orthopnea. Gastrointestinal: Negative for abdominal pain, blood in stool, constipation, diarrhea, heartburn, nausea and vomiting.    Genitourinary: Negative for dysuria, frequency, hematuria and urgency. Musculoskeletal: Positive for myalgias. Negative for back pain. Skin: Negative for itching and rash. Neurological: Negative for dizziness, speech change, focal weakness, weakness and headaches. Endo/Heme/Allergies: Does not bruise/bleed easily. Psychiatric/Behavioral: Negative for depression and suicidal ideas. Physical Exam:    Visit Vitals    /72    Pulse 88    Resp 18    Ht 5' 5\" (1.651 m)    Wt 206 lb (93.4 kg)    LMP 12/01/2012    BMI 34.28 kg/m2      Physical Examination: General appearance - alert, well appearing, and in no distress  Mental status - alert, oriented to person, place, and time  Eyes - sclera anicteric, left eye normal, right eye normal  Ears - right ear normal, left ear normal  Nose - normal and patent, no erythema, discharge or polyps  Mouth - mucous membranes moist, pharynx normal without lesions  Extremities - Left upper arm graft with good thrill. Minimal edema. Tenderness in upper shoulder area with thrill in the vein. Impression and Plan:    ICD-10-CM ICD-9-CM    1. ESRD on dialysis ([facility] N18.6 585.6     Z99.2 V45.11    2. Left arm swelling M79.89 729.81      Orders Placed This Encounter    oxyCODONE-acetaminophen (PERCOCET 10)  mg per tablet     I told Ms. Sagar Gary that I believe her arm pain is due to increased flow through a single outflow vein. I do not believe the retained balloon piece is causing her pain. I also wonder how much of her discomfort is psychosomatic. I have marked the borders of her graft so that it can be used for her next dialysis session. If it is not successful then I will refer her to my partner Dr. Elvin Pena to see if she could be a candidate for a HeRO graft. Follow-up Disposition:  Return in about 3 weeks (around 8/2/2017) for catheter removal, Symptom check. The treatment plan was reviewed with the patient in detail.   The patient voiced understanding of this plan and all questions and concerns were addressed. The patient agrees with this plan. We discussed the signs and symptoms that would require earlier attention or intervention. The patient was given educational material related to his/her visit and the patient has voiced understanding of the material.     I appreciate the opportunity to participate in the care of your patient. I will be sure to keep you informed of any subsequent changes in the treatment plan. If you have any questions or concerns, please feel free to contact me. Gale Hutton MD    PLEASE NOTE:  This document has been produced using voice recognition software. Unrecognized errors in transcription may be present.

## 2017-07-26 ENCOUNTER — OFFICE VISIT (OUTPATIENT)
Dept: VASCULAR SURGERY | Age: 42
End: 2017-07-26

## 2017-07-26 VITALS
BODY MASS INDEX: 34.32 KG/M2 | HEIGHT: 65 IN | SYSTOLIC BLOOD PRESSURE: 132 MMHG | HEART RATE: 90 BPM | RESPIRATION RATE: 18 BRPM | DIASTOLIC BLOOD PRESSURE: 70 MMHG | WEIGHT: 206 LBS

## 2017-07-26 DIAGNOSIS — N18.6 ESRD ON DIALYSIS (HCC): Primary | ICD-10-CM

## 2017-07-26 DIAGNOSIS — Z99.2 ESRD ON DIALYSIS (HCC): Primary | ICD-10-CM

## 2017-07-26 RX ORDER — OXYCODONE AND ACETAMINOPHEN 10; 325 MG/1; MG/1
1 TABLET ORAL
Qty: 40 TAB | Refills: 0 | Status: SHIPPED | OUTPATIENT
Start: 2017-07-26 | End: 2017-08-21

## 2017-07-26 NOTE — MR AVS SNAPSHOT
Visit Information Date & Time Provider Department Dept. Phone Encounter #  
 7/26/2017  2:30 PM Ysabel Montenegro MD [facility] [address] [id number] Follow-up Instructions Return in about 4 weeks (around 8/23/2017). Follow-up and Disposition History Your Appointments 8/30/2017  1:15 PM  
Follow Up with Ysabel Montenegro MD  
[facility] THE [facility] ([address]) Appt Note: 1 month fup without studies [address] [place] [address] Upcoming Health Maintenance Date Due Pneumococcal 19-64 Highest Risk (1 of 3 - PCV13) 6/5/1992 DTaP/Tdap/Td series (1 - Tdap) 6/5/1994 PAP AKA CERVICAL CYTOLOGY 6/5/1994 INFLUENZA AGE 9 TO ADULT 8/1/2017 Allergies as of 7/26/2017  Review Complete On: 7/26/2017 By: Ysabel Montenegro MD  
  
 Severity Noted Reaction Type Reactions Vancomycin High 12/25/2012    Other (comments) Felt like her body was burning Aspirin  02/21/2017    Nausea and Vomiting Pt reports aspirin gave her a stomach ulcer. Vicodin [Hydrocodone-acetaminophen]  12/25/2012    Nausea and Vomiting Current Immunizations  Reviewed on 6/26/2017 No immunizations on file. Not reviewed this visit You Were Diagnosed With   
  
 Codes Comments ESRD on dialysis [facility])    -  Primary ICD-10-CM: N18.6, Z99.2 ICD-9-CM: 585.6, V45.11 Vitals BP Pulse Resp Height(growth percentile) Weight(growth percentile) LMP  
 132/70 90 18 5' 5\" (1.651 m) 206 lb (93.4 kg) 12/01/2012 BMI OB Status Smoking Status 34.28 kg/m2 Hysterectomy Never Smoker Vitals History BMI and BSA Data Body Mass Index Body Surface Area  
 34.28 kg/m 2 2.07 m 2 Preferred Pharmacy Pharmacy Name Phone [id number] [email or web address] [address] [phone] Your Updated Medication List  
  
   
This list is accurate as of: 7/26/17  3:21 PM.  Always use your most recent med list.  
  
  
  
  
 albuterol 90 mcg/actuation inhaler Commonly known as:  PROVENTIL HFA, VENTOLIN HFA, PROAIR HFA Take 2 Puffs by inhalation every six (6) hours as needed for Wheezing. AMBIEN 5 mg tablet Generic drug:  zolpidem Take 5 mg by mouth nightly as needed for Sleep. Indications: dialysis days diphenhydrAMINE 50 mg tablet Commonly known as:  BENADRYL Take 50 mg by mouth Q TU, TH & SAT. Indications: predialysis med  
  
 docusate sodium 100 mg capsule Commonly known as:  Vernard Eddie Take 1 Cap by mouth two (2) times a day for 90 days. oxyCODONE-acetaminophen  mg per tablet Commonly known as:  PERCOCET 10 Take 1 Tab by mouth every six (6) hours as needed. Max Daily Amount: 4 Tabs. pravastatin 20 mg tablet Commonly known as:  PRAVACHOL Take 20 mg by mouth nightly. Indications: hypercholesterolemia  
  
 promethazine 25 mg tablet Commonly known as:  PHENERGAN Take 25 mg by mouth every six (6) hours as needed. Dialysis premed  
  
 sevelamer 800 mg tablet Commonly known as:  RENAGEL Take 2,400 mg by mouth two (2) times a day. Prescriptions Printed Refills  
 oxyCODONE-acetaminophen (PERCOCET 10)  mg per tablet 0 Sig: Take 1 Tab by mouth every six (6) hours as needed. Max Daily Amount: 4 Tabs. Class: Print Route: Oral  
  
We Performed the Following NH REMOVAL TUNNELED CV CATH W/O SC PUMP [77163 CPT(R)] Follow-up Instructions Return in about 4 weeks (around 8/23/2017). Patient Instructions Post Catheter Removal Instructions 1. Remove bandage the following day. 2.  You are allowed to shower the following day after your procedure 3. Keep the area clean and covered until scabbed. 4.  If bleeding occurs, please call the office. 5.  Use an ice pack for discomfort. 6.  No heavy lifting or straining for 24 hours after removal. 
7.  If on blood thinner - you can resume taking your medication in 24 hours Introducing [facility]! [employer] introduces [employer] patient portal. Now you can access parts of your medical record, email your doctor's office, and request medication refills online. 1. In your internet browser, go to [email or web address] 2. Click on the First Time User? Click Here link in the Sign In box. You will see the New Member Sign Up page. 3. Enter your [employer] Access Code exactly as it appears below. You will not need to use this code after youve completed the sign-up process. If you do not sign up before the expiration date, you must request a new code. · [employer] Access Code: [email or web address] Expires: 8/22/2017  1:07 PM 
 
4. Enter the last four digits of your Social Security Number (xxxx) and Date of Birth (mm/dd/yyyy) as indicated and click Submit. You will be taken to the next sign-up page. 5. Create a Base79 ID. This will be your [employer] login ID and cannot be changed, so think of one that is secure and easy to remember. 6. Create a [employer]79 password. You can change your password at any time. 7. Enter your Password Reset Question and Answer. This can be used at a later time if you forget your password. 8. Enter your e-mail address. You will receive e-mail notification when new information is available in [address] 9. Click Sign Up. You can now view and download portions of your medical record. 10. Click the Download Summary menu link to download a portable copy of your medical information. If you have questions, please visit the Frequently Asked Questions section of the [employer] website. Remember, [employer] is NOT to be used for urgent needs. For medical emergencies, dial 911. Now available from your iPhone and Android! Please provide this summary of care documentation to your next provider. Your primary care clinician is listed as Eamon Currie. If you have any questions after today's visit, please call [phone].

## 2017-07-26 NOTE — PATIENT INSTRUCTIONS
Post Catheter Removal Instructions    1. Remove bandage the following day. 2.  You are allowed to shower the following day after your procedure  3. Keep the area clean and covered until scabbed. 4.  If bleeding occurs, please call the office. 5.  Use an ice pack for discomfort.   6.  No heavy lifting or straining for 24 hours after removal.  7.  If on blood thinner - you can resume taking your medication in 24 hours

## 2017-07-26 NOTE — PROGRESS NOTES
History of Present Illness: Ms. Sagar Gary is here today for a follow-up on her end stage renal disease. Procedure Note: the skin was cleansed with Betadine solution. The anchoring sutures were removed. Then, approximately 7 cc of 1% Lidocaine were injected around the catheter site. Under blunt dissection, the cuff of the catheter was freed from the skin edges. The catheter was removed in it's entirety with the cuff intact. Direct pressure was applied for approximately 10 minutes followed by pressure dressing. She tolerated the procedure well. Assessment/Plan:  She was given follow-up care instructions and told to call with any problems, questions or concerns.     Ignacio Soria MD

## 2017-08-21 ENCOUNTER — HOSPITAL ENCOUNTER (OUTPATIENT)
Age: 42
Setting detail: OBSERVATION
Discharge: HOME | End: 2017-08-22
Attending: INTERNAL MEDICINE | Admitting: INTERNAL MEDICINE
Payer: MEDICARE

## 2017-08-21 DIAGNOSIS — N18.6 ESRD (END STAGE RENAL DISEASE) (HCC): ICD-10-CM

## 2017-08-21 DIAGNOSIS — R19.7 DIARRHEA, UNSPECIFIED TYPE: ICD-10-CM

## 2017-08-21 DIAGNOSIS — E87.5 HYPERKALEMIA: Primary | ICD-10-CM

## 2017-08-21 LAB
ALBUMIN SERPL-MCNC: 3.7 G/DL (ref 3.4–5)
ALBUMIN/GLOB SERPL: 0.8 {RATIO} (ref 0.8–1.7)
ALP SERPL-CCNC: 480 U/L (ref 45–117)
ALT SERPL-CCNC: 10 U/L (ref 13–56)
ANION GAP SERPL CALC-SCNC: 10 MMOL/L (ref 3–18)
ANION GAP SERPL CALC-SCNC: 9 MMOL/L (ref 3–18)
AST SERPL-CCNC: 10 U/L (ref 15–37)
BASOPHILS # BLD: 0 K/UL (ref 0–0.06)
BASOPHILS NFR BLD: 0 % (ref 0–2)
BILIRUB SERPL-MCNC: 0.4 MG/DL (ref 0.2–1)
BUN SERPL-MCNC: 74 MG/DL (ref 7–18)
BUN SERPL-MCNC: 76 MG/DL (ref 7–18)
BUN/CREAT SERPL: 5 (ref 12–20)
BUN/CREAT SERPL: 5 (ref 12–20)
CALCIUM SERPL-MCNC: 9.5 MG/DL (ref 8.5–10.1)
CALCIUM SERPL-MCNC: 9.5 MG/DL (ref 8.5–10.1)
CHLORIDE SERPL-SCNC: 96 MMOL/L (ref 100–108)
CHLORIDE SERPL-SCNC: 96 MMOL/L (ref 100–108)
CK MB CFR SERPL CALC: NORMAL % (ref 0–4)
CK MB SERPL-MCNC: <1 NG/ML (ref 5–25)
CK SERPL-CCNC: 49 U/L (ref 26–192)
CO2 SERPL-SCNC: 26 MMOL/L (ref 21–32)
CO2 SERPL-SCNC: 26 MMOL/L (ref 21–32)
CREAT SERPL-MCNC: 14.47 MG/DL (ref 0.6–1.3)
CREAT SERPL-MCNC: 14.48 MG/DL (ref 0.6–1.3)
DIFFERENTIAL METHOD BLD: ABNORMAL
EOSINOPHIL # BLD: 0.1 K/UL (ref 0–0.4)
EOSINOPHIL NFR BLD: 2 % (ref 0–5)
ERYTHROCYTE [DISTWIDTH] IN BLOOD BY AUTOMATED COUNT: 14.9 % (ref 11.6–14.5)
GLOBULIN SER CALC-MCNC: 4.5 G/DL (ref 2–4)
GLUCOSE SERPL-MCNC: 78 MG/DL (ref 74–99)
GLUCOSE SERPL-MCNC: 81 MG/DL (ref 74–99)
HCG SERPL QL: NEGATIVE
HCT VFR BLD AUTO: 37.9 % (ref 35–45)
HGB BLD-MCNC: 12.6 G/DL (ref 12–16)
LIPASE SERPL-CCNC: 173 U/L (ref 73–393)
LYMPHOCYTES # BLD: 1.3 K/UL (ref 0.9–3.6)
LYMPHOCYTES NFR BLD: 22 % (ref 21–52)
MCH RBC QN AUTO: 31.1 PG (ref 24–34)
MCHC RBC AUTO-ENTMCNC: 33.2 G/DL (ref 31–37)
MCV RBC AUTO: 93.6 FL (ref 74–97)
MONOCYTES # BLD: 0.4 K/UL (ref 0.05–1.2)
MONOCYTES NFR BLD: 7 % (ref 3–10)
NEUTS SEG # BLD: 4.1 K/UL (ref 1.8–8)
NEUTS SEG NFR BLD: 69 % (ref 40–73)
PLATELET # BLD AUTO: 316 K/UL (ref 135–420)
PMV BLD AUTO: 9.9 FL (ref 9.2–11.8)
POTASSIUM SERPL-SCNC: 7.2 MMOL/L (ref 3.5–5.5)
POTASSIUM SERPL-SCNC: 8.2 MMOL/L (ref 3.5–5.5)
PROT SERPL-MCNC: 8.2 G/DL (ref 6.4–8.2)
RBC # BLD AUTO: 4.05 M/UL (ref 4.2–5.3)
SODIUM SERPL-SCNC: 131 MMOL/L (ref 136–145)
SODIUM SERPL-SCNC: 132 MMOL/L (ref 136–145)
TROPONIN I SERPL-MCNC: <0.02 NG/ML (ref 0–0.06)
WBC # BLD AUTO: 5.8 K/UL (ref 4.6–13.2)

## 2017-08-21 PROCEDURE — 80048 BASIC METABOLIC PNL TOTAL CA: CPT | Performed by: INTERNAL MEDICINE

## 2017-08-21 PROCEDURE — 74011250637 HC RX REV CODE- 250/637: Performed by: PHYSICIAN ASSISTANT

## 2017-08-21 PROCEDURE — 96374 THER/PROPH/DIAG INJ IV PUSH: CPT

## 2017-08-21 PROCEDURE — 74011636637 HC RX REV CODE- 636/637: Performed by: PHYSICIAN ASSISTANT

## 2017-08-21 PROCEDURE — 74011250636 HC RX REV CODE- 250/636: Performed by: INTERNAL MEDICINE

## 2017-08-21 PROCEDURE — 82550 ASSAY OF CK (CPK): CPT | Performed by: INTERNAL MEDICINE

## 2017-08-21 PROCEDURE — 94640 AIRWAY INHALATION TREATMENT: CPT

## 2017-08-21 PROCEDURE — 74011250636 HC RX REV CODE- 250/636: Performed by: PHYSICIAN ASSISTANT

## 2017-08-21 PROCEDURE — 85025 COMPLETE CBC W/AUTO DIFF WBC: CPT | Performed by: PHYSICIAN ASSISTANT

## 2017-08-21 PROCEDURE — 99285 EMERGENCY DEPT VISIT HI MDM: CPT

## 2017-08-21 PROCEDURE — 84703 CHORIONIC GONADOTROPIN ASSAY: CPT | Performed by: PHYSICIAN ASSISTANT

## 2017-08-21 PROCEDURE — 80053 COMPREHEN METABOLIC PANEL: CPT | Performed by: PHYSICIAN ASSISTANT

## 2017-08-21 PROCEDURE — 96375 TX/PRO/DX INJ NEW DRUG ADDON: CPT

## 2017-08-21 PROCEDURE — 83690 ASSAY OF LIPASE: CPT | Performed by: PHYSICIAN ASSISTANT

## 2017-08-21 PROCEDURE — 93005 ELECTROCARDIOGRAM TRACING: CPT

## 2017-08-21 PROCEDURE — 77030013140 HC MSK NEB VYRM -A

## 2017-08-21 PROCEDURE — 74011000250 HC RX REV CODE- 250: Performed by: PHYSICIAN ASSISTANT

## 2017-08-21 RX ORDER — DEXTROSE 50 % IN WATER (D50W) INTRAVENOUS SYRINGE
25
Status: COMPLETED | OUTPATIENT
Start: 2017-08-21 | End: 2017-08-21

## 2017-08-21 RX ORDER — ONDANSETRON 2 MG/ML
4 INJECTION INTRAMUSCULAR; INTRAVENOUS
Status: COMPLETED | OUTPATIENT
Start: 2017-08-21 | End: 2017-08-21

## 2017-08-21 RX ORDER — ALBUTEROL SULFATE 0.83 MG/ML
SOLUTION RESPIRATORY (INHALATION)
Status: DISPENSED
Start: 2017-08-21 | End: 2017-08-22

## 2017-08-21 RX ORDER — SODIUM POLYSTYRENE SULFONATE 15 G/60ML
30 SUSPENSION ORAL; RECTAL
Status: COMPLETED | OUTPATIENT
Start: 2017-08-21 | End: 2017-08-21

## 2017-08-21 RX ORDER — ALBUTEROL SULFATE 0.83 MG/ML
5 SOLUTION RESPIRATORY (INHALATION)
Status: COMPLETED | OUTPATIENT
Start: 2017-08-21 | End: 2017-08-21

## 2017-08-21 RX ADMIN — ALBUTEROL SULFATE 5 MG: 2.5 SOLUTION RESPIRATORY (INHALATION) at 22:34

## 2017-08-21 RX ADMIN — SODIUM CHLORIDE 500 ML: 900 INJECTION, SOLUTION INTRAVENOUS at 22:28

## 2017-08-21 RX ADMIN — ONDANSETRON 4 MG: 2 INJECTION INTRAMUSCULAR; INTRAVENOUS at 23:17

## 2017-08-21 RX ADMIN — SODIUM POLYSTYRENE SULFONATE 30 G: 15 SUSPENSION ORAL; RECTAL at 23:04

## 2017-08-21 RX ADMIN — INSULIN HUMAN 10 UNITS: 100 INJECTION, SOLUTION PARENTERAL at 22:41

## 2017-08-21 RX ADMIN — DEXTROSE MONOHYDRATE 25 G: 25 INJECTION, SOLUTION INTRAVENOUS at 22:41

## 2017-08-21 NOTE — IP AVS SNAPSHOT
[address] [place] [address] 
[phone] Patient: Quinn Rendon MRN: [id number] QQ/3/7541 You are allergic to the following Allergen Reactions Vancomycin Other (comments) Felt like her body was burning Aspirin Nausea and Vomiting Pt reports aspirin gave her a stomach ulcer. Vicodin (Hydrocodone-Acetaminophen) Nausea and Vomiting Recent Documentation Height Weight BMI OB Status Smoking Status 1.651 m 88.9 kg 32.61 kg/m2 Hysterectomy Never Smoker Emergency Contacts Name Discharge Info Relation Home Work Mobile [address] [facility] CAREGIVER [3] Mother [14]   [phone] Marlys CAREGIVER [3] Daughter [21] [phone] [facility] Members  Child [2] [phone] About your hospitalization You were admitted on:  2017 You last received care in the:  [facility] You were discharged on:  2017 Unit phone number:  [phone] Why you were hospitalized Your primary diagnosis was:  Diastolic Dysfunction Your diagnoses also included:  Hyperkalemia, Diarrhea, Esrd On Dialysis (Hcc) Providers Seen During Your Hospitalizations Provider Role Specialty Primary office phone De Card MD Attending Provider Emergency Medicine [phone] Rea Lee MD Attending Provider Internal Medicine [phone] Your Primary Care Physician (PCP) Primary Care Physician Office Phone Office Fax Tammi Prashant [phone] Follow-up Information Follow up With Details Comments Contact Info Ysabel Montenegro MD On 2017 Follow-up appointment @ 1:15 p.m. [address] [place] [address] [address] 47[address] [facility] 
[phone] [facility]  Patient will see physician while on dialysis. [phone] Starr Sandoval MD   [address] [place] [address] [address] 1700 [facility] 
[phone] Your Appointments Wednesday August 30, 2017  1:15 PM EDT Follow Up with Alina Wilson MD  
[facility] ([address])  
 [address] [facility] [address] 1700 [facility]  
[phone] Current Discharge Medication List  
  
CONTINUE these medications which have NOT CHANGED Dose & Instructions Dispensing Information Comments Morning Noon Evening Bedtime  
 albuterol 90 mcg/actuation inhaler Commonly known as:  PROVENTIL HFA, VENTOLIN HFA, PROAIR HFA Your last dose was: Your next dose is:    
   
   
 Dose:  2 Puff Take 2 Puffs by inhalation every six (6) hours as needed for Wheezing. Refills:  0 AMBIEN 5 mg tablet Generic drug:  zolpidem Your last dose was: Your next dose is:    
   
   
 Dose:  5 mg Take 5 mg by mouth nightly as needed for Sleep. Indications: dialysis days Refills:  0  
     
   
   
   
  
 diphenhydrAMINE 50 mg tablet Commonly known as:  BENADRYL Your last dose was: Your next dose is:    
   
   
 Dose:  50 mg Take 50 mg by mouth Q TU, TH & SAT. Indications: predialysis med Refills:  0  
     
   
   
   
  
 pravastatin 20 mg tablet Commonly known as:  PRAVACHOL Your last dose was: Your next dose is:    
   
   
 Dose:  20 mg Take 20 mg by mouth nightly. Indications: hypercholesterolemia Refills:  0  
     
   
   
   
  
 promethazine 25 mg tablet Commonly known as:  PHENERGAN Your last dose was: Your next dose is:    
   
   
 Dose:  25 mg Take 25 mg by mouth every six (6) hours as needed. Dialysis premed Refills:  0  
     
   
   
   
  
 sevelamer 800 mg tablet Commonly known as:  RENAGEL Your last dose was: Your next dose is:    
   
   
 Dose:  2400 mg Take 2,400 mg by mouth two (2) times a day. Refills:  0 Discharge Instructions Diarrhea: Care Instructions Your Care Instructions Diarrhea is loose, watery stools (bowel movements). The exact cause is often hard to find. Sometimes diarrhea is your body's way of getting rid of what caused an upset stomach. Viruses, food poisoning, and many medicines can cause diarrhea. Some people get diarrhea in response to emotional stress, anxiety, or certain foods. Almost everyone has diarrhea now and then. It usually isn't serious, and your stools will return to normal soon. The important thing to do is replace the fluids you have lost, so you can prevent dehydration. The doctor has checked you carefully, but problems can develop later. If you notice any problems or new symptoms, get medical treatment right away. Follow-up care is a key part of your treatment and safety. Be sure to make and go to all appointments, and call your doctor if you are having problems. It's also a good idea to know your test results and keep a list of the medicines you take. How can you care for yourself at home? · Watch for signs of dehydration, which means your body has lost too much water. Dehydration is a serious condition and should be treated right away. Signs of dehydration are: 
¨ Increasing thirst and dry eyes and mouth. ¨ Feeling faint or lightheaded. ¨ Darker urine, and a smaller amount of urine than normal. 
· To prevent dehydration, drink plenty of fluids, enough so that your urine is light yellow or clear like water. Choose water and other caffeine-free clear liquids until you feel better. If you have kidney, heart, or liver disease and have to limit fluids, talk with your doctor before you increase the amount of fluids you drink. · Begin eating small amounts of mild foods the next day, if you feel like it. ¨ Try yogurt that has live cultures of Lactobacillus. (Check the label.) ¨ Avoid spicy foods, fruits, alcohol, and caffeine until 48 hours after all symptoms are gone. ¨ Avoid chewing gum that contains sorbitol. ¨ Avoid dairy products (except for yogurt with Lactobacillus) while you have diarrhea and for 3 days after symptoms are gone. · The doctor may recommend that you take over-the-counter medicine, such as loperamide (Imodium), if you still have diarrhea after 6 hours. Read and follow all instructions on the label. Do not use this medicine if you have bloody diarrhea, a high fever, or other signs of serious illness. Call your doctor if you think you are having a problem with your medicine. When should you call for help? Call 911 anytime you think you may need emergency care. For example, call if: 
· You passed out (lost consciousness). · Your stools are maroon or very bloody. Call your doctor now or seek immediate medical care if: 
· You are dizzy or lightheaded, or you feel like you may faint. · Your stools are black and look like tar, or they have streaks of blood. · You have new or worse belly pain. · You have symptoms of dehydration, such as: ¨ Dry eyes and a dry mouth. ¨ Passing only a little dark urine. ¨ Feeling thirstier than usual. 
· You have a new or higher fever. Watch closely for changes in your health, and be sure to contact your doctor if: 
· Your diarrhea is getting worse. · You see pus in the diarrhea. · You are not getting better after 2 days (48 hours). Where can you learn more? Go to http://jaquan-treva.info/. Enter N346 in the search box to learn more about \"Diarrhea: Care Instructions. \" Current as of: March 20, 2017 Content Version: 11.3 © 0024-7756 [employer]. Care instructions adapted under license by [employer] (which disclaims liability or warranty for this information).  If you have questions about a medical condition or this instruction, always ask your healthcare professional. [address] any warranty or liability for your use of this information. Discharge Orders None OpenDoor Announcement We are excited to announce that we are making your provider's discharge notes available to you in OpenDoor. You will see these notes when they are completed and signed by the physician that discharged you from your recent hospital stay. If you have any questions or concerns about any information you see in OpenDoor, please call the Health Information Department where you were seen or reach out to your Primary Care Provider for more information about your plan of care. Introducing [facility]! Deandre Jefferson introduces OpenDoor patient portal. Now you can access parts of your medical record, email your doctor's office, and request medication refills online. 1. In your internet browser, go to [email or web address] 2. Click on the First Time User? Click Here link in the Sign In box. You will see the New Member Sign Up page. 3. Enter your OpenDoor Access Code exactly as it appears below. You will not need to use this code after youve completed the sign-up process. If you do not sign up before the expiration date, you must request a new code. · OpenDoor Access Code: [email or web address] Expires: 11/20/2017  5:44 PM 
 
4. Enter the last four digits of your Social Security Number (xxxx) and Date of Birth (mm/dd/yyyy) as indicated and click Submit. You will be taken to the next sign-up page. 5. Create a OpenDoor ID. This will be your OpenDoor login ID and cannot be changed, so think of one that is secure and easy to remember. 6. Create a OpenDoor password. You can change your password at any time. 7. Enter your Password Reset Question and Answer. This can be used at a later time if you forget your password. 8. Enter your e-mail address. You will receive e-mail notification when new information is available in [address] 9. Click Sign Up. You can now view and download portions of your medical record. 10. Click the Download Summary menu link to download a portable copy of your medical information. If you have questions, please visit the Frequently Asked Questions section of the [facility] website. Remember, MyChart is NOT to be used for urgent needs. For medical emergencies, dial 911. Now available from your iPhone and Android! General Information Please provide this summary of care documentation to your next provider. Patient Signature:  ____________________________________________________________ Date:  ____________________________________________________________  
  
[facility] Provider Signature:  ____________________________________________________________ Date:  ____________________________________________________________

## 2017-08-21 NOTE — IP AVS SNAPSHOT
[facility] 
 
 
 [address] [place] [address] 
[phone] Patient: Tyrel Bain MRN: [id number] NPN:1/2/6410 Current Discharge Medication List  
  
CONTINUE these medications which have NOT CHANGED Dose & Instructions Dispensing Information Comments Morning Noon Evening Bedtime  
 albuterol 90 mcg/actuation inhaler Commonly known as:  PROVENTIL HFA, VENTOLIN HFA, PROAIR HFA Your last dose was: Your next dose is:    
   
   
 Dose:  2 Puff Take 2 Puffs by inhalation every six (6) hours as needed for Wheezing. Refills:  0 AMBIEN 5 mg tablet Generic drug:  zolpidem Your last dose was: Your next dose is:    
   
   
 Dose:  5 mg Take 5 mg by mouth nightly as needed for Sleep. Indications: dialysis days Refills:  0  
     
   
   
   
  
 diphenhydrAMINE 50 mg tablet Commonly known as:  BENADRYL Your last dose was: Your next dose is:    
   
   
 Dose:  50 mg Take 50 mg by mouth Q TU, TH & SAT. Indications: predialysis med Refills:  0  
     
   
   
   
  
 pravastatin 20 mg tablet Commonly known as:  PRAVACHOL Your last dose was: Your next dose is:    
   
   
 Dose:  20 mg Take 20 mg by mouth nightly. Indications: hypercholesterolemia Refills:  0  
     
   
   
   
  
 promethazine 25 mg tablet Commonly known as:  PHENERGAN Your last dose was: Your next dose is:    
   
   
 Dose:  25 mg Take 25 mg by mouth every six (6) hours as needed. Dialysis premed Refills:  0  
     
   
   
   
  
 sevelamer 800 mg tablet Commonly known as:  RENAGEL Your last dose was: Your next dose is:    
   
   
 Dose:  2400 mg Take 2,400 mg by mouth two (2) times a day. Refills:  0

## 2017-08-21 NOTE — ED TRIAGE NOTES
Patient with complaints of diarrhea and abdominal pain that started last night. Sepsis Screening completed    (  )Patient meets SIRS criteria. ( x )Patient does not meet SIRS criteria.       SIRS Criteria is achieved when two or more of the following are present   Temperature < 96.8°F (36°C) or > 100.9°F (38.3°C)   Heart Rate > 90 beats per minute   Respiratory Rate > 20 breaths per minute   WBC count > 12,000 or <4,000 or > 10% bands

## 2017-08-21 NOTE — Clinical Note
Patient Class[de-identified] Observation [238] Type of Bed: Telemetry [19] Reason for Observation: elevated cr hyeprkal 
 Admitting Diagnosis: Hyperkalemia [141246] Admitting Physician: Carlos Manuel Pérez [[id number]] Attending Physician: Carlos Manuel Pérez [[id number]]

## 2017-08-22 VITALS
RESPIRATION RATE: 18 BRPM | HEIGHT: 65 IN | TEMPERATURE: 98.5 F | OXYGEN SATURATION: 100 % | HEART RATE: 88 BPM | SYSTOLIC BLOOD PRESSURE: 130 MMHG | BODY MASS INDEX: 32.65 KG/M2 | WEIGHT: 195.99 LBS | DIASTOLIC BLOOD PRESSURE: 71 MMHG

## 2017-08-22 LAB
ALBUMIN SERPL-MCNC: 3 G/DL (ref 3.4–5)
ALBUMIN/GLOB SERPL: 0.8 {RATIO} (ref 0.8–1.7)
ALP SERPL-CCNC: 401 U/L (ref 45–117)
ALT SERPL-CCNC: 7 U/L (ref 13–56)
ANION GAP SERPL CALC-SCNC: 14 MMOL/L (ref 3–18)
AST SERPL-CCNC: 8 U/L (ref 15–37)
BASOPHILS # BLD: 0 K/UL (ref 0–0.06)
BASOPHILS NFR BLD: 0 % (ref 0–2)
BILIRUB SERPL-MCNC: 0.3 MG/DL (ref 0.2–1)
BUN SERPL-MCNC: 78 MG/DL (ref 7–18)
BUN/CREAT SERPL: 5 (ref 12–20)
CALCIUM SERPL-MCNC: 9 MG/DL (ref 8.5–10.1)
CHLORIDE SERPL-SCNC: 97 MMOL/L (ref 100–108)
CO2 SERPL-SCNC: 22 MMOL/L (ref 21–32)
CREAT SERPL-MCNC: 14.6 MG/DL (ref 0.6–1.3)
DIFFERENTIAL METHOD BLD: ABNORMAL
EOSINOPHIL # BLD: 0.1 K/UL (ref 0–0.4)
EOSINOPHIL NFR BLD: 1 % (ref 0–5)
ERYTHROCYTE [DISTWIDTH] IN BLOOD BY AUTOMATED COUNT: 15 % (ref 11.6–14.5)
GLOBULIN SER CALC-MCNC: 3.9 G/DL (ref 2–4)
GLUCOSE BLD STRIP.AUTO-MCNC: 101 MG/DL (ref 70–110)
GLUCOSE BLD STRIP.AUTO-MCNC: 107 MG/DL (ref 70–110)
GLUCOSE BLD STRIP.AUTO-MCNC: 50 MG/DL (ref 70–110)
GLUCOSE BLD STRIP.AUTO-MCNC: 75 MG/DL (ref 70–110)
GLUCOSE BLD STRIP.AUTO-MCNC: 79 MG/DL (ref 70–110)
GLUCOSE BLD STRIP.AUTO-MCNC: 93 MG/DL (ref 70–110)
GLUCOSE SERPL-MCNC: 151 MG/DL (ref 74–99)
HCT VFR BLD AUTO: 33.2 % (ref 35–45)
HGB BLD-MCNC: 10.9 G/DL (ref 12–16)
LYMPHOCYTES # BLD: 0.9 K/UL (ref 0.9–3.6)
LYMPHOCYTES NFR BLD: 17 % (ref 21–52)
MCH RBC QN AUTO: 30.6 PG (ref 24–34)
MCHC RBC AUTO-ENTMCNC: 32.8 G/DL (ref 31–37)
MCV RBC AUTO: 93.3 FL (ref 74–97)
MONOCYTES # BLD: 0.4 K/UL (ref 0.05–1.2)
MONOCYTES NFR BLD: 7 % (ref 3–10)
NEUTS SEG # BLD: 4 K/UL (ref 1.8–8)
NEUTS SEG NFR BLD: 75 % (ref 40–73)
PLATELET # BLD AUTO: 272 K/UL (ref 135–420)
PMV BLD AUTO: 9.6 FL (ref 9.2–11.8)
POTASSIUM SERPL-SCNC: 4.9 MMOL/L (ref 3.5–5.5)
PROT SERPL-MCNC: 6.9 G/DL (ref 6.4–8.2)
RBC # BLD AUTO: 3.56 M/UL (ref 4.2–5.3)
SODIUM SERPL-SCNC: 133 MMOL/L (ref 136–145)
WBC # BLD AUTO: 5.4 K/UL (ref 4.6–13.2)

## 2017-08-22 PROCEDURE — 85025 COMPLETE CBC W/AUTO DIFF WBC: CPT | Performed by: INTERNAL MEDICINE

## 2017-08-22 PROCEDURE — 36415 COLL VENOUS BLD VENIPUNCTURE: CPT | Performed by: INTERNAL MEDICINE

## 2017-08-22 PROCEDURE — 99218 HC RM OBSERVATION: CPT

## 2017-08-22 PROCEDURE — 96372 THER/PROPH/DIAG INJ SC/IM: CPT

## 2017-08-22 PROCEDURE — 80053 COMPREHEN METABOLIC PANEL: CPT | Performed by: INTERNAL MEDICINE

## 2017-08-22 PROCEDURE — 74011250636 HC RX REV CODE- 250/636: Performed by: INTERNAL MEDICINE

## 2017-08-22 PROCEDURE — 74011250637 HC RX REV CODE- 250/637: Performed by: INTERNAL MEDICINE

## 2017-08-22 PROCEDURE — 82962 GLUCOSE BLOOD TEST: CPT

## 2017-08-22 PROCEDURE — 90935 HEMODIALYSIS ONE EVALUATION: CPT

## 2017-08-22 RX ORDER — PRAVASTATIN SODIUM 20 MG/1
20 TABLET ORAL
Status: DISCONTINUED | OUTPATIENT
Start: 2017-08-22 | End: 2017-08-22 | Stop reason: HOSPADM

## 2017-08-22 RX ORDER — ALBUTEROL SULFATE 90 UG/1
2 AEROSOL, METERED RESPIRATORY (INHALATION)
Status: DISCONTINUED | OUTPATIENT
Start: 2017-08-22 | End: 2017-08-22 | Stop reason: HOSPADM

## 2017-08-22 RX ORDER — HEPARIN SODIUM 1000 [USP'U]/ML
INJECTION, SOLUTION INTRAVENOUS; SUBCUTANEOUS
Status: DISCONTINUED
Start: 2017-08-22 | End: 2017-08-22 | Stop reason: HOSPADM

## 2017-08-22 RX ORDER — SEVELAMER CARBONATE 800 MG/1
2400 TABLET, FILM COATED ORAL 2 TIMES DAILY
Status: DISCONTINUED | OUTPATIENT
Start: 2017-08-22 | End: 2017-08-22 | Stop reason: HOSPADM

## 2017-08-22 RX ORDER — HEPARIN SODIUM 5000 [USP'U]/ML
5000 INJECTION, SOLUTION INTRAVENOUS; SUBCUTANEOUS EVERY 8 HOURS
Status: DISCONTINUED | OUTPATIENT
Start: 2017-08-22 | End: 2017-08-22 | Stop reason: HOSPADM

## 2017-08-22 RX ADMIN — PRAVASTATIN SODIUM 20 MG: 20 TABLET ORAL at 01:01

## 2017-08-22 RX ADMIN — SEVELAMER CARBONATE 2400 MG: 800 TABLET, FILM COATED ORAL at 09:16

## 2017-08-22 RX ADMIN — HEPARIN SODIUM 5000 UNITS: 5000 INJECTION, SOLUTION INTRAVENOUS; SUBCUTANEOUS at 09:17

## 2017-08-22 RX ADMIN — HEPARIN SODIUM 5000 UNITS: 5000 INJECTION, SOLUTION INTRAVENOUS; SUBCUTANEOUS at 01:01

## 2017-08-22 NOTE — DISCHARGE INSTRUCTIONS
Diarrhea: Care Instructions  Your Care Instructions    Diarrhea is loose, watery stools (bowel movements). The exact cause is often hard to find. Sometimes diarrhea is your body's way of getting rid of what caused an upset stomach. Viruses, food poisoning, and many medicines can cause diarrhea. Some people get diarrhea in response to emotional stress, anxiety, or certain foods. Almost everyone has diarrhea now and then. It usually isn't serious, and your stools will return to normal soon. The important thing to do is replace the fluids you have lost, so you can prevent dehydration. The doctor has checked you carefully, but problems can develop later. If you notice any problems or new symptoms, get medical treatment right away. Follow-up care is a key part of your treatment and safety. Be sure to make and go to all appointments, and call your doctor if you are having problems. It's also a good idea to know your test results and keep a list of the medicines you take. How can you care for yourself at home? · Watch for signs of dehydration, which means your body has lost too much water. Dehydration is a serious condition and should be treated right away. Signs of dehydration are:  ¨ Increasing thirst and dry eyes and mouth. ¨ Feeling faint or lightheaded. ¨ Darker urine, and a smaller amount of urine than normal.  · To prevent dehydration, drink plenty of fluids, enough so that your urine is light yellow or clear like water. Choose water and other caffeine-free clear liquids until you feel better. If you have kidney, heart, or liver disease and have to limit fluids, talk with your doctor before you increase the amount of fluids you drink. · Begin eating small amounts of mild foods the next day, if you feel like it. ¨ Try yogurt that has live cultures of Lactobacillus. (Check the label.)  ¨ Avoid spicy foods, fruits, alcohol, and caffeine until 48 hours after all symptoms are gone.   ¨ Avoid chewing gum that contains sorbitol. ¨ Avoid dairy products (except for yogurt with Lactobacillus) while you have diarrhea and for 3 days after symptoms are gone. · The doctor may recommend that you take over-the-counter medicine, such as loperamide (Imodium), if you still have diarrhea after 6 hours. Read and follow all instructions on the label. Do not use this medicine if you have bloody diarrhea, a high fever, or other signs of serious illness. Call your doctor if you think you are having a problem with your medicine. When should you call for help? Call 911 anytime you think you may need emergency care. For example, call if:  · You passed out (lost consciousness). · Your stools are maroon or very bloody. Call your doctor now or seek immediate medical care if:  · You are dizzy or lightheaded, or you feel like you may faint. · Your stools are black and look like tar, or they have streaks of blood. · You have new or worse belly pain. · You have symptoms of dehydration, such as:  ¨ Dry eyes and a dry mouth. ¨ Passing only a little dark urine. ¨ Feeling thirstier than usual.  · You have a new or higher fever. Watch closely for changes in your health, and be sure to contact your doctor if:  · Your diarrhea is getting worse. · You see pus in the diarrhea. · You are not getting better after 2 days (48 hours). Where can you learn more? Go to http://jaquan-treva.info/. Enter Z738 in the search box to learn more about \"Diarrhea: Care Instructions. \"  Current as of: March 20, 2017  Content Version: 11.3  © 0222-9952 [employer]. Care instructions adapted under license by [employer] (which disclaims liability or warranty for this information). If you have questions about a medical condition or this instruction, always ask your healthcare professional. [address] any warranty or liability for your use of this information.

## 2017-08-22 NOTE — DIALYSIS
ACUTE HEMODIALYSIS FLOW SHEET  HEMODIALYSIS ORDERS: Physician: Milla Plascencia H-H   Dialyzer:  Duration: 3, changed from 3.5 hr  BFR: 450   DFR: 800Labs:  N/A   Dialysate:  Temp 36.5 K+   2    Ca+  2.5 Na 140 Bicarb 37   Weight:  88.9 kg    Bed Scale [x]  Dry weight/UF Goal: 2000ml   Heparin [x]  Bolus 2000 Units @1530  Access AVG Needle Gauge 15    Pre BP:   119/73    Pulse:     89     Temperature:   99.1  Respirations: 18     Additional Orders(medications, blood products, hypotension management):   [x] N/A     [x] Time Out/Safety Check  [x] [facility] Consent Verified     GRAFT/FISTULA ACCESS:  []N/A     []Right     [x]Left     [x]UE     []LE   [x]AVG   []AVF        []Buttonhole    [x]Medical Aseptic Prep Utilized   [x]No S/S infection  []Redness  []Drainage []Cultured []Swelling []Pain  Bruit:   [] Strong    [x] Weak       Thrill :   [x] Strong    [] Weak     Needle Gauge: 15   Length: 1 IN   If access problem,  notified: []Yes     [x]N/A  Date:        Please describe access if present and not used:N/A     GENERAL ASSESSMENT:    LUNGS:  Rate 18 SaO2% 100  [] N/A    [x] Clear x 5 lobes   Cough: []Productive  []Dry  [x]N/A   Respirations:  [x]Easy  []Labored   Therapy:  [x]RA  []NC  l/min       CARDIAC: [x]Regular      [x]  Monitored    Rhythm: NSR   EDEMA: [] None  [x]Generalized  [] Nonpitting   [x] Facial     SKIN:   [x] Warm  [] Hot     [] Cold   [x] Dry     [] Pale   [] Diaphoretic      LOC:    [x] Alert      [x]Oriented:    [x] Person     [x] Place  [x]Time   GI / ABDOMEN: [x] Soft    [] Firm   [x]  Obese[x] Bowel Sounds -LBM 8/22/17, formed   / URINE ASSESSMENT:  [x] Anuria   [x]  Bathroom Privileges   PAIN: [x] 0 []1  []2   []3   []4   []5   []6   []7   []8   []9   []10            Scale 0-10  Action/Follow Up: N/A   MOBILITY:  [x] Amb    [] Amb/Assist    [] Bed    [] Wheelchair  [] Stretcher      All Vitals and Treatment Details on Attached [address]: [facility]       Room # 360   [] 1st Time Acute  [] Stat  [x] Routine  [] Urgent     [] Acute Room  [x]  Bedside  [] ICU/CCU  [] ER   Isolation Precautions:  [x] Dialysis   [] Airborne   [] Contact    [] Reverse   Special Considerations:         [] Blood Consent Verified [x]N/A     ALLERGIES: Vancomycin, aspirin, vicodin    Code Status:  [x] Full Code  [] DNR  [] Other           HBsAg ONLY: Date Drawn 8/8/17        [x]Negative []Positive []Unknown   HBsAb: Date     [] Susceptible   [] Nphikh59 [x]Not Drawn  [] Drawn     Current Labs:    Date of Labs: Today [x]     Results for Cuba Lucreo (MRN [id number]) as of 8/22/2017 14:41   Ref. Range 8/22/2017 02:30   WBC Latest Ref Range: 4.6 - 13.2 K/uL 5.4   RBC Latest Ref Range: 4.20 - 5.30 M/uL 3.56 (L)   HGB Latest Ref Range: 12.0 - 16.0 g/dL 10.9 (L)   HCT Latest Ref Range: 35.0 - 45.0 % 33.2 (L)   MCV Latest Ref Range: 74.0 - 97.0 FL 93.3   MCH Latest Ref Range: 24.0 - 34.0 PG 30.6   MCHC Latest Ref Range: 31.0 - 37.0 g/dL 32.8   RDW Latest Ref Range: 11.6 - 14.5 % 15.0 (H)   PLATELET Latest Ref Range: 135 - 420 K/uL 272   MPV Latest Ref Range: 9.2 - 11.8 FL 9.6   NEUTROPHILS Latest Ref Range: 40 - 73 % 75 (H)   LYMPHOCYTES Latest Ref Range: 21 - 52 % 17 (L)   MONOCYTES Latest Ref Range: 3 - 10 % 7   EOSINOPHILS Latest Ref Range: 0 - 5 % 1   BASOPHILS Latest Ref Range: 0 - 2 % 0   DF Latest Units:   AUTOMATED   ABS. NEUTROPHILS Latest Ref Range: 1.8 - 8.0 K/UL 4.0   ABS. LYMPHOCYTES Latest Ref Range: 0.9 - 3.6 K/UL 0.9   ABS. MONOCYTES Latest Ref Range: 0.05 - 1.2 K/UL 0.4   ABS. EOSINOPHILS Latest Ref Range: 0.0 - 0.4 K/UL 0.1   ABS.  BASOPHILS Latest Ref Range: 0.0 - 0.06 K/UL 0.0   Sodium Latest Ref Range: 136 - 145 mmol/L 133 (L)   Potassium Latest Ref Range: 3.5 - 5.5 mmol/L 4.9   Chloride Latest Ref Range: 100 - 108 mmol/L 97 (L)   CO2 Latest Ref Range: 21 - 32 mmol/L 22   Anion gap Latest Ref Range: 3.0 - 18 mmol/L 14   Glucose Latest Ref Range: 74 - 99 mg/dL 151 (H)   BUN Latest Ref Range: 7.0 - 18 MG/DL 78 (H)   Creatinine Latest Ref Range: 0.6 - 1.3 MG/DL 14.60 (H)   BUN/Creatinine ratio Latest Ref Range: 12 - 20   5 (L)   Calcium Latest Ref Range: 8.5 - 10.1 MG/DL 9.0   GFR est non-AA Latest Ref Range: >60 ml/min/1.73m2 3 (L)   GFR est AA Latest Ref Range: >60 ml/min/1.73m2 3 (L)   Bilirubin, total Latest Ref Range: 0.2 - 1.0 MG/DL 0.3   Protein, total Latest Ref Range: 6.4 - 8.2 g/dL 6.9   Albumin Latest Ref Range: 3.4 - 5.0 g/dL 3.0 (L)   Globulin Latest Ref Range: 2.0 - 4.0 g/dL 3.9   A-G Ratio Latest Ref Range: 0.8 - 1.7   0.8   ALT (SGPT) Latest Ref Range: 13 - 56 U/L 7 (L)   AST Latest Ref Range: 15 - 37 U/L 8 (L)   Alk.  phosphatase Latest Ref Range: 45 - 117 U/L 401 (H)                                                                                                                              DIET:  [x] Renal    [] Other     [] NPO     []  Diabetic      PRIMARY NURSE REPORT: First initial/Last name/Title    Pre Dialysis: Ashleigh Palafox RN    Time: 1232      EDUCATION:    [x] Patient [] Other         Knowledge Basis: []None [x]Minimal [] Substantial   Barriers to learning  [x]N/A     [x]K+- pt came in w/ high k, explained risks involved w/ hypo and hyperkalemia    []Albumin     [] Medications     [] Tx Options     [] Transplant     [] Diet     [] Other   Teaching Tools:  [x] Explain  [] Demo  [] Handouts [] Video  Patient response:   [x] Verbalized understanding  [] Teach back  [] Return demonstration [] Requires follow up:n/a       RO/HEMODIALYSIS MACHINE SAFETY CHECKS  Before each treatment:     Machine Number:                   [facility] [place]                                  [] Portable Machine #1/RO serial # [id number]                                  [x] Portable Machine #2/RO serial # [id number]  Alarm Test:  Pass time 1339         Other:         [x] RO/Machine Log Complete  [x]Extracorporeal Circuit Tested for integrity   Temp    36.6    Dialysate: pH  7.4 Conductivity: Meter   14.2 HD Machine   14       TCD: 13.9     Dialyzer Lot # [id number]            Blood Tubing Lot # [id number]          Saline Lot #  [id number]     CHLORINE TESTING-Before each treatment and every 4 hours    Total Chlorine: [x] less than 0.1 ppm  Time: 1315 4 Hr/2nd Check Time: n/a   (if greater than 0.1 ppm from Primary then every 30 minutes from Secondary)     TREATMENT INITIATION  with Dialysis Precautions:   [x] All Connections Secured                 [x] Saline Line Double Clamped   [x] Venous Parameters Set                  [x] Arterial Parameters Set    [x] Prime Given  250 ml               [x]Air Foam Detector Engaged      Treatment Initiation Note:   4575- flushed pt due to h/o clotting, two clots noted one in arterial, one in venous, contacted Dr. Milla Plascencia in re: heparin bolus, awaiting response at this time. [address] [facility] aware that pt usually dialyzes 3 hours and recieves heparin bolus @ center. H e authorized nurse to administer 2000u bolus to assist w/ clotting. Medication Dose Volume Route Initials Dialyzer Cleared: [] Good [] Fair  [x] Poor- clots noted Blood processed:  61.1 L  UF Removed  2000 Ml ,Est post Wt: 86.9 kg Post BP:  130/71       Pulse: 88      Respirations: 18  Temperature: 98.5   N/A                         Post Tx Vascular Access: AVF/AVG: Bleeding stopped Art 10 min. Julius. 8 Min                                    Catheter: Locking solution: Heparin 1:1000   N/A                                 Post Assessment: No significant changes from above assessment.                                    Skin:  [x] Warm  [x] Dry [] Diaphoretic    [] Flushed  [] Pale [] Cyanotic   [facility] Signatures Title Initials  Time Lungs: [x] Clear    [] Course  [] Crackles  [] Wheezing [] Diminished   [place] Median Enrique Peter, RN 1700 Cardiac: [x] Regular   [] Irregular   [x] Monitor, Rhythm: NSR       Edema:  [] None    [x] General     [x] Facial   [] Pedal    [] UE    [] LE       Pain: [x]0  []1  []2   []3  []4   []5   []6 []7   []8   []9   []10     Post Treatment Note::Pt tolerated HD well, net UF removed 2000 ml.      POST TREATMENT PRIMARY NURSE HANDOFF REPORT:   First initial/Last name/Title       Post Dialysis: Seamus Laguna RN Time:  5667

## 2017-08-22 NOTE — H&P
History & Physical    Patient: Gill Wallace MRN: [id number]  CSN: [id number]    YOB: 1975  Age: 38 y.o. Sex: female      DOA: 8/21/2017  Primary Care Provider:  Rosa Omer MD      Assessment/Plan     Patient Active Problem List   Diagnosis Code    CAP (community acquired pneumonia) J18.9    ESRD on dialysis ([address] N18.6, Z99.2    Sepsis ([address] A41.9    Anemia D64.9    Hyponatremia E87.1    Dehydration E86.0    Prolonged Q-T interval on ECG P79.67    Diastolic dysfunction A48.3    Infected prosthetic vascular graft ([address] T82. 7XXA    Bacteremia due to Staphylococcus aureus R78.81    C. difficile colitis A04.7    PNA (pneumonia) J18.9    Hypokalemia E87.6    Hypoglycemia E16.2    Constipation K59.00    ESRD (end stage renal disease) ([facility]) N18.6    Hyperkalemia E87.5    Diarrhea R19.7       1. Diarrhea, watery per patient   2. Hyperkalemia, without ekg changes   3. ESRD on HD, T/Th/Sat  FULL CODE     -admit for obs at this time   -albuterol, insulin and D50 given in ED, slight improvement in Josy Walters ex ordered as well.   -she will need HD, nephro consulted- appreciate input  -order stool studies- WBC, cultures and C diff. If neg she can be on antidiarrhea, otherwise she will need appropriate test and treatment   -renal diet  -supportive care   -no need for CT A/P at this time         CC: diarrhea       HPI:     Gill Wallace is a 38 y.o. female who comes to the ED with complaints of diarrhea. Patient states she started having watery diarrhea yesterday and since than she has had about 20 episodes. Its accompanied by sharp generalized abdominal pain at times along with maybe some chills. She dneies any other complaints. According to her she has not been on any recent Abx, no recent illness, and she received her routine HD last Saturday. In the ED she was noted to be hyperK. She was given Insulin, d50 and Albuterol. Charis Boeck ex was ordered and nephro was consulted.      No other complaints to me. Past Medical History:   Diagnosis Date    Chronic kidney disease     ESRD    Dialysis patient ([address]     PUD (peptic ulcer disease) 2004       Past Surgical History:   Procedure Laterality Date    HX HYSTERECTOMY      HX OTHER SURGICAL Left     dialysis graft arm; removed    HX TONSILLECTOMY      HX TRANSPLANT  2004    renal transplant    HX VASCULAR ACCESS Right     Dialysis catheter    HX VASCULAR ACCESS Left     AV graft, (\"does not work\")    VASCULAR SURGERY PROCEDURE UNLIST         Family History   Problem Relation Age of Onset    Hypertension Brother     Diabetes Maternal Grandmother        Social History     Social History    Marital status: UNKNOWN     Spouse name: N/A    Number of children: N/A    Years of education: N/A     Social History Main Topics    Smoking status: Never Smoker    Smokeless tobacco: Never Used    Alcohol use No    Drug use: No    Sexual activity: No     Other Topics Concern    None     Social History Narrative       Prior to Admission medications    Medication Sig Start Date End Date Taking? Authorizing Provider   diphenhydrAMINE (BENADRYL) 50 mg tablet Take 50 mg by mouth Q TU, TH & SAT. Indications: predialysis med    Historical Provider   albuterol (PROVENTIL HFA, VENTOLIN HFA, PROAIR HFA) 90 mcg/actuation inhaler Take 2 Puffs by inhalation every six (6) hours as needed for Wheezing. Historical Provider   sevelamer (RENAGEL) 800 mg tablet Take 2,400 mg by mouth two (2) times a day. Historical Provider   pravastatin (PRAVACHOL) 20 mg tablet Take 20 mg by mouth nightly. Indications: hypercholesterolemia    Historical Provider   zolpidem (AMBIEN) 5 mg tablet Take 5 mg by mouth nightly as needed for Sleep. Indications: dialysis days    Historical Provider   promethazine (PHENERGAN) 25 mg tablet Take 25 mg by mouth every six (6) hours as needed.  Dialysis premed     Historical Provider       Allergies   Allergen Reactions    Vancomycin Other (comments)     Felt like her body was burning    Aspirin Nausea and Vomiting     Pt reports aspirin gave her a stomach ulcer.  Vicodin [Hydrocodone-Acetaminophen] Nausea and Vomiting       Review of Systems  Gen: No fever, malaise, weight loss/gain. Heent: No headache, rhinorrhea, epistaxis, ear pain, hearing loss, sinus pain, neck pain/stiffness, sore throat. Heart: No chest pain, palpitations, IZQUIERDO, pnd, or orthopnea. Resp: No cough, hemoptysis, wheezing and shortness of breath. GI: No nausea, vomiting,  constipation, melena or hematochezia. : No urinary obstruction, dysuria or hematuria. Derm: No rash, new skin lesion or pruritis. Musc/skeletal: no bone or joint complains. Vasc: No edema, cyanosis or claudication. Endo: No heat/cold intolerance, no polyuria,polydipsia or polyphagia. Neuro: No unilateral weakness, numbness, tingling. No seizures. Heme: No easy bruising or bleeding. Physical Exam:     Physical Exam:  Visit Vitals    /68 (BP 1 Location: Left leg)    Pulse 90    Temp 98.5 °F (36.9 °C)    Resp 16    Ht 5' 5\" (1.651 m)    Wt 88.9 kg (195 lb 15.8 oz)    LMP 2012    SpO2 100%    BMI 32.61 kg/m2      O2 Device: Room air    Temp (24hrs), Av.2 °F (36.8 °C), Min:97.8 °F (36.6 °C), Max:98.5 °F (36.9 °C)             General:  Awake, cooperative, no distress. Head:  Normocephalic, without obvious abnormality, atraumatic. Eyes:  Conjunctivae/corneas clear, sclera anicteric, PERRL, EOMs intact. Nose: Nares normal. No drainage or sinus tenderness. Throat: Lips, mucosa, and tongue normal.    Neck: Supple, symmetrical, trachea midline, no adenopathy. Lungs:   Clear to auscultation bilaterally. Heart:  Regular rate and rhythm, S1, S2 normal, no murmur, click, rub or gallop. Abdomen: Soft, non-tender. Bowel sounds normal. No masses,  No organomegaly. Extremities: Extremities normal, atraumatic, no cyanosis or edema.  Capillary refill normal.   Pulses: 2+ and symmetric all extremities. Skin: Skin color pink/pale/mottled/flushed, turgor normal. No rashes or lesions   Neurologic: CNII-XII intact. No focal motor or sensory deficit. Labs Reviewed:      Recent Results (from the past 24 hour(s))   CBC WITH AUTOMATED DIFF    Collection Time: 08/21/17  9:15 PM   Result Value Ref Range    WBC 5.8 4.6 - 13.2 K/uL    RBC 4.05 (L) 4.20 - 5.30 M/uL    HGB 12.6 12.0 - 16.0 g/dL    HCT 37.9 35.0 - 45.0 %    MCV 93.6 74.0 - 97.0 FL    MCH 31.1 24.0 - 34.0 PG    MCHC 33.2 31.0 - 37.0 g/dL    RDW 14.9 (H) 11.6 - 14.5 %    PLATELET 207 690 - 465 K/uL    MPV 9.9 9.2 - 11.8 FL    NEUTROPHILS 69 40 - 73 %    LYMPHOCYTES 22 21 - 52 %    MONOCYTES 7 3 - 10 %    EOSINOPHILS 2 0 - 5 %    BASOPHILS 0 0 - 2 %    ABS. NEUTROPHILS 4.1 1.8 - 8.0 K/UL    ABS. LYMPHOCYTES 1.3 0.9 - 3.6 K/UL    ABS. MONOCYTES 0.4 0.05 - 1.2 K/UL    ABS. EOSINOPHILS 0.1 0.0 - 0.4 K/UL    ABS. BASOPHILS 0.0 0.0 - 0.06 K/UL    DF AUTOMATED     LIPASE    Collection Time: 08/21/17  9:15 PM   Result Value Ref Range    Lipase 173 73 - 382 U/L   METABOLIC PANEL, COMPREHENSIVE    Collection Time: 08/21/17  9:15 PM   Result Value Ref Range    Sodium 131 (L) 136 - 145 mmol/L    Potassium 8.2 (HH) 3.5 - 5.5 mmol/L    Chloride 96 (L) 100 - 108 mmol/L    CO2 26 21 - 32 mmol/L    Anion gap 9 3.0 - 18 mmol/L    Glucose 81 74 - 99 mg/dL    BUN 74 (H) 7.0 - 18 MG/DL    Creatinine 14.48 (H) 0.6 - 1.3 MG/DL    BUN/Creatinine ratio 5 (L) 12 - 20      GFR est AA 3 (L) >60 ml/min/1.73m2    GFR est non-AA 3 (L) >60 ml/min/1.73m2    Calcium 9.5 8.5 - 10.1 MG/DL    Bilirubin, total 0.4 0.2 - 1.0 MG/DL    ALT (SGPT) 10 (L) 13 - 56 U/L    AST (SGOT) 10 (L) 15 - 37 U/L    Alk.  phosphatase 480 (H) 45 - 117 U/L    Protein, total 8.2 6.4 - 8.2 g/dL    Albumin 3.7 3.4 - 5.0 g/dL    Globulin 4.5 (H) 2.0 - 4.0 g/dL    A-G Ratio 0.8 0.8 - 1.7     HCG QL SERUM    Collection Time: 08/21/17  9:15 PM   Result Value Ref Range    HCG, Ql. NEGATIVE  NEG     METABOLIC PANEL, BASIC    Collection Time: 08/21/17  9:50 PM   Result Value Ref Range    Sodium 132 (L) 136 - 145 mmol/L    Potassium 7.2 (HH) 3.5 - 5.5 mmol/L    Chloride 96 (L) 100 - 108 mmol/L    CO2 26 21 - 32 mmol/L    Anion gap 10 3.0 - 18 mmol/L    Glucose 78 74 - 99 mg/dL    BUN 76 (H) 7.0 - 18 MG/DL    Creatinine 14.47 (H) 0.6 - 1.3 MG/DL    BUN/Creatinine ratio 5 (L) 12 - 20      GFR est AA 3 (L) >60 ml/min/1.73m2    GFR est non-AA 3 (L) >60 ml/min/1.73m2    Calcium 9.5 8.5 - 10.1 MG/DL   CARDIAC PANEL,(CK, CKMB & TROPONIN)    Collection Time: 08/21/17  9:50 PM   Result Value Ref Range    CK 49 26 - 192 U/L    CK - MB <1.0 <3.6 ng/ml    CK-MB Index  0.0 - 4.0 %     CALCULATION NOT PERFORMED WHEN RESULT IS BELOW LINEAR LIMIT    Troponin-I, Qt. <0.02 0.00 - 0.06 NG/ML   EKG, 12 LEAD, INITIAL    Collection Time: 08/21/17 10:20 PM   Result Value Ref Range    Ventricular Rate 86 BPM    Atrial Rate 86 BPM    P-R Interval 174 ms    QRS Duration 92 ms    Q-T Interval 392 ms    QTC Calculation (Bezet) 469 ms    Calculated P Axis 74 degrees    Calculated R Axis 22 degrees    Calculated T Axis 60 degrees    Diagnosis       Normal sinus rhythm  Low voltage QRS  Septal infarct , age undetermined  Abnormal ECG  When compared with ECG of 10-APR-2017 14:14,  Questionable change in QRS duration         Procedures/imaging: see electronic medical records for all procedures/Xrays and details which were not copied into this note but were reviewed prior to creation of Plan    50 minutes of care time spent in the direct evaluation and treatment of this high risk patient.      CC: Nathalie Cabezas MD

## 2017-08-22 NOTE — PROGRESS NOTES
TRANSFER - IN REPORT:    Verbal report received from AL Singer R.N. on [place]  being received from Emergency Dept. for routine progression of care      Report consisted of patients Situation, Background, Assessment and   Recommendations(SBAR). Information from the following report(s) SBAR, Kardex, ED Summary, Procedure Summary, Intake/Output, MAR, Recent Results and Cardiac Rhythm NSR was reviewed with the receiving nurse. Opportunity for questions and clarification was provided. Assessment completed upon patients arrival to unit and care assumed. 2315: Assumed patient care, she was alert and oriented to person, place, time and situation. Respiratory status was stable on room. Vital signs were stable. MEWS score was a one. Patient denied any nausea vomiting dizziness or anxiety. White board was updated and explained. Bed was locked and in lowest position. Call bell, water and personal belongings were within reach. Patient had no questions, comments or concerns after bedside shift report. 0700: Patient had an uneventful shift. Respiratory status, vital signs and MEWS score remained stable. Patient was resting quietly with no signs of distress noted. Bed locked and in lowest position. Call bell water and personal belongings were within reach. Patient had no questions, comments or concerns after bedside shift report.  Bedside report given to Di Pnieda R.N.

## 2017-08-22 NOTE — ED NOTES
Pt is dialysis pt - T/Th/Sat - last dialyzed Thursday - graft in LUE w/ pulse palpable. Pt does not make urine. Onset last pm diarrhea and ongoing today w/ abd cramping associated - no ill contacts that she is aware of. Pt reports about 20 episodes of diarrhea since last pm. No n/v reported.

## 2017-08-22 NOTE — ROUTINE PROCESS
Dual AVS reviewed with Darian Abdullahi RN. All medications reviewed individually with patient. Opportunities for questions and concerns provided. Patient discharged via (mode of transport ie. Car, ambulance or air transport) car. Patient's arm band appropriately discarded.

## 2017-08-22 NOTE — PROGRESS NOTES
Nephrology HD Note    Subjective:     Silvia Alcazar is a 38 y.o. AA female with HTN, DM, anemia, ESRD on HD TTS at [facility] unit. Pt was admitted for diarrhea and hyperkalemia, received medical treatment. Dialysis arranged. Seen on dialysis currently. No cp/sob    Admit Date: 8/21/2017  Patient Active Problem List   Diagnosis Code    CAP (community acquired pneumonia) J18.9    ESRD on dialysis ([address] N18.6, Z99.2    Sepsis ([address] A41.9    Anemia D64.9    Hyponatremia E87.1    Dehydration E86.0    Prolonged Q-T interval on ECG C33.25    Diastolic dysfunction W57.2    Infected prosthetic vascular graft ([facility] T82. 7XXA    Bacteremia due to Staphylococcus aureus R78.81    C. difficile colitis A04.7    PNA (pneumonia) J18.9    Hypokalemia E87.6    Hypoglycemia E16.2    Constipation K59.00    ESRD (end stage renal disease) ([facility]) N18.6    Hyperkalemia E87.5    Diarrhea R19.7     Current Facility-Administered Medications   Medication Dose Route Frequency    pravastatin (PRAVACHOL) tablet 20 mg  20 mg Oral QHS    sevelamer carbonate (RENVELA) tab 2,400 mg  2,400 mg Oral BID    albuterol (PROVENTIL HFA, VENTOLIN HFA, PROAIR HFA) inhaler 2 Puff  2 Puff Inhalation Q6H PRN    heparin (porcine) injection 5,000 Units  5,000 Units SubCUTAneous Q8H       Allergy:   Allergies   Allergen Reactions    Vancomycin Other (comments)     Felt like her body was burning    Aspirin Nausea and Vomiting     Pt reports aspirin gave her a stomach ulcer.      Vicodin [Hydrocodone-Acetaminophen] Nausea and Vomiting        Objective:     Visit Vitals    /75    Pulse 87    Temp 99.1 °F (37.3 °C) (Oral)    Resp 18    Ht 5' 5\" (1.651 m)    Wt 88.9 kg (195 lb 15.8 oz)    LMP 12/01/2012    SpO2 100%    BMI 32.61 kg/m2       Intake/Output Summary (Last 24 hours) at 08/22/17 1517  Last data filed at 08/22/17 1212   Gross per 24 hour   Intake              600 ml   Output                0 ml   Net              600 ml Physical Exam:       General: No acute distress   HENT: Atraumatic and normocephalic   Eyes: Normal conjunctiva   Neck: Supple    Cardiovascular: Normal S1 & S2, no m/r/g   Pulmonary/Chest Wall: Clear to auscultation bilaterally   Abdominal: Soft and non-tender   Musculoskeletal: trace edema   Neurological: No focal deficits     Access:  Has good Qb today    Data Review:  Recent Labs      08/22/17 0230  08/21/17   2150   NA  133*  132*   K  4.9  7.2*   CL  97*  96*   CO2  22  26   BUN  78*  76*   CREA  14.60*  14.47*   GLU  151*  78   CA  9.0  9.5     Recent Labs      08/22/17 0230 08/21/17   2115   WBC  5.4  5.8   HGB  10.9*  12.6   HCT  33.2*  37.9   PLT  272  316     Recent Labs      08/22/17 0230 08/21/17   2115   SGOT  8*  10*   AP  401*  480*   TP  6.9  8.2   ALB  3.0*  3.7   GLOB  3.9  4.5*     No results for input(s): INR, PTP, APTT in the last 72 hours. No lab exists for component: INREXT   No results for input(s): IRON, FE, TIBC, IBCT, PSAT, FERR in the last 72 hours. Most recent labs: reviewed.     Impression:     ESRD on HD TTS  Hyperkalemia, resolved  Anemia in CKD  HTN    Plan:     Dialysis today in progress  Use current access for HD  D/w pt and dialysis staff    MD Gaudencio Bird  [phone]

## 2017-08-22 NOTE — ED NOTES
TRANSFER - OUT REPORT:    Verbal report given to hillary meza(name) on Jayshree Barragan  being transferred to [facility](unit) for routine progression of care       Report consisted of patients Situation, Background, Assessment and   Recommendations(SBAR). Information from the following report(s) SBAR, Kardex, ED Summary, MAR, Recent Results, Med Rec Status and Cardiac Rhythm nsr was reviewed with the receiving nurse. Lines:   Peripheral IV 08/21/17 Right Antecubital (Active)   Site Assessment Clean, dry, & intact 8/21/2017  9:16 PM   Phlebitis Assessment 0 8/21/2017  9:16 PM   Infiltration Assessment 0 8/21/2017  9:16 PM   Dressing Status Clean, dry, & intact 8/21/2017  9:16 PM   Dressing Type Tape;Transparent 8/21/2017  9:16 PM   Hub Color/Line Status Blue;Capped;Flushed;Positional 8/21/2017  9:16 PM   Action Taken Blood drawn 8/21/2017  9:16 PM   Alcohol Cap Used Yes 8/21/2017  9:16 PM       Peripheral IV 08/21/17 Right Other(comment) (Active)   Site Assessment Clean, dry, & intact 8/21/2017 10:44 PM   Phlebitis Assessment 0 8/21/2017 10:44 PM   Infiltration Assessment 0 8/21/2017 10:44 PM   Dressing Status Clean, dry, & intact 8/21/2017 10:44 PM   Dressing Type Transparent 8/21/2017 10:44 PM        Opportunity for questions and clarification was provided.       Patient transported with:   Monitor  Tech

## 2017-08-22 NOTE — DISCHARGE SUMMARY
Discharge Summary    Patient: Jose Miguel Figueroa MRN: [id number]  CSN: [id number]    YOB: 1975  Age: 38 y.o. Sex: female    DOA: 8/21/2017 LOS:  LOS: 0 days   Discharge Date:      Primary Care Provider:  Reynaldo Mota MD    Admission Diagnoses: Hyperkalemia  Hyperkalemia  Diarrhea    Discharge Diagnoses:    Hospital Problems  Date Reviewed: 8/21/2017          Codes Class Noted POA    Hyperkalemia ICD-10-CM: E87.5  ICD-9-CM: 276.7  8/21/2017 Unknown        Diarrhea ICD-10-CM: R19.7  ICD-9-CM: 787.91  8/21/2017 Unknown        * (Principal)Diastolic dysfunction (Chronic) ICD-10-CM: I51.9  ICD-9-CM: 429.9  2/22/2017 Yes        ESRD on dialysis [facility]) ICD-10-CM: N18.6, Z99.2  ICD-9-CM: 585.6, V45.11  2/21/2017 Yes              Discharge Condition: Stable    Discharge Medications:     Current Discharge Medication List      CONTINUE these medications which have NOT CHANGED    Details   diphenhydrAMINE (BENADRYL) 50 mg tablet Take 50 mg by mouth Q TU, TH & SAT. Indications: predialysis med      albuterol (PROVENTIL HFA, VENTOLIN HFA, PROAIR HFA) 90 mcg/actuation inhaler Take 2 Puffs by inhalation every six (6) hours as needed for Wheezing. sevelamer (RENAGEL) 800 mg tablet Take 2,400 mg by mouth two (2) times a day. pravastatin (PRAVACHOL) 20 mg tablet Take 20 mg by mouth nightly. Indications: hypercholesterolemia      zolpidem (AMBIEN) 5 mg tablet Take 5 mg by mouth nightly as needed for Sleep. Indications: dialysis days      promethazine (PHENERGAN) 25 mg tablet Take 25 mg by mouth every six (6) hours as needed. Dialysis premed              Procedures : none     Consults: Nephrology      PHYSICAL EXAM   Visit Vitals    /75    Pulse 89    Temp 99.1 °F (37.3 °C) (Oral)    Resp 18    Ht 5' 5\" (1.651 m)    Wt 88.9 kg (195 lb 15.8 oz)    SpO2 100%    BMI 32.61 kg/m2     General: Awake, cooperative, no acute distress    HEENT: NC, Atraumatic. PERRLA, EOMI. Anicteric sclerae.   Lungs:  CTA Bilaterally. No Wheezing/Rhonchi/Rales. Heart:  Regular  rhythm,  No murmur, No Rubs, No Gallops  Abdomen: Soft, Non distended, Non tender. +Bowel sounds,   Extremities: No c/c/e  Psych:   Not anxious or agitated. Neurologic:  No acute neurological deficits. Admission HPI :   Siomara Mcnamara is a 38 y.o. female who comes to the ED with complaints of diarrhea. Patient states she started having watery diarrhea yesterday and since than she has had about 20 episodes. Its accompanied by sharp generalized abdominal pain at times along with maybe some chills. She dneies any other complaints. According to her she has not been on any recent Abx, no recent illness, and she received her routine HD last Saturday.      In the ED she was noted to be hyperK. She was given Insulin, d50 and Albuterol. Darl Walters ex was ordered and nephro was consulted.      No other complaints to me. Hospital Course :   Since she was admitted, Calvin Grise was given with insulin and D50 for hyperkalemia, renal was on board and she received HD and tolerated very well. Her hyperkalemia resolved. Also her diarrhea resolved and had solid bm once during her stay.  She remained hemodynamic stable     Activity: Activity as tolerated    Diet: Renal Diet    Follow-up: pcm and hd clinic     Disposition: home     Minutes spent on discharge: 50 min       Labs: Results:       Chemistry Recent Labs      08/22/17 0230 08/21/17 2150 08/21/17 2115   GLU  151*  78  81   NA  133*  132*  131*   K  4.9  7.2*  8.2*   CL  97*  96*  96*   CO2  22  26  26   BUN  78*  76*  74*   CREA  14.60*  14.47*  14.48*   CA  9.0  9.5  9.5   AGAP  14  10  9   BUCR  5*  5*  5*   AP  401*   --   480*   TP  6.9   --   8.2   ALB  3.0*   --   3.7   GLOB  3.9   --   4.5*   AGRAT  0.8   --   0.8      CBC w/Diff Recent Labs      08/22/17   0230 08/21/17 2115   WBC  5.4  5.8   RBC  3.56*  4.05*   HGB  10.9*  12.6   HCT  33.2*  37.9   PLT  272  316   GRANS 75*  69   LYMPH  17*  22   EOS  1  2      Cardiac Enzymes Recent Labs      08/21/17   2150   CPK  49   CKND1  CALCULATION NOT PERFORMED WHEN RESULT IS BELOW LINEAR LIMIT      Coagulation No results for input(s): PTP, INR, APTT in the last 72 hours. No lab exists for component: INREXT    Lipid Panel No results found for: CHOL, CHOLPOCT, CHOLX, CHLST, CHOLV, 271414, HDL, LDL, LDLC, DLDLP, 856096, VLDLC, VLDL, TGLX, TRIGL, TRIGP, TGLPOCT, CHHD, CHHDX   BNP No results for input(s): BNPP in the last 72 hours. Liver Enzymes Recent Labs      08/22/17   0230   TP  6.9   ALB  3.0*   AP  401*   SGOT  8*      Thyroid Studies No results found for: T4, T3U, TSH, TSHEXT         Significant Diagnostic Studies: No results found.           [facility]     CC: Wilson Culp MD

## 2017-08-22 NOTE — PROGRESS NOTES
Chart Reviewed. Noted patient admitted to the hospital for further medical management. Care Management to follow up with patient and/or family for transition of care needs prn.    1145 am Met with patient in room during [facility]. Patient is waiting on dialysis to go home. Dr. Peter Morel informed team patient is a current patient on dilaysis. Stefan Lam RN spoke with Nephgologist. Dr. Wade Gonzalez and he will have his dialyisis nurse come do dialysis today. Patient reports she goes to [facility] on [address]. States she has medicaid cab take her to and from dialysis. States she goes on Tuesday, Thursday and Saturday at 05:30am. CMS to send out updates And clinicals to [facility] on [address]. Patient may be d/c today waiting on dialysis    Readmission Risk Assessment:     Moderate Risk and MSSP/Good Help ACO patients    RRAT Score:  13-20    Initial Assessment: presenting to the ED C/O gradually worsening diarrhea onset last PM. Was admitted for medical mangement of hyperkalemia and diastolic dysfunction     Emergency Contact:  See face sheet    Pertinent Medical Hx:     HTN, DM, Renal Failure    PCP/Specialists: [employer]:       DME:          Moderate Risk Care Transition Plan:  1. Evaluate for [facility] or [facility], SNF, acute rehab, community care coordination of resources. 2. Involve patient/caregiver in assessment, planning, education and implement of intervention. 3. CM daily patient care huddles/interdisciplinary rounds. 4. PCP/Specialist appointment within 5  7 days made prior to discharge. 5. Facilitate transportation and logistics for follow-up appointments. 6. Medication reconciliation [id number]  7. Formal handoff between hospital provider and post-acute provider to transition patient  Handoff to [facility] Nurse Navigator or PCP practice. Care Management Interventions  PCP Verified by CM:  Yes  Transition of Care Consult (CM Consult): Discharge Planning     Care manager available and will assist with safe transition of care    Clinicals placed by CMS for davita.  Plan is possible d/c home today and continue with HD.

## 2017-08-22 NOTE — ED PROVIDER NOTES
HPI Comments: 8:44 PM    Ingrid Vicente is a 38 y.o. Female T/Th/Sat dialysis patient presenting to the ED C/O gradually worsening diarrhea onset last PM. Associated Sx include nausea, chills, and cramping abdominal pain rated 5/10. Pt states she has not taken anything for her Sx yet. Pt reports she does not make urine. Pt reports her only abdominal surgery as a hysterectomy. Pt denies recent antibiotic use and any known ill contacts. Pt denies vomiting, fever, any other symptoms or complaints. Written by RODRIGO Eddy, as dictated by Renay West PA-C     Patient is a 38 y.o. female presenting with diarrhea. The history is provided by the patient. No  was used. Diarrhea    This is a new problem. The current episode started 12 to 24 hours ago. The problem has been gradually worsening. The pain is located in the generalized abdominal region. The quality of the pain is cramping. The pain is at a severity of 5/10. Associated symptoms include diarrhea and nausea. Pertinent negatives include no fever and no vomiting. Past Medical History:   Diagnosis Date    Chronic kidney disease     ESRD    Dialysis patient ([facility] [address]     PUD (peptic ulcer disease) 2004       Past Surgical History:   Procedure Laterality Date    HX HYSTERECTOMY      HX OTHER SURGICAL Left     dialysis graft arm; removed    HX TONSILLECTOMY      HX TRANSPLANT  2004    renal transplant    HX VASCULAR ACCESS Right     Dialysis catheter    HX VASCULAR ACCESS Left     AV graft, (\"does not work\")    VASCULAR SURGERY PROCEDURE UNLIST           Family History:   Problem Relation Age of Onset    Hypertension Brother     Diabetes Maternal Grandmother        Social History     Social History    Marital status: UNKNOWN     Spouse name: N/A    Number of children: N/A    Years of education: N/A     Occupational History    Not on file.      Social History Main Topics    Smoking status: Never Smoker    Smokeless tobacco: Never Used    Alcohol use No    Drug use: No    Sexual activity: No     Other Topics Concern    Not on file     Social History Narrative         ALLERGIES: Vancomycin; Aspirin; and Vicodin [hydrocodone-acetaminophen]    Review of Systems   Constitutional: Positive for chills. Negative for fever. Gastrointestinal: Positive for abdominal pain (\"Cramping\"), diarrhea and nausea. Negative for vomiting. All other systems reviewed and are negative. Vitals:    08/21/17 2234 08/21/17 2245 08/21/17 2251 08/22/17 0015   BP: 121/57 121/57 125/68 125/56   Pulse: 86 86 90 85   Resp:  18 16 16   Temp:  98.5 °F (36.9 °C)  98 °F (36.7 °C)   SpO2:  100% 100% 96%   Weight:       Height:                Physical Exam   Constitutional: She is oriented to person, place, and time. She appears well-developed and well-nourished. No distress. Pt appears well sitting up in bed in no distress, speaking in full sentences without evidence of dyspnea, increased work of breathing or any obvious pain at this time     HENT:   Head: Normocephalic and atraumatic. Neck: Normal range of motion. Neck supple. Cardiovascular: Normal rate, regular rhythm, normal heart sounds and intact distal pulses. No murmur heard. Pulmonary/Chest: Effort normal and breath sounds normal. No respiratory distress. She has no wheezes. She has no rales. Abdominal: Soft. Bowel sounds are normal. She exhibits no distension. There is no tenderness. There is no rebound and no guarding. Non tender to palpation   Old well healed surgical scars to abd    Musculoskeletal:   Dialysis shunt to the LUE with palpable thrill, radial pulse 2+    Neurological: She is alert and oriented to person, place, and time. Skin: Skin is warm and dry. No rash noted. No erythema. Psychiatric: She has a normal mood and affect. Judgment normal.   Nursing note and vitals reviewed.      RESULTS:    PULSE OXIMETRY NOTE:  Pulse-ox is 100% on room air Interpretation: Normal       EKG interpretation: (Preliminary)  10:20 PM   NSR. Rate 86 bpm. Narrow QRS. No ST changes. EKG read by Leah Yu PA-C     No orders to display        Labs Reviewed   CBC WITH AUTOMATED DIFF - Abnormal; Notable for the following:        Result Value    RBC 4.05 (*)     RDW 14.9 (*)     All other components within normal limits   METABOLIC PANEL, COMPREHENSIVE - Abnormal; Notable for the following:     Sodium 131 (*)     Potassium 8.2 (*)     Chloride 96 (*)     BUN 74 (*)     Creatinine 14.48 (*)     BUN/Creatinine ratio 5 (*)     GFR est AA 3 (*)     GFR est non-AA 3 (*)     ALT (SGPT) 10 (*)     AST (SGOT) 10 (*)     Alk. phosphatase 480 (*)     Globulin 4.5 (*)     All other components within normal limits   METABOLIC PANEL, BASIC - Abnormal; Notable for the following:     Sodium 132 (*)     Potassium 7.2 (*)     Chloride 96 (*)     BUN 76 (*)     Creatinine 14.47 (*)     BUN/Creatinine ratio 5 (*)     GFR est AA 3 (*)     GFR est non-AA 3 (*)     All other components within normal limits   GLUCOSE, POC - Abnormal; Notable for the following:     Glucose (POC) 50 (*)     All other components within normal limits   C. DIFFICILE/EPI PCR   CULTURE, STOOL   LIPASE   HCG QL SERUM   CARDIAC PANEL,(CK, CKMB & TROPONIN)   WBC, STOOL   METABOLIC PANEL, COMPREHENSIVE   CBC WITH AUTOMATED DIFF   GLUCOSE, POC   GLUCOSE, POC       Recent Results (from the past 12 hour(s))   CBC WITH AUTOMATED DIFF    Collection Time: 08/21/17  9:15 PM   Result Value Ref Range    WBC 5.8 4.6 - 13.2 K/uL    RBC 4.05 (L) 4.20 - 5.30 M/uL    HGB 12.6 12.0 - 16.0 g/dL    HCT 37.9 35.0 - 45.0 %    MCV 93.6 74.0 - 97.0 FL    MCH 31.1 24.0 - 34.0 PG    MCHC 33.2 31.0 - 37.0 g/dL    RDW 14.9 (H) 11.6 - 14.5 %    PLATELET 462 810 - 391 K/uL    MPV 9.9 9.2 - 11.8 FL    NEUTROPHILS 69 40 - 73 %    LYMPHOCYTES 22 21 - 52 %    MONOCYTES 7 3 - 10 %    EOSINOPHILS 2 0 - 5 %    BASOPHILS 0 0 - 2 %    ABS.  NEUTROPHILS 4.1 1.8 - 8.0 K/UL    ABS. LYMPHOCYTES 1.3 0.9 - 3.6 K/UL    ABS. MONOCYTES 0.4 0.05 - 1.2 K/UL    ABS. EOSINOPHILS 0.1 0.0 - 0.4 K/UL    ABS. BASOPHILS 0.0 0.0 - 0.06 K/UL    DF AUTOMATED     LIPASE    Collection Time: 08/21/17  9:15 PM   Result Value Ref Range    Lipase 173 73 - 032 U/L   METABOLIC PANEL, COMPREHENSIVE    Collection Time: 08/21/17  9:15 PM   Result Value Ref Range    Sodium 131 (L) 136 - 145 mmol/L    Potassium 8.2 (HH) 3.5 - 5.5 mmol/L    Chloride 96 (L) 100 - 108 mmol/L    CO2 26 21 - 32 mmol/L    Anion gap 9 3.0 - 18 mmol/L    Glucose 81 74 - 99 mg/dL    BUN 74 (H) 7.0 - 18 MG/DL    Creatinine 14.48 (H) 0.6 - 1.3 MG/DL    BUN/Creatinine ratio 5 (L) 12 - 20      GFR est AA 3 (L) >60 ml/min/1.73m2    GFR est non-AA 3 (L) >60 ml/min/1.73m2    Calcium 9.5 8.5 - 10.1 MG/DL    Bilirubin, total 0.4 0.2 - 1.0 MG/DL    ALT (SGPT) 10 (L) 13 - 56 U/L    AST (SGOT) 10 (L) 15 - 37 U/L    Alk.  phosphatase 480 (H) 45 - 117 U/L    Protein, total 8.2 6.4 - 8.2 g/dL    Albumin 3.7 3.4 - 5.0 g/dL    Globulin 4.5 (H) 2.0 - 4.0 g/dL    A-G Ratio 0.8 0.8 - 1.7     HCG QL SERUM    Collection Time: 08/21/17  9:15 PM   Result Value Ref Range    HCG, Ql. NEGATIVE  NEG     METABOLIC PANEL, BASIC    Collection Time: 08/21/17  9:50 PM   Result Value Ref Range    Sodium 132 (L) 136 - 145 mmol/L    Potassium 7.2 (HH) 3.5 - 5.5 mmol/L    Chloride 96 (L) 100 - 108 mmol/L    CO2 26 21 - 32 mmol/L    Anion gap 10 3.0 - 18 mmol/L    Glucose 78 74 - 99 mg/dL    BUN 76 (H) 7.0 - 18 MG/DL    Creatinine 14.47 (H) 0.6 - 1.3 MG/DL    BUN/Creatinine ratio 5 (L) 12 - 20      GFR est AA 3 (L) >60 ml/min/1.73m2    GFR est non-AA 3 (L) >60 ml/min/1.73m2    Calcium 9.5 8.5 - 10.1 MG/DL   CARDIAC PANEL,(CK, CKMB & TROPONIN)    Collection Time: 08/21/17  9:50 PM   Result Value Ref Range    CK 49 26 - 192 U/L    CK - MB <1.0 <3.6 ng/ml    CK-MB Index  0.0 - 4.0 %     CALCULATION NOT PERFORMED WHEN RESULT IS BELOW LINEAR LIMIT    Troponin-I, Qt. <0.02 0.00 - 0.06 NG/ML   EKG, 12 LEAD, INITIAL    Collection Time: 08/21/17 10:20 PM   Result Value Ref Range    Ventricular Rate 86 BPM    Atrial Rate 86 BPM    P-R Interval 174 ms    QRS Duration 92 ms    Q-T Interval 392 ms    QTC Calculation (Bezet) 469 ms    Calculated P Axis 74 degrees    Calculated R Axis 22 degrees    Calculated T Axis 60 degrees    Diagnosis       Normal sinus rhythm  Low voltage QRS  Septal infarct , age undetermined  Abnormal ECG  When compared with ECG of 10-APR-2017 14:14,  Questionable change in QRS duration     GLUCOSE, POC    Collection Time: 08/22/17 12:29 AM   Result Value Ref Range    Glucose (POC) 50 (LL) 70 - 110 mg/dL   GLUCOSE, POC    Collection Time: 08/22/17 12:45 AM   Result Value Ref Range    Glucose (POC) 75 70 - 110 mg/dL   GLUCOSE, POC    Collection Time: 08/22/17  1:07 AM   Result Value Ref Range    Glucose (POC) 107 70 - 110 mg/dL      MDM  Number of Diagnoses or Management Options     Amount and/or Complexity of Data Reviewed  Clinical lab tests: ordered and reviewed  Tests in the medicine section of CPT®: ordered and reviewed (EKG)  Discuss the patient with other providers: yes Stefany Gaspar MD (ED Attending)  Keagan Crooks MD (Hospitalist)   Arti Grande MD (Nephrology))  Independent visualization of images, tracings, or specimens: yes (EKG)      ED Course     Medications   albuterol (PROVENTIL VENTOLIN) 2.5 mg /3 mL (0.083 %) nebulizer solution (  Canceled Entry 8/21/17 2240)   pravastatin (PRAVACHOL) tablet 20 mg (20 mg Oral Given 8/22/17 0101)   sevelamer carbonate (RENVELA) tab 2,400 mg (not administered)   albuterol (PROVENTIL HFA, VENTOLIN HFA, PROAIR HFA) inhaler 2 Puff (not administered)   heparin (porcine) injection 5,000 Units (5,000 Units SubCUTAneous Given 8/22/17 0101)   sodium chloride 0.9 % bolus infusion 500 mL (500 mL IntraVENous New Bag 8/21/17 2228)   albuterol (PROVENTIL VENTOLIN) nebulizer solution 5 mg (5 mg Nebulization Given 8/21/17 2234) insulin regular (NOVOLIN R, HUMULIN R) injection 10 Units (10 Units IntraVENous Given 8/21/17 2241)   dextrose (D50W) injection syrg 25 g (25 g IntraVENous Given 8/21/17 2241)   sodium polystyrene (KAYEXALATE) 15 gram/60 mL oral suspension 30 g (30 g Oral Given 8/21/17 2304)   ondansetron (ZOFRAN) injection 4 mg (4 mg IntraVENous Given 8/21/17 2317)      Procedures    PROGRESS NOTE:  8:44 PM  Initial assessment performed. CONSULT NOTE:   9:50 PM  Jacob Reddy PA-C spoke with Jennifer Castro MD   Specialty: ED Attending  Discussed pt's hx, disposition, and available diagnostic and imaging results. Reviewed care plans. Consulting physician agrees with plans as outlined. He recommends re-checking patients potassium, hydrating with 500ml saline, repeat chem 7,and to obtain an EKG. Also iff k > 6.0, to give bicarb iv, give calcium gluconate iv, amp d50/insulin iv r 10 units, and call nephrologist for urgent dialysis   CONSULT NOTE:   10:40 PM  Jacob Reddy PA-C spoke with Claudia Woodward MD   Specialty: Hospitalist  Discussed pt's hx, disposition, and available diagnostic and imaging results over the telephone. Reviewed care plans. Consulting physician agrees with plans as outlined. He will admit the patient to Telemetry for observation. He recommends consulting nephrology for specific recommendations regarding dialysis and treatment of hypokalemia. Requests stool culture, C-diff culture, and stool wbc due to history of C-diff and frequent diarrhea. 10:40 PM  Patient is being admitted to the hospital by Claudia Woodward MD. The results of their tests and reasons for their admission have been discussed with them and/or available family. They convey agreement and understanding for the need to be admitted and for their admission diagnosis. CONDITIONS ON ADMISSION:  MRSA is not present at the time of admission. Wound infection is not present at the time of admission.  Pressure Ulcer is not present at the time of admission. CONSULT NOTE:   10:54 PM  Bridget Prince PA-C spoke with Jeremías Barry MD.    Specialty: Nephrology   Discussed pt's hx, disposition, and available diagnostic and imaging results over the telephone. Reviewed care plans. Consulting physician agrees with plans as outlined. She recommends a couple hours of dialysis and 30g Kayexalate PO.      CLINICAL IMPRESSION:    1. Hyperkalemia    2. ESRD (end stage renal disease) ([facility] [address]    3. Diarrhea, unspecified type        PLAN: Admit to Telemetry    ATTESTATION:  This note is prepared by Daron Walden, acting as Scribe for Bridget Prince PA-C. Bridget Prince PA-C: The scribe's documentation has been prepared under my direction and personally reviewed by me in its entirety. I confirm that the note above accurately reflects all work, treatment, procedures, and medical decision making performed by me. I was personally available for consultation in the emergency department. I have reviewed the chart prior to the patient's discharge and agree with the documentation recorded by the [facility], including the assessment, treatment plan, and disposition.

## 2017-08-22 NOTE — PROGRESS NOTES
1330 Assumed responsibility for patient from Bernice Pedroza, RN  8087 Patient had medium, brown, formed stool. No sample sent to lab due to not meeting criteria for cdiff  1800 Patient completed dialysis and was discharged. Patients had no complaints of discomfort or pain. All belongings accounted for.

## 2017-08-27 LAB
ATRIAL RATE: 86 BPM
CALCULATED P AXIS, ECG09: 74 DEGREES
CALCULATED R AXIS, ECG10: 22 DEGREES
CALCULATED T AXIS, ECG11: 60 DEGREES
DIAGNOSIS, 93000: NORMAL
P-R INTERVAL, ECG05: 174 MS
Q-T INTERVAL, ECG07: 392 MS
QRS DURATION, ECG06: 92 MS
QTC CALCULATION (BEZET), ECG08: 469 MS
VENTRICULAR RATE, ECG03: 86 BPM

## 2017-08-30 ENCOUNTER — OFFICE VISIT (OUTPATIENT)
Dept: VASCULAR SURGERY | Age: 42
End: 2017-08-30

## 2017-08-30 VITALS
HEART RATE: 84 BPM | BODY MASS INDEX: 32.49 KG/M2 | RESPIRATION RATE: 18 BRPM | WEIGHT: 195 LBS | HEIGHT: 65 IN | SYSTOLIC BLOOD PRESSURE: 128 MMHG | DIASTOLIC BLOOD PRESSURE: 82 MMHG

## 2017-08-30 DIAGNOSIS — N18.6 ESRD ON HEMODIALYSIS (HCC): ICD-10-CM

## 2017-08-30 DIAGNOSIS — M79.89 LEFT ARM SWELLING: Primary | ICD-10-CM

## 2017-08-30 DIAGNOSIS — Z99.2 ESRD ON HEMODIALYSIS (HCC): ICD-10-CM

## 2017-08-30 NOTE — MR AVS SNAPSHOT
Visit Information Date & Time Provider Department Dept. Phone Encounter #  
 8/30/2017 11:30 AM Theresa Bray MD [facility] [address] [id number] Upcoming Health Maintenance Date Due Pneumococcal 19-64 Highest Risk (1 of 3 - PCV13) 6/5/1992 DTaP/Tdap/Td series (1 - Tdap) 6/5/1994 PAP AKA CERVICAL CYTOLOGY 6/5/1994 INFLUENZA AGE 9 TO ADULT 8/1/2017 Allergies as of 8/30/2017  Review Complete On: 8/30/2017 By: Leann Lanza RN Severity Noted Reaction Type Reactions Vancomycin High 12/25/2012    Other (comments) Felt like her body was burning Aspirin  02/21/2017    Nausea and Vomiting Pt reports aspirin gave her a stomach ulcer. Vicodin [Hydrocodone-acetaminophen]  12/25/2012    Nausea and Vomiting Current Immunizations  Reviewed on 6/26/2017 No immunizations on file. Not reviewed this visit Vitals BP Pulse Resp Height(growth percentile) Weight(growth percentile) LMP  
 128/82 84 18 5' 5\" (1.651 m) 195 lb (88.5 kg) 12/01/2012 BMI OB Status Smoking Status 32.45 kg/m2 Hysterectomy Never Smoker Vitals History BMI and BSA Data Body Mass Index Body Surface Area  
 32.45 kg/m 2 2.01 m 2 Preferred Pharmacy Pharm[facility] Name Phone [phone] [place] [id number] [phone] Your Updated Medication List  
  
   
This list is accurate as of: 8/30/17 11:49 AM.  Always use your most recent med list.  
  
  
  
  
 albuterol 90 mcg/actuation inhaler Commonly known as:  PROVENTIL HFA, VENTOLIN HFA, PROAIR HFA Take 2 Puffs by inhalation every six (6) hours as needed for Wheezing. AMBIEN 5 mg tablet Generic drug:  zolpidem Take 5 mg by mouth nightly as needed for Sleep. Indications: dialysis days diphenhydrAMINE 50 mg tablet Commonly known as:  BENADRYL Take 50 mg by mouth Q TU, TH & SAT. Indications: predialysis med pravastatin 20 mg tablet Commonly known as:  PRAVACHOL Take 20 mg by mouth nightly. Indications: hypercholesterolemia  
  
 promethazine 25 mg tablet Commonly known as:  PHENERGAN Take 25 mg by mouth every six (6) hours as needed. Dialysis premed  
  
 sevelamer 800 mg tablet Commonly known as:  RENAGEL Take 2,400 mg by mouth two (2) times a day. Introducing [facility] SERVICES! Susan Camacho introduces [employer] patient portal. Now you can access parts of your medical record, email your doctor's office, and request medication refills online. 1. In your internet browser, go to [email or web address] 2. Click on the First Time User? Click Here link in the Sign In box. You will see the New Member Sign Up page. 3. Enter your [employer] Access Code exactly as it appears below. You will not need to use this code after youve completed the sign-up process. If you do not sign up before the expiration date, you must request a new code. · [employer] Access Code: [email or web address] Expires: 11/20/2017  5:44 PM 
 
4. Enter the last four digits of your Social Security Number (xxxx) and Date of Birth (mm/dd/yyyy) as indicated and click Submit. You will be taken to the next sign-up page. 5. Create a [employer] ID. This will be your [employer] login ID and cannot be changed, so think of one that is secure and easy to remember. 6. Create a [employer] password. You can change your password at any time. 7. Enter your Password Reset Question and Answer. This can be used at a later time if you forget your password. 8. Enter your e-mail address. You will receive e-mail notification when new information is available in [address] 9. Click Sign Up. You can now view and download portions of your medical record. 10. Click the Download Summary menu link to download a portable copy of your medical information.  
 
If you have questions, please visit the Frequently Asked Questions section of the "[employer]. Remember, [employer] is NOT to be used for urgent needs. For medical emergencies, dial 911. Now available from your iPhone and Android! Please provide this summary of care documentation to your next provider. Your primary care clinician is listed as Eamon Currie. If you have any questions after today's visit, please call [phone].

## 2017-12-23 ENCOUNTER — HOSPITAL ENCOUNTER (EMERGENCY)
Age: 42
Discharge: HOME | End: 2017-12-23
Attending: EMERGENCY MEDICINE
Payer: MEDICARE

## 2017-12-23 VITALS
HEART RATE: 98 BPM | BODY MASS INDEX: 34.09 KG/M2 | OXYGEN SATURATION: 100 % | HEIGHT: 65 IN | SYSTOLIC BLOOD PRESSURE: 103 MMHG | DIASTOLIC BLOOD PRESSURE: 61 MMHG | TEMPERATURE: 97.7 F | WEIGHT: 204.59 LBS | RESPIRATION RATE: 15 BRPM

## 2017-12-23 DIAGNOSIS — M25.561 ACUTE PAIN OF RIGHT KNEE: Primary | ICD-10-CM

## 2017-12-23 DIAGNOSIS — N18.6 ESRD (END STAGE RENAL DISEASE) (HCC): ICD-10-CM

## 2017-12-23 PROCEDURE — 99282 EMERGENCY DEPT VISIT SF MDM: CPT

## 2017-12-23 RX ORDER — IBUPROFEN 400 MG/1
400 TABLET ORAL
Qty: 20 TAB | Refills: 0 | Status: SHIPPED | OUTPATIENT
Start: 2017-12-23 | End: 2017-12-28

## 2017-12-23 RX ORDER — FAMOTIDINE 20 MG/1
20 TABLET, FILM COATED ORAL 2 TIMES DAILY
Qty: 20 TAB | Refills: 0 | Status: SHIPPED | OUTPATIENT
Start: 2017-12-23 | End: 2018-01-02

## 2017-12-23 NOTE — ED TRIAGE NOTES
Patient complaining of right knee pain x2 days. Patient denies any known injury. Patient states she took Tylenol at home with no relief. Sepsis Screening completed    (  )Patient meets SIRS criteria. ( x )Patient does not meet SIRS criteria.       SIRS Criteria is achieved when two or more of the following are present   Temperature < 96.8°F (36°C) or > 100.9°F (38.3°C)   Heart Rate > 90 beats per minute   Respiratory Rate > 20 breaths per minute   WBC count > 12,000 or <4,000 or > 10% bands

## 2017-12-23 NOTE — ED PROVIDER NOTES
EMERGENCY DEPARTMENT HISTORY AND PHYSICAL EXAM    Date: 12/23/2017  Patient Name: Beverly Maddox    History of Presenting Illness     Chief Complaint   Patient presents with    Knee Pain         History Provided By: Patient    Chief Complaint: knee pain  Duration: 2 Days  Timing:  Worsening  Location: right knee  Quality: Aching  Severity: Moderate  Modifying Factors: worsening with movement  Associated Symptoms: denies any other associated signs or symptoms    Additional History (Context):   10:05 AM   Beverly Maddox is a 38 y.o. female with no significant PMHX of who presents to the emergency department C/O right knee pain. No associated sxs. Pt reports that her left knee first started hurting but now her left knee hurts worse than the right. Pt states that when she is resting her knee is fine but the pain worsens when she is moving her leg. Pt states that she does not work due to her being on dialysis. Pt reports she took Tylenol with no relief. Pt denies trauma or injury, weakness, numbness, gait problems, joint swelling, any other pain and any other sxs or complaints. PCP: Rosa Elena Girard MD    Current Outpatient Prescriptions   Medication Sig Dispense Refill    ibuprofen (MOTRIN) 400 mg tablet Take 1 Tab by mouth every six (6) hours as needed for Pain for up to 5 days. 20 Tab 0    famotidine (PEPCID) 20 mg tablet Take 1 Tab by mouth two (2) times a day for 10 days. Indications: PREVENTION OF STRESS ULCER 20 Tab 0    sevelamer (RENAGEL) 800 mg tablet Take 2,400 mg by mouth two (2) times a day.  diphenhydrAMINE (BENADRYL) 50 mg tablet Take 50 mg by mouth Q TU, TH & SAT. Indications: predialysis med      albuterol (PROVENTIL HFA, VENTOLIN HFA, PROAIR HFA) 90 mcg/actuation inhaler Take 2 Puffs by inhalation every six (6) hours as needed for Wheezing.  pravastatin (PRAVACHOL) 20 mg tablet Take 20 mg by mouth nightly.  Indications: hypercholesterolemia      zolpidem (AMBIEN) 5 mg tablet Take 5 mg by mouth nightly as needed for Sleep. Indications: dialysis days      promethazine (PHENERGAN) 25 mg tablet Take 25 mg by mouth every six (6) hours as needed. Dialysis premed          Past History     Past Medical History:  Past Medical History:   Diagnosis Date    Chronic kidney disease     ESRD    [address] patient ([address]     PUD (peptic ulcer disease) 2004       Past Surgical History:  Past Surgical History:   Procedure Laterality Date    HX HYSTERECTOMY      HX OTHER SURGICAL Left     dialysis graft arm; removed    HX TONSILLECTOMY      HX TRANSPLANT  2004    renal transplant    HX VASCULAR ACCESS Right     Dialysis catheter    HX VASCULAR ACCESS Left     AV graft, (\"does not work\")    VASCULAR SURGERY PROCEDURE UNLIST         Family History:  Family History   Problem Relation Age of Onset    Hypertension Brother     Diabetes Maternal Grandmother        Social History:  Social History   Substance Use Topics    Smoking status: Never Smoker    Smokeless tobacco: Never Used    Alcohol use No       Allergies: Allergies   Allergen Reactions    Vancomycin Other (comments)     Felt like her body was burning    Aspirin Nausea and Vomiting     Pt reports aspirin gave her a stomach ulcer.  Vicodin [Hydrocodone-Acetaminophen] Nausea and Vomiting         Review of Systems   Review of Systems   Constitutional: Negative for chills and fever. Respiratory: Negative for shortness of breath. Cardiovascular: Negative for chest pain. Musculoskeletal: Positive for arthralgias (right knee, left knee, alternating). Negative for gait problem, joint swelling and myalgias. Neurological: Negative for weakness and numbness. All other systems reviewed and are negative.       Physical Exam     Vitals:    12/23/17 1002   BP: 103/61   Pulse: 98   Resp: 15   Temp: 97.7 °F (36.5 °C)   SpO2: 100%   Weight: 92.8 kg (204 lb 9.4 oz)   Height: 5' 5\" (1.651 m)     Physical Exam   Constitutional: She is oriented to person, place, and time. Vital signs are normal. She appears well-developed and well-nourished. No distress. HENT:   Head: Normocephalic and atraumatic. Eyes: Conjunctivae are normal.   Neck: Normal range of motion. Neck supple. Cardiovascular: Normal rate, regular rhythm and normal heart sounds. Pulmonary/Chest: Effort normal and breath sounds normal. No respiratory distress. She has no wheezes. She has no rales. She exhibits no tenderness. Musculoskeletal: Normal range of motion. She exhibits tenderness (right knee generalized). Right knee with generalized tenderness to gentle squeeze (aching per pt). There is no joint laxity, swelling, effusion, or bruising. Normal ROM. Strength 5/5. Bilateral sensation to late touch and tack. No patellar femoral tenderness. Neurological: She is alert and oriented to person, place, and time. Skin: Skin is warm and dry. She is not diaphoretic. Psychiatric: She has a normal mood and affect. Her behavior is normal.   Nursing note and vitals reviewed. Diagnostic Study Results     Labs -   No results found for this or any previous visit (from the past 12 hour(s)). Radiologic Studies -   No orders to display     CT Results  (Last 48 hours)    None        CXR Results  (Last 48 hours)    None          Medications given in the ED-  Medications - No data to display      Medical Decision Making   I am the first provider for this patient. I reviewed the vital signs, available nursing notes, past medical history, past surgical history, family history and social history. Vital Signs-Reviewed the patient's vital signs. Pulse Oximetry Analysis - 100% on RA     Records Reviewed: Nursing Notes    Procedures:  Procedures    ED Course:   10:05 AM  Initial assessment performed. The patients presenting problems have been discussed, and they are in agreement with the care plan formulated and outlined with them.   I have encouraged them to ask questions as they arise throughout their visit. Diagnosis and Disposition       DISCHARGE NOTE:  10:15 AM   Sandor Whiteside  results have been reviewed with her. She has been counseled regarding her diagnosis, treatment, and plan. She verbally conveys understanding and agreement of the signs, symptoms, diagnosis, treatment and prognosis and additionally agrees to follow up as discussed. She also agrees with the care-plan and conveys that all of her questions have been answered. I have also provided discharge instructions for her that include: educational information regarding their diagnosis and treatment, and list of reasons why they would want to return to the ED prior to their follow-up appointment, should her condition change. She has been provided with education for proper emergency department utilization. CLINICAL IMPRESSION:    1. Acute pain of right knee    2. [facility]        PLAN:  1. D/C Home  2. Discharge Medication List as of 12/23/2017 10:20 AM      START taking these medications    Details   ibuprofen (MOTRIN) 400 mg tablet Take 1 Tab by mouth every six (6) hours as needed for Pain for up to 5 days. , Normal, Disp-20 Tab, R-0      famotidine (PEPCID) 20 mg tablet Take 1 Tab by mouth two (2) times a day for 10 days. Indications: PREVENTION OF STRESS ULCER, Normal, Disp-20 Tab, R-0         CONTINUE these medications which have NOT CHANGED    Details   sevelamer (RENAGEL) 800 mg tablet Take 2,400 mg by mouth two (2) times a day., Historical Med      diphenhydrAMINE (BENADRYL) 50 mg tablet Take 50 mg by mouth Q TU, TH & SAT. Indications: predialysis med, Historical Med      albuterol (PROVENTIL HFA, VENTOLIN HFA, PROAIR HFA) 90 mcg/actuation inhaler Take 2 Puffs by inhalation every six (6) hours as needed for Wheezing., Historical Med      pravastatin (PRAVACHOL) 20 mg tablet Take 20 mg by mouth nightly.  Indications: hypercholesterolemia, Historical Med      zolpidem (AMBIEN) 5 mg tablet Take 5 mg by mouth nightly as needed for Sleep. Indications: dialysis days, Historical Med      promethazine (PHENERGAN) 25 mg tablet Take 25 mg by mouth every six (6) hours as needed. Dialysis premed , Historical Med           3. Follow-up Information     Follow up With Details Comments Contact Info    Fifi Villalta MD Schedule an appointment as soon as possible for a visit in 2 days For primary care follow up [address] [facility] EMERGENCY DEPT Go to As needed, if symptoms worsen [address] [place] [address]  [phone]        _______________________________    Attestations: This note is prepared by Gina Ayala, acting as Scribe for Carlie Ingram PA-C. Carlie Ingram PA-C:  The scribe's documentation has been prepared under my direction and personally reviewed by me in its entirety.   I confirm that the note above accurately reflects all work, treatment, procedures, and medical decision making performed by me.  _______________________________

## 2017-12-23 NOTE — DISCHARGE INSTRUCTIONS
End-Stage Renal Disease: Care Instructions  Your Care Instructions    End-stage renal (or kidney) disease happens when your kidneys can no longer do their jobs. They can't remove waste from your blood. And they aren't able to balance your body's fluids and chemicals. This stage of the disease usually occurs after you have chronic kidney disease for years. Now the kidneys work so poorly that you need dialysis or a kidney transplant. Dialysis uses a machine to filter your waste. A transplant is surgery to give you a healthy kidney from another person. Follow-up care is a key part of your treatment and safety. Be sure to make and go to all appointments, and call your doctor if you are having problems. It's also a good idea to know your test results and keep a list of the medicines you take. How can you care for yourself at home? ? · Be safe with medicines. Take your medicines exactly as prescribed. Call your doctor if you have any problems with your medicine. You also may take medicine to control your blood pressure or to treat diabetes. Many people who have diabetes take blood pressure medicine. ? · If you have diabetes, do your best to keep your blood sugar in your target range. You may do this by taking medicine, eating healthy food, and exercising. ? · Follow your dialysis schedule. ? · Do not take ibuprofen (Advil, Motrin), naproxen (Aleve), or similar medicines, unless your doctor tells you to. These may make chronic kidney disease worse. ? · Make sure your doctor knows all of the medicines, vitamins, supplements, and herbal remedies you take. ? · Do not smoke or use other tobacco products. Smoking can reduce blood flow to the kidneys. If you need help quitting, talk to your doctor about stop-smoking programs and medicines. These can increase your chances of quitting for good. ? · Do not drink alcohol or use illegal drugs. ? · If your doctor recommends it, get more exercise.  Walking is a good choice. ? · If you have an advance directive, let your doctor know. It may include a living will and a durable power of  for health care. If you don't have one, you may want to prepare one. It lets your doctor and loved ones know your health care wishes if you become unable to speak for yourself. Diet  ? · Talk to a registered dietitian. He or she can help you make a meal plan that is right for you. Most people with kidney disease need to limit salt (sodium), fluids, and protein. Some also have to limit potassium and phosphorus. When should you call for help? Call 911 anytime you think you may need emergency care. For example, call if:  ? · You passed out (lost consciousness). ?Call your doctor now or seek immediate medical care if:  ? · You have new or worse nausea and vomiting. ? · You have much less urine than normal, or you have no urine. ? · You are feeling confused or cannot think clearly. ? · You have new or more blood in your urine. ? · You have new swelling. ? · You are dizzy or lightheaded, or feel like you may faint. ? Watch closely for changes in your health, and be sure to contact your doctor if you have any problems. Where can you learn more? Go to http://jaquan-treva.info/. Enter T209 in the search box to learn more about \"End-Stage Renal Disease: Care Instructions. \"  Current as of: May 12, 2017  Content Version: 11.4  © 4574-4485 [employer]. Care instructions adapted under license by [employer] (which disclaims liability or warranty for this information). If you have questions about a medical condition or this instruction, always ask your healthcare professional. [place] [address] any warranty or liability for your use of this information. Knee Pain or Injury: Care Instructions  Your Care Instructions    Injuries are a common cause of knee problems.  Sudden (acute) injuries may be caused by a direct blow to the knee. They can also be caused by abnormal twisting, bending, or falling on the knee. Pain, bruising, or swelling may be severe, and may start within minutes of the injury. Overuse is another cause of knee pain. Other causes are climbing stairs, kneeling, and other activities that use the knee. Everyday wear and tear, especially as you get older, also can cause knee pain. Rest, along with home treatment, often relieves pain and allows your knee to heal. If you have a serious knee injury, you may need tests and treatment. Follow-up care is a key part of your treatment and safety. Be sure to make and go to all appointments, and call your doctor if you are having problems. It's also a good idea to know your test results and keep a list of the medicines you take. How can you care for yourself at home? · Be safe with medicines. Read and follow all instructions on the label. ¨ If the doctor gave you a prescription medicine for pain, take it as prescribed. ¨ If you are not taking a prescription pain medicine, ask your doctor if you can take an over-the-counter medicine. · Rest and protect your knee. Take a break from any activity that may cause pain. · Put ice or a cold pack on your knee for 10 to 20 minutes at a time. Put a thin cloth between the ice and your skin. · Prop up a sore knee on a pillow when you ice it or anytime you sit or lie down for the next 3 days. Try to keep it above the level of your heart. This will help reduce swelling. · If your knee is not swollen, you can put moist heat, a heating pad, or a warm cloth on your knee. · If your doctor recommends an elastic bandage, sleeve, or other type of support for your knee, wear it as directed. · Follow your doctor's instructions about how much weight you can put on your leg. Use a cane, crutches, or a walker as instructed. · Follow your doctor's instructions about activity during your healing process.  If you can do mild exercise, slowly increase your activity. · Reach and stay at a healthy weight. Extra weight can strain the joints, especially the knees and hips, and make the pain worse. Losing even a few pounds may help. When should you call for help? Call 911 anytime you think you may need emergency care. For example, call if:  ? · You have symptoms of a blood clot in your lung (called a pulmonary embolism). These may include:  ¨ Sudden chest pain. ¨ Trouble breathing. ¨ Coughing up blood. ?Call your doctor now or seek immediate medical care if:  ? · You have severe or increasing pain. ? · Your leg or foot turns cold or changes color. ? · You cannot stand or put weight on your knee. ? · Your knee looks twisted or bent out of shape. ? · You cannot move your knee. ? · You have signs of infection, such as:  ¨ Increased pain, swelling, warmth, or redness. ¨ Red streaks leading from the knee. ¨ Pus draining from a place on your knee. ¨ A fever. ? · You have signs of a blood clot in your leg (called a deep vein thrombosis), such as:  ¨ Pain in your calf, back of the knee, thigh, or groin. ¨ Redness and swelling in your leg or groin. ? Watch closely for changes in your health, and be sure to contact your doctor if:  ? · You have tingling, weakness, or numbness in your knee. ? · You have any new symptoms, such as swelling. ? · You have bruises from a knee injury that last longer than 2 weeks. ? · You do not get better as expected. Where can you learn more? Go to http://jaquan-treva.info/. Enter K195 in the search box to learn more about \"Knee Pain or Injury: Care Instructions. \"  Current as of: March 20, 2017  Content Version: 11.4  © 1466-7164 [employer]. Care instructions adapted under license by [employer] (which disclaims liability or warranty for this information).  If you have questions about a medical condition or this instruction, always ask your healthcare professional. [employer] disclaims any warranty or liability for your use of this information.

## 2018-03-07 ENCOUNTER — APPOINTMENT (OUTPATIENT)
Dept: GENERAL RADIOLOGY | Age: 43
End: 2018-03-07
Attending: PHYSICIAN ASSISTANT
Payer: MEDICARE

## 2018-03-07 ENCOUNTER — HOSPITAL ENCOUNTER (EMERGENCY)
Age: 43
Discharge: HOME | End: 2018-03-07
Attending: EMERGENCY MEDICINE
Payer: MEDICARE

## 2018-03-07 VITALS
OXYGEN SATURATION: 100 % | RESPIRATION RATE: 20 BRPM | BODY MASS INDEX: 35.08 KG/M2 | HEART RATE: 102 BPM | HEIGHT: 65 IN | SYSTOLIC BLOOD PRESSURE: 171 MMHG | DIASTOLIC BLOOD PRESSURE: 98 MMHG | TEMPERATURE: 97.9 F | WEIGHT: 210.54 LBS

## 2018-03-07 DIAGNOSIS — M25.512 ACUTE PAIN OF LEFT SHOULDER: Primary | ICD-10-CM

## 2018-03-07 DIAGNOSIS — M19.012 OSTEOARTHRITIS OF LEFT SHOULDER, UNSPECIFIED OSTEOARTHRITIS TYPE: ICD-10-CM

## 2018-03-07 PROCEDURE — 99283 EMERGENCY DEPT VISIT LOW MDM: CPT

## 2018-03-07 PROCEDURE — 73030 X-RAY EXAM OF SHOULDER: CPT

## 2018-03-07 RX ORDER — PREDNISONE 10 MG/1
TABLET ORAL
Qty: 21 TAB | Refills: 0 | Status: SHIPPED | OUTPATIENT
Start: 2018-03-07 | End: 2018-06-22

## 2018-03-07 NOTE — DISCHARGE INSTRUCTIONS
Joint Pain: Care Instructions  Your Care Instructions    Many people have small aches and pains from overuse or injury to muscles and joints. Joint injuries often happen during sports or recreation, work tasks, or projects around the home. An overuse injury can happen when you put too much stress on a joint or when you do an activity that stresses the joint over and over, such as using the computer or rowing a boat. You can take action at home to help your muscles and joints get better. You should feel better in 1 to 2 weeks, but it can take 3 months or more to heal completely. Follow-up care is a key part of your treatment and safety. Be sure to make and go to all appointments, and call your doctor if you are having problems. It's also a good idea to know your test results and keep a list of the medicines you take. How can you care for yourself at home? · Do not put weight on the injured joint for at least a day or two. · For the first day or two after an injury, do not take hot showers or baths, and do not use hot packs. The heat could make swelling worse. · Put ice or a cold pack on the sore joint for 10 to 20 minutes at a time. Try to do this every 1 to 2 hours for the next 3 days (when you are awake) or until the swelling goes down. Put a thin cloth between the ice and your skin. · Wrap the injury in an elastic bandage. Do not wrap it too tightly because this can cause more swelling. · Prop up the sore joint on a pillow when you ice it or anytime you sit or lie down during the next 3 days. Try to keep it above the level of your heart. This will help reduce swelling. · Take an over-the-counter pain medicine, such as acetaminophen (Tylenol), ibuprofen (Advil, Motrin), or naproxen (Aleve). Read and follow all instructions on the label. · After 1 or 2 days of rest, begin moving the joint gently.  While the joint is still healing, you can begin to exercise using activities that do not strain or hurt the painful joint. When should you call for help? Call your doctor now or seek immediate medical care if:  ? · You have signs of infection, such as:  ¨ Increased pain, swelling, warmth, and redness. ¨ Red streaks leading from the joint. ¨ A fever. ? Watch closely for changes in your health, and be sure to contact your doctor if:  ? · Your movement or symptoms are not getting better after 1 to 2 weeks of home treatment. Where can you learn more? Go to http://jaquan-treva.info/. Enter P205 in the search box to learn more about \"Joint Pain: Care Instructions. \"  Current as of: March 21, 2017  Content Version: 11.4  © 7487-4650 [employer]. Care instructions adapted under license by [employer] (which disclaims liability or warranty for this information). If you have questions about a medical condition or this instruction, always ask your healthcare professional. [address] any warranty or liability for your use of this information.

## 2018-03-07 NOTE — ED PROVIDER NOTES
EMERGENCY DEPARTMENT HISTORY AND PHYSICAL EXAM    Date: 3/7/2018  Patient Name: Zhang Souza    History of Presenting Illness     Chief Complaint   Patient presents with    Shoulder Pain         History Provided By: Patient    Chief Complaint: shoulder pain  Duration: 2 Weeks  Timing:  Gradual  Location: left shoulder  Quality: Aching  Severity: Mild  Modifying Factors: Hx of shoulder pain  Associated Symptoms: denies any other associated signs or symptoms    Additional History (Context):   5:33 PM  Zhang Souza is a 38 y.o. female with Hx of shoulder pain who is a dialysis pt (TTS) presents to the emergency department C/O left shoulder pain onset 2 weeks ago, worse with movement. Pt was seen by [facility] ortho for right shoulder pain and an injection was given and PT ordered. For 3 weeks pt has been on 600 mg Motrin and muscle relaxer for pain relief. Pt started physical therapy one week ago. Pt denies fall, injury, CP, SOB, and any other sxs or complaints. PCP: Zoila Sandra MD    Current Outpatient Prescriptions   Medication Sig Dispense Refill    predniSONE (STERAPRED DS) 10 mg dose pack Use as directed 21 Tab 0    sevelamer (RENAGEL) 800 mg tablet Take 2,400 mg by mouth two (2) times a day.  pravastatin (PRAVACHOL) 20 mg tablet Take 20 mg by mouth nightly. Indications: hypercholesterolemia      zolpidem (AMBIEN) 5 mg tablet Take 5 mg by mouth nightly as needed for Sleep. Indications: dialysis days      promethazine (PHENERGAN) 25 mg tablet Take 25 mg by mouth every six (6) hours as needed.  Dialysis premed          Past History     Past Medical History:  Past Medical History:   Diagnosis Date    Chronic kidney disease     ESRD    [facility]ialysis patient ([facility] [address]     PUD (peptic ulcer disease) 2004       Past Surgical History:  Past Surgical History:   Procedure Laterality Date    HX HYSTERECTOMY      HX OTHER SURGICAL Left     dialysis graft arm; removed    HX TONSILLECTOMY      HX TRANSPLANT 2004    renal transplant    HX VASCULAR ACCESS Right     Dialysis catheter    HX VASCULAR ACCESS Left     AV graft, (\"does not work\")    VASCULAR SURGERY PROCEDURE UNLIST         Family History:  Family History   Problem Relation Age of Onset    Hypertension Brother     Diabetes Maternal Grandmother        Social History:  Social History   Substance Use Topics    Smoking status: Never Smoker    Smokeless tobacco: Never Used    Alcohol use No       Allergies: Allergies   Allergen Reactions    Vancomycin Other (comments)     Felt like her body was burning    Aspirin Nausea and Vomiting     Pt reports aspirin gave her a stomach ulcer.  Vicodin [Hydrocodone-Acetaminophen] Nausea and Vomiting         Review of Systems   Review of Systems   Respiratory: Negative for shortness of breath. Cardiovascular: Negative for chest pain. Musculoskeletal: Positive for arthralgias. All other systems reviewed and are negative. Physical Exam     Vitals:    03/07/18 1705   BP: (!) 171/98   Pulse: (!) 102   Resp: 20   Temp: 97.9 °F (36.6 °C)   SpO2: 100%   Weight: 95.5 kg (210 lb 8.6 oz)   Height: 5' 5\" (1.651 m)     Physical Exam   Constitutional: She is oriented to person, place, and time. She appears well-developed and well-nourished. Overweight, in NAD   HENT:   Head: Normocephalic and atraumatic. Eyes: Conjunctivae and EOM are normal. Pupils are equal, round, and reactive to light. Neck: Normal range of motion. Neck supple. Cardiovascular: Normal rate and regular rhythm. Pulmonary/Chest: Effort normal and breath sounds normal.   Musculoskeletal: Normal range of motion. She exhibits tenderness. She exhibits no edema or deformity.    LUE: +dialysis shunt noted medial proxial upper arm with +thrill, no swelling erythema lesions or wounds, NVI, +TTP diffuse shoulder deltoid region, remainder of LUE normal atraumatic & with FROM & NVI   Neurological: She is alert and oriented to person, place, and time.   Skin: Skin is warm and dry. Psychiatric: She has a normal mood and affect. Her behavior is normal.   Nursing note and vitals reviewed. Diagnostic Study Results     Labs -   No results found for this or any previous visit (from the past 12 hour(s)). Radiologic Studies -   5:39 PM  RADIOLOGY FINDINGS  Left shoulder X-ray shows mild degenerative change at [facility] joint  Pending review by Radiologist  Recorded by Bhavana Edwards ED Scribe, as dictated by Antelmo Torres PA-C   XR SHOULDER LT AP/LAT MIN 2 V    (Results Pending)     CT Results  (Last 48 hours)    None        CXR Results  (Last 48 hours)    None          Medications given in the ED-  Medications - No data to display      Medical Decision Making   I am the first provider for this patient. I reviewed the vital signs, available nursing notes, past medical history, past surgical history, family history and social history. Vital Signs-Reviewed the patient's vital signs. Procedures:  Procedures    ED Course:   5:33 PM Initial assessment performed. The patients presenting problems have been discussed, and they are in agreement with the care plan formulated and outlined with them. I have encouraged them to ask questions as they arise throughout their visit. Diagnosis and Disposition       DISCHARGE NOTE:  5:40 PM  Yadira May  results have been reviewed with her. She has been counseled regarding her diagnosis, treatment, and plan. She verbally conveys understanding and agreement of the signs, symptoms, diagnosis, treatment and prognosis and additionally agrees to follow up as discussed. She also agrees with the care-plan and conveys that all of her questions have been answered.   I have also provided discharge instructions for her that include: educational information regarding their diagnosis and treatment, and list of reasons why they would want to return to the ED prior to their follow-up appointment, should her condition change. She has been provided with education for proper emergency department utilization. CLINICAL IMPRESSION:    1. Acute pain of left shoulder    2. Osteoarthritis of left shoulder, unspecified osteoarthritis type        PLAN:  1. D/C Home  2. Current Discharge Medication List      START taking these medications    Details   predniSONE (STERAPRED DS) 10 mg dose pack Use as directed  Qty: 21 Tab, Refills: 0           3. Follow-up Information     Follow up With Details Comments Contact Info    Jean Hurd, DO Schedule an appointment as soon as possible for a visit in 2 days or follow up with your orthopedist [address] [place] [address] and [facility]. [address] [place] [address] Drive      [facility] EMERGENCY DEPT  As needed, If symptoms worsen [address] [id number] [phone]        _______________________________    Attestations: This note is prepared by Dinesh Feng , acting as Scribe for Antelmo Torres PA-C. Antelmo Torres PA-C:  The scribe's documentation has been prepared under my direction and personally reviewed by me in its entirety.   I confirm that the note above accurately reflects all work, treatment, procedures, and medical decision making performed by me.  _______________________________

## 2018-04-05 ENCOUNTER — HOSPITAL ENCOUNTER (OUTPATIENT)
Dept: MRI IMAGING | Age: 43
Discharge: HOME | End: 2018-04-05
Attending: ORTHOPAEDIC SURGERY
Payer: MEDICARE

## 2018-04-05 DIAGNOSIS — M25.512 LEFT SHOULDER PAIN: ICD-10-CM

## 2018-04-05 PROCEDURE — 73221 MRI JOINT UPR EXTREM W/O DYE: CPT

## 2018-06-22 ENCOUNTER — HOSPITAL ENCOUNTER (EMERGENCY)
Age: 43
Discharge: HOME | End: 2018-06-22
Attending: EMERGENCY MEDICINE
Payer: MEDICARE

## 2018-06-22 VITALS
RESPIRATION RATE: 16 BRPM | HEIGHT: 65 IN | OXYGEN SATURATION: 100 % | WEIGHT: 210.98 LBS | TEMPERATURE: 98.2 F | SYSTOLIC BLOOD PRESSURE: 96 MMHG | BODY MASS INDEX: 35.15 KG/M2 | HEART RATE: 96 BPM | DIASTOLIC BLOOD PRESSURE: 60 MMHG

## 2018-06-22 DIAGNOSIS — M25.512 CHRONIC LEFT SHOULDER PAIN: Primary | ICD-10-CM

## 2018-06-22 DIAGNOSIS — G89.29 CHRONIC LEFT SHOULDER PAIN: Primary | ICD-10-CM

## 2018-06-22 DIAGNOSIS — M67.912 TENDINOPATHY OF LEFT SHOULDER: ICD-10-CM

## 2018-06-22 PROCEDURE — 99282 EMERGENCY DEPT VISIT SF MDM: CPT

## 2018-06-22 RX ORDER — CHLORZOXAZONE 500 MG/1
500 TABLET ORAL
Qty: 15 TAB | Refills: 0 | Status: SHIPPED | OUTPATIENT
Start: 2018-06-22

## 2018-06-22 RX ORDER — LIDOCAINE 50 MG/G
PATCH TOPICAL
Qty: 15 EACH | Refills: 0 | Status: SHIPPED | OUTPATIENT
Start: 2018-06-22 | End: 2019-09-09

## 2018-06-22 NOTE — ED PROVIDER NOTES
EMERGENCY DEPARTMENT HISTORY AND PHYSICAL EXAM    Date: 6/22/2018  Patient Name: Effie Tee    History of Presenting Illness     Chief Complaint   Patient presents with    Shoulder Pain         History Provided By: Patient    Chief Complaint: left shoulder pain  Duration: 2 months  Timing:  Constant  Location: left shoulder  Quality: Aching and Sharp  Severity: Moderate  Modifying Factors: worse with movement. Associated Symptoms: denies any other associated signs or symptoms    Additional History (Context):   12:09 PM  Effie Tee is a 37 y.o. female with PMHX ESRD on dialysis, PUD, HLP, presents to the emergency department C/O left shoulder pain. Pain is aching for 2 months. No trauma. Worse with movement and better with rest. No relief with OTC pain relievers. Pt has seen Dr. Shyla Avila (ortho) for same in past. Recent MRI revealed tendinoplathy. Pt is awaiting FU on July 13, 2018 with [facility]. Pt denies weakness and numbness, chest pain, SOB, and any other sxs or complaints. PCP: Angel Bell MD    Current Outpatient Prescriptions   Medication Sig Dispense Refill    chlorzoxazone (PARAFON FORTE DSC) 500 mg tablet Take 1 Tab by mouth three (3) times daily as needed for Muscle Spasm(s). 15 Tab 0    lidocaine (LIDODERM) 5 % Apply patch to the affected area for 12 hours a day and remove for 12 hours a day. 15 Each 0    sevelamer (RENAGEL) 800 mg tablet Take 2,400 mg by mouth two (2) times a day.  pravastatin (PRAVACHOL) 20 mg tablet Take 20 mg by mouth nightly. Indications: hypercholesterolemia      zolpidem (AMBIEN) 5 mg tablet Take 5 mg by mouth nightly as needed for Sleep. Indications: dialysis days      promethazine (PHENERGAN) 25 mg tablet Take 25 mg by mouth every six (6) hours as needed.  Dialysis premed          Past History     Past Medical History:  Past Medical History:   Diagnosis Date    Chronic kidney disease     ESRD    [facility] patient ([facility] [address]     PUD (peptic ulcer disease) 2004       Past Surgical History:  Past Surgical History:   Procedure Laterality Date    HX HYSTERECTOMY      HX OTHER SURGICAL Left     dialysis graft arm; removed    HX TONSILLECTOMY      HX TRANSPLANT  2004    renal transplant    HX VASCULAR ACCESS Right     Dialysis catheter    HX VASCULAR ACCESS Left     AV graft, (\"does not work\")    VASCULAR SURGERY PROCEDURE UNLIST         Family History:  Family History   Problem Relation Age of Onset    Hypertension Brother     Diabetes Maternal Grandmother        Social History:  Social History   Substance Use Topics    Smoking status: Never Smoker    Smokeless tobacco: Never Used    Alcohol use No       Allergies: Allergies   Allergen Reactions    Vancomycin Other (comments)     Felt like her body was burning    Aspirin Nausea and Vomiting     Pt reports aspirin gave her a stomach ulcer.  Vicodin [Hydrocodone-Acetaminophen] Nausea and Vomiting         Review of Systems   Review of Systems   Respiratory: Negative for shortness of breath. Cardiovascular: Negative for chest pain. Musculoskeletal: Positive for arthralgias. Negative for joint swelling. Skin: Negative for color change. Neurological: Negative for weakness and numbness. All other systems reviewed and are negative. Physical Exam     Vitals:    06/22/18 1154   BP: 96/60   Pulse: 96   Resp: 16   Temp: 98.2 °F (36.8 °C)   SpO2: 100%   Weight: 95.7 kg (210 lb 15.7 oz)   Height: 5' 5\" (1.651 m)     Physical Exam   Constitutional: She is oriented to person, place, and time. She appears well-developed and well-nourished. No distress. AA female in NAD. Alert. Appears comfortable. In fast track room   HENT:   Head: Normocephalic and atraumatic. Right Ear: External ear normal.   Left Ear: External ear normal.   Nose: Nose normal.   Eyes: Conjunctivae are normal.   Neck: Normal range of motion and full passive range of motion without pain.  No spinous process tenderness and no muscular tenderness present. Normal range of motion present. Cardiovascular: Normal rate, regular rhythm, normal heart sounds and intact distal pulses. Exam reveals no gallop and no friction rub. No murmur heard. Pulmonary/Chest: Effort normal and breath sounds normal. No accessory muscle usage. No tachypnea. No respiratory distress. She has no decreased breath sounds. She has no wheezes. She has no rhonchi. She has no rales. Abdominal: Soft. Musculoskeletal: She exhibits no edema. Left shoulder: She exhibits decreased range of motion, tenderness, swelling and pain. She exhibits no bony tenderness and no deformity. Neurological: She is alert and oriented to person, place, and time. Skin: Skin is warm and dry. No rash noted. She is not diaphoretic. No erythema. Psychiatric: She has a normal mood and affect. Judgment normal.   Nursing note and vitals reviewed. Diagnostic Study Results     Labs -   No results found for this or any previous visit (from the past 12 hour(s)). Radiologic Studies -   No orders to display     CT Results  (Last 48 hours)    None        CXR Results  (Last 48 hours)    None            Medical Decision Making   I am the first provider for this patient. I reviewed the vital signs, available nursing notes, past medical history, past surgical history, family history and social history. Vital Signs-Reviewed the patient's vital signs. Pulse Oximetry Analysis - 100% on RA     ED Course:   12:11 PM Initial assessment performed. The patients presenting problems have been discussed, and they are in agreement with the care plan formulated and outlined with them. I have encouraged them to ask questions as they arise throughout their visit. Provider Notes (Medical Decision Making): sprain, strain, ligamentous injury, tendonitis, OA. Doubt cardiac.      Procedures:  Procedures    Diagnosis and Disposition     Reviewed previous workup including MRI L shoulder revealing tendinopathy. Has appt to see [facility]. Rx muscle relaxer. Lidoderm for chronic pain. No numbness or weakness. Reasons to RTED discussed with pt. All questions answered. Pt feels comfortable going home at this time. Pt expressed understanding and she agrees with plan. DISCHARGE NOTE:  12:13 PM  Edwin Venegas  results have been reviewed with her. She has been counseled regarding her diagnosis, treatment, and plan. She verbally conveys understanding and agreement of the signs, symptoms, diagnosis, treatment and prognosis and additionally agrees to follow up as discussed. She also agrees with the care-plan and conveys that all of her questions have been answered. I have also provided discharge instructions for her that include: educational information regarding their diagnosis and treatment, and list of reasons why they would want to return to the ED prior to their follow-up appointment, should her condition change. She has been provided with education for proper emergency department utilization. CLINICAL IMPRESSION:    1. Chronic left shoulder pain    2. Tendinopathy of left shoulder          PLAN:  1. D/C Home  2. Current Discharge Medication List      START taking these medications    Details   chlorzoxazone (PARAFON FORTE DSC) 500 mg tablet Take 1 Tab by mouth three (3) times daily as needed for Muscle Spasm(s). Qty: 15 Tab, Refills: 0      lidocaine (LIDODERM) 5 % Apply patch to the affected area for 12 hours a day and remove for 12 hours a day. Qty: 15 Each, Refills: 0           3. Follow-up Information     None        ___________________________   Attestations:     SCRIBE ATTESTATION:  This note is prepared by Jose Null, acting as Scribe for Liseth Aguilar PA-C.    PROVIDER ATTESTATION:  Liseth Aguilar PA-C: The scribe's documentation has been prepared under my direction and personally reviewed by me in its entirety.  I confirm that the note above accurately reflects all work, treatment, procedures, and medical decision making performed by me.   _______________________________

## 2018-06-22 NOTE — ED TRIAGE NOTES
C/o sharp left sided shoulder pain for 2 months. Seen here earlier this year for same complaint, noted to have also seen ortho doctor after ED visit.

## 2018-07-17 ENCOUNTER — APPOINTMENT (OUTPATIENT)
Dept: GENERAL RADIOLOGY | Age: 43
End: 2018-07-17
Attending: INTERNAL MEDICINE
Payer: MEDICARE

## 2018-07-17 ENCOUNTER — HOSPITAL ENCOUNTER (EMERGENCY)
Age: 43
Discharge: HOME | End: 2018-07-17
Attending: INTERNAL MEDICINE | Admitting: INTERNAL MEDICINE
Payer: MEDICARE

## 2018-07-17 VITALS
OXYGEN SATURATION: 100 % | HEART RATE: 95 BPM | WEIGHT: 209.44 LBS | BODY MASS INDEX: 34.89 KG/M2 | TEMPERATURE: 98 F | SYSTOLIC BLOOD PRESSURE: 104 MMHG | DIASTOLIC BLOOD PRESSURE: 60 MMHG | RESPIRATION RATE: 20 BRPM | HEIGHT: 65 IN

## 2018-07-17 DIAGNOSIS — J20.9 ACUTE BRONCHITIS, UNSPECIFIED ORGANISM: Primary | ICD-10-CM

## 2018-07-17 LAB
ALBUMIN SERPL-MCNC: 3.8 G/DL (ref 3.4–5)
ALBUMIN/GLOB SERPL: 0.8 {RATIO} (ref 0.8–1.7)
ALP SERPL-CCNC: 390 U/L (ref 45–117)
ALT SERPL-CCNC: 11 U/L (ref 13–56)
ANION GAP SERPL CALC-SCNC: 7 MMOL/L (ref 3–18)
AST SERPL-CCNC: 11 U/L (ref 15–37)
BASOPHILS # BLD: 0 K/UL (ref 0–0.06)
BASOPHILS NFR BLD: 1 % (ref 0–2)
BILIRUB SERPL-MCNC: 0.4 MG/DL (ref 0.2–1)
BNP SERPL-MCNC: 315 PG/ML (ref 0–450)
BUN SERPL-MCNC: 21 MG/DL (ref 7–18)
BUN/CREAT SERPL: 2 (ref 12–20)
CALCIUM SERPL-MCNC: 10.3 MG/DL (ref 8.5–10.1)
CHLORIDE SERPL-SCNC: 95 MMOL/L (ref 100–108)
CK MB CFR SERPL CALC: NORMAL % (ref 0–4)
CK MB SERPL-MCNC: <1 NG/ML (ref 5–25)
CK SERPL-CCNC: 109 U/L (ref 26–192)
CO2 SERPL-SCNC: 30 MMOL/L (ref 21–32)
CREAT SERPL-MCNC: 9.44 MG/DL (ref 0.6–1.3)
DIFFERENTIAL METHOD BLD: ABNORMAL
EOSINOPHIL # BLD: 0.5 K/UL (ref 0–0.4)
EOSINOPHIL NFR BLD: 13 % (ref 0–5)
ERYTHROCYTE [DISTWIDTH] IN BLOOD BY AUTOMATED COUNT: 14.3 % (ref 11.6–14.5)
GLOBULIN SER CALC-MCNC: 4.6 G/DL (ref 2–4)
GLUCOSE SERPL-MCNC: 64 MG/DL (ref 74–99)
HCT VFR BLD AUTO: 38.4 % (ref 35–45)
HGB BLD-MCNC: 12.4 G/DL (ref 12–16)
LYMPHOCYTES # BLD: 1.2 K/UL (ref 0.9–3.6)
LYMPHOCYTES NFR BLD: 30 % (ref 21–52)
MCH RBC QN AUTO: 30.8 PG (ref 24–34)
MCHC RBC AUTO-ENTMCNC: 32.3 G/DL (ref 31–37)
MCV RBC AUTO: 95.3 FL (ref 74–97)
MONOCYTES # BLD: 0.5 K/UL (ref 0.05–1.2)
MONOCYTES NFR BLD: 12 % (ref 3–10)
NEUTS SEG # BLD: 1.8 K/UL (ref 1.8–8)
NEUTS SEG NFR BLD: 44 % (ref 40–73)
PLATELET # BLD AUTO: 198 K/UL (ref 135–420)
PMV BLD AUTO: 10 FL (ref 9.2–11.8)
POTASSIUM SERPL-SCNC: 4.5 MMOL/L (ref 3.5–5.5)
PROT SERPL-MCNC: 8.4 G/DL (ref 6.4–8.2)
RBC # BLD AUTO: 4.03 M/UL (ref 4.2–5.3)
SODIUM SERPL-SCNC: 132 MMOL/L (ref 136–145)
TROPONIN I SERPL-MCNC: <0.02 NG/ML (ref 0–0.06)
WBC # BLD AUTO: 4 K/UL (ref 4.6–13.2)

## 2018-07-17 PROCEDURE — 77030013140 HC MSK NEB VYRM -A

## 2018-07-17 PROCEDURE — 83880 ASSAY OF NATRIURETIC PEPTIDE: CPT | Performed by: INTERNAL MEDICINE

## 2018-07-17 PROCEDURE — 82550 ASSAY OF CK (CPK): CPT | Performed by: INTERNAL MEDICINE

## 2018-07-17 PROCEDURE — 94640 AIRWAY INHALATION TREATMENT: CPT

## 2018-07-17 PROCEDURE — 93005 ELECTROCARDIOGRAM TRACING: CPT

## 2018-07-17 PROCEDURE — 74011000250 HC RX REV CODE- 250: Performed by: INTERNAL MEDICINE

## 2018-07-17 PROCEDURE — 71045 X-RAY EXAM CHEST 1 VIEW: CPT

## 2018-07-17 PROCEDURE — 85025 COMPLETE CBC W/AUTO DIFF WBC: CPT | Performed by: INTERNAL MEDICINE

## 2018-07-17 PROCEDURE — 99282 EMERGENCY DEPT VISIT SF MDM: CPT

## 2018-07-17 PROCEDURE — 80053 COMPREHEN METABOLIC PANEL: CPT | Performed by: INTERNAL MEDICINE

## 2018-07-17 RX ORDER — PREDNISONE 20 MG/1
40 TABLET ORAL DAILY
Qty: 15 TAB | Refills: 0 | Status: SHIPPED | OUTPATIENT
Start: 2018-07-17 | End: 2018-07-27

## 2018-07-17 RX ORDER — ALBUTEROL SULFATE 90 UG/1
2 AEROSOL, METERED RESPIRATORY (INHALATION)
Qty: 1 INHALER | Refills: 0 | Status: SHIPPED | OUTPATIENT
Start: 2018-07-17

## 2018-07-17 RX ORDER — IPRATROPIUM BROMIDE AND ALBUTEROL SULFATE 2.5; .5 MG/3ML; MG/3ML
3 SOLUTION RESPIRATORY (INHALATION)
Status: COMPLETED | OUTPATIENT
Start: 2018-07-17 | End: 2018-07-17

## 2018-07-17 RX ORDER — LEVOFLOXACIN 500 MG/1
500 TABLET, FILM COATED ORAL DAILY
Qty: 7 TAB | Refills: 0 | Status: SHIPPED | OUTPATIENT
Start: 2018-07-17 | End: 2019-09-09

## 2018-07-17 RX ORDER — SODIUM CHLORIDE 0.9 % (FLUSH) 0.9 %
5-10 SYRINGE (ML) INJECTION AS NEEDED
Status: DISCONTINUED | OUTPATIENT
Start: 2018-07-17 | End: 2018-07-17 | Stop reason: HOSPADM

## 2018-07-17 RX ADMIN — IPRATROPIUM BROMIDE AND ALBUTEROL SULFATE 3 ML: .5; 3 SOLUTION RESPIRATORY (INHALATION) at 11:33

## 2018-07-17 RX ADMIN — IPRATROPIUM BROMIDE AND ALBUTEROL SULFATE 3 ML: .5; 3 SOLUTION RESPIRATORY (INHALATION) at 13:17

## 2018-07-17 NOTE — ED TRIAGE NOTES
Pt arrives alert and oriented from dialysis states she feels short of breath and wheezing since her dialysis tx this am. Pt able to speak in full sentences.

## 2018-07-17 NOTE — DISCHARGE INSTRUCTIONS
Bronchitis: Care Instructions  Your Care Instructions    Bronchitis is inflammation of the bronchial tubes, which carry air to the lungs. The tubes swell and produce mucus, or phlegm. The mucus and inflamed bronchial tubes make you cough. You may have trouble breathing. Most cases of bronchitis are caused by viruses like those that cause colds. Antibiotics usually do not help and they may be harmful. Bronchitis usually develops rapidly and lasts about 2 to 3 weeks in otherwise healthy people. Follow-up care is a key part of your treatment and safety. Be sure to make and go to all appointments, and call your doctor if you are having problems. It's also a good idea to know your test results and keep a list of the medicines you take. How can you care for yourself at home? · Take all medicines exactly as prescribed. Call your doctor if you think you are having a problem with your medicine. · Get some extra rest.  · Take an over-the-counter pain medicine, such as acetaminophen (Tylenol), ibuprofen (Advil, Motrin), or naproxen (Aleve) to reduce fever and relieve body aches. Read and follow all instructions on the label. · Do not take two or more pain medicines at the same time unless the doctor told you to. Many pain medicines have acetaminophen, which is Tylenol. Too much acetaminophen (Tylenol) can be harmful. · Take an over-the-counter cough medicine that contains dextromethorphan to help quiet a dry, hacking cough so that you can sleep. Avoid cough medicines that have more than one active ingredient. Read and follow all instructions on the label. · Breathe moist air from a humidifier, hot shower, or sink filled with hot water. The heat and moisture will thin mucus so you can cough it out. · Do not smoke. Smoking can make bronchitis worse. If you need help quitting, talk to your doctor about stop-smoking programs and medicines. These can increase your chances of quitting for good.   When should you call for help? Call 911 anytime you think you may need emergency care. For example, call if:    · You have severe trouble breathing.    Call your doctor now or seek immediate medical care if:    · You have new or worse trouble breathing.     · You cough up dark brown or bloody mucus (sputum).     · You have a new or higher fever.     · You have a new rash.    Watch closely for changes in your health, and be sure to contact your doctor if:    · You cough more deeply or more often, especially if you notice more mucus or a change in the color of your mucus.     · You are not getting better as expected. Where can you learn more? Go to http://jaquan-treva.info/. Enter H333 in the search box to learn more about \"Bronchitis: Care Instructions. \"  Current as of: December 6, 2017  Content Version: 11.7  © 3119-0473 [employer]. Care instructions adapted under license by [employer] (which disclaims liability or warranty for this information). If you have questions about a medical condition or this instruction, always ask your healthcare professional. [address] any warranty or liability for your use of this information.

## 2018-07-17 NOTE — ED PROVIDER NOTES
EMERGENCY DEPARTMENT HISTORY AND PHYSICAL EXAM    Date: 7/17/2018  Patient Name: Ludmila Hendricks    History of Presenting Illness     Chief Complaint   Patient presents with    Wheezing         History Provided By: Patient    Chief Complaint: Wheezing  Duration: 2 Hours  Timing:  Acute  Location: Lungs  Modifying Factors: No relieving or worsening factors  Associated Symptoms: SOB, cough, diaphoresis, dizziness w/ cough. Additional History (Context):   11:11 AM  Ludmila Hendricks is a 37 y.o. female with PMHX of CKD and PUD  who presents to the emergency department C/O wheezing, onset this morning s/p her dialysis tx. Associated sxs include SOB, cough, diaphoresis, dizziness w/ cough. Pt confirms that her dialysis was completed today and that she receives dialysis every Tuesday, Thursday, and Saturday. Pt gets dialysis d/t kidney failure from chicken pox. Failed renal transplant occurred and 2011. Pt does not produce urine. Pt denies abd pain, fever, and any other sxs or complaints. PCP: Delia Thomas MD    Current Facility-Administered Medications   Medication Dose Route Frequency Provider Last Rate Last Dose    sodium chloride (NS) flush 5-10 mL  5-10 mL IntraVENous PRN Cher Mcgee MD         Current Outpatient Prescriptions   Medication Sig Dispense Refill    predniSONE (DELTASONE) 20 mg tablet Take 2 Tabs by mouth daily for 10 doses. With Breakfast 15 Tab 0    albuterol (PROVENTIL HFA, VENTOLIN HFA, PROAIR HFA) 90 mcg/actuation inhaler Take 2 Puffs by inhalation every four (4) hours as needed for Wheezing. 1 Inhaler 0    levoFLOXacin (LEVAQUIN) 500 mg tablet Take 1 Tab by mouth daily. 7 Tab 0    chlorzoxazone (PARAFON FORTE DSC) 500 mg tablet Take 1 Tab by mouth three (3) times daily as needed for Muscle Spasm(s). 15 Tab 0    lidocaine (LIDODERM) 5 % Apply patch to the affected area for 12 hours a day and remove for 12 hours a day.  15 Each 0    sevelamer (RENAGEL) 800 mg tablet Take 2,400 mg by mouth two (2) times a day.  pravastatin (PRAVACHOL) 20 mg tablet Take 20 mg by mouth nightly. Indications: hypercholesterolemia      zolpidem (AMBIEN) 5 mg tablet Take 5 mg by mouth nightly as needed for Sleep. Indications: dialysis days      promethazine (PHENERGAN) 25 mg tablet Take 25 mg by mouth every six (6) hours as needed. Dialysis premed          Past History     Past Medical History:  Past Medical History:   Diagnosis Date    Chronic kidney disease     ESRD    Dialysis patient ([facility] [address]     PUD (peptic ulcer disease) 2004       Past Surgical History:  Past Surgical History:   Procedure Laterality Date    HX HYSTERECTOMY      HX OTHER SURGICAL Left     dialysis graft arm; removed    HX TONSILLECTOMY      HX TRANSPLANT  2004    renal transplant    HX VASCULAR ACCESS Right     Dialysis catheter    HX VASCULAR ACCESS Left     AV graft, (\"does not work\")    VASCULAR SURGERY PROCEDURE UNLIST         Family History:  Family History   Problem Relation Age of Onset    Hypertension Brother     Diabetes Maternal Grandmother        Social History:  Social History   Substance Use Topics    Smoking status: Never Smoker    Smokeless tobacco: Never Used    Alcohol use No       Allergies: Allergies   Allergen Reactions    Vancomycin Other (comments)     Felt like her body was burning    Aspirin Nausea and Vomiting     Pt reports aspirin gave her a stomach ulcer.  Vicodin [Hydrocodone-Acetaminophen] Nausea and Vomiting         Review of Systems   Review of Systems   Constitutional: Positive for chills and diaphoresis. Negative for fever. Respiratory: Positive for cough, shortness of breath and wheezing. Gastrointestinal: Negative for abdominal pain. All other systems reviewed and are negative.       Physical Exam     Vitals:    07/17/18 0946 07/17/18 1135   BP: 104/60    Pulse: 95    Resp: 20    Temp: 98 °F (36.7 °C)    SpO2: 99% 100%   Weight: 95 kg (209 lb 7 oz)    Height: 5' 5\" (1.651 m) Physical Exam   Constitutional: She is oriented to person, place, and time. She appears well-developed and well-nourished. HENT:   Head: Normocephalic and atraumatic. Right Ear: External ear normal.   Left Ear: External ear normal.   Nose: Nose normal.   Mouth/Throat: Oropharynx is clear and moist.   Eyes: Conjunctivae and EOM are normal. Pupils are equal, round, and reactive to light. Right eye exhibits no discharge. Left eye exhibits no discharge. No scleral icterus. Neck: Normal range of motion. Neck supple. No JVD present. No tracheal deviation present. Cardiovascular: Normal rate, regular rhythm, normal heart sounds and intact distal pulses. Pulmonary/Chest: Effort normal. She has wheezes. She has rales. Some rales in bilateral upper lobes; diffuse inspiratory and expiratory wheezes   Abdominal: Soft. Bowel sounds are normal. She exhibits no distension. There is no tenderness. No HSM   Musculoskeletal: Normal range of motion. She exhibits no edema. Left AV fistula in upper left arm, clean and dry   Neurological: She is alert and oriented to person, place, and time. She has normal reflexes. No focal motor weakness. Skin: Skin is warm and dry. No rash noted. Healed surgical scar on abd   Psychiatric: She has a normal mood and affect. Her behavior is normal.   Nursing note and vitals reviewed.         Diagnostic Study Results     Labs -     Recent Results (from the past 12 hour(s))   EKG, 12 LEAD, INITIAL    Collection Time: 07/17/18 11:26 AM   Result Value Ref Range    Ventricular Rate 84 BPM    Atrial Rate 84 BPM    P-R Interval 138 ms    QRS Duration 76 ms    Q-T Interval 390 ms    QTC Calculation (Bezet) 460 ms    Calculated P Axis 35 degrees    Calculated R Axis 24 degrees    Calculated T Axis 55 degrees    Diagnosis       Normal sinus rhythm  Low voltage QRS  Nonspecific T wave abnormality  Prolonged QT  Abnormal ECG  When compared with ECG of 21-AUG-2017 22:20,  Criteria for Septal infarct are no longer present  Nonspecific T wave abnormality now evident in Lateral leads     CBC WITH AUTOMATED DIFF    Collection Time: 07/17/18 11:50 AM   Result Value Ref Range    WBC 4.0 (L) 4.6 - 13.2 K/uL    RBC 4.03 (L) 4.20 - 5.30 M/uL    HGB 12.4 12.0 - 16.0 g/dL    HCT 38.4 35.0 - 45.0 %    MCV 95.3 74.0 - 97.0 FL    MCH 30.8 24.0 - 34.0 PG    MCHC 32.3 31.0 - 37.0 g/dL    RDW 14.3 11.6 - 14.5 %    PLATELET 612 289 - 691 K/uL    MPV 10.0 9.2 - 11.8 FL    NEUTROPHILS 44 40 - 73 %    LYMPHOCYTES 30 21 - 52 %    MONOCYTES 12 (H) 3 - 10 %    EOSINOPHILS 13 (H) 0 - 5 %    BASOPHILS 1 0 - 2 %    ABS. NEUTROPHILS 1.8 1.8 - 8.0 K/UL    ABS. LYMPHOCYTES 1.2 0.9 - 3.6 K/UL    ABS. MONOCYTES 0.5 0.05 - 1.2 K/UL    ABS. EOSINOPHILS 0.5 (H) 0.0 - 0.4 K/UL    ABS. BASOPHILS 0.0 0.0 - 0.06 K/UL    DF AUTOMATED     METABOLIC PANEL, COMPREHENSIVE    Collection Time: 07/17/18 11:50 AM   Result Value Ref Range    Sodium 132 (L) 136 - 145 mmol/L    Potassium 4.5 3.5 - 5.5 mmol/L    Chloride 95 (L) 100 - 108 mmol/L    CO2 30 21 - 32 mmol/L    Anion gap 7 3.0 - 18 mmol/L    Glucose 64 (L) 74 - 99 mg/dL    BUN 21 (H) 7.0 - 18 MG/DL    Creatinine 9.44 (H) 0.6 - 1.3 MG/DL    BUN/Creatinine ratio 2 (L) 12 - 20      GFR est AA 5 (L) >60 ml/min/1.73m2    GFR est non-AA 4 (L) >60 ml/min/1.73m2    Calcium 10.3 (H) 8.5 - 10.1 MG/DL    Bilirubin, total 0.4 0.2 - 1.0 MG/DL    ALT (SGPT) 11 (L) 13 - 56 U/L    AST (SGOT) 11 (L) 15 - 37 U/L    Alk.  phosphatase 390 (H) 45 - 117 U/L    Protein, total 8.4 (H) 6.4 - 8.2 g/dL    Albumin 3.8 3.4 - 5.0 g/dL    Globulin 4.6 (H) 2.0 - 4.0 g/dL    A-G Ratio 0.8 0.8 - 1.7     CARDIAC PANEL,(CK, CKMB & TROPONIN)    Collection Time: 07/17/18 11:50 AM   Result Value Ref Range     26 - 192 U/L    CK - MB <1.0 <3.6 ng/ml    CK-MB Index  0.0 - 4.0 %     CALCULATION NOT PERFORMED WHEN RESULT IS BELOW LINEAR LIMIT    Troponin-I, Qt. <0.02 0.00 - 0.06 NG/ML   NT-PRO BNP    Collection Time: 07/17/18 11:50 AM   Result Value Ref Range    NT pro- 0 - 450 PG/ML       Radiologic Studies -   XR CHEST PORT   Final Result        CT Results  (Last 48 hours)    None        CXR Results  (Last 48 hours)               07/17/18 1202  XR CHEST PORT Final result    Impression:  IMPRESSION:       1. Stable exam. No acute cardiopulmonary process. Narrative:  EXAM:Chest X-Ray         History: Shortness of breath       Technique:  Portable Frontal View       Comparison: 04/10/2017, 0-20 1/20/2017       _______________       FINDINGS:   Left upper extremity vascular stent and bilateral upper surgical clips are   present. The trachea is midline. The cardiomediastinal silhouette is midline and stable. The lungs are grossly clear without evidence of focal consolidation, effusion,   or pneumothorax. Intact osseous structures. _______________                 Medications given in the ED-  Medications   albuterol-ipratropium (DUO-NEB) 2.5 MG-0.5 MG/3 ML (3 mL Nebulization Given 7/17/18 1133)   albuterol-ipratropium (DUO-NEB) 2.5 MG-0.5 MG/3 ML (3 mL Nebulization Given 7/17/18 1317)         Medical Decision Making   I am the first provider for this patient. I reviewed the vital signs, available nursing notes, past medical history, past surgical history, family history and social history. Vital Signs-Reviewed the patient's vital signs. Pulse Oximetry Analysis - 99% on RA     Records Reviewed: Nursing Notes and Old Medical Records    Provider Notes (Medical Decision Making): Ddx: PNA, CHF, ACS, pleural effusion, pneumothorax    Procedures:  Procedures    ED Course:   11:11 AM Initial assessment performed. The patients presenting problems have been discussed, and they are in agreement with the care plan formulated and outlined with them. I have encouraged them to ask questions as they arise throughout their visit.     1:10 PM pt re-assessed, feels lot better, lungs cta bilaterally with occasional wheezes. ready to go home. Diagnosis and Disposition       DISCHARGE NOTE:  1:18 PM  Michael Williamson's  results have been reviewed with her. She has been counseled regarding her diagnosis, treatment, and plan. She verbally conveys understanding and agreement of the signs, symptoms, diagnosis, treatment and prognosis and additionally agrees to follow up as discussed. She also agrees with the care-plan and conveys that all of her questions have been answered. I have also provided discharge instructions for her that include: educational information regarding their diagnosis and treatment, and list of reasons why they would want to return to the ED prior to their follow-up appointment, should her condition change. She has been provided with education for proper emergency department utilization. CLINICAL IMPRESSION:    1. Acute bronchitis, unspecified organism        PLAN:  1. D/C Home  2. Current Discharge Medication List      START taking these medications    Details   predniSONE (DELTASONE) 20 mg tablet Take 2 Tabs by mouth daily for 10 doses. With Breakfast  Qty: 15 Tab, Refills: 0      albuterol (PROVENTIL HFA, VENTOLIN HFA, PROAIR HFA) 90 mcg/actuation inhaler Take 2 Puffs by inhalation every four (4) hours as needed for Wheezing. Qty: 1 Inhaler, Refills: 0      levoFLOXacin (LEVAQUIN) 500 mg tablet Take 1 Tab by mouth daily. Qty: 7 Tab, Refills: 0           3. Follow-up Information     Follow up With Details Comments Contact Info    Jonah Morley MD Schedule an appointment as soon as possible for a visit in 1 day  [address] [facility] EMERGENCY DEPT  As needed, If symptoms worsen [address] [facility] [address] [phone]        _______________________________    Attestations:   This note is prepared by Rowdy Dumont, acting as Scribe for Nettie Wilson MD.    Nettie Wilson MD:  The scribe's documentation has been prepared under my direction and personally reviewed by me in its entirety.   I confirm that the note above accurately reflects all work, treatment, procedures, and medical decision making performed by me.  _______________________________

## 2018-07-28 LAB
ATRIAL RATE: 84 BPM
CALCULATED P AXIS, ECG09: 35 DEGREES
CALCULATED R AXIS, ECG10: 24 DEGREES
CALCULATED T AXIS, ECG11: 55 DEGREES
DIAGNOSIS, 93000: NORMAL
P-R INTERVAL, ECG05: 138 MS
Q-T INTERVAL, ECG07: 390 MS
QRS DURATION, ECG06: 76 MS
QTC CALCULATION (BEZET), ECG08: 460 MS
VENTRICULAR RATE, ECG03: 84 BPM

## 2018-07-30 ENCOUNTER — HOSPITAL ENCOUNTER (EMERGENCY)
Age: 43
Discharge: HOME | End: 2018-07-30
Attending: INTERNAL MEDICINE | Admitting: INTERNAL MEDICINE
Payer: MEDICARE

## 2018-07-30 ENCOUNTER — APPOINTMENT (OUTPATIENT)
Dept: GENERAL RADIOLOGY | Age: 43
End: 2018-07-30
Attending: INTERNAL MEDICINE
Payer: MEDICARE

## 2018-07-30 VITALS
OXYGEN SATURATION: 100 % | WEIGHT: 204.81 LBS | DIASTOLIC BLOOD PRESSURE: 69 MMHG | SYSTOLIC BLOOD PRESSURE: 99 MMHG | RESPIRATION RATE: 11 BRPM | HEART RATE: 87 BPM | BODY MASS INDEX: 34.12 KG/M2 | TEMPERATURE: 98.2 F | HEIGHT: 65 IN

## 2018-07-30 DIAGNOSIS — I95.9 TRANSIENT HYPOTENSION: ICD-10-CM

## 2018-07-30 DIAGNOSIS — R42 DIZZINESS: Primary | ICD-10-CM

## 2018-07-30 LAB
ALBUMIN SERPL-MCNC: 3.6 G/DL (ref 3.4–5)
ALBUMIN/GLOB SERPL: 0.9 {RATIO} (ref 0.8–1.7)
ALP SERPL-CCNC: 366 U/L (ref 45–117)
ALT SERPL-CCNC: 12 U/L (ref 13–56)
ANION GAP SERPL CALC-SCNC: 5 MMOL/L (ref 3–18)
AST SERPL-CCNC: 11 U/L (ref 15–37)
BASOPHILS # BLD: 0 K/UL (ref 0–0.1)
BASOPHILS NFR BLD: 0 % (ref 0–2)
BILIRUB SERPL-MCNC: 0.4 MG/DL (ref 0.2–1)
BUN SERPL-MCNC: 18 MG/DL (ref 7–18)
BUN/CREAT SERPL: 2 (ref 12–20)
CALCIUM SERPL-MCNC: 9.9 MG/DL (ref 8.5–10.1)
CHLORIDE SERPL-SCNC: 97 MMOL/L (ref 100–108)
CK MB CFR SERPL CALC: NORMAL % (ref 0–4)
CK MB SERPL-MCNC: <1 NG/ML (ref 5–25)
CK SERPL-CCNC: 33 U/L (ref 26–192)
CO2 SERPL-SCNC: 36 MMOL/L (ref 21–32)
CREAT SERPL-MCNC: 7.55 MG/DL (ref 0.6–1.3)
DIFFERENTIAL METHOD BLD: ABNORMAL
EOSINOPHIL # BLD: 0.2 K/UL (ref 0–0.4)
EOSINOPHIL NFR BLD: 3 % (ref 0–5)
ERYTHROCYTE [DISTWIDTH] IN BLOOD BY AUTOMATED COUNT: 14.2 % (ref 11.6–14.5)
GLOBULIN SER CALC-MCNC: 4.2 G/DL (ref 2–4)
GLUCOSE SERPL-MCNC: 76 MG/DL (ref 74–99)
HCT VFR BLD AUTO: 36.1 % (ref 35–45)
HGB BLD-MCNC: 11.5 G/DL (ref 12–16)
LYMPHOCYTES # BLD: 1.7 K/UL (ref 0.9–3.6)
LYMPHOCYTES NFR BLD: 33 % (ref 21–52)
MAGNESIUM SERPL-MCNC: 2.2 MG/DL (ref 1.6–2.6)
MCH RBC QN AUTO: 29.9 PG (ref 24–34)
MCHC RBC AUTO-ENTMCNC: 31.9 G/DL (ref 31–37)
MCV RBC AUTO: 94 FL (ref 74–97)
MONOCYTES # BLD: 0.7 K/UL (ref 0.05–1.2)
MONOCYTES NFR BLD: 14 % (ref 3–10)
NEUTS SEG # BLD: 2.5 K/UL (ref 1.8–8)
NEUTS SEG NFR BLD: 50 % (ref 40–73)
PLATELET # BLD AUTO: 230 K/UL (ref 135–420)
PMV BLD AUTO: 10.3 FL (ref 9.2–11.8)
POTASSIUM SERPL-SCNC: 3.5 MMOL/L (ref 3.5–5.5)
PROT SERPL-MCNC: 7.8 G/DL (ref 6.4–8.2)
RBC # BLD AUTO: 3.84 M/UL (ref 4.2–5.3)
SODIUM SERPL-SCNC: 138 MMOL/L (ref 136–145)
TROPONIN I SERPL-MCNC: <0.02 NG/ML (ref 0–0.06)
WBC # BLD AUTO: 5.1 K/UL (ref 4.6–13.2)

## 2018-07-30 PROCEDURE — 96360 HYDRATION IV INFUSION INIT: CPT

## 2018-07-30 PROCEDURE — 93005 ELECTROCARDIOGRAM TRACING: CPT

## 2018-07-30 PROCEDURE — 85025 COMPLETE CBC W/AUTO DIFF WBC: CPT | Performed by: INTERNAL MEDICINE

## 2018-07-30 PROCEDURE — 84484 ASSAY OF TROPONIN QUANT: CPT | Performed by: INTERNAL MEDICINE

## 2018-07-30 PROCEDURE — 74011250636 HC RX REV CODE- 250/636: Performed by: INTERNAL MEDICINE

## 2018-07-30 PROCEDURE — 99285 EMERGENCY DEPT VISIT HI MDM: CPT

## 2018-07-30 PROCEDURE — 71045 X-RAY EXAM CHEST 1 VIEW: CPT

## 2018-07-30 PROCEDURE — 80053 COMPREHEN METABOLIC PANEL: CPT | Performed by: INTERNAL MEDICINE

## 2018-07-30 PROCEDURE — 83735 ASSAY OF MAGNESIUM: CPT | Performed by: INTERNAL MEDICINE

## 2018-07-30 RX ORDER — DIPHENHYDRAMINE HCL 25 MG
25 CAPSULE ORAL
COMMUNITY

## 2018-07-30 RX ADMIN — SODIUM CHLORIDE 500 ML: 900 INJECTION, SOLUTION INTRAVENOUS at 20:45

## 2018-07-30 NOTE — ED TRIAGE NOTES
Pt arrives via ems stretcher with c\o HA, neck pain, left shoulder pain, pt sts when she has these pains after dialysis it has been because her BP is low, pt has dialysis today where they puller off 0.6 L  Of fluid, pt is a&o x 4, fsbs of 104 en route per ems, pt is able to make needs known speaking in complete sentences, pt in nad at this time
Homemaker

## 2018-07-30 NOTE — ED PROVIDER NOTES
EMERGENCY DEPARTMENT HISTORY AND PHYSICAL EXAM 
 
Date: 7/30/2018 Patient Name: Luis M Restrepo History of Presenting Illness Chief Complaint Patient presents with  
 Headache  Neck Pain  Hypotension  Dizziness History Provided By: Patient Chief Complaint: Headache Duration: Today Timing:  Constant Location: Head Quality: Aching Associated Symptoms: dizziness, neck pain, and left shoulder pain Additional History (Context):  
7:28 PM 
Luis M Restrepo is a 37 y.o. female with PMHx of CKD who is a dialysis patient who presents via EMS to the emergency department C/O headache, onset today. Associated sxs include dizziness, neck pain, and left shoulder pain. She reports her pain began after having dialysis today approximately 8 hours ago where she had 0.6 L of fluid removed. She reports that her BP dropped after dialysis. Pt also reports that she took a muscle relaxant 4.4 hours ago but has not had any prior complications. Notes allergy to vancomycin, ASA, and Vicodin. Pt does not take BP medication. Notes no h/o MI. Pt denies LOC, fever, chills, nausea, vomiting, constipation, blood in stool, abdominal pain, unilateral weakness, or any other sxs or complaints. PCP: Nirmal Bernard MD 
 
Current Outpatient Prescriptions Medication Sig Dispense Refill  diphenhydrAMINE (BENADRYL) 25 mg capsule Take 25 mg by mouth every six (6) hours as needed.  sevelamer (RENAGEL) 800 mg tablet Take 2,400 mg by mouth two (2) times a day.  pravastatin (PRAVACHOL) 20 mg tablet Take 20 mg by mouth nightly. Indications: hypercholesterolemia  promethazine (PHENERGAN) 25 mg tablet Take 25 mg by mouth every six (6) hours as needed. Dialysis premed  albuterol (PROVENTIL HFA, VENTOLIN HFA, PROAIR HFA) 90 mcg/actuation inhaler Take 2 Puffs by inhalation every four (4) hours as needed for Wheezing. 1 Inhaler 0  
 levoFLOXacin (LEVAQUIN) 500 mg tablet Take 1 Tab by mouth daily.  7 Tab 0  
 chlorzoxazone (PARAFON FORTE DSC) 500 mg tablet Take 1 Tab by mouth three (3) times daily as needed for Muscle Spasm(s). 15 Tab 0  
 lidocaine (LIDODERM) 5 % Apply patch to the affected area for 12 hours a day and remove for 12 hours a day. 15 Each 0  
 zolpidem (AMBIEN) 5 mg tablet Take 5 mg by mouth nightly as needed for Sleep. Indications: dialysis days Past History Past Medical History: 
Past Medical History:  
Diagnosis Date  Chronic kidney disease ESRD  Dialysis patient [facility])  PUD (peptic ulcer disease) 2004 Past Surgical History: 
Past Surgical History:  
Procedure Laterality Date  HX HYSTERECTOMY  HX OTHER SURGICAL Left   
 dialysis graft arm; removed  HX TONSILLECTOMY  HX TRANSPLANT  2004  
 renal transplant  HX VASCULAR ACCESS Right Dialysis catheter  HX VASCULAR ACCESS Left AV graft, (\"does not work\")  VASCULAR SURGERY PROCEDURE UNLIST Family History: 
Family History Problem Relation Age of Onset  Hypertension Brother  Diabetes Maternal Grandmother Social History: 
Social History Substance Use Topics  Smoking status: Never Smoker  Smokeless tobacco: Never Used  Alcohol use No  
 
 
Allergies: Allergies Allergen Reactions  Vancomycin Other (comments) Felt like her body was burning  Aspirin Nausea and Vomiting Pt reports aspirin gave her a stomach ulcer.  Vicodin [Hydrocodone-Acetaminophen] Nausea and Vomiting Review of Systems Review of Systems Musculoskeletal: Positive for arthralgias (left shoulder pain) and neck pain. Neurological: Positive for headaches. All other systems reviewed and are negative. Physical Exam  
 
Vitals:  
 07/30/18 2045 07/30/18 2100 07/30/18 2152 07/30/18 2154 BP: (!) 83/45 (!) 82/46 91/56 99/69 Pulse: 80 82 81 87 Resp: 20 13  11 Temp:      
SpO2: 98% 99% 100% 100% Weight:      
Height:      
 
Physical Exam Constitutional: She is oriented to person, place, and time. She appears well-developed and well-nourished. Obese HENT:  
Head: Normocephalic and atraumatic. Right Ear: External ear normal.  
Left Ear: External ear normal.  
Nose: Nose normal.  
Mouth/Throat: Oropharynx is clear and moist. Mucous membranes are dry. Dry mucous membranes Eyes: Conjunctivae and EOM are normal. Pupils are equal, round, and reactive to light. Right eye exhibits no discharge. Left eye exhibits no discharge. No scleral icterus. Neck: Normal range of motion. Neck supple. No JVD present. No tracheal deviation present. Cardiovascular: Normal rate, regular rhythm, normal heart sounds and intact distal pulses. Pulmonary/Chest: Effort normal and breath sounds normal.  
Abdominal: Soft. Bowel sounds are normal. She exhibits no distension. There is no tenderness. No HSM Musculoskeletal: Normal range of motion. She exhibits no edema. Neurological: She is alert and oriented to person, place, and time. She has normal strength and normal reflexes. She displays no atrophy and no tremor. No cranial nerve deficit or sensory deficit. She exhibits normal muscle tone. She displays a negative Romberg sign. She displays no seizure activity. Coordination and gait normal. GCS eye subscore is 4. GCS verbal subscore is 5. GCS motor subscore is 6. She displays no Babinski's sign on the right side. She displays no Babinski's sign on the left side. No focal motor weakness. Skin: Skin is warm and dry. No rash noted. Psychiatric: She has a normal mood and affect. Her behavior is normal.  
Nursing note and vitals reviewed. Diagnostic Study Results Labs - Recent Results (from the past 12 hour(s)) EKG, 12 LEAD, INITIAL Collection Time: 07/30/18  7:37 PM  
Result Value Ref Range Ventricular Rate 81 BPM  
 Atrial Rate 81 BPM  
 P-R Interval 156 ms QRS Duration 72 ms Q-T Interval 382 ms  QTC Calculation (Bezet) 443 ms  
 Calculated P Axis 55 degrees Calculated R Axis 49 degrees Calculated T Axis 36 degrees Diagnosis Normal sinus rhythm Low voltage QRS Septal infarct , age undetermined Abnormal ECG When compared with ECG of 17-JUL-2018 11:26, 
Nonspecific T wave abnormality no longer evident in Lateral leads CBC WITH AUTOMATED DIFF Collection Time: 07/30/18  8:09 PM  
Result Value Ref Range WBC 5.1 4.6 - 13.2 K/uL  
 RBC 3.84 (L) 4.20 - 5.30 M/uL  
 HGB 11.5 (L) 12.0 - 16.0 g/dL HCT 36.1 35.0 - 45.0 % MCV 94.0 74.0 - 97.0 FL  
 MCH 29.9 24.0 - 34.0 PG  
 MCHC 31.9 31.0 - 37.0 g/dL  
 RDW 14.2 11.6 - 14.5 % PLATELET 343 303 - 965 K/uL MPV 10.3 9.2 - 11.8 FL  
 NEUTROPHILS 50 40 - 73 % LYMPHOCYTES 33 21 - 52 % MONOCYTES 14 (H) 3 - 10 % EOSINOPHILS 3 0 - 5 % BASOPHILS 0 0 - 2 %  
 ABS. NEUTROPHILS 2.5 1.8 - 8.0 K/UL  
 ABS. LYMPHOCYTES 1.7 0.9 - 3.6 K/UL  
 ABS. MONOCYTES 0.7 0.05 - 1.2 K/UL  
 ABS. EOSINOPHILS 0.2 0.0 - 0.4 K/UL  
 ABS. BASOPHILS 0.0 0.0 - 0.1 K/UL  
 DF AUTOMATED METABOLIC PANEL, COMPREHENSIVE Collection Time: 07/30/18  8:09 PM  
Result Value Ref Range Sodium 138 136 - 145 mmol/L Potassium 3.5 3.5 - 5.5 mmol/L Chloride 97 (L) 100 - 108 mmol/L  
 CO2 36 (H) 21 - 32 mmol/L Anion gap 5 3.0 - 18 mmol/L Glucose 76 74 - 99 mg/dL BUN 18 7.0 - 18 MG/DL Creatinine 7.55 (H) 0.6 - 1.3 MG/DL  
 BUN/Creatinine ratio 2 (L) 12 - 20 GFR est AA 7 (L) >60 ml/min/1.73m2 GFR est non-AA 6 (L) >60 ml/min/1.73m2 Calcium 9.9 8.5 - 10.1 MG/DL Bilirubin, total 0.4 0.2 - 1.0 MG/DL  
 ALT (SGPT) 12 (L) 13 - 56 U/L  
 AST (SGOT) 11 (L) 15 - 37 U/L Alk. phosphatase 366 (H) 45 - 117 U/L Protein, total 7.8 6.4 - 8.2 g/dL Albumin 3.6 3.4 - 5.0 g/dL Globulin 4.2 (H) 2.0 - 4.0 g/dL A-G Ratio 0.9 0.8 - 1.7 MAGNESIUM Collection Time: 07/30/18  8:09 PM  
Result Value Ref Range Magnesium 2.2 1.6 - 2.6 mg/dL CARDIAC PANEL,(CK, CKMB & TROPONIN) Collection Time: 07/30/18  8:09 PM  
Result Value Ref Range CK 33 26 - 192 U/L  
 CK - MB <1.0 <3.6 ng/ml CK-MB Index  0.0 - 4.0 % CALCULATION NOT PERFORMED WHEN RESULT IS BELOW LINEAR LIMIT Troponin-I, Qt. <0.02 0.00 - 0.06 NG/ML Radiologic Studies -   
XR CHEST PORT    (Results Pending) 9:59 PM 
RADIOLOGY FINDINGS Chest X-ray shows no acute process Pending review by Radiologist 
Recorded by Alvaro Anaya, ED Scribe, as dictated by Torrie Teixeira MD 
 
CT Results  (Last 48 hours) None CXR Results  (Last 48 hours) None Medical Decision Making I am the first provider for this patient. I reviewed the vital signs, available nursing notes, past medical history, past surgical history, family history and social history. Vital Signs-Reviewed the patient's vital signs. Pulse Oximetry Analysis - 100% on RA  
 
EKG interpretation: (Preliminary) 7:37 PM  
NSR at 81 bpm. No STEMI. EKG read by Torrie Teixeira MD at 7:37 PM 
 
Records Reviewed: Nursing Notes and Old Medical Records Provider Notes (Medical Decision Making):  
Ddx: Medication effect, dehydration, over-dialyzed, infection, anemia, ACS, MI. No obvious focal neuro symptoms to suggest acute intracranial pathology Procedures: 
Procedures MEDICATIONS GIVEN: 
Medications  
sodium chloride 0.9 % bolus infusion 500 mL (500 mL IntraVENous New Bag 7/30/18 2045) ED Course:  
7:28 PM  
Initial assessment performed. The patients presenting problems have been discussed, and they are in agreement with the care plan formulated and outlined with them. I have encouraged them to ask questions as they arise throughout their visit. 9:57 PM  
 
Pt re-assessed, feels well, orthostatics negative, pt w/o complaints. I advised pt to stop Tizanidine or other triggers that drop BP and to follow up with PCP and dialysis. Diagnosis and Disposition DISCHARGE NOTE: 
9:57 PM 
Candiss Pain results have been reviewed with her. She has been counseled regarding her diagnosis, treatment, and plan. She verbally conveys understanding and agreement of the signs, symptoms, diagnosis, treatment and prognosis and additionally agrees to follow up as discussed. She also agrees with the care-plan and conveys that all of her questions have been answered. I have also provided discharge instructions for her that include: educational information regarding their diagnosis and treatment, and list of reasons why they would want to return to the ED prior to their follow-up appointment, should her condition change. She has been provided with education for proper emergency department utilization. CLINICAL IMPRESSION: 
 
1. Dizziness 2. Transient hypotension PLAN: 
1. D/C Home 2. Current Discharge Medication List  
  
 
3. Follow-up Information Follow up With Details Comments Contact Info Fariha Estrella MD Call in 1 day For primary care follow up [facility] [place] [address] 
[phone] Your dialysis center Go to as scheduled [facility] EMERGENCY DEPT  As needed, If symptoms worsen [address] [place] [address] 
[phone]  
  
 
_______________________________ Attestations: This note is prepared by Kodak Molina, acting as Scribe for Ashley Cuevas MD. 
 
Ashley Cuevas MD:  The scribe's documentation has been prepared under my direction and personally reviewed by me in its entirety. I confirm that the note above accurately reflects all work, treatment, procedures, and medical decision making performed by me. 
_______________________________

## 2018-07-31 NOTE — DISCHARGE INSTRUCTIONS
Dizziness: Care Instructions  Your Care Instructions  Dizziness is the feeling of unsteadiness or fuzziness in your head. It is different than having vertigo, which is a feeling that the room is spinning or that you are moving or falling. It is also different from lightheadedness, which is the feeling that you are about to faint. It can be hard to know what causes dizziness. Some people feel dizzy when they have migraine headaches. Sometimes bouts of flu can make you feel dizzy. Some medical conditions, such as heart problems or high blood pressure, can make you feel dizzy. Many medicines can cause dizziness, including medicines for high blood pressure, pain, or anxiety. If a medicine causes your symptoms, your doctor may recommend that you stop or change the medicine. If it is a problem with your heart, you may need medicine to help your heart work better. If there is no clear reason for your symptoms, your doctor may suggest watching and waiting for a while to see if the dizziness goes away on its own. Follow-up care is a key part of your treatment and safety. Be sure to make and go to all appointments, and call your doctor if you are having problems. It's also a good idea to know your test results and keep a list of the medicines you take. How can you care for yourself at home? · If your doctor recommends or prescribes medicine, take it exactly as directed. Call your doctor if you think you are having a problem with your medicine. · Do not drive while you feel dizzy. · Try to prevent falls. Steps you can take include:  ¨ Using nonskid mats, adding grab bars near the tub, and using night-lights. ¨ Clearing your home so that walkways are free of anything you might trip on. ¨ Letting family and friends know that you have been feeling dizzy. This will help them know how to help you. When should you call for help? Call 911 anytime you think you may need emergency care.  For example, call if:    · You passed out (lost consciousness).     · You have dizziness along with symptoms of a heart attack. These may include:  ¨ Chest pain or pressure, or a strange feeling in the chest.  ¨ Sweating. ¨ Shortness of breath. ¨ Nausea or vomiting. ¨ Pain, pressure, or a strange feeling in the back, neck, jaw, or upper belly or in one or both shoulders or arms. ¨ Lightheadedness or sudden weakness. ¨ A fast or irregular heartbeat.     · You have symptoms of a stroke. These may include:  ¨ Sudden numbness, tingling, weakness, or loss of movement in your face, arm, or leg, especially on only one side of your body. ¨ Sudden vision changes. ¨ Sudden trouble speaking. ¨ Sudden confusion or trouble understanding simple statements. ¨ Sudden problems with walking or balance. ¨ A sudden, severe headache that is different from past headaches.    Call your doctor now or seek immediate medical care if:    · You feel dizzy and have a fever, headache, or ringing in your ears.     · You have new or increased nausea and vomiting.     · Your dizziness does not go away or comes back.    Watch closely for changes in your health, and be sure to contact your doctor if:    · You do not get better as expected. Where can you learn more? Go to http://jaquan-treva.info/. Enter W478 in the search box to learn more about \"Dizziness: Care Instructions. \"  Current as of: November 20, 2017  Content Version: 11.7  © 1482-5678 [employer]. Care instructions adapted under license by [employer] (which disclaims liability or warranty for this information). If you have questions about a medical condition or this instruction, always ask your healthcare professional. [place] [address] any warranty or liability for your use of this information.          Low Blood Pressure: Care Instructions  Your Care Instructions    Blood pressure is a measurement of the force of the blood against the walls of the blood vessels during and after each beat of the heart. Low blood pressure is also called hypotension. It means that your blood pressure is much lower than normal. Some people, especially young, slim women, may have slightly low blood pressure without symptoms. But in many people, low blood pressure can cause symptoms such as feeling dizzy or lightheaded. When your blood pressure is too low, your heart, brain, and other organs do not get enough blood. Low blood pressure can be caused by many things, including heart problems and some medicines. Diabetes that is not under control can cause your blood pressure to drop. And so can a severe allergic reaction or infection. Another cause is dehydration, which is when your body loses too much fluid. Treatment for low blood pressure depends on the cause. Follow-up care is a key part of your treatment and safety. Be sure to make and go to all appointments, and call your doctor if you are having problems. It's also a good idea to know your test results and keep a list of the medicines you take. How can you care for yourself at home? · Drink plenty of fluids, enough so that your urine is light yellow or clear like water. If you have kidney, heart, or liver disease and have to limit fluids, talk with your doctor before you increase the amount of fluids you drink. · Be safe with medicines. Call your doctor if you think you are having a problem with your medicine. You will get more details on the specific medicines your doctor prescribes. · Stand up or get out of bed very slowly to allow your body to adjust.  · Get plenty of rest.  · Do not smoke. Smoking increases your risk of heart attack. If you need help quitting, talk to your doctor about stop-smoking programs and medicines. These can increase your chances of quitting for good. · Limit alcohol to 2 drinks a day for men and 1 drink a day for women. Alcohol may interfere with your medicine.  In addition, alcohol can make your low blood pressure worse by causing your body to lose water. When should you call for help? Call 911 anytime you think you may need emergency care. For example, call if:    · You passed out (lost consciousness).    Call your doctor now or seek immediate medical care if:    · You are dizzy or lightheaded, or you feel like you may faint.    Watch closely for changes in your health, and be sure to contact your doctor if you have any problems. Where can you learn more? Go to http://jaquan-treva.info/. Enter C304 in the search box to learn more about \"Low Blood Pressure: Care Instructions. \"  Current as of: December 6, 2017  Content Version: 11.7  © 6919-1898 [employer]. Care instructions adapted under license by [employer] (which disclaims liability or warranty for this information). If you have questions about a medical condition or this instruction, always ask your healthcare professional. [address] any warranty or liability for your use of this information.

## 2018-08-02 LAB
ATRIAL RATE: 81 BPM
CALCULATED P AXIS, ECG09: 55 DEGREES
CALCULATED R AXIS, ECG10: 49 DEGREES
CALCULATED T AXIS, ECG11: 36 DEGREES
DIAGNOSIS, 93000: NORMAL
P-R INTERVAL, ECG05: 156 MS
Q-T INTERVAL, ECG07: 382 MS
QRS DURATION, ECG06: 72 MS
QTC CALCULATION (BEZET), ECG08: 443 MS
VENTRICULAR RATE, ECG03: 81 BPM

## 2018-08-22 ENCOUNTER — HOSPITAL ENCOUNTER (OUTPATIENT)
Dept: PHYSICAL THERAPY | Age: 43
End: 2018-08-22

## 2018-08-27 ENCOUNTER — HOSPITAL ENCOUNTER (OUTPATIENT)
Dept: PHYSICAL THERAPY | Age: 43
Discharge: HOME | End: 2018-08-27
Payer: MEDICARE

## 2018-08-27 PROCEDURE — 97162 PT EVAL MOD COMPLEX 30 MIN: CPT

## 2018-08-27 PROCEDURE — G8988 SELF CARE GOAL STATUS: HCPCS

## 2018-08-27 PROCEDURE — G8987 SELF CARE CURRENT STATUS: HCPCS

## 2018-08-27 PROCEDURE — 97140 MANUAL THERAPY 1/> REGIONS: CPT

## 2018-08-27 PROCEDURE — 97110 THERAPEUTIC EXERCISES: CPT

## 2018-08-27 NOTE — PROGRESS NOTES
PT DAILY TREATMENT NOTE/SHOULDER EVAL 3-16    Patient Name: Bossman Beckett  Date:2018  : 1975  [x]  Patient  Verified  Payor: [place] MEDICARE / Plan: [place] MEDICARE PART A & B / Product Type: Medicare /    In time:205  Out time:255  Total Treatment Time (min): 50  Total Timed Codes (min): 45  1:1 Treatment Time ( [address] only): 45   Visit #: 1 of 12    Treatment Area: Left shoulder pain [M25.512]    SUBJECTIVE  Pain Level (0-10 scale): left shoulder 7/10  []constant []intermittent []improving []worsening []no change since onset    Any medication changes, allergies to medications, adverse drug reactions, diagnosis change, or new procedure performed?: [x] No    [] Yes (see summary sheet for update)  Subjective functional status/changes:  Left shoulder pain for a long time with gradual onset , pain for about 6 months left shoulder and upper arm, also intermittent tingling in all fingers. Symptoms disappear with hand movements. S/P removal of left shoulder bone spur 8/10/18. PT prior to surgery in [place] for 4 weeks without change in symptoms. Reports unchanged pain since surgery. Pain increased with AROM up to 9/10, nocturnal pain up to 7/10, min pain 4/10. Not working due to dialysis.    Occasional knee pain   PLOF: chronic left shoulder pain for 6 months, PT prior to surgery  Limitations to PLOF: limited shoulder ROM  Mechanism of Injury: gradual onset  Current symptoms/Complaints: left shoulder and upper arm pain  Previous Treatment/Compliance: good  PMHx/Surgical Hx: left shoulder surgery 8/10/18  Work Hx: on disability /dialysis  Living Situation: lives with daughter  Pt Goals: get better, be able to move shoulder better  Barriers: []pain []financial []time []transportation []other  Motivation: good  Substance use: []Alcohol []Tobacco []other:   FABQ Score: []low []elevate  Cognition: A & O x 3    Other:    OBJECTIVE/EXAMINATION  Domestic Life: doing housekeeping, 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  Activity/Recreational Limitations: walking and gym, not currently  Mobility: WNL  Self Care: [facility]        Modality rationale: Pain control   Min Type Additional Details    [] Estim:  []Unatt       []IFC  []Premod                        []Other:  []w/ice   []w/heat  Position:  Location:    [] Estim: []Att    []TENS instruct  []NMES                    []Other:  []w/US   []w/ice   []w/heat  Position:  Location:    []  Traction: [] Cervical       []Lumbar                       [] Prone          []Supine                       []Intermittent   []Continuous Lbs:  [] before manual  [] after manual    []  Ultrasound: []Continuous   [] Pulsed                           []1MHz   []3MHz Location:  W/cm2:    []  Iontophoresis with dexamethasone         Location: [] Take home patch   [] In clinic   5 []  Ice     []  heat  [x]  Ice massage  []  Laser   []  Anodyne Position:seated  Location:left shoulder    []  Laser with stim  []  Other: Position:  Location:    []  Vasopneumatic Device Pressure:       [] lo [] med [] hi   Temperature: [] lo [] med [] hi   [] Skin assessment post-treatment:  []intact []redness- no adverse reaction    []redness  adverse reaction:     20 min [x]Eval                  []Re-Eval       15 min Therapeutic Exercise:  [x] See flow sheet :ROM /strength left shoulder   Rationale: increase ROM and increase strength to improve the patients ability to return to PLOF          10 min Manual Therapy:  Functional massage and mobs left GHJ, ACJ and scapula in supine /SL   Rationale: decrease pain, increase ROM and increase tissue extensibility               With   [] TE   [] TA   [] neuro   [] other: Patient Education: [x] Review HEP    [] Progressed/Changed HEP based on:   [] positioning   [] body mechanics   [] transfers   [] heat/ice application    [] other:      Other Objective/Functional Measures: as per eval  Denies any neck problems    Physical Therapy Evaluation - Shoulder    Posture: [] Poor    [x] Fair    [] Good Describe:    ROM:  [] Unable to assess at this time                                           AROM                                                              PROM   Left Right  Left Right   Flexion 100  Flexion 140P    Extension 60  Extension     Scaption/ABD 70  Scaptin/ABD     ER @ 0 Degrees 40  ER @ 0 Degrees     ER @ 90 Degrees 60  ER @ 90 Degrees     IR @ 90 Degrees 70  IR @ 90 Degrees       End Feel / Painful Arc:    Strength:   [] Unable to assess at this time                                                                            L (1-5) R (1-5) Pain   Flexors 3+ 4 [x] Yes   [] No   Abductors 3+ 4+ [x] Yes   [] No   External Rotators 4- 4 [] Yes   [] No   Internal Rotators 4 5 [] Yes   [] No   Supraspinatus 3+  [x] Yes   [] No   Serratus Anterior 4- 4 [] Yes   [] No   Lower Trapezius   [] Yes   [] No   Elbow Flexion  4 [] Yes   [] No   Elbow Extension  4 [] Yes   [] No       Scapulohumoral Control / Rhythm:  Able to eccentrically lower with good control?  Left: [] Yes   [x] No     Right: [x] Yes   [] No    Accessory Motions:left shoulder hiking    Palpation  [] Min  [x] Mod  [] Severe    Location:left ACJ, biceps tendon, anterior shoulder  [] Min  [] Mod  [] Severe    Location:  [] Min  [] Mod  [] Severe    Location:    Optional Tests: WNL, intermittent finger tingling though resolved with AROM   Sensation Left Right Reflexes Left Right   Biceps (C5)   Biceps (C5)     Juares Radial(C6-7)   Brachioradialis (C6)     Juares Ulnar(C8-T1)   Triceps (C7)       Adson's Test  [] Pos   [] Neg Yergason's Test [] Pos   [] Neg  Gracie's Test  [] Pos   [] Neg Zellwood's Sign [] Pos   [] Neg  Neer's Test  [] Pos   [] Neg Clunk Test  [] Pos   [] Neg  Hawkin's Test  [] Pos   [] Neg AC Joint  [x] Pos   [] Neg  Speed's Test  [] Pos   [] Neg SC Joint  [] Pos   [] Neg  Empty Can  [x] Pos   [] Neg Pectoral Tightness [x] Pos   [] Neg  Anterior Apprehension [] Pos   [] Neg   Posterior Apprehension [] Pos   [] Neg      Other Tests / Comments:        Pain Level (0-10 scale) post treatment: reduction in pain with TE to 5/10    ASSESSMENT/Changes in Function: increased mobility with MT and TE    Patient will continue to benefit from skilled PT services to address ROM deficits, address strength deficits and analyze and address soft tissue restrictions to attain remaining goals.      [x]  See Plan of Care  []  See progress note/recertification  []  See Discharge Summary         Progress towards goals / Updated goals:  Reduction in muscle guarding, pain and increased functional mobility    PLAN  []  Upgrade activities as tolerated     [x]  Continue plan of care  []  Update interventions per flow sheet       []  Discharge due to:_  []  Other:_      Cosmo Matthews, PT 8/27/2018  2:07 PM

## 2018-08-28 NOTE — PROGRESS NOTES
Please sign and return to   [facility] in [address] [place], [facility]  Phone: [phone]      Fax:  [phone]      [facility] in [address] [place], [address] [facility] [address] [place]  Phone: [phone]      Fax:  [phone] of Care/ Statement of Necessity for Physical Therapy Services    Patient name: Alicja Randle Start of Care: 2018   Referral source: Manjit Rowe MD : 1975    Medical Diagnosis: Left shoulder pain [M25.512]   Onset Date:8/10/18 surgery    Treatment Diagnosis: left shoulder pain   Prior Hospitalization: see medical history Provider#: [id number]   Medications: Verified on Patient summary List    Comorbidities: mild FHP, chronic shoulder pain > 6 months   Prior Level of Function: limited left shoulder function/mobility with associated pain for 6 months      The Plan of Care and following information is based on the information from the initial evaluation. Assessment/ key information: 37 YOF currently on disability/dialysis 3x weekly is presenting to PT 2 weeks post op left shoulder surgery including SAD as per patient info. Patient reports pain ranging from 4-8/10 and intermittent paresthesia in left hand/fingers. Objective findings include limited active/passive left shoulder ROM, deficit in RC strength, deficit in postural alignment including mild FHP/mild Dowager's hump, limited function in regards to overhead reaching and tolerance to ADL/housekeeping. Patient is a good candidate for skilled PT to address functional deficits. Evaluation Complexity History MEDIUM  Complexity : 1-2 comorbidities / personal factors will impact the outcome/ POC ; Examination MEDIUM Complexity : 3 Standardized tests and measures addressing body structure, function, activity limitation and / or participation in recreation  ;Presentation MEDIUM Complexity : Evolving with changing characteristics  ; Clinical Decision Making MEDIUM Complexity : FOTO score of 26-74  Overall Complexity Rating: MEDIUM  Problem List: decrease ROM, decrease strength, decrease activity tolerance and decrease flexibility/ joint mobility   Treatment Plan may include any combination of the following: Therapeutic exercise, Therapeutic activities, Neuromuscular re-education, Physical agent/modality, Manual therapy and Patient education  Patient / Family readiness to learn indicated by: trying to perform skills and interest  Persons(s) to be included in education: patient (P)  Barriers to Learning/Limitations: None  Patient Goal (s): be able to move shoulder better  Patient Self Reported Health Status: good  Rehabilitation Potential: good    Short Term Goals: To be accomplished in 3 weeks:   1. Improved PROM to >or= 160 deg to prepare patient for improved overhead shoulder function  Status at [facility]: passive Flex left shoulder in supine 140 deg    2. Patient reports reduction in pain at night and during functional activities/housekeeping to <or= 5/10  Status at [facility]: pain up to 8/10, nocturnal pain up to 7/10, min pain 4/10      Long Term Goals: To be accomplished in 6 weeks:   1. Improved FOTO score to >or= 66/100 as evidence of improved function with overhead reaching, placing light objects on shelf at shoulder level and carrying objects  Status at [facility]: FOTO 47/100    2. Improved AROM left shoulder to >or= 140 deg for Flex/ABD to allow return to functional overhead reaching, material handling and housekeeping  Status at [facility]: Active left shoulder Flex 100, ABD 70    3.  Improved left shoulder strength to >or= 4/5 MMT to allow functional reaching and material handling during housekeeping  Status at [facility]: MMT left shoulder Strength:                                                                              L (1-5) R (1-5) Pain   Flexors 3+ 4 [x] Yes   [] No   Abductors 3+ 4+ [x] Yes   [] No   External Rotators 4- 4 [] Yes   [] No   Internal Rotators 4 5 [] Yes   [] No   Supraspinatus 3+   [x] Yes   [] No   Serratus Anterior 4- 4 [] Yes   [] No     4. Reduction in pain left shoulder to <or= 3/10 to allow return to all ADL and recreational activities  Status at Eval: limited tolerance to ADL, unable to use gym    5. Improved left scapular stability and humeroscapular rhythm for return to optimal function with ADL  Status at [facility]: left scapula dyskinesia, habitual shoulder hiking with all overhead activities     Frequency / Duration: Patient to be seen 2 times per week for 6 weeks. Patient/ Caregiver education and instruction: Diagnosis, prognosis, activity modification and exercises   [x]  Plan of care has been reviewed with [facility]    G-Codes (GP)    Self Care   Current  CK= 40-59%   Goal  CJ= 20-39%    The severity rating is based on clinical judgment and the FOTO score. Certification Period: 8/27/18-10/26/18  Alex Mcdonough, PT 8/27/2018 9:30 PM  _____________________________________________________________________  I certify that the above Therapy Services are being furnished while the patient is under my care. I agree with the treatment plan and certify that this therapy is necessary. Physician's Signature:____________Date:_________TIME:________    [employer], Date and Time must be completed for valid certification **    Please sign and return to   [facility] in [address] [place], [address] [place] [address] [address] [place]     Phone: [phone]      Fax:  [phone]

## 2018-08-31 ENCOUNTER — HOSPITAL ENCOUNTER (OUTPATIENT)
Dept: PHYSICAL THERAPY | Age: 43
Discharge: HOME | End: 2018-08-31
Payer: MEDICARE

## 2018-08-31 PROCEDURE — 97140 MANUAL THERAPY 1/> REGIONS: CPT

## 2018-08-31 PROCEDURE — 97110 THERAPEUTIC EXERCISES: CPT

## 2018-08-31 NOTE — PROGRESS NOTES
PT DAILY TREATMENT NOTE - [facility]  Patient Name: David Arceo Date:2018 : 1975 [x]  Patient  Verified Payor: [place] MED[place]ARE / Plan: Ariel Paulinoy / Product Type: Medicare / In time:11  Out time:1145 Total Treatment Time (min): 45 Total Timed Codes (min): 45 
1:1 Treatment Time ( only): 45 Visit #: 2 of 12 Treatment Area: Left shoulder pain [M25.512] SUBJECTIVE Pain Level (0-10 scale): 0 Any medication changes, allergies to medications, adverse drug reactions, diagnosis change, or new procedure performed?: [x] No    [] Yes (see summary sheet for update) Subjective functional status/changes:   [] No changes reported Reports soreness after first visit, compliant with HEP, no current pain OBJECTIVE Modality rationale: Symptom control Min Type Additional Details  
 [] Estim:  []Unatt       []IFC  []Premod []Other:  []w/ice   []w/heat Position: Location:  
 [] Estim: []Att    []TENS instruct  []NMES []Other:  []w/US   []w/ice   []w/heat Position: Location:  
 []  Traction: [] Cervical       []Lumbar 
                     [] Prone          []Supine []Intermittent   []Continuous Lbs: 
[] before manual 
[] after manual  
 []  Ultrasound: []Continuous   [] Pulsed []1MHz   []3MHz W/cm2: 
Location:  
 []  Iontophoresis with dexamethasone Location: [] Take home patch  
[] In clinic  
5 [x]  Ice     []  heat 
[]  Ice massage 
[]  Laser  
[]  Anodyne Position:seated Location:left shoulder  
 []  Laser with stim 
[]  Other:  Position: Location:  
 []  Vasopneumatic Device Pressure:       [] lo [] med [] hi  
Temperature: [] lo [] med [] hi  
[] Skin assessment post-treatment:  []intact []redness- no adverse reaction 
  []redness  adverse reaction:  
 
 
 
25 min Therapeutic Exercise:  [x] See flow sheet :ROM, strength, added left shoulder Ext/scap depression for improved shoulder girdle symmetry with TB Rationale: increase ROM and increase strength to improve the patients ability to return to PLOF 
 
 min Therapeutic Activity:  []  See flow sheet :  
 
  
 min Neuromuscular Re-education:  []  See flow sheet :  
 
 
15 min Manual Therapy:  Functional massage left shoulder, GHJ and AC mobs, passive stretching, rib cage mobilization with proper breathing pattern for improved Rationale: decrease pain, increase ROM and increase tissue extensibility to return to PLOF With 
 [] TE 
 [] TA 
 [] neuro 
 [] other: Patient Education: [x] Review HEP [] Progressed/Changed HEP based on:  
[] positioning   [] body mechanics   [] transfers   [] heat/ice application   
[] other:   
 
Other Objective/Functional Measures: left shoulder Flex pre  in supine, after mobilization ex 150 deg Min pain in [facility] Significant habitual left shoulder elevation in standing and with UE activities Pain Level (0-10 scale) post treatment: 0 
 
ASSESSMENT/Changes in Function: good tolerance to ex with mild pain, increased mobility Patient will continue to benefit from skilled PT services to address ROM deficits, address strength deficits and analyze and address soft tissue restrictions to attain remaining goals. [x]  See Plan of Care 
[]  See progress note/recertification 
[]  See Discharge Summary Progress towards goals / Updated goals: 
Short Term Goals: To be accomplished in 3 weeks: 1. Improved PROM to >or= 160 deg to prepare patient for improved overhead shoulder function Status at Eval: passive Flex left shoulder in supine 140 deg Current status: left shoulder Flex active to 150, passive 155 at end of session, progressing 
  
2. Patient reports reduction in pain at night and during functional activities/housekeeping to <or= 5/10 Status at [facility]: pain up to 8/10, nocturnal pain up to 7/10, min pain 4/10 
  
  
Long Term Goals: To be accomplished in 6 weeks: 1. Improved FOTO score to >or= 66/100 as evidence of improved function with overhead reaching, placing light objects on shelf at shoulder level and carrying objects Status at [facility]: FOTO 47/100 
  
2. Improved AROM left shoulder to >or= 140 deg for Flex/ABD to allow return to functional overhead reaching, material handling and housekeeping Status at [facility]: Active left shoulder Flex 100, ABD 70 
  
3. Improved left shoulder strength to >or= 4/5 MMT to allow functional reaching and material handling during housekeeping Status at [facility]: MMT left shoulder Strength:                                                                           
   L (1-5) R (1-5) Pain Flexors 3+ 4 [x] Yes   [] No  
Abductors 3+ 4+ [x] Yes   [] No  
External Rotators 4- 4 [] Yes   [] No  
Internal Rotators 4 5 [] Yes   [] No  
Supraspinatus 3+    [x] Yes   [] No  
Serratus Anterior 4- 4 [] Yes   [] No  
  
4. Reduction in pain left shoulder to <or= 3/10 to allow return to all ADL and recreational activities Status at [facility]: limited tolerance to ADL, unable to use gym 
  
5. Improved left scapular stability and humeroscapular rhythm for return to optimal function with ADL Status at [facility]: left scapula dyskinesia, habitual shoulder hiking with all overhead activities PLAN 
[]  Upgrade activities as tolerated     [x]  Continue plan of care 
[]  Update interventions per flow sheet      
[]  Discharge due to:_ 
[]  Other:_ Jean-Claude Dutta PT 8/31/2018  11:06 AM 
 
Future Appointments Date Time Provider Dwight Perez 9/5/2018 2:30 PM Cynthia Mayer Limb, PT [facility]  
9/12/2018 2:30 PM Cynthia Mayer Limb, PT [facility]  
9/14/2018 11:15 AM Cynthia Mayer Limb, PT [facility]  
9/19/2018 2:30 PM Cynthia Mayer Limb, PT [facility]  
9/21/2018 2:00 PM Cynthia Kiser, PT [facility]  
 9/24/2018 1:00 PM Cynthia Russell PT [facility]  
9/26/2018 11:00 AM Chyna Monroy, PT, DPT [facility]

## 2018-09-05 ENCOUNTER — APPOINTMENT (OUTPATIENT)
Dept: PHYSICAL THERAPY | Age: 43
End: 2018-09-05
Payer: MEDICARE

## 2018-09-12 ENCOUNTER — HOSPITAL ENCOUNTER (OUTPATIENT)
Dept: PHYSICAL THERAPY | Age: 43
Discharge: HOME | End: 2018-09-12
Payer: MEDICARE

## 2018-09-12 PROCEDURE — 97110 THERAPEUTIC EXERCISES: CPT

## 2018-09-12 PROCEDURE — 97140 MANUAL THERAPY 1/> REGIONS: CPT

## 2018-09-12 NOTE — PROGRESS NOTES
PT DAILY TREATMENT NOTE - [facility]  Patient Name: Carli Dexter Date:2018 : 1975 [x]  Patient  Verified Payor: [place] MEDICARE / Plan: Ariel Millan y / Product Type: Medicare / In time:230  Out time:320 Total Treatment Time (min): 50 Total Timed Codes (min): 45 
1:1 Treatment Time ( only): 45 Visit #: 3 of 12 Treatment Area: Left shoulder pain [M25.512] SUBJECTIVE Pain Level (0-10 scale): 0 Any medication changes, allergies to medications, adverse drug reactions, diagnosis change, or new procedure performed?: [x] No    [] Yes (see summary sheet for update) Subjective functional status/changes:   [] No changes reported Feeling overall better, no significant pain at night or in left SL, some pain with AROM, able to reach overhead cabinets OBJECTIVE Modality rationale: Symptom control Min Type Additional Details  
 [] Estim:  []Unatt       []IFC  []Premod []Other:  []w/ice   []w/heat Position: Location:  
 [] Estim: []Att    []TENS instruct  []NMES []Other:  []w/US   []w/ice   []w/heat Position: Location:  
 []  Traction: [] Cervical       []Lumbar 
                     [] Prone          []Supine []Intermittent   []Continuous Lbs: 
[] before manual 
[] after manual  
 []  Ultrasound: []Continuous   [] Pulsed []1MHz   []3MHz W/cm2: 
Location:  
 []  Iontophoresis with dexamethasone Location: [] Take home patch  
[] In clinic  
5 [x]  Ice     []  heat 
[]  Ice massage 
[]  Laser  
[]  Anodyne Position:seated Location:left shoulder  
 []  Laser with stim 
[]  Other:  Position: Location:  
 []  Vasopneumatic Device Pressure:       [] lo [] med [] hi  
Temperature: [] lo [] med [] hi  
[] Skin assessment post-treatment:  []intact []redness- no adverse reaction 
  []redness  adverse reaction: 30 min Therapeutic Exercise:  [x] See flow sheet :ROM, strength, updated TE and HEP, increased TB ex left shoulder in supine Rationale: increase ROM and increase strength to improve the patients ability to return to PLOF 
 
 min Therapeutic Activity:  []  See flow sheet :  
 
  
 min Neuromuscular Re-education:  []  See flow sheet :  
 
 
15 min Manual Therapy:  Functional massage left shoulder, GHJ and AC mobs, passive stretching,assisted scapular depression with active shoulder ABD in SL for muscle reeducation Rationale: decrease pain, increase ROM and increase tissue extensibility to return to PLOF With 
 [] TE 
 [] TA 
 [] neuro 
 [] other: Patient Education: [x] Review HEP [] Progressed/Changed HEP based on:  
[] positioning   [] body mechanics   [] transfers   [] heat/ice application   
[] other:   
 
Other Objective/Functional Measures:  
Mild ACJ left pain with end range Flex at 165 Challenged with 7# supine chest press/SA with ball but able to perform without pain No pain during wall washing with end range Flex Pain Level (0-10 scale) post treatment: 0 
 
ASSESSMENT/Changes in Function: good tolerance to ex with mild pain, increased mobility Patient will continue to benefit from skilled PT services to address ROM deficits, address strength deficits and analyze and address soft tissue restrictions to attain remaining goals. [x]  See Plan of Care 
[]  See progress note/recertification 
[]  See Discharge Summary Progress towards goals / Updated goals: 
Short Term Goals: To be accomplished in 3 weeks: 1. Improved PROM to >or= 160 deg to prepare patient for improved overhead shoulder function Status at Eval: passive Flex left shoulder in supine 140 deg Current status: left shoulder Flex active to 160, passive 165, progressing 
  
2. Patient reports reduction in pain at night and during functional activities/housekeeping to <or= 5/10 Status at [facility]: pain up to 8/10, nocturnal pain up to 7/10, min pain 4/10 Current status: pain ranging 3-7/10, progressing 
  
  
Long Term Goals: To be accomplished in 6 weeks: 1. Improved FOTO score to >or= 66/100 as evidence of improved function with overhead reaching, placing light objects on shelf at shoulder level and carrying objects Status at [facility]: FOTO 47/100 
  
2. Improved AROM left shoulder to >or= 140 deg for Flex/ABD to allow return to functional overhead reaching, material handling and housekeeping Status at [facility]: Active left shoulder Flex 100, ABD 70 
  
3. Improved left shoulder strength to >or= 4/5 MMT to allow functional reaching and material handling during housekeeping Status at [facility]: MMT left shoulder Strength:                                                                           
   L (1-5) R (1-5) Pain Flexors 3+ 4 [x] Yes   [] No  
Abductors 3+ 4+ [x] Yes   [] No  
External Rotators 4- 4 [] Yes   [] No  
Internal Rotators 4 5 [] Yes   [] No  
Supraspinatus 3+    [x] Yes   [] No  
Serratus Anterior 4- 4 [] Yes   [] No  
  
4. Reduction in pain left shoulder to <or= 3/10 to allow return to all ADL and recreational activities Status at [facility]: limited tolerance to ADL, unable to use gym Current status: pain 3-7/10, progressing 
  
5. Improved left scapular stability and humeroscapular rhythm for return to optimal function with ADL Status at [facility]: left scapula dyskinesia, habitual shoulder hiking with all overhead activities Current status: improved awareness of scapula hiking and self correction, progressing PLAN 
[]  Upgrade activities as tolerated     [x]  Continue plan of care 
[]  Update interventions per flow sheet      
[]  Discharge due to:_ 
[]  Other:_ Alex Mcdonough PT 9/12/2018  11:06 AM 
 
Future Appointments Date Time Provider Dwight Perez 9/14/2018 10:30 AM Cynthia Chu, PT [facility]  
 9/19/2018 2:30 PM MEJIA Hall [facility]  
9/21/2018 1:00 PM MEJIA Hall [facility]  
9/24/2018 1:00 PM MEJIA Hall [facility]  
9/26/2018 11:00 AM Laury ROLDAN [facility]

## 2018-09-14 ENCOUNTER — APPOINTMENT (OUTPATIENT)
Dept: PHYSICAL THERAPY | Age: 43
End: 2018-09-14
Payer: MEDICARE

## 2018-09-21 ENCOUNTER — HOSPITAL ENCOUNTER (OUTPATIENT)
Dept: PHYSICAL THERAPY | Age: 43
Discharge: HOME | End: 2018-09-21
Payer: MEDICARE

## 2018-09-21 PROCEDURE — 97110 THERAPEUTIC EXERCISES: CPT

## 2018-09-21 PROCEDURE — 97530 THERAPEUTIC ACTIVITIES: CPT

## 2018-09-21 NOTE — PROGRESS NOTES
PT DAILY TREATMENT NOTE - [facility]  Patient Name: Maria Del Carmen Montano Date:2018 : 1975 [x]  Patient  Verified Payor: [place] MEDICARE / Plan: Ariel Mann / Product Type: Medicare / In time:1  Out time:140 Total Treatment Time (min): 40 Total Timed Codes (min): 45 
1:1 Treatment Time ( only): 40 Visit #: 4 of 12 Treatment Area: Left shoulder pain [M25.512] SUBJECTIVE Pain Level (0-10 scale): 0 Any medication changes, allergies to medications, adverse drug reactions, diagnosis change, or new procedure performed?: [x] No    [] Yes (see summary sheet for update) Subjective functional status/changes:   [] No changes reported No pain at rest left shoulder, pain up to 6/10 with overhead reaching OBJECTIVE Modality rationale: Symptom control Min Type Additional Details  
 [] Estim:  []Unatt       []IFC  []Premod []Other:  []w/ice   []w/heat Position: Location:  
 [] Estim: []Att    []TENS instruct  []NMES []Other:  []w/US   []w/ice   []w/heat Position: Location:  
 []  Traction: [] Cervical       []Lumbar 
                     [] Prone          []Supine []Intermittent   []Continuous Lbs: 
[] before manual 
[] after manual  
 []  Ultrasound: []Continuous   [] Pulsed []1MHz   []3MHz W/cm2: 
Location:  
 []  Iontophoresis with dexamethasone Location: [] Take home patch  
[] In clinic [x]  Ice     []  heat 
[]  Ice massage 
[]  Laser  
[]  Anodyne Position:seated Location:left shoulder  
 []  Laser with stim 
[]  Other:  Position: Location:  
 []  Vasopneumatic Device Pressure:       [] lo [] med [] hi  
Temperature: [] lo [] med [] hi  
[] Skin assessment post-treatment:  []intact []redness- no adverse reaction 
  []redness  adverse reaction:  
 
 
 
25 min Therapeutic Exercise:  [x] See flow sheet : 
  
 Rationale: increase ROM and increase strength to improve the patients ability to return to PLOF 
 
 min Therapeutic Activity:  []  See flow sheet :  
 
  
 min Neuromuscular Re-education:  []  See flow sheet :  
 
 
20 min Therapeutic Activity: facilitation of proper scapulo-thoracic mvt for better shoulder mechanics, updated activities to focus on primary deficit of left RC weakness, added QP activities including WS and leg lifts for shoulder stability Rationale: decrease pain, increase ROM and increase tissue extensibility to return to PLOF With 
 [] TE 
 [] TA 
 [] neuro 
 [] other: Patient Education: [x] Review HEP [] Progressed/Changed HEP based on:  
[] positioning   [] body mechanics   [] transfers   [] heat/ice application   
[] other:   
 
Other Objective/Functional Measures:  
Mild ACJ left pain with end range Flex at 165 Challenged with 7# supine chest press/SA with ball but able to perform without pain No pain during bilateral wall washing with 4# freeweight  Though unable to perform just with left arm due to weakness MMT: left shoulder/seated- Flex 4/5, Ext 4+/5, ABD 3-/10, IR 4/5, ER 4/5 (Neutral) Marked left shoulder hiking to compensate for lack of Supraspinatus strength Improved shoulder girdle with verbal and tactile cues Pain Level (0-10 scale) post treatment: 0 
 
ASSESSMENT/Changes in Function: good tolerance to ex with mild pain, increased mobility, still presenting with left shoulder weakness, primary deficit in Supraspinatus weakness Patient will continue to benefit from skilled PT services to address ROM deficits, address strength deficits and analyze and address soft tissue restrictions to attain remaining goals. [x]  See Plan of Care 
[]  See progress note/recertification 
[]  See Discharge Summary Progress towards goals / Updated goals: 
Short Term Goals: To be accomplished in 3 weeks: 1. Improved PROM to >or= 160 deg to prepare patient for improved overhead shoulder function Status at [facility]: passive Flex left shoulder in supine 140 deg Current status: left shoulder Flex active to 160, passive 165, goal met 
  
2. Patient reports reduction in pain at night and during functional activities/housekeeping to <or= 5/10 Status at [facility]: pain up to 8/10, nocturnal pain up to 7/10, min pain 4/10 Current status: pain ranging 0-6/10, progressing 
  
  
Long Term Goals: To be accomplished in 6 weeks: 1. Improved FOTO score to >or= 66/100 as evidence of improved function with overhead reaching, placing light objects on shelf at shoulder level and carrying objects Status at [facility]: FOTO 47/100 Current status: to be tested on visit #5 
  
2. Improved AROM left shoulder to >or= 140 deg for Flex/ABD to allow return to functional overhead reaching, material handling and housekeeping Status at [facility]: Active left shoulder Flex 100, ABD 70 Current status: Active left shoulder Flex 160, ABD 80, progressing 
  
3. Improved left shoulder strength to >or= 4/5 MMT to allow functional reaching and material handling during housekeeping Status at [facility]: MMT left shoulder Strength:                                                                           
   L (1-5) R (1-5) Pain Flexors 3+ 4 [x] Yes   [] No  
Abductors 3+ 4+ [x] Yes   [] No  
External Rotators 4- 4 [] Yes   [] No  
Internal Rotators 4 5 [] Yes   [] No  
Supraspinatus 3+    [x] Yes   [] No  
Serratus Anterior 4- 4 [] Yes   [] No  
  
4. Reduction in pain left shoulder to <or= 3/10 to allow return to all ADL and recreational activities Status at [facility]: limited tolerance to ADL, unable to use gym Current status: pain 3-7/10, progressing Current status: 0-6/10, progressing 
  
5.  Improved left scapular stability and humeroscapular rhythm for return to optimal function with ADL 
 Status at Eval: left scapula dyskinesia, habitual shoulder hiking with all overhead activities Current status: improved awareness of scapula hiking and self correction, progressing PLAN 
[]  Upgrade activities as tolerated     [x]  Continue plan of care 
[]  Update interventions per flow sheet      
[]  Discharge due to:_ 
[]  Other:_ Alex Palomino, PT 9/21/2018  11:06 AM 
 
Future Appointments Date Time Provider Dwgiht Ana 9/24/2018 1:00 PM Cynthia Russell, PT [facility]  
9/26/2018 11:00 AM Laury ROLDAN [facility]

## 2018-09-24 ENCOUNTER — APPOINTMENT (OUTPATIENT)
Dept: PHYSICAL THERAPY | Age: 43
End: 2018-09-24
Payer: MEDICARE

## 2018-09-26 ENCOUNTER — HOSPITAL ENCOUNTER (OUTPATIENT)
Dept: PHYSICAL THERAPY | Age: 43
Discharge: HOME | End: 2018-09-26
Payer: MEDICARE

## 2018-09-26 PROCEDURE — G8987 SELF CARE CURRENT STATUS: HCPCS

## 2018-09-26 PROCEDURE — 97530 THERAPEUTIC ACTIVITIES: CPT

## 2018-09-26 PROCEDURE — 97110 THERAPEUTIC EXERCISES: CPT

## 2018-09-26 PROCEDURE — G8988 SELF CARE GOAL STATUS: HCPCS

## 2018-09-26 NOTE — PROGRESS NOTES
PT DAILY TREATMENT NOTE 8- Patient Name: Chanel Wooten Date:2018 : 1975 [x]  Patient  Verified Payor: [place] MEDICARE / Plan: Ariel Millan y / Product Type: Medicare / In time: 11:10  Out time: 12:10 Total Treatment Time (min): 60 Total Timed Codes (min): 50 
1:1 Treatment Time (min): 40 Visit #: 5 of 12 Treatment Area: Left shoulder pain [M25.512] SUBJECTIVE Pain Level (0-10 scale): 0 Any medication changes, allergies to medications, adverse drug reactions, diagnosis change, or new procedure performed?: [x] No    [] Yes (see summary sheet for update) Subjective functional status/changes:   [] No changes reported \"It feels like I need more strength still. The pain is not as intense as it was\". Rates overall improvement as 75%. Improvements: Decreased impairment grooming hair, reaching into cabinets, cooking/cleaning Current functional limitations: carrying groceries, laundry, sleeping (unable to lie on left side) OBJECTIVE Modality rationale: decrease inflammation and decrease pain to improve the patients ability to perform functional mobility/ADLs and attain goals. Min Type Additional Details  
 [] Estim: []Att   []Unatt        []TENS instruct []IFC  []Premod   []NMES []Other:  []w/US   []w/ice   []w/heat Position: Location:  
 []  Traction: [] Cervical       []Lumbar 
                     [] Prone          []Supine []Intermittent   []Continuous Lbs: 
[] before manual 
[] after manual  
 []  Ultrasound: []Continuous   [] Pulsed []1MHz   []3MHz Location: 
W/cm2:  
 []  Iontophoresis with dexamethasone Location: [] Take home patch  
[] In clinic  
10 [x]  Ice     []  heat 
[]  Ice massage Position: reclined Location: L shoulder  
 []  Vasopneumatic Device Pressure:       [] lo [] med [] hi  
Temperature: [] lo [] med [] hi  
 [x] Skin assessment post-treatment:  [x]intact []redness- no adverse reaction 
     []redness  adverse reaction:  
 
 
32 min Therapeutic Exercise:  [x] See flow sheet :  
Rationale: increase ROM and increase strength to improve the patients ability to lift, reach and carry for ADL's 
 
8 min Therapeutic Activity:  [x]  See flow sheet :facilitation of proper scapulo-thoracic mvt for better shoulder mechanics, updated activities to focus on primary deficit of left RC weakness, added QP activities including WS (also with use of rocker board) and arm/leg lifts (in isolation) for shoulder stability. Rationale: To decrease pain, increase ROM/strength for decreased impairment with ADL's/IADL's (i.e. Household cleaning). min Patient Education: [x] Review HEP    [] Progressed/Changed HEP based on:  
[] positioning   [] body mechanics   [] transfers   [] heat/ice application Other Objective/Functional Measures:  
-progressed to 1# weight with S/L ER/Abd 
-held manual due to not indicated with full PROM all planes this session. Pain Level (0-10 scale) post treatment: 0 
 
ASSESSMENT/Changes in Function:  
 
Patient will continue to benefit from skilled PT services to modify and progress therapeutic interventions, address ROM deficits, address strength deficits, analyze and address soft tissue restrictions, analyze and cue movement patterns and analyze and modify body mechanics/ergonomics to attain remaining goals. []  See Plan of Care [x]  See progress note/recertification 
[]  See Discharge Summary Progress towards goals / Updated goals: 
See PN 
 
 
PLAN [x]  Upgrade activities as tolerated     [x]  Continue plan of care 
[]  Update interventions per flow sheet      
[]  Discharge due to:_ 
[]  Other:_ Laury Brito, PT 9/26/2018  9:39 AM

## 2018-09-26 NOTE — PROGRESS NOTES
[facility] at [address] [place] [address], [address] [place] [address] Phone: [phone]   Fax: [phone] Medicare Progress Report Patient name: Maria Del Carmen Montano     Start of Care: 18 Referral source: Rosio Hannah MD    : 1975 Medical/Treatment Diagnosis: Left shoulder pain [M25.512]  Onset Date:8/10/18 surgery Prior Hospitalization: see medical history   Provider#: [id number] Comorbidities: mild FHP, chronic shoulder pain > 6 months Prior Level of Function:limited left shoulder function/mobility with associated pain for 6 months Medications: Verified on Patient Summary List 
 
Visits from Start of Care: 5    Missed Visits: 4 Reporting Period : 18 to 18 Subjective Reports: \"It feels like I need more strength still. The pain is not as intense as it was\". Rates overall improvement as 75% with ADL's. Current Status/ treatment goals Objective measures 1. Improved PROM to >or= 160 deg to prepare patient for improved overhead shoulder function 
 [x] met                 [] not met 
[] progressing  Status at Eval: passive Flex left shoulder in supine 140 deg Current status: left shoulder Flex active to 165, passive 175 2. Patient reports reduction in pain at night and during functional activities/housekeeping to <or= 5/10 [] met                 [] not met [x] progressing Status at Eval: pain up to 8/10, nocturnal pain up to 7/10, min pain 4/10 Current status: pain ranging 0-7/10  
3. Improved FOTO score to >or= 66/100 as evidence of improved function with overhead reaching, placing light objects on shelf at shoulder level and carrying objects [] met                 [] not met [x] progressing Status at Eval: FOTO 47/100 Current status: 62  
4. Improved AROM left shoulder to >or= 140 deg for Flex/ABD to allow return to functional overhead reaching, material handling and housekeeping 
 [x] met                 [] not met [] progressing Status at Eval: Active left shoulder Flex 100, ABD 70 Current status: Active left shoulder Flex 165,  (in supine) Key functional changes: Decreased impairment grooming hair, reaching into cabinets, cooking/cleaning Problems/ barriers to goal attainment: inconsistent attendance with PT sessions; reviewed importance of compliance with HEP and attendance this session Assessment / Recommendations:Pt has achieved good progress this period. Pt demonstrates significant improvement with AROM as per measurements above and PROM WNL all planes. Continues to have significant weakness to L shoulder requiring skilled progression of exercises to promote decreased impairment with ADL/IADL's. Current functional limitations: carrying groceries, laundry, sleeping (unable to lie on left side), household chores Problem List: pain affecting function, decrease ROM, decrease strength, decrease ADL/ functional abilitiies, decrease activity tolerance and decrease flexibility/ joint mobility Treatment Plan: Therapeutic exercise, Therapeutic activities, Neuromuscular re-education, Physical agent/modality, Manual therapy, Patient education and Self Care training Patient Goal (s) has been updated and includes: see below Updated Goals to be accomplished in 4 weeks: 1. Improved FOTO score to >or= 66/100 as evidence of improved function with overhead reaching, placing light objects on shelf at shoulder level and carrying objects. Status at Eval: FOTO 47/100 Current status: 62 
 
2. Improved left shoulder strength to >or= 4/5 MMT to allow functional reaching and material handling during housekeeping Status at [facility]: MMT left shoulder flexion L 3+, R 4; abd 3+, R 4+; ER L 4-, R 4;  IR L 4, R 5;  Supraspinatus 3+;  Serratus Ant L 4-, R 4. 
Current status:  Left Shoulder flex/abd 3+/5, ER 4-/5, IR 4/5, supraspinatus 4-/5, serratus anterior 4-/5; all motions with pt reporting minimal pain during resisted testing. 3. Improved left scapular stability and humeroscapular rhythm for return to optimal function with ADL Status at Eval: left scapula dyskinesia, habitual shoulder hiking with all overhead activities Current status: improved awareness of scapula hiking and self correction, progressing 4. Patient reports reduction in pain at night and during functional activities/housekeeping to <or= 5/10 Status at Eval: pain up to 8/10, nocturnal pain up to 7/10, min pain 4/10 Current status: pain ranging 0-7/10 Frequency / Duration: Patient to be seen 1-2 times per week for 4 weeks: 
G-Danyel (GP) Self Care  Current  CJ= 20-39%  Goal  CJ= 20-39% The severity rating is based on clinical judgement and the FOTO score. Laury Brito, PT 9/26/2018 11:17 AM

## 2018-10-03 ENCOUNTER — HOSPITAL ENCOUNTER (OUTPATIENT)
Dept: PHYSICAL THERAPY | Age: 43
Discharge: HOME | End: 2018-10-03
Payer: MEDICARE

## 2018-10-03 PROCEDURE — 97530 THERAPEUTIC ACTIVITIES: CPT

## 2018-10-03 PROCEDURE — 97110 THERAPEUTIC EXERCISES: CPT

## 2018-10-03 NOTE — PROGRESS NOTES
PT DAILY TREATMENT NOTE - [facility]  Patient Name: Sammy Blunt Date:10/3/2018 : 1975 [x]  Patient  Verified Payor: [place] MEDICARE / Plan: Ariel Mann / Product Type: Medicare / In time:12:58  Out time:3:38 Total Treatment Time (min): 40 Total Timed Codes (min): 38 
1:1 Treatment Time ( only): 40 Visit #: 6 of 12 Treatment Area: Left shoulder pain [M25.512] SUBJECTIVE Pain Level (0-10 scale): 0/10 Any medication changes, allergies to medications, adverse drug reactions, diagnosis change, or new procedure performed?: [x] No    [] Yes (see summary sheet for update) Subjective functional status/changes:   [] No changes reported \"I'm doing ok today, no pain. \" OBJECTIVE Modality rationale: decrease pain to improve the patients ability to complete her ADLs with decreased symptoms Min Type Additional Details  
 [] Estim:  []Unatt       []IFC  []Premod []Other:  []w/ice   []w/heat Position: Location:  
 [] Estim: []Att    []TENS instruct  []NMES []Other:  []w/US   []w/ice   []w/heat Position: Location:  
 []  Traction: [] Cervical       []Lumbar 
                     [] Prone          []Supine []Intermittent   []Continuous Lbs: 
[] before manual 
[] after manual  
 []  Ultrasound: []Continuous   [] Pulsed []1MHz   []3MHz W/cm2: 
Location:  
 []  Iontophoresis with dexamethasone Location: [] Take home patch  
[] In clinic 2 [x]  Ice     []  heat 
[]  Ice massage 
[]  Laser  
[]  Anodyne Position: sitting Location: left shoulder with towel underneath  
 []  Laser with stim 
[]  Other:  Position: Location:  
 []  Vasopneumatic Device Pressure:       [] lo [] med [] hi  
Temperature: [] lo [] med [] hi  
[x] Skin assessment post-treatment:  [x]intact []redness- no adverse reaction 
  []redness  adverse reaction:  
 
15 min Therapeutic Exercise:  [x] See flow sheet :  
 Rationale: increase ROM and increase strength to improve the patients ability to complete her ADLs with decreased symptoms 25 min Therapeutic Activity:  [x]  See flow sheet :  
Rationale: increase strength, improve coordination and increase proprioception  to improve the patients ability to complete her ADLs with decreased symptoms With 
 [x] TE 
 [] TA 
 [] neuro 
 [] other: Patient Education: [x] Review HEP [] Progressed/Changed HEP based on:  
[] positioning   [] body mechanics   [] transfers   [] heat/ice application   
[] other:   
 
Other Objective/Functional Measures: Added bird dogs and ball on wall Added resistance/reps per flow sheet Pain Level (0-10 scale) post treatment: 0/10 ASSESSMENT/Changes in Function: Patient tolerated treatment today without adverse effects. She required frequent rest breaks due to fatigue and reported increased pain over her left coracoid process. Patient's ride came, so patient had to leave early so ice treatment was terminated early. Patient reports that she will ice at home. Patient will continue to benefit from skilled PT services to modify and progress therapeutic interventions, address functional mobility deficits, address ROM deficits, address strength deficits, analyze and address soft tissue restrictions, analyze and cue movement patterns, analyze and modify body mechanics/ergonomics and address imbalance/dizziness to attain remaining goals. []  See Plan of Care 
[]  See progress note/recertification 
[]  See Discharge Summary Progress towards goals / Updated goals: 
Updated Goals to be accomplished in 4 weeks: 1. Improved FOTO score to >or= 66/100 as evidence of improved function with overhead reaching, placing light objects on shelf at shoulder level and carrying objects. Status at Eval: FOTO 47/100 Current status: 62 
  
2.  Improved left shoulder strength to >or= 4/5 MMT to allow functional reaching and material handling during housekeeping Status at Eval: MMT left shoulder flexion L 3+, R 4; abd 3+, R 4+; ER L 4-, R 4;  IR L 4, R 5;  Supraspinatus 3+;  Serratus Ant L 4-, R 4. 
Current status:  Left Shoulder flex/abd 3+/5, ER 4-/5, IR 4/5, supraspinatus 4-/5, serratus anterior 4-/5; all motions with pt reporting minimal pain during resisted testing. 
  
3. Improved left scapular stability and humeroscapular rhythm for return to optimal function with ADL Status at Eval: left scapula dyskinesia, habitual shoulder hiking with all overhead activities Current status: improved awareness of scapula hiking and self correction, progressing 
  
4. Patient reports reduction in pain at night and during functional activities/housekeeping to <or= 5/10 Status at Eval: pain up to 8/10, nocturnal pain up to 7/10, min pain 4/10 Current status: pain ranging 0-7/10 PLAN [x]  Upgrade activities as tolerated     [x]  Continue plan of care 
[]  Update interventions per flow sheet      
[]  Discharge due to:_ 
[]  Other:_   
 
Alcon Woods PT 10/3/2018  1:01 PM 
 
Future Appointments Date Time Provider Dwight Perez 10/5/2018 2:00 PM Alcon Woods [facility]  
10/10/2018 1:00 PM Alcon Woods [facility]  
10/12/2018 1:00 PM Cynthia Staley [facility]  
10/15/2018 2:30 PM Cynthia Staley [facility]  
10/17/2018 1:00 PM Cynthia Staley [facility]  
10/22/2018 2:30 PM Cynthia Greco PT [facility]

## 2018-10-05 ENCOUNTER — HOSPITAL ENCOUNTER (OUTPATIENT)
Dept: PHYSICAL THERAPY | Age: 43
End: 2018-10-05
Payer: MEDICARE

## 2018-10-10 ENCOUNTER — HOSPITAL ENCOUNTER (OUTPATIENT)
Dept: PHYSICAL THERAPY | Age: 43
End: 2018-10-10
Payer: MEDICARE

## 2018-10-12 ENCOUNTER — HOSPITAL ENCOUNTER (OUTPATIENT)
Dept: PHYSICAL THERAPY | Age: 43
Discharge: HOME | End: 2018-10-12
Payer: MEDICARE

## 2018-10-12 PROCEDURE — 97110 THERAPEUTIC EXERCISES: CPT

## 2018-10-12 NOTE — PROGRESS NOTES
PT DAILY TREATMENT NOTE - [facility]  Patient Name: Sandra Iglesias Date:10/12/2018 : 1975 [x]  Patient  Verified Payor: [place] MEDICARE / Plan: Ariel Mann / Product Type: Medicare / In time:115 Out time:150 Total Treatment Time (min): 35 Total Timed Codes (min): 35 
1:1 Treatment Time ( only): 35 Visit #: 8 of 12 Treatment Area: Left shoulder pain [M25.512] SUBJECTIVE Pain Level (0-10 scale): 0/10 Any medication changes, allergies to medications, adverse drug reactions, diagnosis change, or new procedure performed?: [x] No    [] Yes (see summary sheet for update) Subjective functional status/changes:   [] No changes reported \"I'm doing ok today, no pain. \" OBJECTIVE Modality rationale: decrease pain to improve the patients ability to complete her ADLs with decreased symptoms Type Additional Details  
[] Estim:  []Unatt       []IFC  []Premod []Other:  []w/ice   []w/heat Position: Location:  
[] Estim: []Att    []TENS instruct  []NMES []Other:  []w/US   []w/ice   []w/heat Position: Location:  
[]  Traction: [] Cervical       []Lumbar 
                     [] Prone          []Supine []Intermittent   []Continuous Lbs: 
[] before manual 
[] after manual  
[]  Ultrasound: []Continuous   [] Pulsed []1MHz   []3MHz W/cm2: 
Location:  
[]  Iontophoresis with dexamethasone Location: [] Take home patch  
[] In clinic [x]  Ice     []  heat 
[]  Ice massage 
[]  Laser  
[]  Anodyne Position: sitting Location: left shoulder with towel underneath  
[]  Laser with stim 
[]  Other:  Position: Location:  
[]  Vasopneumatic Device Pressure:       [] lo [] med [] hi  
Temperature: [] lo [] med [] hi  
[x] Skin assessment post-treatment:  [x]intact []redness- no adverse reaction 
  []redness  adverse reaction:  
 
25 min Therapeutic Exercise:  [x] See flow sheet :  
 Rationale: increase ROM and increase strength to improve the patients ability to complete her ADLs with decreased symptoms 10 min Therapeutic Activity:  [x]  See flow sheet :muscle reeducation to reduce shoulder hiking , MWM with focus on caudal mobs during shoulder reaching With 
 [x] TE 
 [] TA 
 [] neuro 
 [] other: Patient Education: [x] Review HEP [] Progressed/Changed HEP based on:  
[] positioning   [] body mechanics   [] transfers   [] heat/ice application   
[] other:   
 
Other Objective/Functional Measures: shoulder pain in [facility] region with overhead reaching Pain Level (0-10 scale) post treatment: 0/10 ASSESSMENT/Changes in Function: Patient tolerated treatment today without adverse effects. She required frequent rest breaks due to fatigue and reported increased pain over her left coracoid process. Patient's ride came, so patient had to leave early so ice treatment was terminated early. Patient reports that she will ice at home. Patient will continue to benefit from skilled PT services to modify and progress therapeutic interventions, address functional mobility deficits, address ROM deficits, address strength deficits, analyze and address soft tissue restrictions, analyze and cue movement patterns, analyze and modify body mechanics/ergonomics and address imbalance/dizziness to attain remaining goals. []  See Plan of Care 
[]  See progress note/recertification 
[]  See Discharge Summary Progress towards goals / Updated goals: 
Updated Goals to be accomplished in 4 weeks: 1. Improved FOTO score to >or= 66/100 as evidence of improved function with overhead reaching, placing light objects on shelf at shoulder level and carrying objects. Status at Eval: FOTO 47/100 Current status: 62 
  
2. Improved left shoulder strength to >or= 4/5 MMT to allow functional reaching and material handling during housekeeping Status at Eval: MMT left shoulder flexion L 3+, R 4; abd 3+, R 4+; ER L 4-, R 4;  IR L 4, R 5;  Supraspinatus 3+;  Serratus Ant L 4-, R 4. 
Current status:  Left Shoulder flex/abd 3+/5, ER 4-/5, IR 4/5, supraspinatus 4-/5, serratus anterior 4-/5; all motions with pt reporting minimal pain during resisted testing. 
  
3. Improved left scapular stability and humeroscapular rhythm for return to optimal function with ADL Status at Eval: left scapula dyskinesia, habitual shoulder hiking with all overhead activities Current status: improved awareness of scapula hiking and self correction, progressing 
  
4. Patient reports reduction in pain at night and during functional activities/housekeeping to <or= 5/10 Status at Eval: pain up to 8/10, nocturnal pain up to 7/10, min pain 4/10 Current status: pain ranging 0-7/10 PLAN [x]  Upgrade activities as tolerated     [x]  Continue plan of care 
[]  Update interventions per flow sheet      
[]  Discharge due to:_ 
[]  Other:_ Dalila Gagnon, PT 10/12/2018  1:01 PM 
 
Future Appointments Date Time Provider Dw[facility]  
[facility]  
10/22/2018 2:30 PM Cynthia Sebastian PT [facility]

## 2018-10-17 ENCOUNTER — HOSPITAL ENCOUNTER (OUTPATIENT)
Dept: PHYSICAL THERAPY | Age: 43
Discharge: HOME | End: 2018-10-17
Payer: MEDICARE

## 2018-10-17 PROCEDURE — 97140 MANUAL THERAPY 1/> REGIONS: CPT

## 2018-10-17 PROCEDURE — 97530 THERAPEUTIC ACTIVITIES: CPT

## 2018-10-17 NOTE — PROGRESS NOTES
PT DAILY TREATMENT NOTE - [facility]  Patient Name: Robe Baumann Date:10/17/2018 : 1975 [x]  Patient  Verified Payor: [place] MEDICARE / Plan: Ariel Mann / Product Type: Medicare / In time:115  Out time:150 Total Treatment Time (min): 35 Total Timed Codes (min): 35 
1:1 Treatment Time ( only): 30 Visit #: 8 of 12 Treatment Area: Left shoulder pain [M25.512] SUBJECTIVE Pain Level (0-10 scale): 4/10 Any medication changes, allergies to medications, adverse drug reactions, diagnosis change, or new procedure performed?: [x] No    [] Yes (see summary sheet for update) Subjective functional status/changes:   [] No changes reported \"My left shoulder hurts when I reach up\" OBJECTIVE Modality rationale: decrease pain to improve the patients ability to complete her ADLs with decreased symptoms Type Additional Details  
[] Estim:  []Unatt       []IFC  []Premod []Other:  []w/ice   []w/heat Position: Location:  
[] Estim: []Att    []TENS instruct  []NMES []Other:  []w/US   []w/ice   []w/heat Position: Location:  
[]  Traction: [] Cervical       []Lumbar 
                     [] Prone          []Supine []Intermittent   []Continuous Lbs: 
[] before manual 
[] after manual  
[]  Ultrasound: []Continuous   [] Pulsed []1MHz   []3MHz W/cm2: 
Location:  
[]  Iontophoresis with dexamethasone Location: [] Take home patch  
[] In clinic  
[]  Ice     []  heat 
[]  Ice massage 
[]  Laser  
[]  Anodyne Position: sitting Location: left shoulder with towel underneath  
[]  Laser with stim 
[]  Other:  Position: Location:  
[]  Vasopneumatic Device Pressure:       [] lo [] med [] hi  
Temperature: [] lo [] med [] hi  
[x] Skin assessment post-treatment:  [x]intact []redness- no adverse reaction 
  []redness  adverse reaction: 15 NC min Therapeutic Exercise:  [x] See flow sheet :scapular stability in QP Rationale: increase ROM and increase strength to improve the patients ability to complete her ADLs with decreased symptoms 20 min Therapeutic Activity:  [x]  See flow sheet :  
Rationale: increase strength, improve coordination and increase proprioception  to improve the patients ability to complete her ADLs with decreased symptoms 10 min Manual Therapy: left shoulder in SL with focus on scapular mobs, manual caudal glides left GHJ, manual facilitation of proper SHR with arm movements With 
 [x] TE 
 [] TA 
 [] neuro 
 [] other: Patient Education: [x] Review HEP [] Progressed/Changed HEP based on:  
[] positioning   [] body mechanics   [] transfers   [] heat/ice application   
[] other:   
 
Other Objective/Functional Measures: Added QP stabilization ex Pain Level (0-10 scale) post treatment: 0/10 ASSESSMENT/Changes in Function: Patient tolerated treatment today without adverse effects. She required frequent rest breaks due to fatigue and reported increased pain over her left coracoid process. Patient's ride came, so patient had to leave early so ice treatment was terminated early. Patient reports that she will ice at home. Patient will continue to benefit from skilled PT services to modify and progress therapeutic interventions, address functional mobility deficits, address ROM deficits, address strength deficits, analyze and address soft tissue restrictions, analyze and cue movement patterns, analyze and modify body mechanics/ergonomics and address imbalance/dizziness to attain remaining goals. []  See Plan of Care 
[]  See progress note/recertification 
[]  See Discharge Summary Progress towards goals / Updated goals: 
Updated Goals to be accomplished in 4 weeks: 1.   Improved FOTO score to >or= 66/100 as evidence of improved function with overhead reaching, placing light objects on shelf at shoulder level and carrying objects. Status at Eval: FOTO 47/100 Current status: 62 
  
2. Improved left shoulder strength to >or= 4/5 MMT to allow functional reaching and material handling during housekeeping Status at [facility]: MMT left shoulder flexion L 3+, R 4; abd 3+, R 4+; ER L 4-, R 4;  IR L 4, R 5;  Supraspinatus 3+;  Serratus Ant L 4-, R 4. 
Current status:  Left Shoulder flex/abd 3+/5, ER 4-/5, IR 4/5, supraspinatus 4-/5, serratus anterior 4-/5; all motions with pt reporting minimal pain during resisted testing. 
  
3. Improved left scapular stability and humeroscapular rhythm for return to optimal function with ADL Status at [facility]: left scapula dyskinesia, habitual shoulder hiking with all overhead activities Current status: improved awareness of scapula hiking and self correction, progressing 
  
4. Patient reports reduction in pain at night and during functional activities/housekeeping to <or= 5/10 Status at [facility]: pain up to 8/10, nocturnal pain up to 7/10, min pain 4/10 Current status: pain ranging 0-7/10 PLAN [x]  Upgrade activities as tolerated     [x]  Continue plan of care 
[]  Update interventions per flow sheet      
[]  Discharge due to:_ 
[]  Other:_ Horace Aguilar PT 10/17/2018  1:01 PM 
 
Future Appointments Date Time Provider Dwight Perez 10/22/2018  2:30 PM Alberto Fabry, PT [facility]

## 2018-11-21 ENCOUNTER — HOSPITAL ENCOUNTER (OUTPATIENT)
Dept: ULTRASOUND IMAGING | Age: 43
Discharge: HOME | End: 2018-11-21
Attending: NURSE PRACTITIONER
Payer: MEDICARE

## 2018-11-21 ENCOUNTER — HOSPITAL ENCOUNTER (OUTPATIENT)
Dept: NON INVASIVE DIAGNOSTICS | Age: 43
Discharge: HOME | End: 2018-11-21
Attending: NURSE PRACTITIONER
Payer: MEDICARE

## 2018-11-21 DIAGNOSIS — N25.0 RENAL OSTEODYSTROPHY: ICD-10-CM

## 2018-11-21 PROCEDURE — 93005 ELECTROCARDIOGRAM TRACING: CPT

## 2018-11-21 PROCEDURE — 76700 US EXAM ABDOM COMPLETE: CPT

## 2018-11-22 LAB
ATRIAL RATE: 87 BPM
CALCULATED P AXIS, ECG09: 76 DEGREES
CALCULATED R AXIS, ECG10: 52 DEGREES
CALCULATED T AXIS, ECG11: 107 DEGREES
DIAGNOSIS, 93000: NORMAL
P-R INTERVAL, ECG05: 142 MS
Q-T INTERVAL, ECG07: 426 MS
QRS DURATION, ECG06: 74 MS
QTC CALCULATION (BEZET), ECG08: 512 MS
VENTRICULAR RATE, ECG03: 87 BPM

## 2018-12-10 NOTE — PROGRESS NOTES
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 
  
 
Unable to further assess progress towards goals at this time due to lack of attendance. Patient didn't contact us for further appointments after last visit on 10/17/18. DC at this time with no further instructions to the patient.   Thank you for this referral.

## 2019-01-14 ENCOUNTER — TELEPHONE (OUTPATIENT)
Dept: VASCULAR SURGERY | Age: 44
End: 2019-01-14

## 2019-01-14 NOTE — TELEPHONE ENCOUNTER
Received call from Raheel regarding patient that access is clotted , advised I would reach out to MD. Discussed with providers and they have no availability to take care of access today. Raheel states she will send patient to the ED.

## 2019-09-09 NOTE — PROGRESS NOTES
PT aware of NPO status. NPO x 6 hrs prior to procedure. PT aware of need to hold anticoagulants per protocol. Denies. PT aware of potential for sedation administration and need for  at discharge. PT aware of arrival time pre procedure. Arrive at 1230 for scheduled procedure to occur at 1430. Pt states no questions at this time. Gave pt [facility] radiology rn phone number [phone].

## 2019-09-10 ENCOUNTER — HOSPITAL ENCOUNTER (OUTPATIENT)
Dept: INTERVENTIONAL RADIOLOGY/VASCULAR | Age: 44
Setting detail: OBSERVATION
Discharge: HOME | End: 2019-09-11
Attending: SURGERY | Admitting: HOSPITALIST
Payer: MEDICARE

## 2019-09-10 DIAGNOSIS — N18.6 ESRD (END STAGE RENAL DISEASE) (HCC): ICD-10-CM

## 2019-09-10 LAB
ANION GAP SERPL CALC-SCNC: 13 MMOL/L (ref 3–18)
APTT PPP: 30.7 SEC (ref 23–36.4)
BASOPHILS # BLD: 0 K/UL (ref 0–0.1)
BASOPHILS NFR BLD: 1 % (ref 0–2)
BUN BLD-MCNC: 49 MG/DL (ref 7–18)
BUN SERPL-MCNC: 52 MG/DL (ref 7–18)
BUN/CREAT SERPL: 3 (ref 12–20)
CALCIUM SERPL-MCNC: 9.2 MG/DL (ref 8.5–10.1)
CHLORIDE BLD-SCNC: 102 MMOL/L (ref 100–108)
CHLORIDE SERPL-SCNC: 98 MMOL/L (ref 100–111)
CO2 SERPL-SCNC: 25 MMOL/L (ref 21–32)
CREAT SERPL-MCNC: 17.4 MG/DL (ref 0.6–1.3)
DIFFERENTIAL METHOD BLD: ABNORMAL
EOSINOPHIL # BLD: 0.4 K/UL (ref 0–0.4)
EOSINOPHIL NFR BLD: 7 % (ref 0–5)
ERYTHROCYTE [DISTWIDTH] IN BLOOD BY AUTOMATED COUNT: 13.9 % (ref 11.6–14.5)
GLUCOSE BLD STRIP.AUTO-MCNC: 64 MG/DL (ref 74–106)
GLUCOSE SERPL-MCNC: 82 MG/DL (ref 74–99)
HCT VFR BLD AUTO: 29.8 % (ref 35–45)
HCT VFR BLD CALC: 24 % (ref 36–49)
HGB BLD-MCNC: 8.2 G/DL (ref 12–16)
HGB BLD-MCNC: 9.7 G/DL (ref 12–16)
INR PPP: 1 (ref 0.8–1.2)
LYMPHOCYTES # BLD: 1.4 K/UL (ref 0.9–3.6)
LYMPHOCYTES NFR BLD: 23 % (ref 21–52)
MCH RBC QN AUTO: 30.3 PG (ref 24–34)
MCHC RBC AUTO-ENTMCNC: 32.6 G/DL (ref 31–37)
MCV RBC AUTO: 93.1 FL (ref 74–97)
MONOCYTES # BLD: 0.5 K/UL (ref 0.05–1.2)
MONOCYTES NFR BLD: 8 % (ref 3–10)
NEUTS SEG # BLD: 3.7 K/UL (ref 1.8–8)
NEUTS SEG NFR BLD: 61 % (ref 40–73)
PLATELET # BLD AUTO: 252 K/UL (ref 135–420)
PMV BLD AUTO: 10.6 FL (ref 9.2–11.8)
POTASSIUM BLD-SCNC: 5.7 MMOL/L (ref 3.5–5.5)
POTASSIUM SERPL-SCNC: 5.8 MMOL/L (ref 3.5–5.5)
PROTHROMBIN TIME: 13.1 SEC (ref 11.5–15.2)
RBC # BLD AUTO: 3.2 M/UL (ref 4.2–5.3)
SODIUM BLD-SCNC: 135 MMOL/L (ref 136–145)
SODIUM SERPL-SCNC: 136 MMOL/L (ref 136–145)
WBC # BLD AUTO: 6 K/UL (ref 4.6–13.2)

## 2019-09-10 PROCEDURE — 90935 HEMODIALYSIS ONE EVALUATION: CPT

## 2019-09-10 PROCEDURE — 99153 MOD SED SAME PHYS/QHP EA: CPT

## 2019-09-10 PROCEDURE — 74011636320 HC RX REV CODE- 636/320: Performed by: SURGERY

## 2019-09-10 PROCEDURE — 99152 MOD SED SAME PHYS/QHP 5/>YRS: CPT

## 2019-09-10 PROCEDURE — 74011250637 HC RX REV CODE- 250/637: Performed by: HOSPITALIST

## 2019-09-10 PROCEDURE — 85610 PROTHROMBIN TIME: CPT

## 2019-09-10 PROCEDURE — 99218 HC RM OBSERVATION: CPT

## 2019-09-10 PROCEDURE — C1725 CATH, TRANSLUMIN NON-LASER: HCPCS

## 2019-09-10 PROCEDURE — 85025 COMPLETE CBC W/AUTO DIFF WBC: CPT

## 2019-09-10 PROCEDURE — 74011250636 HC RX REV CODE- 250/636: Performed by: SURGERY

## 2019-09-10 PROCEDURE — 85730 THROMBOPLASTIN TIME PARTIAL: CPT

## 2019-09-10 PROCEDURE — 87340 HEPATITIS B SURFACE AG IA: CPT

## 2019-09-10 PROCEDURE — 82947 ASSAY GLUCOSE BLOOD QUANT: CPT

## 2019-09-10 PROCEDURE — 74011000250 HC RX REV CODE- 250

## 2019-09-10 PROCEDURE — C1757 CATH, THROMBECTOMY/EMBOLECT: HCPCS

## 2019-09-10 PROCEDURE — 80048 BASIC METABOLIC PNL TOTAL CA: CPT

## 2019-09-10 PROCEDURE — C1894 INTRO/SHEATH, NON-LASER: HCPCS

## 2019-09-10 PROCEDURE — 74011250636 HC RX REV CODE- 250/636

## 2019-09-10 RX ORDER — FENTANYL CITRATE 50 UG/ML
INJECTION, SOLUTION INTRAMUSCULAR; INTRAVENOUS
Status: DISCONTINUED
Start: 2019-09-10 | End: 2019-09-10 | Stop reason: WASHOUT

## 2019-09-10 RX ORDER — HEPARIN SODIUM 200 [USP'U]/100ML
500 INJECTION, SOLUTION INTRAVENOUS
Status: COMPLETED | OUTPATIENT
Start: 2019-09-10 | End: 2019-09-10

## 2019-09-10 RX ORDER — HEPARIN SODIUM 5000 [USP'U]/ML
5000 INJECTION, SOLUTION INTRAVENOUS; SUBCUTANEOUS EVERY 8 HOURS
Status: DISCONTINUED | OUTPATIENT
Start: 2019-09-10 | End: 2019-09-11 | Stop reason: HOSPADM

## 2019-09-10 RX ORDER — HEPARIN SODIUM 1000 [USP'U]/ML
INJECTION, SOLUTION INTRAVENOUS; SUBCUTANEOUS
Status: DISPENSED
Start: 2019-09-10 | End: 2019-09-11

## 2019-09-10 RX ORDER — NALOXONE HYDROCHLORIDE 0.4 MG/ML
0.4 INJECTION, SOLUTION INTRAMUSCULAR; INTRAVENOUS; SUBCUTANEOUS AS NEEDED
Status: DISCONTINUED | OUTPATIENT
Start: 2019-09-10 | End: 2019-09-11 | Stop reason: HOSPADM

## 2019-09-10 RX ORDER — FLUMAZENIL 0.1 MG/ML
INJECTION INTRAVENOUS
Status: DISCONTINUED
Start: 2019-09-10 | End: 2019-09-10 | Stop reason: WASHOUT

## 2019-09-10 RX ORDER — CHLORZOXAZONE 500 MG/1
500 TABLET ORAL
Status: DISCONTINUED | OUTPATIENT
Start: 2019-09-10 | End: 2019-09-11 | Stop reason: HOSPADM

## 2019-09-10 RX ORDER — LIDOCAINE HYDROCHLORIDE 10 MG/ML
1-10 INJECTION, SOLUTION EPIDURAL; INFILTRATION; INTRACAUDAL; PERINEURAL
Status: DISCONTINUED | OUTPATIENT
Start: 2019-09-10 | End: 2019-09-11 | Stop reason: HOSPADM

## 2019-09-10 RX ORDER — PRAVASTATIN SODIUM 20 MG/1
20 TABLET ORAL
Status: DISCONTINUED | OUTPATIENT
Start: 2019-09-10 | End: 2019-09-11 | Stop reason: HOSPADM

## 2019-09-10 RX ORDER — ONDANSETRON 2 MG/ML
4 INJECTION INTRAMUSCULAR; INTRAVENOUS
Status: DISCONTINUED | OUTPATIENT
Start: 2019-09-10 | End: 2019-09-11 | Stop reason: HOSPADM

## 2019-09-10 RX ORDER — HEPARIN SODIUM 200 [USP'U]/100ML
INJECTION, SOLUTION INTRAVENOUS
Status: DISPENSED
Start: 2019-09-10 | End: 2019-09-11

## 2019-09-10 RX ORDER — ALBUTEROL SULFATE 90 UG/1
2 AEROSOL, METERED RESPIRATORY (INHALATION)
Status: DISCONTINUED | OUTPATIENT
Start: 2019-09-10 | End: 2019-09-11 | Stop reason: HOSPADM

## 2019-09-10 RX ORDER — DIPHENHYDRAMINE HYDROCHLORIDE 50 MG/ML
25-50 INJECTION, SOLUTION INTRAMUSCULAR; INTRAVENOUS ONCE
Status: COMPLETED | OUTPATIENT
Start: 2019-09-10 | End: 2019-09-10

## 2019-09-10 RX ORDER — WATER FOR INJECTION,STERILE
VIAL (ML) INJECTION
Status: COMPLETED
Start: 2019-09-10 | End: 2019-09-10

## 2019-09-10 RX ORDER — FLUMAZENIL 0.1 MG/ML
0.2 INJECTION INTRAVENOUS
Status: DISCONTINUED | OUTPATIENT
Start: 2019-09-10 | End: 2019-09-11 | Stop reason: HOSPADM

## 2019-09-10 RX ORDER — SODIUM CHLORIDE 9 MG/ML
25 INJECTION, SOLUTION INTRAVENOUS CONTINUOUS
Status: DISCONTINUED | OUTPATIENT
Start: 2019-09-10 | End: 2019-09-11 | Stop reason: HOSPADM

## 2019-09-10 RX ORDER — DIPHENHYDRAMINE HYDROCHLORIDE 50 MG/ML
INJECTION, SOLUTION INTRAMUSCULAR; INTRAVENOUS
Status: COMPLETED
Start: 2019-09-10 | End: 2019-09-10

## 2019-09-10 RX ORDER — CEFAZOLIN SODIUM/WATER 2 G/20 ML
2 SYRINGE (ML) INTRAVENOUS ONCE
Status: COMPLETED | OUTPATIENT
Start: 2019-09-10 | End: 2019-09-10

## 2019-09-10 RX ORDER — MIDAZOLAM HYDROCHLORIDE 1 MG/ML
INJECTION, SOLUTION INTRAMUSCULAR; INTRAVENOUS
Status: DISPENSED
Start: 2019-09-10 | End: 2019-09-11

## 2019-09-10 RX ORDER — FENTANYL CITRATE 50 UG/ML
INJECTION, SOLUTION INTRAMUSCULAR; INTRAVENOUS
Status: DISPENSED
Start: 2019-09-10 | End: 2019-09-11

## 2019-09-10 RX ORDER — OXYCODONE AND ACETAMINOPHEN 5; 325 MG/1; MG/1
1 TABLET ORAL
Status: DISCONTINUED | OUTPATIENT
Start: 2019-09-10 | End: 2019-09-11 | Stop reason: HOSPADM

## 2019-09-10 RX ORDER — ACETAMINOPHEN 325 MG/1
650 TABLET ORAL
Status: DISCONTINUED | OUTPATIENT
Start: 2019-09-10 | End: 2019-09-11 | Stop reason: HOSPADM

## 2019-09-10 RX ORDER — HEPARIN SODIUM 1000 [USP'U]/ML
10000 INJECTION, SOLUTION INTRAVENOUS; SUBCUTANEOUS
Status: DISCONTINUED | OUTPATIENT
Start: 2019-09-10 | End: 2019-09-11 | Stop reason: HOSPADM

## 2019-09-10 RX ORDER — PROMETHAZINE HYDROCHLORIDE 25 MG/1
25 TABLET ORAL
Status: DISCONTINUED | OUTPATIENT
Start: 2019-09-10 | End: 2019-09-11 | Stop reason: HOSPADM

## 2019-09-10 RX ORDER — NALOXONE HYDROCHLORIDE 0.4 MG/ML
INJECTION, SOLUTION INTRAMUSCULAR; INTRAVENOUS; SUBCUTANEOUS
Status: DISCONTINUED
Start: 2019-09-10 | End: 2019-09-10 | Stop reason: WASHOUT

## 2019-09-10 RX ORDER — MIDAZOLAM HYDROCHLORIDE 1 MG/ML
INJECTION, SOLUTION INTRAMUSCULAR; INTRAVENOUS
Status: DISCONTINUED
Start: 2019-09-10 | End: 2019-09-10 | Stop reason: WASHOUT

## 2019-09-10 RX ORDER — MIDAZOLAM HYDROCHLORIDE 1 MG/ML
.5-4 INJECTION, SOLUTION INTRAMUSCULAR; INTRAVENOUS
Status: DISCONTINUED | OUTPATIENT
Start: 2019-09-10 | End: 2019-09-11 | Stop reason: HOSPADM

## 2019-09-10 RX ORDER — FENTANYL CITRATE 50 UG/ML
25-200 INJECTION, SOLUTION INTRAMUSCULAR; INTRAVENOUS
Status: DISCONTINUED | OUTPATIENT
Start: 2019-09-10 | End: 2019-09-11 | Stop reason: HOSPADM

## 2019-09-10 RX ORDER — DIPHENHYDRAMINE HYDROCHLORIDE 50 MG/ML
12.5 INJECTION, SOLUTION INTRAMUSCULAR; INTRAVENOUS
Status: DISCONTINUED | OUTPATIENT
Start: 2019-09-10 | End: 2019-09-11 | Stop reason: HOSPADM

## 2019-09-10 RX ORDER — SEVELAMER CARBONATE 800 MG/1
2400 TABLET, FILM COATED ORAL 2 TIMES DAILY WITH MEALS
Status: DISCONTINUED | OUTPATIENT
Start: 2019-09-10 | End: 2019-09-11 | Stop reason: HOSPADM

## 2019-09-10 RX ADMIN — MIDAZOLAM HYDROCHLORIDE 1 MG: 1 INJECTION, SOLUTION INTRAMUSCULAR; INTRAVENOUS at 15:17

## 2019-09-10 RX ADMIN — WATER 10 ML: 1 INJECTION INTRAMUSCULAR; INTRAVENOUS; SUBCUTANEOUS at 15:20

## 2019-09-10 RX ADMIN — PRAVASTATIN SODIUM 20 MG: 20 TABLET ORAL at 22:09

## 2019-09-10 RX ADMIN — FENTANYL CITRATE 50 MCG: 50 INJECTION, SOLUTION INTRAMUSCULAR; INTRAVENOUS at 15:09

## 2019-09-10 RX ADMIN — ALTEPLASE 8 MG: 2.2 INJECTION, POWDER, LYOPHILIZED, FOR SOLUTION INTRAVENOUS at 15:13

## 2019-09-10 RX ADMIN — SEVELAMER CARBONATE 2400 MG: 800 TABLET, FILM COATED ORAL at 20:28

## 2019-09-10 RX ADMIN — HEPARIN SODIUM 1000 UNITS: 200 INJECTION, SOLUTION INTRAVENOUS at 15:32

## 2019-09-10 RX ADMIN — MIDAZOLAM HYDROCHLORIDE 0.5 MG: 1 INJECTION, SOLUTION INTRAMUSCULAR; INTRAVENOUS at 15:23

## 2019-09-10 RX ADMIN — FENTANYL CITRATE 25 MCG: 50 INJECTION, SOLUTION INTRAMUSCULAR; INTRAVENOUS at 15:35

## 2019-09-10 RX ADMIN — OXYCODONE HYDROCHLORIDE AND ACETAMINOPHEN 1 TABLET: 5; 325 TABLET ORAL at 18:06

## 2019-09-10 RX ADMIN — MIDAZOLAM HYDROCHLORIDE 1 MG: 1 INJECTION, SOLUTION INTRAMUSCULAR; INTRAVENOUS at 15:10

## 2019-09-10 RX ADMIN — FENTANYL CITRATE 25 MCG: 50 INJECTION, SOLUTION INTRAMUSCULAR; INTRAVENOUS at 15:23

## 2019-09-10 RX ADMIN — LIDOCAINE HYDROCHLORIDE 3 ML: 10 INJECTION, SOLUTION EPIDURAL; INFILTRATION; INTRACAUDAL; PERINEURAL at 16:02

## 2019-09-10 RX ADMIN — DIPHENHYDRAMINE HYDROCHLORIDE 50 MG: 50 INJECTION, SOLUTION INTRAMUSCULAR; INTRAVENOUS at 14:54

## 2019-09-10 RX ADMIN — HEPARIN SODIUM 8000 UNITS: 1000 INJECTION INTRAVENOUS; SUBCUTANEOUS at 15:11

## 2019-09-10 RX ADMIN — FENTANYL CITRATE 50 MCG: 50 INJECTION, SOLUTION INTRAMUSCULAR; INTRAVENOUS at 15:15

## 2019-09-10 RX ADMIN — Medication 2 G: at 14:54

## 2019-09-10 RX ADMIN — IOPAMIDOL 55 ML: 612 INJECTION, SOLUTION INTRAVENOUS at 16:04

## 2019-09-10 RX ADMIN — MIDAZOLAM HYDROCHLORIDE 0.5 MG: 1 INJECTION, SOLUTION INTRAMUSCULAR; INTRAVENOUS at 15:24

## 2019-09-10 RX ADMIN — FENTANYL CITRATE 50 MCG: 50 INJECTION, SOLUTION INTRAMUSCULAR; INTRAVENOUS at 15:42

## 2019-09-10 RX ADMIN — MIDAZOLAM HYDROCHLORIDE 1 MG: 1 INJECTION, SOLUTION INTRAMUSCULAR; INTRAVENOUS at 15:42

## 2019-09-10 NOTE — PROGRESS NOTES
TRANSFER - OUT REPORT:    Verbal report given to Maxine Rasmussen(name) on [facility]  being transferred to Care unit(unit) for routine progression of care       Report consisted of patients Situation, Background, Assessment and   Recommendations(SBAR). Information from the following report(s) SBAR, Procedure Summary and MAR was reviewed with the receiving nurse. Lines:   Peripheral IV 09/10/19 Right Antecubital (Active)   Site Assessment Clean, dry, & intact 9/10/2019 12:39 PM   Phlebitis Assessment 0 9/10/2019 12:39 PM   Infiltration Assessment 0 9/10/2019 12:39 PM   Dressing Status Clean, dry, & intact 9/10/2019 12:39 PM   Dressing Type Tape;Transparent 9/10/2019 12:39 PM   Hub Color/Line Status Blue;Capped;Flushed; Infusing 9/10/2019 12:39 PM   Action Taken Open ports on tubing capped 9/10/2019 12:39 PM   Alcohol Cap Used Yes 9/10/2019 12:39 PM        Opportunity for questions and clarification was provided.       Patient transported with:   Registered Nurse

## 2019-09-10 NOTE — PROGRESS NOTES
Pt c/o pain in left upper arm. Dr Juan Montes paged, will come see pt in care unit.   Pt aware, phone take to pt to call physician

## 2019-09-10 NOTE — PROCEDURES
Procedure Note      Left Upper Extremity AVG Thrombectomy  [facility]        Date of Service: 9- 5261     Surgeon(s):  Kimo Muniz DO FACS     Assistant(s): None     Pre-operative Diagnosis: thrombosed LUE AVG     Post-operative Diagnosis: same as preop diagnosis     Procedure(s) Performed:       1. Central venogram.  3. Balloon angioplasty of AVG venous anastomosis and multiple intragraft steoses with 8 mm angioplasty balloon   4. Over the wire emma embolectomy of AVG arterial inflow  5. Balloon Angioplasty of arterial anastomosis with 6 x 40 balloon     Anesthesia:  Local with 1% lidocaine. Moderate sedation was administered. The patient was constantly monitored by Carline Dials under my direct supervision from 1511  until 1610  hours. Fentanyl 200mcg, Versed 4 mg.        Blood Adminstered:  none     Findings: venous outflow anastomotic stenosis and intragraft stenosis. Some resolution after angioplasty with patent peripheral and central venous outflow. Mild stenosis proximal to venous outflow stent and 50% stenosis central to  The stent. May need a larger stent more centrally but none available at THE [facility]. Excellent flow in AVG at conclusion of the procedure     Complications: none     Estimated Blood Loss:  minimal     Tubes and Drains: none     Implants/Grafts:  none     Specimens: none        Indication:   52 yo Female with ESRD on HD via LUE loop graft. This had been working well until yesterday when she was not able to be dialyzed. On exam, there is no thrill or pulse, no bruit audible. The decision was made to proceed with AVG thrombectomy. The risks and benefits of this procedure were discussed. The patient voiced understanding. All questions were answered and the patient opted to proceed.         Description of Procedure:   After informed consent was obtained, the patient was brought to the angio suite and placed on the table in supine position with the left arm abducted at 45 degrees.  The [facility] was prepped and draped in standard sterile fashion. A safety pause was performed with all necessary personnel and equipment in the room.      I accessed the AV graft first with an 18-gauge needle after numbing the skin with 1% lidocaine. The first needle was pointing towards the venous outflow which was lateral.  Using standard Seldinger technique over a wire I passed a 7 Western Katya sheath. I systemically heparinized the patient with 6000 units of IV heparin. Over the wire that I passed centrally I passed a Cueva catheter and a central venogram was performed first and  there were essentially no real central venous stenoses. I laced the clot starting at the venous anastomosis and working toward my 7 Western Katya sheath with 8 mg of TPA. I then replaced my wire and performed angioplasty of the graft starting at my sheath insertion site inflating and deflating the balloon and macerating the clot and pushing it forward. There appeared to be a stenosis at the venous anastomosis which was not pronounced but inflating the balloon within the graft itself demonstrated some challenges due to likely scarring within the graft. I used a 10 mm balloon to re-angioplasty the two areas with narrowing and the area in the graft improved but central to the stent there was persistent stenosis approximately 50%. After I was done with this portion, I accessed the graft about 10 cm away pointing towards the arterial anastomosis once again with an 18-gauge needle once again after anesthetizing the skin with 1% lidocaine. Using Seldinger technique I passed a 7 Western Katya sheath. Using a Bentson wire and Cueva catheter I was able to cross into the brachial artery. I did over-the-wire Patricio embolectomy and pulled a lot of clot including the arterial plug into the graft and let it flush centrally.   I did an angiogram with the Cueva catheter in the brachial artery proximally , demonstrating residual clot within the arterial inflow limb of the graft. I once again passed a Patricio embolectomy catheter multiple times. It appeared that I had cleared all the clot out of the graft at this point. The arterial anastomosis is small presumably because this was a 4 mm tapered AV graft. Nonetheless, I angioplastied this for 2 minutes with a 6 x 40 balloon with improvement in luminal caliber. At this point I removed all catheters and wires. I pulled both sheaths and closed the sites with 4-0 Monocryl pursestring sutures. Hemostasis was good however there was a hematoma on 1 of the sheath exit sites close to the antecubital fossa. This area was monitored pressure was held. It was non-expanding and the area was marked with a surgical marking pen. Sterile dressings were applied on both sides. The patient tolerated the procedure well and went to recovery in stable condition.   I was present and scrubbed throughout.     The patient tolerated the procedure well and was transported to the recovery area in stable condition.   She maintained a palpable thrill in the LUE AVG     Kimo N [facility], [place] [id number] [facility]

## 2019-09-10 NOTE — PROGRESS NOTES
1630  Received pt from IR,  Pt drowsy but easily aroused with verbal stimuli,  2 2x2 gauge and tegaderm dressing intact to left upper arm fistula site,

## 2019-09-10 NOTE — H&P
History and Physical    Patient: Robe Martinez MRN: [id number]  SSN: [id number]    YOB: 1975  Age: 53 y.o. Sex: female      Subjective:      Robe Martinez is a 53 y.o. female who presents for percutaneous mechanical thrombectomy left arm AVG. Last dialyzed this past Saturday  No complaints    Past Medical History:   Diagnosis Date    Chronic kidney disease     ESRD    Dialysis patient ([facility] [address]     PUD (peptic ulcer disease) 2004    Thyroid disease      Past Surgical History:   Procedure Laterality Date    HX HYSTERECTOMY      HX OTHER SURGICAL Left     dialysis graft arm; removed    HX TONSILLECTOMY      HX TRANSPLANT  2004    renal transplant    HX VASCULAR ACCESS Right     Dialysis catheter    HX VASCULAR ACCESS Left     AV graft, (\"does not work\")    VASCULAR SURGERY PROCEDURE UNLIST        Family History   Problem Relation Age of Onset    Hypertension Brother     Diabetes Maternal Grandmother      Social History     Tobacco Use    Smoking status: Never Smoker    Smokeless tobacco: Never Used   Substance Use Topics    Alcohol use: No      Prior to Admission medications    Medication Sig Start Date End Date Taking? Authorizing Provider   albuterol (PROVENTIL HFA, VENTOLIN HFA, PROAIR HFA) 90 mcg/actuation inhaler Take 2 Puffs by inhalation every four (4) hours as needed for Wheezing. 7/17/18  Yes Evelyn Paredes MD   sevelamer (RENAGEL) 800 mg tablet Take 2,400 mg by mouth two (2) times a day. Yes Provider, Historical   pravastatin (PRAVACHOL) 20 mg tablet Take 20 mg by mouth nightly. Indications: hypercholesterolemia   Yes Provider, Historical   zolpidem (AMBIEN) 5 mg tablet Take 5 mg by mouth nightly as needed for Sleep. Indications: dialysis days   Yes Provider, Historical   diphenhydrAMINE (BENADRYL) 25 mg capsule Take 25 mg by mouth every six (6) hours as needed.     Other, MD Yamil   chlorzoxazone (PARAFON FORTE DSC) 500 mg tablet Take 1 Tab by mouth three (3) times daily as needed for Muscle Spasm(s). 6/22/18   Alejandro Novoa PA-C   promethazine (PHENERGAN) 25 mg tablet Take 25 mg by mouth every six (6) hours as needed. Dialysis premed     Provider, Historical        Allergies   Allergen Reactions    Vancomycin Other (comments)     Felt like her body was burning    Aspirin Nausea and Vomiting     Pt reports aspirin gave her a stomach ulcer.  Vicodin [Hydrocodone-Acetaminophen] Nausea and Vomiting       Review of Systems:  A comprehensive review of systems was negative. Objective:     Vitals:    09/10/19 1229 09/10/19 1230 09/10/19 1459   BP:  120/71 159/79   Pulse:  95 94   Resp:  20 16   Temp:  98.6 °F (37 °C)    SpO2:  99% 99%   Weight: 86.2 kg (190 lb)     Height: 5' 5\" (1.651 m)          Physical Exam:  GENERAL: alert, cooperative, no distress, appears stated age  THROAT & NECK: normal and no erythema or exudates noted. LUNG: clear to auscultation bilaterally  HEART: regular rate and rhythm, S1, S2 normal, no murmur, click, rub or gallop  ABDOMEN: soft, non-tender.  Bowel sounds normal. No masses,  no organomegaly  EXTREMITIES:  extremities normal, atraumatic, no cyanosis or edema  SKIN: no rash or abnormalities  NEUROLOGIC: negative    AVG without thrill or bruit    Assessment:     Hospital Problems  Date Reviewed: 8/31/2017    None      Clotted left arm AV Graft    Plan:     Percutaneous Mechanical Thrombectomy left ARM AVG    Signed By: Ruba Krause MD     September 10, 2019

## 2019-09-10 NOTE — H&P
History & Physical    Patient: Jennifer Larsen MRN: [id number]  CSN: [id number]    YOB: 1975  Age: 53 y.o. Sex: female      DOA: 9/10/2019  Primary Care Provider:  Diane Del Cid MD      Assessment/Plan     Hospital Problems  Date Reviewed: 8/31/2017          Codes Class Noted POA    Hyperkalemia ICD-10-CM: E87.5  ICD-9-CM: 276.7  8/21/2017 Unknown        ESRD on dialysis [facility]) ICD-10-CM: N18.6, Z99.2  ICD-9-CM: 585.6, V45.11  2/21/2017 Yes              Admit to tele   Hyperkalemia   Will have HD tonight  Case discussed with Dr. Bland Ends   Will arrange HD tonight     esrd on HD  A.v fistular emblectomy today, will continue pain control  HD today. Full code       Estimate  length of stay : overnight     DVT : heparin  CC: pain in the arm        HPI:     Jennifer Larsen is a 53 y.o. female who hx of esrd on hd is a direct admission from vascular lab. She had embolectomy of av fistular and was found her K was high. She is supposed to have HD today. Renal was called. Will arrange HD tonight. She reported pain in the arm. Denies any slurred speech/headache/cp/n/v/blurred vission/d/c/palpitation/gait change/bleeding. Denies smoking/ any alcohol or drug use. Visit Vitals  /87   Pulse 96   Temp 98.7 °F (37.1 °C)   Resp 16   Ht 5' 5\" (1.651 m)   Wt 86.2 kg (190 lb)   LMP 12/01/2012   SpO2 100%   Breastfeeding?  No   BMI 31.62 kg/m²    O2 Flow Rate (L/min): 3 l/min O2 Device: Room air      Past Medical History:   Diagnosis Date    Chronic kidney disease     ESRD    [facility]ialysis patient ([facility] [address]     PUD (peptic ulcer disease) 2004    Thyroid disease        Past Surgical History:   Procedure Laterality Date    HX HYSTERECTOMY      HX OTHER SURGICAL Left     dialysis graft arm; removed    HX TONSILLECTOMY      HX TRANSPLANT  2004    renal transplant    HX VASCULAR ACCESS Right     Dialysis catheter    HX VASCULAR ACCESS Left     AV graft, (\"does not work\")    VASCULAR SURGERY PROCEDURE UNLIST Family History   Problem Relation Age of Onset    Hypertension Brother     Diabetes Maternal Grandmother        Social History     Socioeconomic History    Marital status:      Spouse name: Not on file    Number of children: Not on file    Years of education: Not on file    Highest education level: Not on file   Tobacco Use    Smoking status: Never Smoker    Smokeless tobacco: Never Used   Substance and Sexual Activity    Alcohol use: No    Drug use: No    Sexual activity: Never   Other Topics Concern       Prior to Admission medications    Medication Sig Start Date End Date Taking? Authorizing Provider   albuterol (PROVENTIL HFA, VENTOLIN HFA, PROAIR HFA) 90 mcg/actuation inhaler Take 2 Puffs by inhalation every four (4) hours as needed for Wheezing. 7/17/18  Yes Romayne Dare, MD   sevelamer (RENAGEL) 800 mg tablet Take 2,400 mg by mouth two (2) times a day. Yes Provider, Historical   pravastatin (PRAVACHOL) 20 mg tablet Take 20 mg by mouth nightly. Indications: hypercholesterolemia   Yes Provider, Historical   zolpidem (AMBIEN) 5 mg tablet Take 5 mg by mouth nightly as needed for Sleep. Indications: dialysis days   Yes Provider, Historical   diphenhydrAMINE (BENADRYL) 25 mg capsule Take 25 mg by mouth every six (6) hours as needed. Other, MD Yamil   chlorzoxazone (PARAFON FORTE DSC) 500 mg tablet Take 1 Tab by mouth three (3) times daily as needed for Muscle Spasm(s). 6/22/18   Lesa Menon PA-C   promethazine (PHENERGAN) 25 mg tablet Take 25 mg by mouth every six (6) hours as needed. Dialysis premed     Provider, Historical       Allergies   Allergen Reactions    Vancomycin Other (comments)     Felt like her body was burning    Aspirin Nausea and Vomiting     Pt reports aspirin gave her a stomach ulcer.  Vicodin [Hydrocodone-Acetaminophen] Nausea and Vomiting       Review of Systems  Gen: No fever, chills, malaise, weight loss/gain.    Heent: No headache, rhinorrhea, epistaxis, ear pain, hearing loss, sinus pain, neck pain/stiffness, sore throat. Heart: No chest pain, palpitations, IZQUIERDO, pnd, or orthopnea. Resp: No cough, hemoptysis, wheezing and shortness of breath. GI: No nausea, vomiting, diarrhea, constipation, melena or hematochezia. : No urinary obstruction, dysuria or hematuria. Derm: No rash, new skin lesion or pruritis. Musc/skeletal: pain in the arm   Vasc: No edema, cyanosis or claudication. Endo: No heat/cold intolerance, no polyuria,polydipsia or polyphagia. Neuro: No unilateral weakness, numbness, tingling. No seizures. Heme: No easy bruising or bleeding. Physical Exam:     Physical Exam:  Visit Vitals  /87   Pulse 96   Temp 98.7 °F (37.1 °C)   Resp 16   Ht 5' 5\" (1.651 m)   Wt 86.2 kg (190 lb)   LMP 2012   SpO2 100%   Breastfeeding? No   BMI 31.62 kg/m²    O2 Flow Rate (L/min): 3 l/min O2 Device: Room air    Temp (24hrs), Av.7 °F (37.1 °C), Min:98.6 °F (37 °C), Max:98.7 °F (37.1 °C)    No intake/output data recorded. No intake/output data recorded. General:  Awake, cooperative, no distress. Head:  Normocephalic, without obvious abnormality, atraumatic. Eyes:  Conjunctivae/corneas clear, sclera anicteric, PERRL, EOMs intact. Nose: Nares normal. No drainage or sinus tenderness. Throat: Lips, mucosa, and tongue normal. .   Neck: Supple, symmetrical, trachea midline, no adenopathy. Lungs:   Clear to auscultation bilaterally. Heart:  Regular rate and rhythm, S1, S2 normal, no murmur, click, rub or gallop. Abdomen: Soft, non-tender. Bowel sounds normal. No masses,  No organomegaly. Extremities: Extremities normal, atraumatic, no cyanosis or edema. Left arm a-v fistular noted    Pulses: 2+ and symmetric all extremities. Skin: Skin color-pink, texture, turgor normal. No rashes or lesions except av fistular  Capillary refill normal    Neurologic: CNII-XII intact. No focal motor or sensory deficit. Labs Reviewed:    BMP:   Lab Results   Component Value Date/Time     09/10/2019 12:40 PM    K 5.8 (H) 09/10/2019 12:40 PM    CL 98 (L) 09/10/2019 12:40 PM    CO2 25 09/10/2019 12:40 PM    AGAP 13 09/10/2019 12:40 PM    GLU 82 09/10/2019 12:40 PM    BUN 52 (H) 09/10/2019 12:40 PM    CREA 17.40 (H) 09/10/2019 12:40 PM    GFRAA 3 (L) 09/10/2019 12:40 PM    GFRNA 2 (L) 09/10/2019 12:40 PM     CMP:   Lab Results   Component Value Date/Time     09/10/2019 12:40 PM    K 5.8 (H) 09/10/2019 12:40 PM    CL 98 (L) 09/10/2019 12:40 PM    CO2 25 09/10/2019 12:40 PM    AGAP 13 09/10/2019 12:40 PM    GLU 82 09/10/2019 12:40 PM    BUN 52 (H) 09/10/2019 12:40 PM    CREA 17.40 (H) 09/10/2019 12:40 PM    GFRAA 3 (L) 09/10/2019 12:40 PM    GFRNA 2 (L) 09/10/2019 12:40 PM    CA 9.2 09/10/2019 12:40 PM     CBC:   Lab Results   Component Value Date/Time    WBC 6.0 09/10/2019 12:40 PM    HGB 9.7 (L) 09/10/2019 12:40 PM    HCT 29.8 (L) 09/10/2019 12:40 PM     09/10/2019 12:40 PM     All Cardiac Markers in the last 24 hours: No results found for: CPK, CK, CKMMB, CKMB, RCK3, CKMBT, CKNDX, CKND1, SHEREEN, TROPT, TROIQ, JITENDRA, TROPT, TNIPOC, BNP, BNPP  Recent Glucose Results:   Lab Results   Component Value Date/Time    GLU 82 09/10/2019 12:40 PM     ABG: No results found for: PH, PHI, PCO2, PCO2I, PO2, PO2I, HCO3, HCO3I, FIO2, FIO2I  COAGS:   Lab Results   Component Value Date/Time    APTT 30.7 09/10/2019 12:40 PM    PTP 13.1 09/10/2019 12:40 PM    INR 1.0 09/10/2019 12:40 PM     Liver Panel: No results found for: ALB, CBIL, TBIL, TP, GLOB, AGRAT, SGOT, ASTPOC, ALTPOC, ALT, GPT, AP  Pancreatic Markers: No results found for: AMYLPOCT, AML, LIPPOCT, LPSE    Procedures/imaging: see electronic medical records for all procedures/Xrays and details which were not copied into this note but were reviewed prior to creation of Stephanie Aiken MD, Internal Medicine     CC: Marly Sexton MD

## 2019-09-10 NOTE — PROGRESS NOTES
Patient will be admitted graciously by the Hospitalist team and Nephrology will be consulted so she can be dialyzed and then likely discharge to home tomorrow      Mili Oviedo D.O. [address] [facility]  [phone]

## 2019-09-11 VITALS
BODY MASS INDEX: 32.65 KG/M2 | OXYGEN SATURATION: 100 % | TEMPERATURE: 97.9 F | DIASTOLIC BLOOD PRESSURE: 58 MMHG | RESPIRATION RATE: 14 BRPM | SYSTOLIC BLOOD PRESSURE: 120 MMHG | WEIGHT: 195.99 LBS | HEART RATE: 75 BPM | HEIGHT: 65 IN

## 2019-09-11 LAB
ANION GAP SERPL CALC-SCNC: 7 MMOL/L (ref 3–18)
BASOPHILS # BLD: 0 K/UL (ref 0–0.1)
BASOPHILS NFR BLD: 0 % (ref 0–2)
BUN SERPL-MCNC: 20 MG/DL (ref 7–18)
BUN/CREAT SERPL: 2 (ref 12–20)
CALCIUM SERPL-MCNC: 8.7 MG/DL (ref 8.5–10.1)
CHLORIDE SERPL-SCNC: 101 MMOL/L (ref 100–111)
CO2 SERPL-SCNC: 30 MMOL/L (ref 21–32)
CREAT SERPL-MCNC: 8.74 MG/DL (ref 0.6–1.3)
DIFFERENTIAL METHOD BLD: ABNORMAL
EOSINOPHIL # BLD: 0.4 K/UL (ref 0–0.4)
EOSINOPHIL NFR BLD: 6 % (ref 0–5)
ERYTHROCYTE [DISTWIDTH] IN BLOOD BY AUTOMATED COUNT: 13.9 % (ref 11.6–14.5)
GLUCOSE SERPL-MCNC: 63 MG/DL (ref 74–99)
HBV SURFACE AG SER QL: NORMAL INDEX
HCT VFR BLD AUTO: 26.1 % (ref 35–45)
HGB BLD-MCNC: 8.4 G/DL (ref 12–16)
LYMPHOCYTES # BLD: 1.4 K/UL (ref 0.9–3.6)
LYMPHOCYTES NFR BLD: 25 % (ref 21–52)
MAGNESIUM SERPL-MCNC: 2.1 MG/DL (ref 1.6–2.6)
MCH RBC QN AUTO: 29.9 PG (ref 24–34)
MCHC RBC AUTO-ENTMCNC: 32.2 G/DL (ref 31–37)
MCV RBC AUTO: 92.9 FL (ref 74–97)
MONOCYTES # BLD: 0.5 K/UL (ref 0.05–1.2)
MONOCYTES NFR BLD: 9 % (ref 3–10)
NEUTS SEG # BLD: 3.4 K/UL (ref 1.8–8)
NEUTS SEG NFR BLD: 60 % (ref 40–73)
PLATELET # BLD AUTO: 213 K/UL (ref 135–420)
PMV BLD AUTO: 10.1 FL (ref 9.2–11.8)
POTASSIUM SERPL-SCNC: 4.4 MMOL/L (ref 3.5–5.5)
RBC # BLD AUTO: 2.81 M/UL (ref 4.2–5.3)
SODIUM SERPL-SCNC: 138 MMOL/L (ref 136–145)
WBC # BLD AUTO: 5.7 K/UL (ref 4.6–13.2)

## 2019-09-11 PROCEDURE — 86706 HEP B SURFACE ANTIBODY: CPT

## 2019-09-11 PROCEDURE — 36415 COLL VENOUS BLD VENIPUNCTURE: CPT

## 2019-09-11 PROCEDURE — 80048 BASIC METABOLIC PNL TOTAL CA: CPT

## 2019-09-11 PROCEDURE — 99218 HC RM OBSERVATION: CPT

## 2019-09-11 PROCEDURE — 74011250636 HC RX REV CODE- 250/636: Performed by: HOSPITALIST

## 2019-09-11 PROCEDURE — 74011250637 HC RX REV CODE- 250/637: Performed by: HOSPITALIST

## 2019-09-11 PROCEDURE — 85025 COMPLETE CBC W/AUTO DIFF WBC: CPT

## 2019-09-11 PROCEDURE — 83735 ASSAY OF MAGNESIUM: CPT

## 2019-09-11 RX ORDER — SODIUM CHLORIDE 9 MG/ML
25 INJECTION, SOLUTION INTRAVENOUS
Status: DISCONTINUED | OUTPATIENT
Start: 2019-09-11 | End: 2019-09-11 | Stop reason: HOSPADM

## 2019-09-11 RX ADMIN — SEVELAMER CARBONATE 2400 MG: 800 TABLET, FILM COATED ORAL at 09:03

## 2019-09-11 RX ADMIN — OXYCODONE HYDROCHLORIDE AND ACETAMINOPHEN 1 TABLET: 5; 325 TABLET ORAL at 00:34

## 2019-09-11 RX ADMIN — OXYCODONE HYDROCHLORIDE AND ACETAMINOPHEN 1 TABLET: 5; 325 TABLET ORAL at 09:03

## 2019-09-11 RX ADMIN — HEPARIN SODIUM 5000 UNITS: 5000 INJECTION INTRAVENOUS; SUBCUTANEOUS at 04:42

## 2019-09-11 NOTE — PROGRESS NOTES
Reason for Admission:   Jose Angel Tavera is a 53 y.o. female who hx of esrd on hd is a direct admission from vascular lab. She had embolectomy of av fistular and was found her K was high. She is supposed to have HD today. Renal was called. Will arrange HD tonight. She reported pain in the arm.      Denies any slurred speech/headache/cp/n/v/blurred vission/d/c/palpitation/gait change/bleeding. Denies smoking/ any alcohol or drug use                   RRAT Score:     12                Plan for utilizing home health:      declines                    Current Advanced Directive/Advance Care Plan: please place consult to address acp with palliative care or pastoral care                         Transition of Care Plan:                    Met with patient at bedside. She lives with a room mate she has medical transport that transports her to [facility] on [facility] at 5am. Her pcp is Dr. Lorenzo Dubose she will need follow up. Patient states she had her HD last night and plans to return. Cms will send updates and clinical to [facility] HD center. Patient has medicare for insurance. Patient reports her daughter will pick her up. She plans to return home today. She does not use any DME  Care Management Interventions  PCP Verified by CM: Yes  Transition of Care Consult (CM Consult): Discharge Planning  Current Support Network:  Other  Plan discussed with Pt/Family/Caregiver: Yes  Freedom of Choice Offered: Yes  Discharge Location  Discharge Placement: Home with family assistance

## 2019-09-11 NOTE — ROUTINE PROCESS
0700 Bedside shift change report given to Ming (oncoming nurse) by [place]. Luis Miguel Chakraborty RN (offgoing nurse). Report included the following information SBAR, Kardex and MAR.

## 2019-09-11 NOTE — PROGRESS NOTES
[address] [facility] [place]:             THE [facility]                              Dr. Julieta Mcneal    []1st Time Acute  []Stat        [x] Routine []Urgent        [x]Chronic Unit             [facility] [place] [facility]\"" in [facility] [place]  []Acute Room [x]Bedside  []ICU/CCU []ER   Isolation Precautions: [x]Dialysis[] Airborne []Contact []Droplet []Reverse    Special Considerations:  [] Blood Consent Verified  [x]N/A   Allergies:[] NKA         [x] Allergies reviewed. See list. Code Status [x]Full Code [] DNR  [] Other    Diet: [x] Renal [] NPO [] Diabetic   [] Enteral Feeding Diabetic: [x]Yes []No     [x]Signed Treatment Consent Verified   [x] Time Out/ Safety Check  Admiting Diagnosis: ESRD. S/P De-clot of AVG today. Pt's current clinic schedule- [facility] at [facility] in [place], [place]. PRIMARY NURSE REPORT: FIRST INITIAL/ LAST NAME/TITLE  PRE DIALYSIS:  Weston Danielson RN         TIME:   19:00pm   ACCESS   CATHETER ACCESS: [x] N/A                    GRAFT/FISTULA ACCESS:  [] N/A  [] RIGHT  [x] LEFT  [x] UE   [] LE       [x] AVG  [] AVF [] BUTTONHOLE    [x] +BRUIT/THRILL [x] MEDICAL ASEPTIC PREP. Access tender s/p de-clot today. 15 gauge fistula needles placed without difficulty. [x] No S/S infection  [] Redness [] Drainage  [] Cultured [] Swelling [x] Pain: Minimal site pain s/p declot.                If Access Problem Dr. Judene Dance: [] Yes [] No    Date:   [x] N/A   GENERAL ASSESSMENT   LUNGS:  SaO2% 100    [x] Clear [] Coarse [] Crackles [] Wheezing                                        Location: [] RLL [] LLL [] RUL [] RML [] KARSON    COUGH:  [] Productive [x] Dry [] N/A  RESPIRATIONS: [x] Easy [] Labored    THERAPY: [x] RA   [] NC   L/min    Mask: [] NRB [] Venti    O2%                  [] Ventilator [] Intubated [] Trach [] BiPap [] CPap [] HI Flow   CARDIAC: [x] Regular [] Irregular [] Pericardial Rub [] JVD               Monitored Rhythm:  [] N/A   EDEMA: [] None [x] Generalized [] Facial [] Pedal [] UE [] LE Non-pitting trace peripheral edema   SKIN:    [x] Warm [] Hot [] Cold  [x] Dry [] Pale [] Diaphoretic              [] Flushed [] Jaundiced [] Cyanotic [] Rash [] Weeping     LOC:    [x] Alert  Oriented to: [x] Person [x] Place [x] Time             [] Confused [] Lethargic [] Medicated [] Non-responsive    GI/ABDOMEN: [] Flat [] Distended [x] Soft [] Firm [] Diarrhea [x] Bowel Sounds Present [] Nausea [] Vomiting    PAIN: [] 0 [] 1 [] 2 [] 3 [] 4 [] 5 [x] 6 [] 7 [] 8 [] 9 [] 10          SUHA tenderness post-procedure. Scale 1-10 Action/Follow Up   MOBILITY: [] Amb [] Amb/Assist [x] Bed  [] Wheelchair    CURRENT LABS   HBsAg:              Date Drawn:   09/10/2019 (pending)       [x] Negative  (per history)            [] Positive       [] Unknown. HBsAb: Date Drawn:              [] Susceptible <10       [] Immune ? 10           [x] Unknown  Outpatient dialysis clinic closed. No recent results available. Patient reports that she dialyzes [facility] at [address] and does not require isolation precautions. Stat HBsAG drawn and machine chemically disinfected post-dialysis per policy. EDUCATION   Person Educated: [x] Patient [] Other    Knowledge base: [] None [] Minimal [x] Substantial  Has been on HD since 2011.    Barriers to learning  [x] None     Preferred method of learning: [] Written [x] Oral [] Visual [] Hands on    Topic: [x] Access Care [x] S&S of infection [x] Fluid Management   [x] K+  [x] Procedural  [] Albumin [] Medications [] Tx Options   [] Transplant [] Diet [] Other    Teaching Tools: [x] Explain [] Demonstration [] Handout  [] Video    RO/HEMODIAYLSIS MACHINE SAFETY CHECKS- BEFORE EACH TREATMENT          [x] THE [facility]: Machine Serial #1:  [id number]   RO Serial #[id number]                       [] THE [facility]: Machine Serial #2:  [id number]   RO Serial #[id number]    Alarm Test: [x] Pass  Time 19:38pm  [x] RO/Machine Log Complete     [x] Extracorporeal circuit Tested for integrity           Dialyzer: Revaclear 300 Lot# [id number]               Tubing: Nipro Lot# [id number]   Dialysate: pH  7.2 Temp. 36.0 Conductivity: Meter 13.8  HD Machine 13.8 TCD= 13.7   CHLORINE TESTING- BEFORE EACH TREATMENT AND EVERY 4 HOURS   Total Chlorine: [x] Less than 0.1 ppm Time: 19:45pm 2nd Check Time: n/a for HD <4 hours  (If greater than 0.1 ppm from Primary then every 30 minutes from Secondary)   TREATMENT INIATION-WITH DIALYSIS PRECAUTIONS   [x] All Connections Secured   [x] Saline Line Double Clamped    [x] Venous Parameters Set [x] Arterial Parameters Set      [x] Prime Given 250 ml   [x] Air Foam Detector Engaged   PRE-TREATMENT   UF Calculations: Wt to lose: 2000 ml(+) Oral:_0_ml(+)IV Meds/Fluids/Blood prods 0 ml(+) Prime/Rinse 500 ml(=)Total UF Goal 2500 mL   Scale Type:[] Bed scale [] Sling Scale [] Wheel Chair Scale [x]  Not Ordered    Tx Initiation Note: Patient s/p de-clot of AVG left upper arm. Area tender but without complications. 15 gauge needles placed and treatment initiated per orders, initial goal set for 2 kg. [x] Time Out/Safety Check  Time:  19:10pm   INTRADIALYTIC MONITORING  (SEE ATTACHED FLOWSHEET)     POST TREATMENT    Time Medication Dose Volume Route    Initials    None                                       [facility] Signatures Title Initials Time   Toño Profit RN AGT 20:00pm               Dialyzer cleared: [x] Good [] Fair [] Poor     Blood Volume Processed 70.1 L   Net UF Removed  2000 mL  Post Tx Access:                  AVF/AVG: Bleeding Stop Time      Art. 10 min Julius.  10 min [x]+bruit/thrill  Post Assessment:              Skin: [x]Warm [x]Dry []Diaphoretic []Flushed [] Pale []Cyanotic            Lungs: [x]Clear []Coarse []Crackles []Wheezing             Cardiac: [x]Regular []Irregular  []Monitored rhythm   []N/A            Edema: [x]None []General []Facial []Pedal  []UE []LE []RIGHT []LEFT             Pain: [x]0 []1 []2 []3 []4 []5 []6 []7 []8 []9 []10   POST Tx Note:   Tolerated dialysis without acute complications. Access functioning well. Net UF= 2000ml per orders. VSS, afebrile. Needles removed intact and hemostasis maintained. Good thrill/bruit post-HD. Minimal site pain s/p declot. Patient stable post-dialysis. No acute distress. No change in assessment. Primary Nurse Report: First initial/Last name/Title    Post Dialysis:   Claus Rousseau RN    Time:   23:15pm   Abbreviations: AVG-arterial venous graft, AVF-arterial venous fistula, IJ-Internal Jugular,  Subcl-Subclavian, Fem-Femoral, Tx-treatment, AP/HR-apical heart rate, DFR-dialysate flow rate, BFR-blood flow rate, AP-arterial pressure, -venous pressure, UF-ultrafiltrate, TMP-transmembrane pressure, Julius-Venous, Art-Arterial, RO-Reverse Osmosis     Vitals   Pre   Post        Temp  Temp: 98.5 °F (36.9 °C) (09/10/19 1945) Temp:  97.8 °F        HR   Pulse (Heart Rate): 90 (09/10/19 2030) Pulse (Heart Rate):  84      B/P   BP: 122/70 (09/10/19 2030) BP:  114/81      Resp   Resp Rate: 16 (09/10/19 1945) Resp Rate: 16        Pain level  Pain Intensity 1: 7 (09/10/19 1800) Pain Intensity 1: 0 Denies discomfort.       Orders:    Duration:   Start:   20:00pm End:   23:00pm Total:   3 Hours   Dialyzer:   Dialyzer/Set Up Inspection: Pari Esquivel (09/10/19 1945)   K Bath:   Dialysate K (mEq/L): 2 (09/10/19 2000)   Ca Bath:   Dialysate CA (mEq/L): 2.5 (09/10/19 2000)   Na/Bicarb:   Dialysate NA (mEq/L): 138 (09/10/19 2000)   Target Fluid Removal:   Goal/Amount of Fluid to Remove (mL): 2000 mL (09/10/19 1945)             Obtained/Reviewed   Critical Results Called   Date when labs were drawn-  Hgb-    HGB   Date Value Ref Range Status   09/10/2019 9.7 (L) 12.0 - 16.0 g/dL Final     K-    Potassium   Date Value Ref Range Status   09/10/2019 5.8 (H) 3.5 - 5.5 mmol/L Final     Comment:     SPECIMEN HEMOLYSED     Ca-   Calcium   Date Value Ref Range Status   09/10/2019 9.2 8.5 - 10.1 MG/DL Final     Bun-   BUN   Date Value Ref Range Status   09/10/2019 52 (H) 7.0 - 18 MG/DL Final     Creat-   Creatinine   Date Value Ref Range Status   09/10/2019 17.40 (H) 0.6 - 1.3 MG/DL Final                                     Comments   Care Plan: Careplan reviewed and continued. Renal Failure (Adult)  Interdisciplinary  · Fluid and electrolytes stabilized  ? Interventions  · Dehydration signs and symptoms (eg: Weight/lab monitoring; vomiting/diarrhea/urine; tenting; mucous membranes; dizziness/lethargy/irritability/confusion; weak pulse; tachycardia; blood pressure; I&O)  · Fluid overload signs and symptoms assessment (eg: Body weight increased; dyspnea; edema; hypertension; respiratory crackles/wheezing; JVD; lab monitoring; mental status changes; I&O)  · Monitor appropriate lab values  · COMPLIANCE WITH PRESCRIBED THERAPY  · ARTERIAL ACCESS SITE ASSESSMENT  · NUTRITION SCREENING  · Vital signs monitoring per assessed patient condition or unit standard  · Cardiac monitoring  · Hydration management  · Intake and output measurement  · Body weight monitoring  · Skin care  · DIALYSIS  · Nutrition Care Process per nutrition screen  · Oral hygiene care every 2 hours  · Pain management          Outcome   ? [x] Progressing Towards Goal  ? [] Not Progressing Towards Goals  ? [] Goals Met/Resolved  ? [] Goals Not Met/ Resolved                   Patient/ Family Education  ?  Progressing Towards Goal

## 2019-09-11 NOTE — PROGRESS NOTES
Discharge instructions reviewed with the patient. Patient verbalized understanding and verified by teach back. All questions answered. IV discontinued, no redness, swelling or pain noted. Patient awaiting daughter for transportation home, with an ETA of 20 mins. Patient armband removed and shredded.

## 2019-09-11 NOTE — DISCHARGE INSTRUCTIONS
Patient Education        Hyperkalemia: Care Instructions  Your Care Instructions    Hyperkalemia is too much potassium in the blood. Potassium helps keep the right mix of fluids in your body. It also helps your nerves and muscles work as they should. And it keeps your heartbeat in a normal rhythm. Some things can raise potassium levels. These include some health problems, medicines, and kidney problems. (Normally, your kidneys remove extra potassium.)  Too much potassium can cause nausea. It also can cause a heartbeat that isn't normal. But you may not have any symptoms. Too much potassium can be dangerous. That's why it's important to treat it. If you are taking any of the medicines that can raise your levels, your doctor will ask you to stop. You may get medicines to lower your levels. And you may have to limit or not eat foods that have a lot of potassium. Follow-up care is a key part of your treatment and safety. Be sure to make and go to all appointments, and call your doctor if you are having problems. It's also a good idea to know your test results and keep a list of the medicines you take. How can you care for yourself at home? · Take your medicines exactly as prescribed. Call your doctor if you think you are having a problem with your medicine. · Stop taking certain medicines if your doctor asks you to. They may be causing your high potassium levels. If you have concerns about stopping medicine, talk with your doctor. · If you have kidney, heart, or liver disease and have to limit fluids, talk with your doctor before you increase the amount of fluids you drink. If the doctor says it's okay, drink plenty of fluids. This means drinking enough so that your urine is light yellow or clear like water. · Avoid strenuous exercise until your doctor tells you it is okay. · Potassium is in many foods, including vegetables, fruits, and milk products.  Foods high in potassium include bananas, cantaloupe, broccoli, milk, potatoes, and tomatoes. · Low potassium foods include blueberries, raspberries, cucumber, white or brown rice, spaghetti, and macaroni. · Do not use a salt substitute without talking to your doctor first. Most of these are very high in potassium. · Be sure to tell your doctor about any prescription, over-the-counter, or herbal medicines you take. Some of these can raise potassium. When should you call for help? Call 911 anytime you think you may need emergency care. For example, call if:    · You passed out (lost consciousness).     · You have an unusual heartbeat. Your heart may beat fast or skip beats.    Call your doctor now or seek immediate medical care if:    · You have muscle aches.     · You feel very weak.    Watch closely for changes in your health, and be sure to contact your doctor if:    · You do not get better as expected. Where can you learn more? Go to http://jaquan-treva.info/. Enter N419 in the search box to learn more about \"Hyperkalemia: Care Instructions. \"  Current as of: October 31, 2018  Content Version: 12.1  © 2521-7085 [employer]. Care instructions adapted under license by [employer] (which disclaims liability or warranty for this information). If you have questions about a medical condition or this instruction, always ask your healthcare professional. [address] any warranty or liability for your use of this information.

## 2019-09-11 NOTE — ROUTINE PROCESS
0961 assumed care of pt after bedside verbal report was given by off going nurse, pt resting in bed quietly, no acute distress noted, will monitor     0958 pt discharged to home, instructions given to pt by discharge nurse Yuniel Merritt

## 2019-09-11 NOTE — DISCHARGE SUMMARY
Discharge Summary    Patient: Julietta November MRN: [id number]  CSN: [id number]    YOB: 1975  Age: 53 y.o. Sex: female    DOA: 9/10/2019 LOS:  LOS: 0 days   Discharge Date: 9/11/2019     Primary Care Provider:  Diane Del Cid MD    Admission Diagnoses: [facility]end stage renal disease[facility] [N18.6]  Hyperkalemia [E87.5]    Discharge Diagnoses:    Hospital Problems  Date Reviewed: 8/31/2017          Codes Class Noted POA    * (Principal) Hyperkalemia ICD-10-CM: E87.5  ICD-9-CM: 276.7  8/21/2017 Unknown        ESRD on dialysis [facility]) ICD-10-CM: N18.6, Z99.2  ICD-9-CM: 585.6, V45.11  2/21/2017 Yes              Discharge Condition: stable     Discharge Medications:     Discharge Medication List as of 9/11/2019  9:48 AM      CONTINUE these medications which have NOT CHANGED    Details   albuterol (PROVENTIL HFA, VENTOLIN HFA, PROAIR HFA) 90 mcg/actuation inhaler Take 2 Puffs by inhalation every four (4) hours as needed for Wheezing., Normal, Disp-1 Inhaler, R-0      sevelamer (RENAGEL) 800 mg tablet Take 2,400 mg by mouth two (2) times a day., Historical Med      pravastatin (PRAVACHOL) 20 mg tablet Take 20 mg by mouth nightly. Indications: hypercholesterolemia, Historical Med      zolpidem (AMBIEN) 5 mg tablet Take 5 mg by mouth nightly as needed for Sleep. Indications: dialysis days, Historical Med      diphenhydrAMINE (BENADRYL) 25 mg capsule Take 25 mg by mouth every six (6) hours as needed., Historical Med      chlorzoxazone (PARAFON FORTE DSC) 500 mg tablet Take 1 Tab by mouth three (3) times daily as needed for Muscle Spasm(s). , Print, Disp-15 Tab, R-0      promethazine (PHENERGAN) 25 mg tablet Take 25 mg by mouth every six (6) hours as needed.  Dialysis premed , Historical Med             Procedures : embolectomy     Consults: Nephrology      PHYSICAL EXAM   Visit Vitals  /58   Pulse 75   Temp 97.9 °F (36.6 °C)   Resp 14   Ht 5' 5\" (1.651 m)   Wt 88.9 kg (195 lb 15.8 oz)   SpO2 100% Breastfeeding? No   BMI 32.61 kg/m²     General: Awake, cooperative, no acute distress    HEENT: NC, Atraumatic. PERRLA, EOMI. Anicteric sclerae. Lungs:  CTA Bilaterally. No Wheezing/Rhonchi/Rales. Heart:  Regular  rhythm,  No murmur, No Rubs, No Gallops  Abdomen: Soft, Non distended, Non tender. +Bowel sounds,   Extremities: No c/c/e, left arm a-v fistular no bleeding   Psych:   Not anxious or agitated. Neurologic:  No acute neurological deficits. Admission HPI :   Rosalina Poole is a 53 y.o. female who hx of esrd on hd is a direct admission from vascular lab. She had embolectomy of av fistular and was found her K was high. She is supposed to have HD today. Renal was called. Will arrange HD tonight. She reported pain in the arm.      Denies any slurred speech/headache/cp/n/v/blurred vission/d/c/palpitation/gait change/bleeding. Denies smoking/ any alcohol or drug use. Hospital Course :   Since she was admitted, nephrologist was consulted for HD for her hyperkalemia. She tolerated HD through the av fistular. Pain was controlled. She remained hemodynamic stable and d/c home. Activity: Activity as tolerated    Diet: Renal Diet    Follow-up: PCP and HD clinic     Disposition: home     Minutes spent on discharge: 45 min       Labs: Results:       Chemistry Recent Labs     09/11/19  0240 09/10/19  1240   GLU 63* 82    136   K 4.4 5.8*    98*   CO2 30 25   BUN 20* 52*   CREA 8.74* 17.40*   CA 8.7 9.2   AGAP 7 13   BUCR 2* 3*      CBC w/Diff Recent Labs     09/11/19  0240 09/10/19  1240   WBC 5.7 6.0   RBC 2.81* 3.20*   HGB 8.4* 9.7*   HCT 26.1* 29.8*    252   GRANS 60 61   LYMPH 25 23   EOS 6* 7*      Cardiac Enzymes No results for input(s): CPK, CKND1, SHEREEN in the last 72 hours.     No lab exists for component: CKRMB, TROIP   Coagulation Recent Labs     09/10/19  1240   PTP 13.1   INR 1.0   APTT 30.7       Lipid Panel No results found for: CHOL, CHOLPOCT, CHOLX, CHLST, [address], [id number], HDL, HDLP, LDL, LDLC, DLDLP, 434925, VLDLC, VLDL, TGLX, TRIGL, TRIGP, TGLPOCT, CHHD, CHHDX   BNP No results for input(s): BNPP in the last 72 hours. Liver Enzymes No results for input(s): TP, ALB, TBIL, AP, SGOT, GPT in the last 72 hours. No lab exists for component: DBIL   Thyroid Studies No results found for: T4, T3U, TSH, TSHEXT         Significant Diagnostic Studies: No results found.           [facility]     CC: Ze Stuart MD

## 2019-09-11 NOTE — PROGRESS NOTES
Nephrology Progress note    Subjective:     Genaro Garcia is a 53 y.o. female with PMH DM, HTN, ESRD on HD TTS s/p thrombectomy. K 5.8- s/p HD yesterday evening. No SOB    Admit Date: 9/10/2019    Allergy:  Allergies   Allergen Reactions    Vancomycin Other (comments)     Felt like her body was burning    Aspirin Nausea and Vomiting     Pt reports aspirin gave her a stomach ulcer.  Vicodin [Hydrocodone-Acetaminophen] Nausea and Vomiting        Objective:     Visit Vitals  /58   Pulse 75   Temp 97.9 °F (36.6 °C)   Resp 14   Ht 5' 5\" (1.651 m)   Wt 88.9 kg (195 lb 15.8 oz)   LMP 12/01/2012   SpO2 100%   Breastfeeding?  No   BMI 32.61 kg/m²         Intake/Output Summary (Last 24 hours) at 9/11/2019 1026  Last data filed at 9/11/2019 0914  Gross per 24 hour   Intake 120 ml   Output 2000 ml   Net -1880 ml       Physical Exam:       General: No acute distress   HENT: Atraumatic and normocephalic   Eyes: Normal conjunctiva   Neck: Supple    Cardiovascular: Normal S1 & S2, no m/r/g   Pulmonary/Chest Wall: Clear to auscultation bilaterally   Abdominal: Soft and non-tender   Musculoskeletal: no edema   Neurological: No focal deficits       Data Review:      Lab Results   Component Value Date/Time    WBC 5.7 09/11/2019 02:40 AM    RBC 2.81 (L) 09/11/2019 02:40 AM    HCT 26.1 (L) 09/11/2019 02:40 AM    MCV 92.9 09/11/2019 02:40 AM    MCH 29.9 09/11/2019 02:40 AM    MCHC 32.2 09/11/2019 02:40 AM    RDW 13.9 09/11/2019 02:40 AM      No results found for: IRONNo components found for: FERRITIN  No components found for: PTHINT  Urinalysis  No results found for: UGLU         Impression:     ESRD  Clotted AVF s/p thrombectomy  Dm  HTN  Hypokalemia  Anemia  SHPT    Plan:     HD TTS  DC home today    MD Gaudencio Gomez  [phone]

## 2019-09-11 NOTE — PROGRESS NOTES
Problem: Falls - Risk of  Goal: *Absence of Falls  Description  Document Travis Boeck Fall Risk and appropriate interventions in the flowsheet. Outcome: Progressing Towards Goal  Note:   Fall Risk Interventions:            Medication Interventions: Patient to call before getting OOB, Teach patient to arise slowly                   Problem: Patient Education: Go to Patient Education Activity  Goal: Patient/Family Education  Outcome: Progressing Towards Goal     Problem: General Medical Care Plan  Goal: *Vital signs within specified parameters  Outcome: Progressing Towards Goal  Goal: *Labs within defined limits  Outcome: Progressing Towards Goal  Goal: *Absence of infection signs and symptoms  Outcome: Progressing Towards Goal  Goal: *Optimal pain control at patient's stated goal  Outcome: Progressing Towards Goal  Goal: *Skin integrity maintained  Outcome: Progressing Towards Goal  Goal: *Fluid volume balance  Outcome: Progressing Towards Goal  Goal: *Optimize nutritional status  Outcome: Progressing Towards Goal  Goal: *Anxiety reduced or absent  Outcome: Progressing Towards Goal  Goal: *Progressive mobility and function (eg: ADL's)  Outcome: Progressing Towards Goal     Problem: Patient Education: Go to Patient Education Activity  Goal: Patient/Family Education  Outcome: Progressing Towards Goal     Problem:  Moderate Sedation (Adult)  Goal: *Optimal pain control at patient's stated goal  Outcome: Progressing Towards Goal

## 2019-09-14 LAB
HBV SURFACE AB SER QL IA: POSITIVE
HBV SURFACE AB SERPL IA-ACNC: 45.56 MIU/ML
HEP BS AB COMMENT,HBSAC: NORMAL

## 2019-09-24 ENCOUNTER — APPOINTMENT (OUTPATIENT)
Dept: INTERVENTIONAL RADIOLOGY/VASCULAR | Age: 44
End: 2019-09-24
Attending: SURGERY
Payer: MEDICARE

## 2019-09-24 ENCOUNTER — HOSPITAL ENCOUNTER (EMERGENCY)
Age: 44
Discharge: HOME | End: 2019-09-24
Attending: EMERGENCY MEDICINE
Payer: MEDICARE

## 2019-09-24 VITALS
HEART RATE: 87 BPM | DIASTOLIC BLOOD PRESSURE: 67 MMHG | RESPIRATION RATE: 20 BRPM | OXYGEN SATURATION: 100 % | TEMPERATURE: 97.7 F | BODY MASS INDEX: 31.22 KG/M2 | WEIGHT: 187.39 LBS | HEIGHT: 65 IN | SYSTOLIC BLOOD PRESSURE: 100 MMHG

## 2019-09-24 DIAGNOSIS — N18.6 ESRD (END STAGE RENAL DISEASE) ON DIALYSIS (HCC): ICD-10-CM

## 2019-09-24 DIAGNOSIS — Z99.2 ESRD (END STAGE RENAL DISEASE) ON DIALYSIS (HCC): ICD-10-CM

## 2019-09-24 DIAGNOSIS — T82.590A DIALYSIS AV FISTULA MALFUNCTION, INITIAL ENCOUNTER (HCC): Primary | ICD-10-CM

## 2019-09-24 LAB
ANION GAP SERPL CALC-SCNC: 10 MMOL/L (ref 3–18)
BASOPHILS # BLD: 0.1 K/UL (ref 0–0.1)
BASOPHILS NFR BLD: 1 % (ref 0–2)
BUN SERPL-MCNC: 43 MG/DL (ref 7–18)
BUN/CREAT SERPL: 3 (ref 12–20)
CALCIUM SERPL-MCNC: 10 MG/DL (ref 8.5–10.1)
CHLORIDE SERPL-SCNC: 98 MMOL/L (ref 100–111)
CO2 SERPL-SCNC: 28 MMOL/L (ref 21–32)
CREAT SERPL-MCNC: 14.1 MG/DL (ref 0.6–1.3)
DIFFERENTIAL METHOD BLD: ABNORMAL
EOSINOPHIL # BLD: 0.5 K/UL (ref 0–0.4)
EOSINOPHIL NFR BLD: 10 % (ref 0–5)
ERYTHROCYTE [DISTWIDTH] IN BLOOD BY AUTOMATED COUNT: 13.3 % (ref 11.6–14.5)
GLUCOSE SERPL-MCNC: 75 MG/DL (ref 74–99)
HCT VFR BLD AUTO: 26.9 % (ref 35–45)
HGB BLD-MCNC: 8.8 G/DL (ref 12–16)
LYMPHOCYTES # BLD: 1.9 K/UL (ref 0.9–3.6)
LYMPHOCYTES NFR BLD: 37 % (ref 21–52)
MCH RBC QN AUTO: 31 PG (ref 24–34)
MCHC RBC AUTO-ENTMCNC: 32.7 G/DL (ref 31–37)
MCV RBC AUTO: 94.7 FL (ref 74–97)
MONOCYTES # BLD: 0.5 K/UL (ref 0.05–1.2)
MONOCYTES NFR BLD: 9 % (ref 3–10)
NEUTS SEG # BLD: 2.2 K/UL (ref 1.8–8)
NEUTS SEG NFR BLD: 43 % (ref 40–73)
PLATELET # BLD AUTO: 248 K/UL (ref 135–420)
PMV BLD AUTO: 10.7 FL (ref 9.2–11.8)
POTASSIUM SERPL-SCNC: 4.7 MMOL/L (ref 3.5–5.5)
RBC # BLD AUTO: 2.84 M/UL (ref 4.2–5.3)
SODIUM SERPL-SCNC: 136 MMOL/L (ref 136–145)
WBC # BLD AUTO: 5.2 K/UL (ref 4.6–13.2)

## 2019-09-24 PROCEDURE — C1876 STENT, NON-COA/NON-COV W/DEL: HCPCS

## 2019-09-24 PROCEDURE — 99285 EMERGENCY DEPT VISIT HI MDM: CPT

## 2019-09-24 PROCEDURE — 77030003629 IR THROMB PERC AV FISTULA W PTA

## 2019-09-24 PROCEDURE — 74011636320 HC RX REV CODE- 636/320: Performed by: EMERGENCY MEDICINE

## 2019-09-24 PROCEDURE — 74011250636 HC RX REV CODE- 250/636

## 2019-09-24 PROCEDURE — 74011250637 HC RX REV CODE- 250/637: Performed by: SURGERY

## 2019-09-24 PROCEDURE — 99153 MOD SED SAME PHYS/QHP EA: CPT

## 2019-09-24 PROCEDURE — 85025 COMPLETE CBC W/AUTO DIFF WBC: CPT

## 2019-09-24 PROCEDURE — 74011250636 HC RX REV CODE- 250/636: Performed by: SURGERY

## 2019-09-24 PROCEDURE — C1725 CATH, TRANSLUMIN NON-LASER: HCPCS

## 2019-09-24 PROCEDURE — 74011000250 HC RX REV CODE- 250

## 2019-09-24 PROCEDURE — 99152 MOD SED SAME PHYS/QHP 5/>YRS: CPT

## 2019-09-24 PROCEDURE — 80048 BASIC METABOLIC PNL TOTAL CA: CPT

## 2019-09-24 RX ORDER — OXYCODONE AND ACETAMINOPHEN 5; 325 MG/1; MG/1
2 TABLET ORAL
Status: DISCONTINUED | OUTPATIENT
Start: 2019-09-24 | End: 2019-09-24 | Stop reason: HOSPADM

## 2019-09-24 RX ORDER — DIPHENHYDRAMINE HYDROCHLORIDE 50 MG/ML
INJECTION, SOLUTION INTRAMUSCULAR; INTRAVENOUS
Status: COMPLETED
Start: 2019-09-24 | End: 2019-09-24

## 2019-09-24 RX ORDER — FENTANYL CITRATE 50 UG/ML
INJECTION, SOLUTION INTRAMUSCULAR; INTRAVENOUS
Status: DISCONTINUED
Start: 2019-09-24 | End: 2019-09-24 | Stop reason: HOSPADM

## 2019-09-24 RX ORDER — ONDANSETRON 2 MG/ML
4 INJECTION INTRAMUSCULAR; INTRAVENOUS
Status: DISCONTINUED | OUTPATIENT
Start: 2019-09-24 | End: 2019-09-24 | Stop reason: HOSPADM

## 2019-09-24 RX ORDER — MIDAZOLAM HYDROCHLORIDE 1 MG/ML
.5-4 INJECTION, SOLUTION INTRAMUSCULAR; INTRAVENOUS
Status: DISCONTINUED | OUTPATIENT
Start: 2019-09-24 | End: 2019-09-24

## 2019-09-24 RX ORDER — HEPARIN SODIUM 200 [USP'U]/100ML
INJECTION, SOLUTION INTRAVENOUS
Status: COMPLETED
Start: 2019-09-24 | End: 2019-09-24

## 2019-09-24 RX ORDER — CEFAZOLIN SODIUM/WATER 2 G/20 ML
SYRINGE (ML) INTRAVENOUS
Status: COMPLETED
Start: 2019-09-24 | End: 2019-09-24

## 2019-09-24 RX ORDER — LIDOCAINE HYDROCHLORIDE 10 MG/ML
1-10 INJECTION, SOLUTION EPIDURAL; INFILTRATION; INTRACAUDAL; PERINEURAL
Status: COMPLETED | OUTPATIENT
Start: 2019-09-24 | End: 2019-09-24

## 2019-09-24 RX ORDER — FLUMAZENIL 0.1 MG/ML
0.2 INJECTION INTRAVENOUS
Status: DISCONTINUED | OUTPATIENT
Start: 2019-09-24 | End: 2019-09-24

## 2019-09-24 RX ORDER — FENTANYL CITRATE 50 UG/ML
25-200 INJECTION, SOLUTION INTRAMUSCULAR; INTRAVENOUS
Status: DISCONTINUED | OUTPATIENT
Start: 2019-09-24 | End: 2019-09-24

## 2019-09-24 RX ORDER — HEPARIN SODIUM 200 [USP'U]/100ML
1000 INJECTION, SOLUTION INTRAVENOUS
Status: COMPLETED | OUTPATIENT
Start: 2019-09-24 | End: 2019-09-24

## 2019-09-24 RX ORDER — HEPARIN SODIUM 1000 [USP'U]/ML
INJECTION, SOLUTION INTRAVENOUS; SUBCUTANEOUS
Status: DISCONTINUED
Start: 2019-09-24 | End: 2019-09-24 | Stop reason: HOSPADM

## 2019-09-24 RX ORDER — HEPARIN SODIUM 1000 [USP'U]/ML
10000 INJECTION, SOLUTION INTRAVENOUS; SUBCUTANEOUS
Status: DISCONTINUED | OUTPATIENT
Start: 2019-09-24 | End: 2019-09-24

## 2019-09-24 RX ORDER — DIPHENHYDRAMINE HYDROCHLORIDE 50 MG/ML
25-50 INJECTION, SOLUTION INTRAMUSCULAR; INTRAVENOUS ONCE
Status: CANCELLED | OUTPATIENT
Start: 2019-09-24 | End: 2019-09-24

## 2019-09-24 RX ORDER — NALOXONE HYDROCHLORIDE 0.4 MG/ML
0.1 INJECTION, SOLUTION INTRAMUSCULAR; INTRAVENOUS; SUBCUTANEOUS AS NEEDED
Status: DISCONTINUED | OUTPATIENT
Start: 2019-09-24 | End: 2019-09-24

## 2019-09-24 RX ORDER — MIDAZOLAM HYDROCHLORIDE 1 MG/ML
INJECTION, SOLUTION INTRAMUSCULAR; INTRAVENOUS
Status: COMPLETED
Start: 2019-09-24 | End: 2019-09-24

## 2019-09-24 RX ORDER — NALOXONE HYDROCHLORIDE 0.4 MG/ML
INJECTION, SOLUTION INTRAMUSCULAR; INTRAVENOUS; SUBCUTANEOUS
Status: DISCONTINUED
Start: 2019-09-24 | End: 2019-09-24 | Stop reason: WASHOUT

## 2019-09-24 RX ORDER — FLUMAZENIL 0.1 MG/ML
INJECTION INTRAVENOUS
Status: DISCONTINUED
Start: 2019-09-24 | End: 2019-09-24 | Stop reason: WASHOUT

## 2019-09-24 RX ORDER — SODIUM CHLORIDE 9 MG/ML
25 INJECTION, SOLUTION INTRAVENOUS CONTINUOUS
Status: DISCONTINUED | OUTPATIENT
Start: 2019-09-24 | End: 2019-09-24 | Stop reason: HOSPADM

## 2019-09-24 RX ORDER — CEFAZOLIN SODIUM/WATER 2 G/20 ML
2 SYRINGE (ML) INTRAVENOUS ONCE
Status: CANCELLED | OUTPATIENT
Start: 2019-09-24 | End: 2019-09-24

## 2019-09-24 RX ORDER — WATER FOR INJECTION,STERILE
VIAL (ML) INJECTION
Status: COMPLETED
Start: 2019-09-24 | End: 2019-09-24

## 2019-09-24 RX ADMIN — MIDAZOLAM HYDROCHLORIDE 1 MG: 1 INJECTION, SOLUTION INTRAMUSCULAR; INTRAVENOUS at 11:37

## 2019-09-24 RX ADMIN — WATER 10 ML: 1 INJECTION INTRAMUSCULAR; INTRAVENOUS; SUBCUTANEOUS at 11:27

## 2019-09-24 RX ADMIN — FENTANYL CITRATE 25 MCG: 50 INJECTION, SOLUTION INTRAMUSCULAR; INTRAVENOUS at 12:34

## 2019-09-24 RX ADMIN — MIDAZOLAM HYDROCHLORIDE 1 MG: 1 INJECTION, SOLUTION INTRAMUSCULAR; INTRAVENOUS at 11:30

## 2019-09-24 RX ADMIN — HEPARIN SODIUM 7000 UNITS: 1000 INJECTION INTRAVENOUS; SUBCUTANEOUS at 11:30

## 2019-09-24 RX ADMIN — HEPARIN SODIUM IN SODIUM CHLORIDE 2000 UNITS: 200 INJECTION INTRAVENOUS at 13:01

## 2019-09-24 RX ADMIN — HEPARIN SODIUM 2000 UNITS: 200 INJECTION, SOLUTION INTRAVENOUS at 13:01

## 2019-09-24 RX ADMIN — Medication 2 G: at 11:28

## 2019-09-24 RX ADMIN — OXYCODONE HYDROCHLORIDE AND ACETAMINOPHEN 2 TABLET: 5; 325 TABLET ORAL at 13:14

## 2019-09-24 RX ADMIN — FENTANYL CITRATE 75 MCG: 50 INJECTION, SOLUTION INTRAMUSCULAR; INTRAVENOUS at 11:42

## 2019-09-24 RX ADMIN — MIDAZOLAM HYDROCHLORIDE 1 MG: 1 INJECTION, SOLUTION INTRAMUSCULAR; INTRAVENOUS at 11:38

## 2019-09-24 RX ADMIN — IOPAMIDOL 90 ML: 612 INJECTION, SOLUTION INTRAVENOUS at 12:40

## 2019-09-24 RX ADMIN — FENTANYL CITRATE 50 MCG: 50 INJECTION, SOLUTION INTRAMUSCULAR; INTRAVENOUS at 11:28

## 2019-09-24 RX ADMIN — FENTANYL CITRATE 50 MCG: 50 INJECTION, SOLUTION INTRAMUSCULAR; INTRAVENOUS at 11:38

## 2019-09-24 RX ADMIN — LIDOCAINE HYDROCHLORIDE 14 ML: 10 INJECTION, SOLUTION EPIDURAL; INFILTRATION; INTRACAUDAL; PERINEURAL at 12:39

## 2019-09-24 RX ADMIN — DIPHENHYDRAMINE HYDROCHLORIDE 50 MG: 50 INJECTION, SOLUTION INTRAMUSCULAR; INTRAVENOUS at 11:28

## 2019-09-24 RX ADMIN — ALTEPLASE 8 MG: 2.2 INJECTION, POWDER, LYOPHILIZED, FOR SOLUTION INTRAVENOUS at 11:27

## 2019-09-24 NOTE — ED PROVIDER NOTES
EMERGENCY DEPARTMENT HISTORY AND PHYSICAL EXAM    Date: 9/24/2019  Patient Name: Levi Roldan    History of Presenting Illness     Chief Complaint   Patient presents with    Arm Pain         History Provided By: Patient    7:17 AM  Levi Roldan is a 53 y.o. female with PMHX of ESRD on dialysis who presents to the emergency department C/O for shunt malfunction. Per patient she went to dialysis this morning and they could not get her fistula out of function. She reports she last received dialysis on Saturday. She states she is followed by Dr. Ari Christopher from vascular surgery and was told to come to the emergency department to see him. She states that she does not feel fluid overloaded and denies any chest pain, shortness of breath, fever, arm pain or swelling, any other complaints.      PCP: Govind Sanders MD    Current Facility-Administered Medications   Medication Dose Route Frequency Provider Last Rate Last Dose    alteplase (CATHFLO) 2 mg injection             fentaNYL citrate (PF) 50 mcg/mL injection             heparin (porcine) 1,000 unit/mL injection             0.9% sodium chloride infusion  25 mL/hr IntraVENous CONTINUOUS Gardenia Hendrix MD        fentaNYL citrate (PF) injection  mcg   mcg IntraVENous Rad Jarad Hendrix MD   25 mcg at 09/24/19 1234    flumazenil (ROMAZICON) 0.1 mg/mL injection 0.2 mg  0.2 mg IntraVENous Rad Jarad Hendrix MD        heparin (porcine) 1,000 unit/mL injection 10,000 Units  10,000 Units IntraVENous Rad Jarad Hendrix MD   7,000 Units at 09/24/19 1130    midazolam (PF) (VERSED) injection 0.5-4 mg  0.5-4 mg IntraVENous Rad Jarad Hendrix MD   1 mg at 09/24/19 1138    naloxone (NARCAN) injection 0.1 mg  0.1 mg IntraVENous PRN Gardenia Hendrix MD        alteplase (CATHFLO) injection 8 mg  8 mg InterCATHeter RAD PRN Gardenia Hendrix MD   8 mg at 09/24/19 1127    oxyCODONE-acetaminophen (PERCOCET) 5-325 mg per tablet 2 Tab  2 Tab Oral Q4H PRN Darian Barron MD   2 Tab at 09/24/19 1314    ondansetron (ZOFRAN) injection 4 mg  4 mg IntraVENous Q6H PRN Darian Barron MD         Current Outpatient Medications   Medication Sig Dispense Refill    diphenhydrAMINE (BENADRYL) 25 mg capsule Take 25 mg by mouth every six (6) hours as needed.  albuterol (PROVENTIL HFA, VENTOLIN HFA, PROAIR HFA) 90 mcg/actuation inhaler Take 2 Puffs by inhalation every four (4) hours as needed for Wheezing. 1 Inhaler 0    chlorzoxazone (PARAFON FORTE DSC) 500 mg tablet Take 1 Tab by mouth three (3) times daily as needed for Muscle Spasm(s). 15 Tab 0    sevelamer (RENAGEL) 800 mg tablet Take 2,400 mg by mouth two (2) times a day.  pravastatin (PRAVACHOL) 20 mg tablet Take 20 mg by mouth nightly. Indications: hypercholesterolemia      zolpidem (AMBIEN) 5 mg tablet Take 5 mg by mouth nightly as needed for Sleep. Indications: dialysis days      promethazine (PHENERGAN) 25 mg tablet Take 25 mg by mouth every six (6) hours as needed.  Dialysis premed          Past History     Past Medical History:  Past Medical History:   Diagnosis Date    Chronic kidney disease     ESRD    [facility]ialysis patient ([facility] [address]     PUD (peptic ulcer disease) 2004    Thyroid disease        Past Surgical History:  Past Surgical History:   Procedure Laterality Date    HX HYSTERECTOMY      HX OTHER SURGICAL Left     dialysis graft arm; removed    HX TONSILLECTOMY      HX TRANSPLANT  2004    renal transplant    HX VASCULAR ACCESS Right     Dialysis catheter    HX VASCULAR ACCESS Left     AV graft, (\"does not work\")    VASCULAR SURGERY PROCEDURE UNLIST         Family History:  Family History   Problem Relation Age of Onset    Hypertension Brother     Diabetes Maternal Grandmother        Social History:  Social History     Tobacco Use    Smoking status: Never Smoker    Smokeless tobacco: Never Used   Substance Use Topics    Alcohol use: No    Drug use: No       Allergies: Allergies   Allergen Reactions    Vancomycin Other (comments)     Felt like her body was burning    Aspirin Nausea and Vomiting     Pt reports aspirin gave her a stomach ulcer.  Vicodin [Hydrocodone-Acetaminophen] Nausea and Vomiting         Review of Systems   Review of Systems   Constitutional: Negative for fever. Respiratory: Negative for shortness of breath. Cardiovascular: Negative for chest pain. All other systems reviewed and are negative.         Physical Exam     Vitals:    09/24/19 1235 09/24/19 1240 09/24/19 1245 09/24/19 1307   BP: 119/89 133/68 121/65 112/70   Pulse: 89 77 73 78   Resp: 12 16 16 15   Temp:       SpO2: 100% 100% 100% 100%   Weight:       Height:         Physical Exam    Nursing notes and vital signs reviewed    Constitutional: Non toxic appearing, no acute distress  Head: Normocephalic, Atraumatic  Eyes: EOMI  Neck: Supple  Cardiovascular: Regular rate and rhythm, no murmurs, rubs, or gallops  Chest: Normal work of breathing and chest excursion bilaterally  Lungs: Clear to ausculation bilaterally  Abdomen: Soft, non tender, non distended  Back: No evidence of trauma or deformity  Extremities: Fistula in left upper arm with no erythema, drainage, swelling no palpable thrill  Skin: Warm and dry, normal cap refill  Neuro: Alert and appropriate  Psychiatric: Normal mood and affect      Diagnostic Study Results     Labs -     Recent Results (from the past 12 hour(s))   CBC WITH AUTOMATED DIFF    Collection Time: 09/24/19  9:35 AM   Result Value Ref Range    WBC 5.2 4.6 - 13.2 K/uL    RBC 2.84 (L) 4.20 - 5.30 M/uL    HGB 8.8 (L) 12.0 - 16.0 g/dL    HCT 26.9 (L) 35.0 - 45.0 %    MCV 94.7 74.0 - 97.0 FL    MCH 31.0 24.0 - 34.0 PG    MCHC 32.7 31.0 - 37.0 g/dL    RDW 13.3 11.6 - 14.5 %    PLATELET 609 021 - 088 K/uL    MPV 10.7 9.2 - 11.8 FL    NEUTROPHILS 43 40 - 73 %    LYMPHOCYTES 37 21 - 52 %    MONOCYTES 9 3 - 10 %    EOSINOPHILS 10 (H) 0 - 5 %    BASOPHILS 1 0 - 2 %    ABS. NEUTROPHILS 2.2 1.8 - 8.0 K/UL    ABS. LYMPHOCYTES 1.9 0.9 - 3.6 K/UL    ABS. MONOCYTES 0.5 0.05 - 1.2 K/UL    ABS. EOSINOPHILS 0.5 (H) 0.0 - 0.4 K/UL    ABS.  BASOPHILS 0.1 0.0 - 0.1 K/UL    DF AUTOMATED     METABOLIC PANEL, BASIC    Collection Time: 09/24/19  9:35 AM   Result Value Ref Range    Sodium 136 136 - 145 mmol/L    Potassium 4.7 3.5 - 5.5 mmol/L    Chloride 98 (L) 100 - 111 mmol/L    CO2 28 21 - 32 mmol/L    Anion gap 10 3.0 - 18 mmol/L    Glucose 75 74 - 99 mg/dL    BUN 43 (H) 7.0 - 18 MG/DL    Creatinine 14.10 (H) 0.6 - 1.3 MG/DL    BUN/Creatinine ratio 3 (L) 12 - 20      GFR est AA 3 (L) >60 ml/min/1.73m2    GFR est non-AA 3 (L) >60 ml/min/1.73m2    Calcium 10.0 8.5 - 10.1 MG/DL       Radiologic Studies -   IR THROMB PERC AV FISTULA W PTA    (Results Pending)     CT Results  (Last 48 hours)    None        CXR Results  (Last 48 hours)    None          Medications given in the ED-  Medications   alteplase (CATHFLO) 2 mg injection (has no administration in time range)   0.9% sodium chloride infusion (has no administration in time range)   fentaNYL citrate (PF) injection  mcg (25 mcg IntraVENous Given 9/24/19 1234)   flumazenil (ROMAZICON) 0.1 mg/mL injection 0.2 mg (has no administration in time range)   heparin (porcine) 1,000 unit/mL injection 10,000 Units (7,000 Units IntraVENous Given 9/24/19 1130)   midazolam (PF) (VERSED) injection 0.5-4 mg (1 mg IntraVENous Given 9/24/19 1138)   naloxone (NARCAN) injection 0.1 mg (has no administration in time range)   alteplase (CATHFLO) injection 8 mg (8 mg InterCATHeter Given 9/24/19 1127)   fentaNYL citrate (PF) 50 mcg/mL injection (has no administration in time range)   heparin (porcine) 1,000 unit/mL injection (has no administration in time range)   oxyCODONE-acetaminophen (PERCOCET) 5-325 mg per tablet 2 Tab (2 Tabs Oral Given 9/24/19 1314)   ondansetron (ZOFRAN) injection 4 mg (has no administration in time range)   lidocaine (PF) (XYLOCAINE) 10 mg/mL (1 %) injection 1-10 mL (14 mL SubCUTAneous Given 9/24/19 1239)   heparinized saline 2 units/mL infusion 2,000 Units (2,000 Units Irrigation New Bag 9/24/19 1301)   iopamidol (ISOVUE 300) 61 % contrast injection 1-100 mL (90 mL IntraVENous Given 9/24/19 1240)   sterile water (preservative free) injection (10 mL  Given 9/24/19 1127)   ceFAZolin (ANCEF) in sterile water 2 gram/20 mL IV syringe (2 g  Given 9/24/19 1128)   diphenhydrAMINE (BENADRYL) 50 mg/mL injection (50 mg  Given 9/24/19 1128)         Medical Decision Making   I am the first provider for this patient. I reviewed the vital signs, available nursing notes, past medical history, past surgical history, family history and social history. Vital Signs-Reviewed the patient's vital signs. Records Reviewed: Nursing Notes and Old Medical Records    Provider Notes (Medical Decision Making): Reyes Lute is a 53 y.o. female presenting for malfunctioning AV fistula. Seen and treated by vascular surgery here in fistula and is now functioning. No emergent need for dialysis at this time. Discussed with nephrology who is arranged for dialysis at her center today. Will discharge her back to dialysis center to complete dialysis with early nephrology follow-up and return precautions. Patient understands and agrees with this plan. Procedures:  Procedures    ED Course:   CONSULT NOTE:   7:51 AM  Dr. Ric Delcid spoke with Dr. Gabbi Erwin  Specialty: Vascular Surgery  Discussed pt's hx, disposition, and available diagnostic and imaging results over the telephone. Reviewed care plans. Dr. Mario Machuca will come to see the patient. 11:02 AM  Patient taken by vascular surgery for procedure on AV fistula. 1:11 PM  Patient has returned from procedure. Patient feels well with no complaints.     CONSULT NOTE:   1:38 PM  Dr. Ric Delcid spoke with Dr. Juan Antonio Ashraf   Specialty: Nephrologist  Discussed pt's hx, disposition, and available diagnostic and imaging results over the telephone. Reviewed care plans. Will call back with dialysis plan. 1:44 PM  Dr. Nicolás Mendoza called and advised that if patient is discharged to dialysis center now they will dialyze her today        Diagnosis and Disposition     Critical Care: None    DISCHARGE NOTE:    Andres Sanches  results have been reviewed with her. She has been counseled regarding her diagnosis, treatment, and plan. She verbally conveys understanding and agreement of the signs, symptoms, diagnosis, treatment and prognosis and additionally agrees to follow up as discussed. She also agrees with the care-plan and conveys that all of her questions have been answered. I have also provided discharge instructions for her that include: educational information regarding their diagnosis and treatment, and list of reasons why they would want to return to the ED prior to their follow-up appointment, should her condition change. She has been provided with education for proper emergency department utilization. CLINICAL IMPRESSION:    1. Dialysis AV fistula malfunction, initial encounter ([facility] [address]    2. ESRD (end stage renal disease) on dialysis ([facility]        PLAN:  1. D/C Home  2. Current Discharge Medication List      CONTINUE these medications which have NOT CHANGED    Details   diphenhydrAMINE (BENADRYL) 25 mg capsule Take 25 mg by mouth every six (6) hours as needed. albuterol (PROVENTIL HFA, VENTOLIN HFA, PROAIR HFA) 90 mcg/actuation inhaler Take 2 Puffs by inhalation every four (4) hours as needed for Wheezing. Qty: 1 Inhaler, Refills: 0      chlorzoxazone (PARAFON FORTE DSC) 500 mg tablet Take 1 Tab by mouth three (3) times daily as needed for Muscle Spasm(s). Qty: 15 Tab, Refills: 0      sevelamer (RENAGEL) 800 mg tablet Take 2,400 mg by mouth two (2) times a day. pravastatin (PRAVACHOL) 20 mg tablet Take 20 mg by mouth nightly.  Indications: hypercholesterolemia      zolpidem (AMBIEN) 5 mg tablet Take 5 mg by mouth nightly as needed for Sleep. Indications: dialysis days      promethazine (PHENERGAN) 25 mg tablet Take 25 mg by mouth every six (6) hours as needed. Dialysis premed            3.   Follow-up Information     Follow up With Specialties Details Why Contact Info    MD Hyacinth Austin [address] [place] [address]      Chad Wright MD Nephrology Schedule an appointment as soon as possible for a visit  [address] [place] [address]  [phone]      [facility] EMERGENCY DEPT Emergency Medicine  If symptoms worsen [address] [id number]  [phone]        _______________________________      Please note that this dictation was completed with F[employer]du, the computer voice recognition software. Quite often unanticipated grammatical, syntax, homophones, and other interpretive errors are inadvertently transcribed by the computer software. Please disregard these errors. Please excuse any errors that have escaped final proofreading.

## 2019-09-24 NOTE — ED NOTES
Pt reports she went to dialysis this am and they told her that there dialysis site was blocked to come be seen. Pt has no other complaints.

## 2019-09-24 NOTE — ED NOTES
Pt transported to vascular lab. TRANSFER - OUT REPORT:    Verbal report given to  Andrea Champagne RN  on [facility]  being transferred to Vascular lab  for ordered procedure       Report consisted of patients Situation, Background, Assessment and   Recommendations(SBAR). Information from the following report(s) SBAR was reviewed with the receiving nurse. Lines:   Peripheral IV 09/24/19 Right Antecubital (Active)   Site Assessment Clean, dry, & intact 9/24/2019  8:03 AM   Phlebitis Assessment 0 9/24/2019  8:03 AM   Infiltration Assessment 0 9/24/2019  8:03 AM   Dressing Status Clean, dry, & intact 9/24/2019  8:03 AM   Hub Color/Line Status Pink 9/24/2019  8:03 AM        Opportunity for questions and clarification was provided.       Patient transported with:   Registered Nurse

## 2019-09-24 NOTE — DISCHARGE INSTRUCTIONS
Patient Education        Kidney Dialysis: Care Instructions  Your Care Instructions    Dialysis is a process that filters wastes from the blood when your kidneys can no longer do the job. It is not a cure, but it can help you live longer and feel better. It is a lifesaving treatment when you have kidney failure. Normal kidneys work 24 hours a day to clean wastes from your blood. Your kidneys are not able to do this job, so a process called dialysis will do some of the work for your kidneys. You and your doctor will decide which type of dialysis you should have. Peritoneal dialysis uses the lining of your belly (peritoneum) to filter your blood. You can do it at home, on a daily basis. Hemodialysis uses a man-made filter called a dialyzer to clean your blood. Most people need to go to a hospital or clinic 3 days a week for several hours each time. Sometimes hemodialysis can be done at home. It is normal to have questions about your treatment, and you have a right to know what is happening to you. Learning about dialysis can help you take an active role in your treatment. Dialysis does not cure kidney disease, but it can help you live longer and feel better. You will need to follow your diet and treatment schedule carefully. Follow-up care is a key part of your treatment and safety. Be sure to make and go to all appointments, and call your doctor if you are having problems. It's also a good idea to know your test results and keep a list of the medicines you take. What do you need to know about peritoneal dialysis? Peritoneal dialysis uses the lining of your belly (or peritoneal membrane) to filter your blood. Before you can begin peritoneal dialysis, your doctor will need to place a thin tube called a catheter in your belly. This is the dialysis access. · Peritoneal dialysis can be done at home or in any clean place. You may be able to do it while you sleep. · You can do it by yourself.  You do not have to rely on help from others. · You can do it at the times you choose as long as you do the right number of treatments. · It has to be done every day of the week. · Some people find it hard to do all the required steps. · It increases your chance for a serious infection of the lining of the belly (peritoneum). What do you need to know about hemodialysis? Hemodialysis uses a man-made membrane called a dialyzer to clean your blood. You are connected to the dialyzer by tubes attached to your blood vessels. Before you start hemodialysis, your doctor will create a site where the blood can flow in and out of your body during your dialysis sessions. This site is called the vascular access. It may be a fistula, made by connecting an artery and a vein. Or it may be a graft, which is a tube implanted under your skin. · Hemodialysis is done mainly by trained health workers who can watch for any problems. · It allows you to be in contact with other people having dialysis. This can help provide emotional support. · You can schedule your treatments in the evenings so you can keep working. · You may be able to do home hemodialysis, which gives you more control over your schedule. · It usually needs to be done on a set schedule 3 times a week. · It can cause side effects. The most common side effects are low blood pressure and muscle cramps. These can often be treated easily. · It requires needle sticks during every treatment, which bothers some people. Others get used to it and even do the needle sticks themselves. How can you care for yourself at home? · Be sure to have all of your dialysis sessions. Do not try to shorten or skip your sessions. You have a better chance of a longer and healthier life by getting your full treatment. · Your doctor or health care team will show you the steps you need to go through each day before, during, and after dialysis. Be sure to follow these steps.  If you do not understand a step, talk to your team.  · Your doctor and dietitian will help you design menus that follow your diet. Be sure to follow your diet guidelines. ? You will need to limit fluids and certain foods that contain salt (sodium), potassium, and phosphorus. ? You may need to follow a heart-healthy diet to keep the fat (cholesterol) in your blood under control. ? You may need higher levels of protein in your diet. · Your doctor may recommend certain vitamins. But do not take any other medicine, including over-the-counter medicines, vitamins, and herbal products, without talking to your doctor first.  · Do not smoke. Smoking raises your risk of many health problems, including more kidney damage. If you need help quitting, talk to your doctor about stop-smoking programs and medicines. These can increase your chances of quitting for good. · Do not take ibuprofen (Advil, Motrin), naproxen (Aleve), or similar medicines, unless your doctor tells you to. These medicines may make kidney problems worse. When should you call for help? Call your doctor now or seek immediate medical care if:    · You have a fever.     · You are dizzy or lightheaded, or you feel like you may faint.     · You are confused or cannot think clearly.     · You have new or worse nausea or vomiting.     · You have new or more blood in your urine.     · You have new swelling.    Watch closely for changes in your health, and be sure to contact your doctor if:    · You do not get better as expected. Where can you learn more? Go to http://jaquan-treva.info/. Enter R032 in the search box to learn more about \"Kidney Dialysis: Care Instructions. \"  Current as of: October 31, 2018  Content Version: 12.2  © 9335-7089 [employer]. Care instructions adapted under license by [employer] (which disclaims liability or warranty for this information).  If you have questions about a medical condition or this instruction, always ask your healthcare professional. [address] any warranty or liability for your use of this information.

## 2019-09-24 NOTE — PROCEDURES
Procedure Note      Left Upper Extremity AVG Thrombectomy  [facility]        Date of Service: 9- 7044     Surgeon(s):  Kimo Muniz DO FACS     Assistant(s): None     Pre-operative Diagnosis: thrombosed LUE AVG     Post-operative Diagnosis: same as preop diagnosis     Procedure(s) Performed:       1. Central venogram.  3. Balloon angioplasty of AVG venous anastomosis and multiple intragraft steoses with 8 mm angioplasty balloon and 10 mm balloon and 8 mm cutting balloon angioplasty axillary vein  4. Over the wire emma embolectomy of AVG arterial inflow  5. Balloon Angioplasty of arterial anastomosis with 6 x 40 balloon  6.10 x 40 Bare metal self-expanding stent placement axillary vein into previous 8 mm venous anastamotic stent     Anesthesia:  Local with 1% lidocaine. Moderate sedation was administered. The patient was constantly monitored by Carline Dials under my direct supervision from 1127  until 1240   hours. Fentanyl 200mcg, Versed 3 mg.        Blood Adminstered:  none     Findings: venous outflow anastomotic stenosis and intragraft stenosis. Some resolution after angioplasty with patent peripheral and central venous outflow. Mild stenosis proximal to venous outflow stent and 50% stenosis central to  The stent. Bare metal stent placed from axillary vein to within the venous anastamotic stent. Excellent flow in AVG at conclusion of the procedure     Complications: none     Estimated Blood Loss:  minimal     Tubes and Drains: none     Implants/Grafts:  none     Specimens: none        Indication:   52 yo Female with ESRD on HD via LUE loop graft. This had been working well until this AM when she was not able to be dialyzed. On exam, there is no thrill or pulse, no bruit audible. The decision was made to proceed with AVG thrombectomy. I had just thrombectomized her AVG two weeks ago. The risks and benefits of this procedure were discussed. The patient voiced understanding.  All questions were answered and the patient opted to proceed.         Description of Procedure:   After informed consent was obtained, the patient was brought to the angio suite and placed on the table in supine position with the left arm abducted at 45 degrees. The LUE was prepped and draped in standard sterile fashion. A safety pause was performed with all necessary personnel and equipment in the room.      I accessed the AV graft first with an 18-gauge needle after numbing the skin with 1% lidocaine.  The first needle was pointing towards the venous outflow which was lateral.  Using standard Seldinger technique over a wire I passed a 7 [place] sheath.  I systemically heparinized the patient with 6000 units of IV heparin.  Over the wire that I passed centrally I passed a Cueva catheter and a central venogram was performed first and  there were essentially no real central venous stenoses.  I laced the clot starting at the venous anastomosis and working toward my 7 Western Katya sheath with 8 mg of TPA.  I then replaced my wire and performed angioplasty of the graft starting at my sheath insertion site inflating and deflating the balloon and macerating the clot and pushing it forward.  There appeared to be a stenosis at the venous anastomosis which was not pronounced but inflating the balloon within the graft itself demonstrated some challenges due to likely scarring within the graft. I used a 10 mm balloon to re-angioplasty the two areas with narrowing and the area in the graft improved but central to the stent there was persistent stenosis approximately 50%. I used an 8 mm [employer] cutting balloon to angioplasty the area with persistent stenosis. I then deployed an [address] THE STENOSIS AND ANGIOPLASTIED THE AREA with a 10 mm balloon. Follow up angiogram showed resolution of the stenosis and good brisk flow through the area and then centrally.        After I was done with this portion, I accessed the graft about 10 cm away pointing towards the arterial anastomosis once again with an 18-gauge needle once again after anesthetizing the skin with 1% lidocaine.  Using Seldinger technique I passed a 7 Telugu sheath.  Using a Bentson wire and Cueva catheter I was able to cross into the brachial artery.  I used a 4  Patricio embolectomy and pulled a lot of clot including the arterial plug into the graft and let it flush centrally.  I did an angiogram with the Cueva catheter in the brachial artery proximally , demonstrating residual clot within the arterial inflow limb of the graft.  I once again passed a Patricio embolectomy catheter multiple times.  It appeared that I had cleared all the clot out of the graft at this point.  The arterial anastomosis is small presumably because this was a 4 mm tapered AV graft.  Nonetheless, I angioplastied this for 2 minutes with a 6 x 40 balloon with improvement in luminal caliber.   At this point I removed all catheters and wires.  I pulled both sheaths and closed the sites with 4-0 Monocryl pursestring sutures.  Hemostasis was good however there was a hematoma on 1 of the sheath exit sites close to the antecubital fossa.  This area was monitored pressure was held.  It was non-expanding and the area was marked with a surgical marking pen.  Sterile dressings were applied on both sides.  The patient tolerated the procedure well and went to recovery in stable condition.  I was present and scrubbed throughout.     The patient tolerated the procedure well and was transported to the recovery area in stable condition.   She maintained a palpable thrill in the LUE AVG     [facility], [address]

## 2019-09-24 NOTE — PROGRESS NOTES
TRANSFER - OUT REPORT:    Verbal report given to Stef Jane RN(name) on Chanell Orozco  being transferred to ER(unit) for routine progression of care       Report consisted of patients Situation, Background, Assessment and   Recommendations(SBAR). Information from the following report(s) SBAR, Procedure Summary and MAR was reviewed with the receiving nurse. Lines:   Peripheral IV 09/24/19 Right Antecubital (Active)   Site Assessment Clean, dry, & intact 9/24/2019  8:03 AM   Phlebitis Assessment 0 9/24/2019  8:03 AM   Infiltration Assessment 0 9/24/2019  8:03 AM   Dressing Status Clean, dry, & intact 9/24/2019  8:03 AM   Hub Color/Line Status Pink 9/24/2019  8:03 AM        Opportunity for questions and clarification was provided.       Patient transported with:   Registered Nurse

## 2019-10-16 NOTE — PROGRESS NOTES
Dani Driver    Chief Complaint   Patient presents with    End Stage Renal Disease       History and Physical    Ms. Kareem Hou returns to our office for follow up of her left upper are AV graft. She states her shoulder pain has improved and her dialysis has been going ok as far as the machine. She states that last week she was hospitalized and received HD in the hospital.  While there, there nurse accessed her graft and it caused her pain. Since then her arm has been \"jumping\" during dialysis. This has improved since the first episode last Thursday, but still occurred during her last HD. No has no other complaints. Past Medical History:   Diagnosis Date    Chronic kidney disease     ESRD    Dialysis patient ([facility] [address]     PUD (peptic ulcer disease) 2004     Patient Active Problem List   Diagnosis Code    CAP (community acquired pneumonia) J18.9    ESRD on dialysis ([facility] [address] N18.6, Z99.2    Sepsis ([facility] [address] A41.9    Anemia D64.9    Hyponatremia E87.1    Dehydration E86.0    Prolonged Q-T interval on ECG P72.34    Diastolic dysfunction X94.8    Infected prosthetic vascular graft ([facility] [address] T82. 7XXA    Bacteremia due to Staphylococcus aureus R78.81    C. difficile colitis A04.7    PNA (pneumonia) J18.9    Hypokalemia E87.6    Hypoglycemia E16.2    Constipation K59.00    ESRD [facility]end stage renal disease) ([facility] N18.6    Hyperkalemia E87.5    Diarrhea R19.7     Past Surgical History:   Procedure Laterality Date    HX HYSTERECTOMY      HX OTHER SURGICAL Left     dialysis graft arm; removed    HX TONSILLECTOMY      HX TRANSPLANT  2004    renal transplant    HX VASCULAR ACCESS Right     Dialysis catheter    HX VASCULAR ACCESS Left     AV graft, (\"does not work\")    VASCULAR SURGERY PROCEDURE UNLIST       Current Outpatient Prescriptions   Medication Sig Dispense Refill    diphenhydrAMINE (BENADRYL) 50 mg tablet Take 50 mg by mouth Q TU, TH & SAT.  Indications: predialysis med      albuterol (PROVENTIL HFA, VENTOLIN HFA, PROAIR HFA) 90 mcg/actuation inhaler Take 2 Puffs by inhalation every six (6) hours as needed for Wheezing.  sevelamer (RENAGEL) 800 mg tablet Take 2,400 mg by mouth two (2) times a day.  pravastatin (PRAVACHOL) 20 mg tablet Take 20 mg by mouth nightly. Indications: hypercholesterolemia      zolpidem (AMBIEN) 5 mg tablet Take 5 mg by mouth nightly as needed for Sleep. Indications: dialysis days      promethazine (PHENERGAN) 25 mg tablet Take 25 mg by mouth every six (6) hours as needed. Dialysis premed        Allergies   Allergen Reactions    Vancomycin Other (comments)     Felt like her body was burning    Aspirin Nausea and Vomiting     Pt reports aspirin gave her a stomach ulcer.  Vicodin [Hydrocodone-Acetaminophen] Nausea and Vomiting     Social History     Social History    Marital status: UNKNOWN     Spouse name: N/A    Number of children: N/A    Years of education: N/A     Occupational History    Not on file. Social History Main Topics    Smoking status: Never Smoker    Smokeless tobacco: Never Used    Alcohol use No    Drug use: No    Sexual activity: No     Other Topics Concern    Not on file     Social History Narrative      Family History   Problem Relation Age of Onset    Hypertension Brother     Diabetes Maternal Grandmother        Review of Systems    Review of Systems   Constitutional: Negative for chills, diaphoresis, fever, malaise/fatigue and weight loss. HENT: Negative for hearing loss and sore throat. Eyes: Negative for blurred vision, photophobia and redness. Respiratory: Negative for cough, hemoptysis, shortness of breath and wheezing. Cardiovascular: Negative for chest pain, palpitations and orthopnea. Gastrointestinal: Negative for abdominal pain, blood in stool, constipation, diarrhea, heartburn, nausea and vomiting. Genitourinary: Negative for dysuria, frequency, hematuria and urgency.    Musculoskeletal: Negative for back pain and myalgias. Skin: Negative for itching and rash. Neurological: Negative for dizziness, speech change, focal weakness, weakness and headaches. Endo/Heme/Allergies: Does not bruise/bleed easily. Psychiatric/Behavioral: Negative for depression and suicidal ideas. Physical Exam:    Visit Vitals    /82    Pulse 84    Resp 18    Ht 5' 5\" (1.651 m)    Wt 195 lb (88.5 kg)    LMP 12/01/2012    BMI 32.45 kg/m2      Physical Examination: General appearance - alert, well appearing, and in no distress  Mental status - alert, oriented to person, place, and time  Eyes - sclera anicteric, left eye normal, right eye normal  Ears - right ear normal, left ear normal  Nose - normal and patent, no erythema, discharge or polyps  Mouth - mucous membranes moist, pharynx normal without lesions  Extremities - Left arm with 1+ edema. Good thrill throughout graft. No tenderness to palpation. No hematoma      Impression and Plan:    ICD-10-CM ICD-9-CM    1. Left arm swelling M79.89 729.81    2. ESRD on hemodialysis ([facility] [address] N18.6 585.6     Z99.2 V45.11      I told Ms. Eleazar Gee that I cannot identify any issues with her graft. I am not sure of why her arm is jumping on dialysis. She states that this has improved and I am hopeful that this will continue to improve. We will see her again next week to check her symptoms again. Follow-up Disposition:  Return in about 1 week (around 9/6/2017) for Symptom check. The treatment plan was reviewed with the patient in detail. The patient voiced understanding of this plan and all questions and concerns were addressed. The patient agrees with this plan. We discussed the signs and symptoms that would require earlier attention or intervention. The patient was given educational material related to his/her visit and the patient has voiced understanding of the material.     I appreciate the opportunity to participate in the care of your patient.   I will be sure to keep you informed of any subsequent changes in the treatment plan. If you have any questions or concerns, please feel free to contact me. Shahida Stevenson MD    PLEASE NOTE:  This document has been produced using voice recognition software. Unrecognized errors in transcription may be present. esther all pertinent systems normal

## 2019-12-12 NOTE — PROGRESS NOTES
Attempted visit patient unavailable. Chaplains remain available to provide pastoral support to patient and family as needed and requested. Rev.  Leif Kearney  [phone] knee nodule

## 2020-05-09 ENCOUNTER — HOSPITAL ENCOUNTER (EMERGENCY)
Age: 45
Discharge: HOME | End: 2020-05-09
Attending: EMERGENCY MEDICINE | Admitting: EMERGENCY MEDICINE
Payer: MEDICARE

## 2020-05-09 ENCOUNTER — APPOINTMENT (OUTPATIENT)
Dept: ULTRASOUND IMAGING | Age: 45
End: 2020-05-09
Attending: EMERGENCY MEDICINE
Payer: MEDICARE

## 2020-05-09 VITALS
TEMPERATURE: 98.3 F | BODY MASS INDEX: 33.32 KG/M2 | WEIGHT: 200 LBS | HEART RATE: 88 BPM | DIASTOLIC BLOOD PRESSURE: 94 MMHG | SYSTOLIC BLOOD PRESSURE: 118 MMHG | RESPIRATION RATE: 16 BRPM | OXYGEN SATURATION: 97 % | HEIGHT: 65 IN

## 2020-05-09 DIAGNOSIS — R31.0 GROSS HEMATURIA: Primary | ICD-10-CM

## 2020-05-09 DIAGNOSIS — Z94.0 KIDNEY TRANSPLANT RECIPIENT: ICD-10-CM

## 2020-05-09 LAB
ANION GAP SERPL CALC-SCNC: 7 MMOL/L (ref 3–18)
APPEARANCE UR: CLEAR
BASOPHILS # BLD: 0 K/UL (ref 0–0.1)
BASOPHILS NFR BLD: 1 % (ref 0–2)
BILIRUB UR QL: NEGATIVE
BUN SERPL-MCNC: 22 MG/DL (ref 7–18)
BUN/CREAT SERPL: 18 (ref 12–20)
CALCIUM SERPL-MCNC: 11.5 MG/DL (ref 8.5–10.1)
CHLORIDE SERPL-SCNC: 110 MMOL/L (ref 100–111)
CO2 SERPL-SCNC: 23 MMOL/L (ref 21–32)
COLOR UR: YELLOW
CREAT SERPL-MCNC: 1.19 MG/DL (ref 0.6–1.3)
DIFFERENTIAL METHOD BLD: ABNORMAL
EOSINOPHIL # BLD: 0.2 K/UL (ref 0–0.4)
EOSINOPHIL NFR BLD: 5 % (ref 0–5)
ERYTHROCYTE [DISTWIDTH] IN BLOOD BY AUTOMATED COUNT: 13.6 % (ref 11.6–14.5)
GLUCOSE SERPL-MCNC: 91 MG/DL (ref 74–99)
GLUCOSE UR STRIP.AUTO-MCNC: NEGATIVE MG/DL
HCG UR QL: NEGATIVE
HCT VFR BLD AUTO: 43.4 % (ref 35–45)
HGB BLD-MCNC: 13.9 G/DL (ref 12–16)
HGB UR QL STRIP: NEGATIVE
KETONES UR QL STRIP.AUTO: NEGATIVE MG/DL
LEUKOCYTE ESTERASE UR QL STRIP.AUTO: NEGATIVE
LYMPHOCYTES # BLD: 0.9 K/UL (ref 0.9–3.6)
LYMPHOCYTES NFR BLD: 19 % (ref 21–52)
MCH RBC QN AUTO: 30.9 PG (ref 24–34)
MCHC RBC AUTO-ENTMCNC: 32 G/DL (ref 31–37)
MCV RBC AUTO: 96.4 FL (ref 74–97)
MONOCYTES # BLD: 0.5 K/UL (ref 0.05–1.2)
MONOCYTES NFR BLD: 10 % (ref 3–10)
NEUTS SEG # BLD: 3.2 K/UL (ref 1.8–8)
NEUTS SEG NFR BLD: 65 % (ref 40–73)
NITRITE UR QL STRIP.AUTO: NEGATIVE
PH UR STRIP: 5.5 [PH] (ref 5–8)
PLATELET # BLD AUTO: 209 K/UL (ref 135–420)
PMV BLD AUTO: 11.1 FL (ref 9.2–11.8)
POTASSIUM SERPL-SCNC: 4.7 MMOL/L (ref 3.5–5.5)
PROT UR STRIP-MCNC: NEGATIVE MG/DL
RBC # BLD AUTO: 4.5 M/UL (ref 4.2–5.3)
SODIUM SERPL-SCNC: 140 MMOL/L (ref 136–145)
SP GR UR REFRACTOMETRY: 1.01 (ref 1–1.03)
UROBILINOGEN UR QL STRIP.AUTO: 0.2 EU/DL (ref 0.2–1)
WBC # BLD AUTO: 4.9 K/UL (ref 4.6–13.2)

## 2020-05-09 PROCEDURE — 85025 COMPLETE CBC W/AUTO DIFF WBC: CPT

## 2020-05-09 PROCEDURE — 99283 EMERGENCY DEPT VISIT LOW MDM: CPT

## 2020-05-09 PROCEDURE — 76770 US EXAM ABDO BACK WALL COMP: CPT

## 2020-05-09 PROCEDURE — 80048 BASIC METABOLIC PNL TOTAL CA: CPT

## 2020-05-09 PROCEDURE — 81025 URINE PREGNANCY TEST: CPT

## 2020-05-09 PROCEDURE — 81003 URINALYSIS AUTO W/O SCOPE: CPT

## 2020-05-09 PROCEDURE — 87086 URINE CULTURE/COLONY COUNT: CPT

## 2020-05-09 NOTE — ED TRIAGE NOTES
Pt w/ c/o blood on TP when wiping, noticed yesterday. Pt denies fevers/n/v/d or urinary pain.  Pt states her transplant team told her to get checked for UTI

## 2020-05-09 NOTE — ED PROVIDER NOTES
EMERGENCY DEPARTMENT HISTORY AND PHYSICAL EXAM    Date: 5/9/2020  Patient Name: Mitch Leonardo    History of Presenting Illness     Chief Complaint   Patient presents with    Blood in Urine         History Provided By: Patient     History Toya Toth):   11:03 AM  Mitch Leonardo is a 53 y.o. female with PMHX of ESRD s/p kidney transplant (Oct 2019), peptic ulcer disease, thyroid disease who presents to the emergency department C/O hematuria onset yesterday. Associated sxs include blood on toilet paper. Pt denies dysuria, or any other sxs or complaints. Patient states she has a transplant kidney. Yesterday, when wiping there was some blood on the paper. Patient has not noticed any blood in her urine. Patient has not had any repeat episodes since yesterday. Chief Complaint: hematuria  Duration: 1 day   Timing:  acute  Location: urethra  Quality: painless  Severity: Mild  Modifying Factors: Nothing makes it better or worse. Associated Symptoms: denies any other associated signs or symptoms    PCP: Michela Gaines MD     Current Outpatient Medications   Medication Sig Dispense Refill    diphenhydrAMINE (BENADRYL) 25 mg capsule Take 25 mg by mouth every six (6) hours as needed.  albuterol (PROVENTIL HFA, VENTOLIN HFA, PROAIR HFA) 90 mcg/actuation inhaler Take 2 Puffs by inhalation every four (4) hours as needed for Wheezing. 1 Inhaler 0    chlorzoxazone (PARAFON FORTE DSC) 500 mg tablet Take 1 Tab by mouth three (3) times daily as needed for Muscle Spasm(s). 15 Tab 0    sevelamer (RENAGEL) 800 mg tablet Take 2,400 mg by mouth two (2) times a day.  pravastatin (PRAVACHOL) 20 mg tablet Take 20 mg by mouth nightly. Indications: hypercholesterolemia      zolpidem (AMBIEN) 5 mg tablet Take 5 mg by mouth nightly as needed for Sleep. Indications: dialysis days      promethazine (PHENERGAN) 25 mg tablet Take 25 mg by mouth every six (6) hours as needed.  Dialysis premed          Past History     Past Medical History:  Past Medical History:   Diagnosis Date    Chronic kidney disease     ESRD    Dialysis patient ([address]     PUD (peptic ulcer disease) 2004    Thyroid disease        Past Surgical History:  Past Surgical History:   Procedure Laterality Date    HX HYSTERECTOMY      HX OTHER SURGICAL Left     dialysis graft arm; removed    HX TONSILLECTOMY      HX TRANSPLANT  2004    renal transplant    HX VASCULAR ACCESS Right     Dialysis catheter    HX VASCULAR ACCESS Left     AV graft, (\"does not work\")    IR THROMB PERC AV FISTULA W PTA  9/10/2019    IR THROMB PERC AV FISTULA W PTA  9/24/2019    VASCULAR SURGERY PROCEDURE UNLIST         Family History:  Family History   Problem Relation Age of Onset    Hypertension Brother     Diabetes Maternal Grandmother        Social History:  Social History     Tobacco Use    Smoking status: Never Smoker    Smokeless tobacco: Never Used   Substance Use Topics    Alcohol use: No    Drug use: No       Allergies: Allergies   Allergen Reactions    Vancomycin Other (comments)     Felt like her body was burning    Aspirin Nausea and Vomiting     Pt reports aspirin gave her a stomach ulcer.  Vicodin [Hydrocodone-Acetaminophen] Nausea and Vomiting         Review of Systems   Review of Systems   Constitutional: Negative for chills and fever. Respiratory: Negative for shortness of breath. Cardiovascular: Negative for chest pain. Gastrointestinal: Negative for abdominal pain, nausea and vomiting. Genitourinary: Positive for hematuria. Negative for dysuria. All other systems reviewed and are negative. Physical Exam     Vitals:    05/09/20 1054 05/09/20 1216 05/09/20 1230 05/09/20 1300   BP: 109/72 93/77 120/86 (!) 118/94   Pulse: 88      Resp: 16      Temp: 98.3 °F (36.8 °C)      SpO2: 97%      Weight: 90.7 kg (200 lb)      Height: 5' 5\" (1.651 m)        Physical Exam  Vitals signs and nursing note reviewed.        Vital signs and nursing notes reviewed  CONSTITUTIONAL: Alert, in no apparent distress; well-developed; well-nourished. HEAD:  Normocephalic, atraumatic  EYES: PERRL; EOM's intact. ENTM: Nose: no rhinorrhea; Throat: no erythema or exudate, mucous membranes moist  Neck:  No JVD, supple without lymphadenopathy  RESP: Chest clear, equal breath sounds. CV: S1 and S2 WNL; No murmurs, gallops or rubs. GI: Normal bowel sounds, abdomen soft and non-tender. No masses or organomegaly. : No costo-vertebral angle tenderness. BACK:  Non-tender  UPPER EXT:  Normal inspection  LOWER EXT: No edema, no calf tenderness. Distal pulses intact. NEURO: CN intact, reflexes 2/4 and sym, strength 5/5 and sym, sensation intact. SKIN: No rashes; Normal for age and stage. PSYCH:  Alert and oriented, normal affect. Diagnostic Study Results     Labs -     Recent Results (from the past 12 hour(s))   URINALYSIS W/ RFLX MICROSCOPIC    Collection Time: 05/09/20 11:50 AM   Result Value Ref Range    Color YELLOW      Appearance CLEAR      Specific gravity 1.012 1.005 - 1.030      pH (UA) 5.5 5.0 - 8.0      Protein Negative NEG mg/dL    Glucose Negative NEG mg/dL    Ketone Negative NEG mg/dL    Bilirubin Negative NEG      Blood Negative NEG      Urobilinogen 0.2 0.2 - 1.0 EU/dL    Nitrites Negative NEG      Leukocyte Esterase Negative NEG     HCG URINE, QL    Collection Time: 05/09/20 11:50 AM   Result Value Ref Range    HCG urine, QL Negative NEG     CBC WITH AUTOMATED DIFF    Collection Time: 05/09/20 12:15 PM   Result Value Ref Range    WBC 4.9 4.6 - 13.2 K/uL    RBC 4.50 4. 20 - 5.30 M/uL    HGB 13.9 12.0 - 16.0 g/dL    HCT 43.4 35.0 - 45.0 %    MCV 96.4 74.0 - 97.0 FL    MCH 30.9 24.0 - 34.0 PG    MCHC 32.0 31.0 - 37.0 g/dL    RDW 13.6 11.6 - 14.5 %    PLATELET 100 295 - 036 K/uL    MPV 11.1 9.2 - 11.8 FL    NEUTROPHILS 65 40 - 73 %    LYMPHOCYTES 19 (L) 21 - 52 %    MONOCYTES 10 3 - 10 %    EOSINOPHILS 5 0 - 5 %    BASOPHILS 1 0 - 2 %    ABS.  NEUTROPHILS 3.2 1.8 - 8.0 K/UL    ABS. LYMPHOCYTES 0.9 0.9 - 3.6 K/UL    ABS. MONOCYTES 0.5 0.05 - 1.2 K/UL    ABS. EOSINOPHILS 0.2 0.0 - 0.4 K/UL    ABS. BASOPHILS 0.0 0.0 - 0.1 K/UL    DF AUTOMATED     METABOLIC PANEL, BASIC    Collection Time: 05/09/20 12:15 PM   Result Value Ref Range    Sodium 140 136 - 145 mmol/L    Potassium 4.7 3.5 - 5.5 mmol/L    Chloride 110 100 - 111 mmol/L    CO2 23 21 - 32 mmol/L    Anion gap 7 3.0 - 18 mmol/L    Glucose 91 74 - 99 mg/dL    BUN 22 (H) 7.0 - 18 MG/DL    Creatinine 1.19 0.6 - 1.3 MG/DL    BUN/Creatinine ratio 18 12 - 20      GFR est AA 59 (L) >60 ml/min/1.73m2    GFR est non-AA 49 (L) >60 ml/min/1.73m2    Calcium 11.5 (H) 8.5 - 10.1 MG/DL       Radiologic Studies -   US RETROPERITONEUM COMP   Final Result   IMPRESSION:      Negative sonographic assessment of the transplanted left pelvic kidney. 49 cc post void bladder residual.        CT Results  (Last 48 hours)    None        CXR Results  (Last 48 hours)    None          Medications given in the ED-  Medications - No data to display      Medical Decision Making   I am the first provider for this patient. I reviewed the vital signs, available nursing notes, past medical history, past surgical history, family history and social history. Vital Signs-Reviewed the patient's vital signs. Pulse Oximetry Analysis - 97% on RA     Cardiac Monitor:  Rate: 88 bpm  Rhythm: NSR    Records Reviewed: Nursing Notes    Provider Notes (Medical Decision Making):   DDX: Transplant injury, hematuria, UTI    Procedures:  Procedures    ED Course:   11:03 AM Initial assessment performed. The patients presenting problems have been discussed, and they are in agreement with the care plan formulated and outlined with them. I have encouraged them to ask questions as they arise throughout their visit. 12:04 PM Discussed with Dr. Carie Moncada, nephrology who strongly recommends BMP and consider US for graft function.      3:19 PM Discussed with nurse coordinator, Brittney Araya, for transplant nephrology at [facility]. She will discuss with the on call provider and relay any instructions to me.     3:24 PM Discussed with transplant nurse coordinator, Brittney Araya, their NP requests a culture, pt may be discharged home, no antibiotics are needed. Diagnosis and Disposition     Discussion:  53 y.o. female with hematuria 8 months status post kidney transplant. Patient shows no sign of blood in her urine today. Patient has no signs of infection in her urine today. Her graft looks functional on ultrasound and her creatinine is normal.  Discussed with the transplant nephrology nurse coordinator in [place] who discussed with their on-call provider and they recommended sending her urine for culture which we have done. Patient may follow-up as scheduled with them. Patient understands and agrees with this plan. DISCHARGE NOTE:  4:01 PM   Lacie Escobar  results have been reviewed with her. She has been counseled regarding her diagnosis, treatment, and plan. She verbally conveys understanding and agreement of the signs, symptoms, diagnosis, treatment and prognosis and additionally agrees to follow up as discussed. She also agrees with the care-plan and conveys that all of her questions have been answered. I have also provided discharge instructions for her that include: educational information regarding their diagnosis and treatment, and list of reasons why they would want to return to the ED prior to their follow-up appointment, should her condition change. She has been provided with education for proper emergency department utilization. CLINICAL IMPRESSION:    1. Gross hematuria    2. Kidney transplant recipient        PLAN:  1. D/C Home  2. Current Discharge Medication List      CONTINUE these medications which have NOT CHANGED    Details   diphenhydrAMINE (BENADRYL) 25 mg capsule Take 25 mg by mouth every six (6) hours as needed.       albuterol (PROVENTIL HFA, VENTOLIN HFA, PROAIR HFA) 90 mcg/actuation inhaler Take 2 Puffs by inhalation every four (4) hours as needed for Wheezing. Qty: 1 Inhaler, Refills: 0      chlorzoxazone (PARAFON FORTE DSC) 500 mg tablet Take 1 Tab by mouth three (3) times daily as needed for Muscle Spasm(s). Qty: 15 Tab, Refills: 0      sevelamer (RENAGEL) 800 mg tablet Take 2,400 mg by mouth two (2) times a day. pravastatin (PRAVACHOL) 20 mg tablet Take 20 mg by mouth nightly. Indications: hypercholesterolemia      zolpidem (AMBIEN) 5 mg tablet Take 5 mg by mouth nightly as needed for Sleep. Indications: dialysis days      promethazine (PHENERGAN) 25 mg tablet Take 25 mg by mouth every six (6) hours as needed. Dialysis premed            3.   Follow-up Information     Follow up With Specialties Details Why Contact Info    Delaney Currie MD Family Practice Schedule an appointment as soon as possible for a visit in 1 week For follow up from Emergency Department visit. Nancy Davis [address] [place] [address]  Schedule an appointment as soon as possible for a visit in 2 days For follow up from Emergency Department visit. [facility],[address], [address]    [facility] EMERGENCY DEPT Emergency Medicine  As needed; If symptoms worsen [address] [facility] [id number]  [address]     Please note that this dictation was completed with [employer], the computer voice recognition software. Quite often unanticipated grammatical, syntax, homophones, and other interpretive errors are inadvertently transcribed by the computer software. Please disregard these errors. Please excuse any errors that have escaped final proofreading.     Valarie Monge MD

## 2020-05-11 LAB
BACTERIA SPEC CULT: NORMAL
CC UR VC: NORMAL
SERVICE CMNT-IMP: NORMAL

## 2020-12-04 NOTE — PROGRESS NOTES
Admission Medication Reconciliation has been performed on this ED patient consisting of interview of the patient regarding their PTA Home Medication List, Allergies and PMH as well as obtaining outpatient pharmacy information. Clarified vancomycin allergy as \"making her body burn \", I believe pt experiencing the side effect of Red Man Syndrome as opposed to the anaphylaxis that was initially entered. Interviewed patient who was a poor historian and at times appears to be intentionally obtuse. Patient daughter did  provide a list of home medications on her smart phone but only contained med name, no strength or dose or frequency. Patient's outpatient pharmacy is [facility] locally. I spoke w/ RPh from both to obtain the best possible med list.  Smoking status is denies  Alcohol use denies  Illicit drug use denies  Patient ABX use within the past 30 days = none  Has patient received any antineoplastics in the past 30 days? na  Has patient received any radiation treatments in the past 45 days? na      Medication Reconciliation Interventions:   Wrong Medication Identified 0  Wrong/missing medication strength or dose identified  1  Wrong/missing Interval Identified 1  Wrong/missing Route Identified 0  Medication Duplication 0  Omissions 11  Commissions 0  Other Issue(s) Identified (Indicate): 0            Medication Compliance Issues and/or Medication Concerns:   1. Pt states she took herself off her sensipar at least a month ago due to ADR of stomach pain. Per [facility] pharmacy she last received a 30 DS on 12/21/16. Her doctor is unaware. 2.  She also states she took herself off midodrine because she felt it made her BP too low. Per [facility] pharmacy she last received a 30 DS on 11/09/16. Her doctor is unaware. 3.  Pt did not initially report ambien nor Robitussin DM but admitted when I discussed with her after speaking with pharmacies.   Of note, per [facility], she got an entire pint (473ml) of Robitussin DM in January 4. It appears pt is non compliant with her sevelamer as well. She got a 30 DS on 12/14/16 with directions of 4 tablets TID with meals and 2 tablets with snacks. Pt states she thought it was only 3 tablets with meals. 5.  Pt is on warfarin for fistula clots. Pt states she hasn't taken any of her meds for at least 3 days.       4 [facility] Pharmacist  [phone] No pallor, no cervical/supraclavicular/inguinal adenopathy.  No splenomegaly

## 2022-02-24 NOTE — DISCHARGE INSTRUCTIONS
Follow-up stress test and MRI. No concerns. Your Hemodialysis Access: Care Instructions  Your Care Instructions  Hemodialysis, or dialysis, is the use of a machine to remove wastes from your blood. You need it if your kidneys are not able to remove wastes on their own. A dialysis access is the place in your arm, or sometimes in your leg, where a doctor creates a blood vessel that carries a large flow of blood. When you have dialysis, two needles are placed in this blood vessel and are connected to the dialysis machine. Your blood flows out of one needle and into the machine to be cleaned. Then your cleaned blood flows back into your body through the other needle. Sometimes, a doctor makes a short-term access through a tube, called a catheter, placed in your neck, upper chest, or groin. Your doctor creates an access during a minor surgery. You need to take care of your access to keep it working and to prevent infection. Follow-up care is a key part of your treatment and safety. Be sure to make and go to all appointments, and call your doctor if you are having problems. Its also a good idea to know your test results and keep a list of the medicines you take. How can you care for yourself at home? · After your doctor creates an access, keep it dry for at least 2 days. · Squeeze a soft ball or other object as instructed after the access is placed. This will help blood flow through the access and help prevent blood clots. · After you have dialysis, check to see whether the access bleeds or swells. Let your doctor know if your arm bleeds or swells. · Do not lift anything heavy with the arm that has the access. · Do not bump your arm. · Do not wear tight clothing or jewelry over the access. · Do not sleep with your access arm under your body. · Have blood drawn or blood pressure taken from your other arm. · Keep the access clean and dry. · Do not put cream or lotion on or near the access. When should you call for help?   Call your doctor now or seek immediate medical care if:  · You have signs of infection, such as:  ¨ Increased pain, swelling, warmth, or redness around the access. ¨ Red streaks leading from the access. ¨ Pus draining from the access. ¨ Swollen lymph nodes in your neck, armpits, or groin. ¨ A fever. · You do not feel a pulse in your access. Watch closely for changes in your health, and be sure to contact your doctor if:  · You have pain, swelling, or bleeding. Some pain or swelling is normal after surgery to create the access. But pain and swelling should get better over time. Where can you learn more? Go to http://jaquan-treva.info/. Enter L169 in the search box to learn more about \"Your Hemodialysis Access: Care Instructions. \"  Current as of: April 3, 2017  Content Version: 11.3  © 2216-6319 [employer]. Care instructions adapted under license by [employer] (which disclaims liability or warranty for this information). If you have questions about a medical condition or this instruction, always ask your healthcare professional. [place] [address] any warranty or liability for your use of this information. DISCHARGE SUMMARY from Nurse    PATIENT INSTRUCTIONS:    After general anesthesia or intravenous sedation, for 24 hours or while taking prescription Narcotics:  · Limit your activities  · Do not drive and operate hazardous machinery  · Do not make important personal or business decisions  · Do  not drink alcoholic beverages  · If you have not urinated within 8 hours after discharge, please contact your surgeon on call.     Report the following to your surgeon:  · Excessive pain, swelling, redness or odor of or around the surgical area  · Temperature over 100.5  · Nausea and vomiting lasting longer than 4 hours or if unable to take medications  · Any signs of decreased circulation or nerve impairment to extremity: change in color, persistent  numbness, tingling, coldness or increase pain  · Any questions        What to do at Home:  Recommended activity: Activity as tolerated and no driving for today,       *  Please give a list of your current medications to your Primary Care Provider. *  Please update this list whenever your medications are discontinued, doses are      changed, or new medications (including over-the-counter products) are added. *  Please carry medication information at all times in case of emergency situations. These are general instructions for a healthy lifestyle:    No smoking/ No tobacco products/ Avoid exposure to second hand smoke    Surgeon General's Warning:  Quitting smoking now greatly reduces serious risk to your health. Obesity, smoking, and sedentary lifestyle greatly increases your risk for illness    A healthy diet, regular physical exercise & weight monitoring are important for maintaining a healthy lifestyle    You may be retaining fluid if you have a history of heart failure or if you experience any of the following symptoms:  Weight gain of 3 pounds or more overnight or 5 pounds in a week, increased swelling in our hands or feet or shortness of breath while lying flat in bed. Please call your doctor as soon as you notice any of these symptoms; do not wait until your next office visit. Recognize signs and symptoms of STROKE:    F-face looks uneven    A-arms unable to move or move unevenly    S-speech slurred or non-existent    T-time-call 911 as soon as signs and symptoms begin-DO NOT go       Back to bed or wait to see if you get better-TIME IS BRAIN. Warning Signs of HEART ATTACK     Call 911 if you have these symptoms:   Chest discomfort. Most heart attacks involve discomfort in the center of the chest that lasts more than a few minutes, or that goes away and comes back. It can feel like uncomfortable pressure, squeezing, fullness, or pain.  Discomfort in other areas of the upper body.  Symptoms can include pain or discomfort in one or both arms, the back, neck, jaw, or stomach.  Shortness of breath with or without chest discomfort.  Other signs may include breaking out in a cold sweat, nausea, or lightheadedness. Don't wait more than five minutes to call 911 - MINUTES MATTER! Fast action can save your life. Calling 911 is almost always the fastest way to get lifesaving treatment. Emergency Medical Services staff can begin treatment when they arrive -- up to an hour sooner than if someone gets to the hospital by car. The discharge information has been reviewed with the patient. The patient verbalized understanding. Discharge medications reviewed with the patient and appropriate educational materials and side effects teaching were provided.

## 2022-03-18 PROBLEM — R19.7 DIARRHEA: Status: ACTIVE | Noted: 2017-08-21

## 2022-03-18 PROBLEM — J18.9 PNA (PNEUMONIA): Status: ACTIVE | Noted: 2017-02-23

## 2022-03-18 PROBLEM — J18.9 CAP (COMMUNITY ACQUIRED PNEUMONIA): Status: ACTIVE | Noted: 2017-02-21

## 2022-03-19 PROBLEM — D64.9 ANEMIA: Status: ACTIVE | Noted: 2017-02-21

## 2022-03-19 PROBLEM — E87.1 HYPONATREMIA: Status: ACTIVE | Noted: 2017-02-21

## 2022-03-19 PROBLEM — E87.6 HYPOKALEMIA: Status: ACTIVE | Noted: 2017-02-24

## 2022-03-19 PROBLEM — K59.00 CONSTIPATION: Status: ACTIVE | Noted: 2017-02-26

## 2022-03-19 PROBLEM — I51.89 DIASTOLIC DYSFUNCTION: Status: ACTIVE | Noted: 2017-02-22

## 2022-03-19 PROBLEM — E87.5 HYPERKALEMIA: Status: ACTIVE | Noted: 2017-08-21

## 2022-03-19 PROBLEM — R94.31 PROLONGED Q-T INTERVAL ON ECG: Status: ACTIVE | Noted: 2017-02-22

## 2022-03-19 PROBLEM — B95.61 BACTEREMIA DUE TO STAPHYLOCOCCUS AUREUS: Status: ACTIVE | Noted: 2017-02-23

## 2022-03-19 PROBLEM — R78.81 BACTEREMIA DUE TO STAPHYLOCOCCUS AUREUS: Status: ACTIVE | Noted: 2017-02-23

## 2022-03-19 PROBLEM — T82.7XXA INFECTED PROSTHETIC VASCULAR GRAFT (HCC): Status: ACTIVE | Noted: 2017-02-22

## 2022-03-19 PROBLEM — N18.6 ESRD (END STAGE RENAL DISEASE) (HCC): Status: ACTIVE | Noted: 2017-06-24

## 2022-03-19 PROBLEM — Z99.2 ESRD ON DIALYSIS (HCC): Status: ACTIVE | Noted: 2017-02-21

## 2022-03-19 PROBLEM — E86.0 DEHYDRATION: Status: ACTIVE | Noted: 2017-02-21

## 2022-03-19 PROBLEM — A04.72 C. DIFFICILE COLITIS: Status: ACTIVE | Noted: 2017-02-23

## 2022-03-19 PROBLEM — N18.6 ESRD ON DIALYSIS (HCC): Status: ACTIVE | Noted: 2017-02-21

## 2022-03-19 PROBLEM — A41.9 SEPSIS (HCC): Status: ACTIVE | Noted: 2017-02-21

## 2022-03-20 PROBLEM — E16.2 HYPOGLYCEMIA: Status: ACTIVE | Noted: 2017-02-25

## 2022-07-06 NOTE — ROUTINE PROCESS
Bedside and Verbal shift change report given to [address]wick  (oncoming nurse) by Adam López RN  (offgoing nurse). Report given with SBAR, Kardex, Intake/Output and Recent Results. Statement Selected

## 2022-07-29 NOTE — ROUTINE PROCESS
[address] [facility]  [phone]  Physical Therapy Progress Report      Referring MD: Mazin Car MD  Diagnosis:     ICD-10-CM ICD-9-CM    1. Difficulty in walking  R26.2 719.7    2. Weakness generalized  R53.1 780.79    3. Decreased functional mobility and endurance  Z74.09 780.99    4. Unstable balance  R26.89 781.2    5. Acute pain of right knee  M25.561 719.46    6. Knee stiffness, right  M25.661 719.56      Surgery: Right Knee Arthroplasty Total Robotic Assisted [place] Spinal/adductor  DOS:  4/13/2022   Therapy precautions: Fall risk  Co-morbidities affecting plan of care: Vertigo    Total Timed Codes: 60 min, Total Treatment Time: 60 min  Time In: 01:00 PM  Time Out: 02:00 PM      Patient History    Past medical and surgical history:   Past Medical History:   Diagnosis Date    Arthritis     pinched nerve in neck    GERD (gastroesophageal reflux disease)     well controlled with omeprazole daily--    Hypertension     managed with medication     Iron deficiency anemia     takes oral Fe- denies hx of blood transfusion    Osteoarthritis     Vertigo     Meclizine PRN       Past Surgical History:   Procedure Laterality Date    HX COLONOSCOPY      HX HYSTERECTOMY  1995    HX ORTHOPAEDIC Left 02/2020    LEFT 3RD TOE CORRECTION OF DEFORMED TOE     HX TONSILLECTOMY       Medications: reviewed in chart   Allergies: Allergies   Allergen Reactions    Latex Rash    Atorvastatin Rash    Nexium [Esomeprazole Magnesium] Rash    Other Medication Rash     Silk tape    Seafood [Shellfish Containing Products] Rash        Chief complaints/history of injury: Patient is a 77 y.o. female with a PMH complicated as noted above. She presents to PT 3 weeks s/p right TKA. She has completed HHPT with no post-op complications. However notes last week she noted possible blood in her bowel movement. States it was on one stool but not on the others.   She Bedside and Verbal shift change report given to Jeanne Fagan RN by Mulugeta Sargent. Report included the following information SBAR, Kardex, OR Summary, Intake/Output and MAR. mobility. PLAN: Continue knee extension machine to facilitate quadriceps strengthening and mini squats for sit to stand transfers      Access Code: [email or web address]  URL: [email or web address]  Date: 07/25/2022  Prepared by: Jeffery Brandt DPT    Exercises  Standing Bilateral Gastroc Stretch with Step - 2 x daily - 7 x weekly - 1 sets - 5 reps - 12 seconds hold  Standing Bilateral Heel Raise on Step - 1 x daily - 5 x weekly - 3 sets - 10 reps  Standing Hamstring Stretch on Chair - 2 x daily - 7 x weekly - 1 sets - 5 reps - 12 seconds hold  Standing Hamstring Curl with Chair Support - 2 x daily - 5 x weekly - 3 sets - 10 reps  Sidelying Quadriceps Stretch with Strap - 2 x daily - 7 x weekly - 1 sets - 5 reps - 12 seconds hold  Supine Heel Slide with Strap - 2 x daily - 7 x weekly - 1 sets - 10 reps - 5 seconds hold  Active Straight Leg Raise with Quad Set - 3 x daily - 5 x weekly - 1 sets - 5 reps - 5 seconds hold  Supine Bridge - 1 x daily - 5 x weekly - 3 sets - 5 reps  Standing Single Leg Stance with Counter Support - 1 x daily - 5 x weekly - 3 sets - 30 seconds hold  Step Up - 1 x daily - 5 x weekly - 3 sets - 10 reps  Lateral Step Up - 1 x daily - 5 x weekly - 3 sets - 10 reps  Staggered Sit-to-Stand - 1 x daily - 5 x weekly - 3 sets - 10 reps  Ice - 2 x daily - 7 x weekly - 15-20 duration (minutes)        [employer].

## 2022-11-19 ENCOUNTER — HOSPITAL ENCOUNTER (EMERGENCY)
Age: 47
Discharge: HOME | End: 2022-11-19
Attending: STUDENT IN AN ORGANIZED HEALTH CARE EDUCATION/TRAINING PROGRAM
Payer: MEDICAID

## 2022-11-19 VITALS
DIASTOLIC BLOOD PRESSURE: 77 MMHG | BODY MASS INDEX: 37.32 KG/M2 | HEIGHT: 65 IN | RESPIRATION RATE: 18 BRPM | TEMPERATURE: 97.1 F | HEART RATE: 86 BPM | SYSTOLIC BLOOD PRESSURE: 116 MMHG | WEIGHT: 224 LBS | OXYGEN SATURATION: 97 %

## 2022-11-19 DIAGNOSIS — S46.811A TRAPEZIUS MUSCLE STRAIN, RIGHT, INITIAL ENCOUNTER: Primary | ICD-10-CM

## 2022-11-19 PROCEDURE — 99283 EMERGENCY DEPT VISIT LOW MDM: CPT

## 2022-11-19 RX ORDER — METHOCARBAMOL 750 MG/1
750 TABLET, FILM COATED ORAL
Qty: 12 TABLET | Refills: 0 | Status: SHIPPED | OUTPATIENT
Start: 2022-11-19

## 2022-11-19 RX ORDER — LIDOCAINE 50 MG/G
PATCH TOPICAL
Qty: 5 EACH | Refills: 0 | Status: SHIPPED | OUTPATIENT
Start: 2022-11-19

## 2022-11-19 NOTE — ED TRIAGE NOTES
Pt arrive to ed with c/o of upper back pain that radiated to left and right shoulder. Pain get worst while attempting to reach for objects. Pt report pain started yesterday. Denies recent falls or hx of arthritis.

## 2022-11-19 NOTE — ED PROVIDER NOTES
EMERGENCY DEPARTMENT HISTORY AND PHYSICAL EXAM    Date: 11/19/2022  Patient Name: Rand Baxter    History of Presenting Illness     Chief Complaint   Patient presents with    Back Pain         History Provided By: Patient    12:33 PM  Rand Baxter is a 50 y.o. female with PMHX of hypothyroidism, status post renal transplant with normal kidney function now who presents to the emergency department C/O right posterior shoulder and upper back pain, which she woke up with 2 days ago. Pain is worse when she reaches forward or lifts anything or turns her head certain ways. She denies any injury or trauma but does work as a  and does lifting. Pt denies CP, SOB, extremity numbness or weakness, headache, and any other sxs or complaints. PCP: None    Current Outpatient Medications   Medication Sig Dispense Refill    methocarbamoL (Robaxin-750) 750 mg tablet Take 1 Tablet by mouth three (3) times daily as needed for Muscle Spasm(s). 12 Tablet 0    lidocaine (Lidoderm) 5 % Apply patch to the affected area for 12 hours a day and remove for 12 hours a day. 5 Each 0    diphenhydrAMINE (BENADRYL) 25 mg capsule Take 25 mg by mouth every six (6) hours as needed. albuterol (PROVENTIL HFA, VENTOLIN HFA, PROAIR HFA) 90 mcg/actuation inhaler Take 2 Puffs by inhalation every four (4) hours as needed for Wheezing. 1 Inhaler 0    sevelamer (RENAGEL) 800 mg tablet Take 2,400 mg by mouth two (2) times a day. pravastatin (PRAVACHOL) 20 mg tablet Take 20 mg by mouth nightly. Indications: hypercholesterolemia      zolpidem (AMBIEN) 5 mg tablet Take 5 mg by mouth nightly as needed for Sleep. Indications: dialysis days      promethazine (PHENERGAN) 25 mg tablet Take 25 mg by mouth every six (6) hours as needed.  Dialysis premed          Past History     Past Medical History:  Past Medical History:   Diagnosis Date    Chronic kidney disease     ESRD    Dialysis patient ([facility] [address]     PUD (peptic ulcer disease) 2004    Thyroid disease        Past Surgical History:  Past Surgical History:   Procedure Laterality Date    HX HYSTERECTOMY      HX OTHER SURGICAL Left     dialysis graft arm; removed    HX TONSILLECTOMY      HX TRANSPLANT  2004    renal transplant    HX VASCULAR ACCESS Right     Dialysis catheter    HX VASCULAR ACCESS Left     AV graft, (\"does not work\")    IR THROMB PERC AV FISTULA W PTA  9/10/2019    IR THROMB PERC AV FISTULA W PTA  9/24/2019    VASCULAR SURGERY PROCEDURE UNLIST         Family History:  Family History   Problem Relation Age of Onset    Hypertension Brother     Diabetes Maternal Grandmother        Social History:  Social History     Tobacco Use    Smoking status: Never    Smokeless tobacco: Never   Substance Use Topics    Alcohol use: No    Drug use: No       Allergies: Allergies   Allergen Reactions    Vancomycin Other (comments)     Felt like her body was burning    Aspirin Nausea and Vomiting     Pt reports aspirin gave her a stomach ulcer. Vicodin [Hydrocodone-Acetaminophen] Nausea and Vomiting         Review of Systems   Review of Systems   Constitutional: Negative. Respiratory:  Negative for shortness of breath. Cardiovascular:  Negative for chest pain. Musculoskeletal:  Positive for arthralgias, back pain and myalgias. Skin: Negative. Neurological:  Negative for weakness and numbness. All other systems reviewed and are negative. Physical Exam     Vitals:    11/19/22 1135   BP: 116/77   Pulse: 86   Resp: 18   Temp: 97.1 °F (36.2 °C)   SpO2: 97%   Weight: 101.6 kg (224 lb)   Height: 5' 5\" (1.651 m)     Physical Exam  Vitals and nursing note reviewed. Constitutional:       General: She is not in acute distress. Appearance: Normal appearance. She is normal weight. HENT:      Head: Normocephalic and atraumatic. Cardiovascular:      Rate and Rhythm: Normal rate and regular rhythm. Heart sounds: Normal heart sounds.    Pulmonary:      Effort: Pulmonary effort is normal. Breath sounds: Normal breath sounds. Musculoskeletal:        Back:    Skin:     General: Skin is warm. Neurological:      General: No focal deficit present. Mental Status: She is alert and oriented to person, place, and time. Psychiatric:         Mood and Affect: Mood normal.         Behavior: Behavior normal.             Diagnostic Study Results     Labs -   No results found for this or any previous visit (from the past 12 hour(s)). Radiologic Studies -   No orders to display     CT Results  (Last 48 hours)      None          CXR Results  (Last 48 hours)      None            Medications given in the ED-  Medications - No data to display      Medical Decision Making   I am the first provider for this patient. I reviewed the vital signs, available nursing notes, past medical history, past surgical history, family history and social history. Vital Signs-Reviewed the patient's vital signs. Records Reviewed: Nursing Notes      Procedures:  Procedures    ED Course:  12:33 PM   Initial assessment performed. The patients presenting problems have been discussed, and they are in agreement with the care plan formulated and outlined with them. I have encouraged them to ask questions as they arise throughout their visit. Provider Notes (Medical Decision Making): Tirso Bedoya is a 50 y.o. female presents with 2 days of left trapezius/rhomboid muscular pain. No injury or trauma but does lifting as she works as a . She is neurovascular intact without bony tenderness or deformity. Consistent with muscle strain/spasm, no radicular symptoms at this time. Recommend activity modification, Tylenol or muscle relaxer as needed (aware of potential sedating effects) and she requests a work note just for today. Recommend heat, light stretching and follow-up with PCP if not significantly improved after 1 to 2 weeks.     Diagnosis and Disposition       DISCHARGE NOTE:    Yadira Palmer results have been reviewed with her. She has been counseled regarding her diagnosis, treatment, and plan. She verbally conveys understanding and agreement of the signs, symptoms, diagnosis, treatment and prognosis and additionally agrees to follow up as discussed. She also agrees with the care-plan and conveys that all of her questions have been answered. I have also provided discharge instructions for her that include: educational information regarding their diagnosis and treatment, and list of reasons why they would want to return to the ED prior to their follow-up appointment, should her condition change. She has been provided with education for proper emergency department utilization. CLINICAL IMPRESSION:    1. Trapezius muscle strain, right, initial encounter        PLAN:  1. D/C Home  2. Discharge Medication List as of 11/19/2022 12:55 PM        START taking these medications    Details   methocarbamoL (Robaxin-750) 750 mg tablet Take 1 Tablet by mouth three (3) times daily as needed for Muscle Spasm(s). , Normal, Disp-12 Tablet, R-0      lidocaine (Lidoderm) 5 % Apply patch to the affected area for 12 hours a day and remove for 12 hours a day., Normal, Disp-5 Each, R-0           CONTINUE these medications which have NOT CHANGED    Details   diphenhydrAMINE (BENADRYL) 25 mg capsule Take 25 mg by mouth every six (6) hours as needed., Historical Med      albuterol (PROVENTIL HFA, VENTOLIN HFA, PROAIR HFA) 90 mcg/actuation inhaler Take 2 Puffs by inhalation every four (4) hours as needed for Wheezing., Normal, Disp-1 Inhaler, R-0      sevelamer (RENAGEL) 800 mg tablet Take 2,400 mg by mouth two (2) times a day., Historical Med      pravastatin (PRAVACHOL) 20 mg tablet Take 20 mg by mouth nightly. Indications: hypercholesterolemia, Historical Med      zolpidem (AMBIEN) 5 mg tablet Take 5 mg by mouth nightly as needed for Sleep.  Indications: dialysis days, Historical Med      promethazine (PHENERGAN) 25 mg tablet Take 25 mg by mouth every six (6) hours as needed. Dialysis premed , Historical Med           STOP taking these medications       chlorzoxazone (PARAFON FORTE DSC) 500 mg tablet Comments:   Reason for Stopping:             3.   Follow-up Information       Follow up With Specialties Details Why Contact Info    Your Orthopedist   As needed     [facility] EMERGENCY DEPT Emergency Medicine  As needed, If symptoms worsen [address] [place] [address]  [phone]          _______________________________      Please note that this dictation was completed with [employer], the computer voice recognition software. Quite often unanticipated grammatical, syntax, homophones, and other interpretive errors are inadvertently transcribed by the computer software. Please disregard these errors. Please excuse any errors that have escaped final proofreading.

## 2022-11-19 NOTE — LETTER
[facility] [place]  [facility] EMERGENCY DEPT  [address] [facility] NEWS [address]  [address]  [phone]    Work/School Note    Date: 11/19/2022    To Whom It May concern:      Jose Dumont was seen and treated today in the emergency room by the following provider(s):  Attending Provider: Dagmar Salinas DO  Physician Assistant: Sunny Holstein, PA. Jose Dumont is excused from work/school on 11/19/22. She is clear to return to work/school on 11/20/22.         Sincerely,          MELANIE Luo

## 2023-02-25 ENCOUNTER — APPOINTMENT (OUTPATIENT)
Facility: HOSPITAL | Age: 48
End: 2023-02-25
Payer: MEDICAID

## 2023-02-25 ENCOUNTER — HOSPITAL ENCOUNTER (EMERGENCY)
Facility: HOSPITAL | Age: 48
Discharge: ANOTHER HOSPITAL | End: 2023-02-26
Attending: EMERGENCY MEDICINE
Payer: MEDICAID

## 2023-02-25 DIAGNOSIS — N17.9 AKI (ACUTE KIDNEY INJURY) (HCC): ICD-10-CM

## 2023-02-25 DIAGNOSIS — Z94.0 HISTORY OF TRANSPLANTATION, RENAL: ICD-10-CM

## 2023-02-25 DIAGNOSIS — U07.1 ACUTE RESPIRATORY FAILURE DUE TO COVID-19 (HCC): ICD-10-CM

## 2023-02-25 DIAGNOSIS — U07.1 COVID-19: Primary | ICD-10-CM

## 2023-02-25 DIAGNOSIS — J96.00 ACUTE RESPIRATORY FAILURE DUE TO COVID-19 (HCC): ICD-10-CM

## 2023-02-25 LAB
ALBUMIN SERPL-MCNC: 4.4 G/DL (ref 3.4–5)
ALBUMIN/GLOB SERPL: 1.2 (ref 0.8–1.7)
ALP SERPL-CCNC: 186 U/L (ref 45–117)
ALT SERPL-CCNC: 15 U/L (ref 13–56)
ANION GAP SERPL CALC-SCNC: 4 MMOL/L (ref 3–18)
APPEARANCE UR: CLEAR
AST SERPL-CCNC: 11 U/L (ref 10–38)
BACTERIA URNS QL MICRO: ABNORMAL /HPF
BASOPHILS # BLD: 0 K/UL (ref 0–0.1)
BASOPHILS NFR BLD: 0 % (ref 0–2)
BILIRUB SERPL-MCNC: 0.2 MG/DL (ref 0.2–1)
BILIRUB UR QL: NEGATIVE
BUN SERPL-MCNC: 28 MG/DL (ref 7–18)
BUN/CREAT SERPL: 18 (ref 12–20)
CALCIUM SERPL-MCNC: 9.6 MG/DL (ref 8.5–10.1)
CHLORIDE SERPL-SCNC: 109 MMOL/L (ref 100–111)
CO2 SERPL-SCNC: 24 MMOL/L (ref 21–32)
COLOR UR: YELLOW
CREAT SERPL-MCNC: 1.58 MG/DL (ref 0.6–1.3)
DIFFERENTIAL METHOD BLD: ABNORMAL
EKG ATRIAL RATE: 110 BPM
EKG DIAGNOSIS: NORMAL
EKG P AXIS: 62 DEGREES
EKG P-R INTERVAL: 122 MS
EKG Q-T INTERVAL: 336 MS
EKG QRS DURATION: 72 MS
EKG QTC CALCULATION (BAZETT): 454 MS
EKG R AXIS: 24 DEGREES
EKG T AXIS: 104 DEGREES
EKG VENTRICULAR RATE: 110 BPM
EOSINOPHIL # BLD: 0.2 K/UL (ref 0–0.4)
EOSINOPHIL NFR BLD: 2 % (ref 0–5)
EPITH CASTS URNS QL MICRO: ABNORMAL /LPF (ref 0–5)
ERYTHROCYTE [DISTWIDTH] IN BLOOD BY AUTOMATED COUNT: 13.7 % (ref 11.6–14.5)
FLUAV RNA SPEC QL NAA+PROBE: NOT DETECTED
FLUBV RNA SPEC QL NAA+PROBE: NOT DETECTED
GLOBULIN SER CALC-MCNC: 3.8 G/DL (ref 2–4)
GLUCOSE BLD STRIP.AUTO-MCNC: 81 MG/DL (ref 70–110)
GLUCOSE SERPL-MCNC: 90 MG/DL (ref 74–99)
GLUCOSE UR STRIP.AUTO-MCNC: NEGATIVE MG/DL
HCT VFR BLD AUTO: 40.2 % (ref 35–45)
HGB BLD-MCNC: 12.9 G/DL (ref 12–16)
HGB UR QL STRIP: NEGATIVE
IMM GRANULOCYTES # BLD AUTO: 0.1 K/UL (ref 0–0.04)
IMM GRANULOCYTES NFR BLD AUTO: 1 % (ref 0–0.5)
KETONES UR QL STRIP.AUTO: NEGATIVE MG/DL
LACTATE BLD-SCNC: 0.62 MMOL/L (ref 0.4–2)
LACTATE SERPL-SCNC: 0.6 MMOL/L (ref 0.4–2)
LEUKOCYTE ESTERASE UR QL STRIP.AUTO: ABNORMAL
LYMPHOCYTES # BLD: 0.4 K/UL (ref 0.9–3.6)
LYMPHOCYTES NFR BLD: 3 % (ref 21–52)
MCH RBC QN AUTO: 29.5 PG (ref 24–34)
MCHC RBC AUTO-ENTMCNC: 32.1 G/DL (ref 31–37)
MCV RBC AUTO: 91.8 FL (ref 78–100)
MONOCYTES # BLD: 1.1 K/UL (ref 0.05–1.2)
MONOCYTES NFR BLD: 9 % (ref 3–10)
NEUTS SEG # BLD: 9.9 K/UL (ref 1.8–8)
NEUTS SEG NFR BLD: 85 % (ref 40–73)
NITRITE UR QL STRIP.AUTO: NEGATIVE
NRBC # BLD: 0 K/UL (ref 0–0.01)
NRBC BLD-RTO: 0 PER 100 WBC
PH UR STRIP: 7 (ref 5–8)
PLATELET # BLD AUTO: 269 K/UL (ref 135–420)
PMV BLD AUTO: 10.7 FL (ref 9.2–11.8)
POTASSIUM SERPL-SCNC: 4.1 MMOL/L (ref 3.5–5.5)
PROT SERPL-MCNC: 8.2 G/DL (ref 6.4–8.2)
PROT UR STRIP-MCNC: ABNORMAL MG/DL
RBC # BLD AUTO: 4.38 M/UL (ref 4.2–5.3)
RBC #/AREA URNS HPF: NEGATIVE /HPF (ref 0–5)
S PYO AG THROAT QL: NEGATIVE
SARS-COV-2 RNA RESP QL NAA+PROBE: DETECTED
SODIUM SERPL-SCNC: 137 MMOL/L (ref 136–145)
SP GR UR REFRACTOMETRY: 1.02 (ref 1–1.03)
TROPONIN I SERPL HS-MCNC: 9 NG/L (ref 0–54)
UROBILINOGEN UR QL STRIP.AUTO: 0.2 EU/DL (ref 0.2–1)
WBC # BLD AUTO: 11.6 K/UL (ref 4.6–13.2)
WBC URNS QL MICRO: ABNORMAL /HPF (ref 0–5)

## 2023-02-25 PROCEDURE — 71045 X-RAY EXAM CHEST 1 VIEW: CPT

## 2023-02-25 PROCEDURE — 83605 ASSAY OF LACTIC ACID: CPT

## 2023-02-25 PROCEDURE — 99285 EMERGENCY DEPT VISIT HI MDM: CPT

## 2023-02-25 PROCEDURE — 96372 THER/PROPH/DIAG INJ SC/IM: CPT

## 2023-02-25 PROCEDURE — 2580000003 HC RX 258: Performed by: EMERGENCY MEDICINE

## 2023-02-25 PROCEDURE — 87880 STREP A ASSAY W/OPTIC: CPT

## 2023-02-25 PROCEDURE — 87070 CULTURE OTHR SPECIMN AEROBIC: CPT

## 2023-02-25 PROCEDURE — 96365 THER/PROPH/DIAG IV INF INIT: CPT

## 2023-02-25 PROCEDURE — 6370000000 HC RX 637 (ALT 250 FOR IP): Performed by: EMERGENCY MEDICINE

## 2023-02-25 PROCEDURE — 81001 URINALYSIS AUTO W/SCOPE: CPT

## 2023-02-25 PROCEDURE — 84484 ASSAY OF TROPONIN QUANT: CPT

## 2023-02-25 PROCEDURE — 96361 HYDRATE IV INFUSION ADD-ON: CPT

## 2023-02-25 PROCEDURE — 71250 CT THORAX DX C-: CPT

## 2023-02-25 PROCEDURE — 82962 GLUCOSE BLOOD TEST: CPT

## 2023-02-25 PROCEDURE — 84145 PROCALCITONIN (PCT): CPT

## 2023-02-25 PROCEDURE — 87636 SARSCOV2 & INF A&B AMP PRB: CPT

## 2023-02-25 PROCEDURE — 80053 COMPREHEN METABOLIC PANEL: CPT

## 2023-02-25 PROCEDURE — 93005 ELECTROCARDIOGRAM TRACING: CPT | Performed by: EMERGENCY MEDICINE

## 2023-02-25 PROCEDURE — 96375 TX/PRO/DX INJ NEW DRUG ADDON: CPT

## 2023-02-25 PROCEDURE — 85025 COMPLETE CBC W/AUTO DIFF WBC: CPT

## 2023-02-25 PROCEDURE — 96367 TX/PROPH/DG ADDL SEQ IV INF: CPT

## 2023-02-25 PROCEDURE — 6360000002 HC RX W HCPCS: Performed by: EMERGENCY MEDICINE

## 2023-02-25 PROCEDURE — 87086 URINE CULTURE/COLONY COUNT: CPT

## 2023-02-25 PROCEDURE — 87040 BLOOD CULTURE FOR BACTERIA: CPT

## 2023-02-25 RX ORDER — PROMETHAZINE HYDROCHLORIDE 25 MG/ML
12.5 INJECTION, SOLUTION INTRAMUSCULAR; INTRAVENOUS
Status: COMPLETED | OUTPATIENT
Start: 2023-02-25 | End: 2023-02-25

## 2023-02-25 RX ORDER — ONDANSETRON 2 MG/ML
4 INJECTION INTRAMUSCULAR; INTRAVENOUS
Status: DISCONTINUED | OUTPATIENT
Start: 2023-02-25 | End: 2023-02-25

## 2023-02-25 RX ORDER — ONDANSETRON 2 MG/ML
4 INJECTION INTRAMUSCULAR; INTRAVENOUS
Status: COMPLETED | OUTPATIENT
Start: 2023-02-25 | End: 2023-02-25

## 2023-02-25 RX ORDER — ONDANSETRON 2 MG/ML
INJECTION INTRAMUSCULAR; INTRAVENOUS
Status: DISCONTINUED
Start: 2023-02-25 | End: 2023-02-25 | Stop reason: WASHOUT

## 2023-02-25 RX ORDER — DEXAMETHASONE SODIUM PHOSPHATE 4 MG/ML
10 INJECTION, SOLUTION INTRA-ARTICULAR; INTRALESIONAL; INTRAMUSCULAR; INTRAVENOUS; SOFT TISSUE ONCE
Status: COMPLETED | OUTPATIENT
Start: 2023-02-25 | End: 2023-02-25

## 2023-02-25 RX ORDER — SODIUM CHLORIDE, SODIUM LACTATE, POTASSIUM CHLORIDE, AND CALCIUM CHLORIDE .6; .31; .03; .02 G/100ML; G/100ML; G/100ML; G/100ML
30 INJECTION, SOLUTION INTRAVENOUS ONCE
Status: COMPLETED | OUTPATIENT
Start: 2023-02-25 | End: 2023-02-26

## 2023-02-25 RX ORDER — ACETAMINOPHEN 325 MG/1
650 TABLET ORAL
Status: COMPLETED | OUTPATIENT
Start: 2023-02-25 | End: 2023-02-25

## 2023-02-25 RX ORDER — ONDANSETRON 2 MG/ML
4 INJECTION INTRAMUSCULAR; INTRAVENOUS
Status: DISCONTINUED | OUTPATIENT
Start: 2023-02-25 | End: 2023-02-26 | Stop reason: HOSPADM

## 2023-02-25 RX ADMIN — ONDANSETRON 4 MG: 2 INJECTION INTRAMUSCULAR; INTRAVENOUS at 19:25

## 2023-02-25 RX ADMIN — PROMETHAZINE HYDROCHLORIDE 12.5 MG: 25 INJECTION INTRAMUSCULAR; INTRAVENOUS at 20:36

## 2023-02-25 RX ADMIN — DEXAMETHASONE SODIUM PHOSPHATE 10 MG: 4 INJECTION, SOLUTION INTRAMUSCULAR; INTRAVENOUS at 22:59

## 2023-02-25 RX ADMIN — CEFTRIAXONE 1000 MG: 1 INJECTION, POWDER, FOR SOLUTION INTRAMUSCULAR; INTRAVENOUS at 23:00

## 2023-02-25 RX ADMIN — ACETAMINOPHEN 650 MG: 325 TABLET ORAL at 22:59

## 2023-02-25 RX ADMIN — SODIUM CHLORIDE, POTASSIUM CHLORIDE, SODIUM LACTATE AND CALCIUM CHLORIDE 1710 ML: 600; 310; 30; 20 INJECTION, SOLUTION INTRAVENOUS at 20:38

## 2023-02-25 ASSESSMENT — PAIN - FUNCTIONAL ASSESSMENT: PAIN_FUNCTIONAL_ASSESSMENT: NONE - DENIES PAIN

## 2023-02-25 ASSESSMENT — ENCOUNTER SYMPTOMS
COUGH: 0
BACK PAIN: 0
NAUSEA: 1
SHORTNESS OF BREATH: 0
ABDOMINAL PAIN: 0
VOMITING: 0
RHINORRHEA: 0
DIARRHEA: 0

## 2023-02-25 NOTE — ED TRIAGE NOTES
Pt fever, and chills started today. Pt verbalized [facility], tested [facility] for [facility] and Covid. Pt had left kidney transplant in 2019. Creatine was 1.6 today at [facility]. Pt was encouraged to visit ed. Not PCP informed pt not to take bactrim med due to transplant hx.

## 2023-02-26 VITALS
HEART RATE: 106 BPM | DIASTOLIC BLOOD PRESSURE: 66 MMHG | OXYGEN SATURATION: 92 % | WEIGHT: 220 LBS | BODY MASS INDEX: 36.65 KG/M2 | SYSTOLIC BLOOD PRESSURE: 120 MMHG | HEIGHT: 65 IN | TEMPERATURE: 99.5 F | RESPIRATION RATE: 27 BRPM

## 2023-02-26 LAB
BACTERIA SPEC CULT: NORMAL
BACTERIA SPEC CULT: NORMAL
PROCALCITONIN SERPL-MCNC: <0.05 NG/ML
SERVICE CMNT-IMP: NORMAL
SERVICE CMNT-IMP: NORMAL

## 2023-02-26 PROCEDURE — 2500000003 HC RX 250 WO HCPCS: Performed by: EMERGENCY MEDICINE

## 2023-02-26 PROCEDURE — 6370000000 HC RX 637 (ALT 250 FOR IP): Performed by: EMERGENCY MEDICINE

## 2023-02-26 PROCEDURE — 2580000003 HC RX 258: Performed by: EMERGENCY MEDICINE

## 2023-02-26 RX ORDER — DOXYCYCLINE 100 MG/1
100 CAPSULE ORAL
Status: COMPLETED | OUTPATIENT
Start: 2023-02-26 | End: 2023-02-26

## 2023-02-26 RX ADMIN — DOXYCYCLINE 100 MG: 100 INJECTION, POWDER, LYOPHILIZED, FOR SOLUTION INTRAVENOUS at 00:06

## 2023-02-26 RX ADMIN — DOXYCYCLINE 100 MG: 100 CAPSULE ORAL at 00:27

## 2023-02-26 NOTE — ED NOTES
Pt packaged up for transport to [address] [place] [address]. Pt taken out of department via stretcher. Belongings with pt.       Flower Cervantes RN  02/26/23 [id number]

## 2023-02-26 NOTE — PROGRESS NOTES
Consulted for admission in a renal transplant pt with COVID19 and CARLOS. I recommended reaching out to her transplant center to see if they can accept her for monoclonal antibody treatment. She is unable to get remdesivir due to poor renal function and our ID physician does not specialize in transplant ID.

## 2023-02-26 NOTE — ED PROVIDER NOTES
[facility] [place] EMERGENCY DEPT  EMERGENCY DEPARTMENT HISTORY AND PHYSICAL EXAM      Date: 2/25/2023  Patient Name: Ivett William      History of Presenting Illness       Chief Complaint   Patient presents with    Chills    Fever    Abnormal Lab       History was provided by: Patient    Location/Duration/Severity/Modifying factors     Ivett William is a 50 y.o. female  who  has a past medical history of Chronic kidney disease, Dialysis patient ([address] PUD (peptic ulcer disease), and Thyroid disease. who arrived to the emergency department by by private vehicle. with complaints of Chills, Fever, and Abnormal Lab        Patient is a 76-year-old female presents to the emergency department today with a chief complaint of fever, chills, nausea, back pain and generalized weakness. She states yesterday she felt somewhat short of breath and some chest pain that seems to have resolved. [facility] because she had the fever and chills this morning where they did a flu and COVID swab and some basic lab work. She does not have any paperwork with her, however she says her creatinine was 1.6. She spoke to her transplant provider and they recommended she go to the emergency room for IV fluids and cultures. Patient denies any current chest pain or shortness of breath. Complains of nausea without any vomiting as of yet. She states she feels cold. She has a mild headache also. Her transplant was done at [facility] in [place] and is followed by her transplant nephrologist in [place]. Current kidney transplant was done in 2019. 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 It is uncertain why the patient suffer from renal failure when I asked the patient about why she had the kidney transplant she only could tell me that \"my kidneys failed\" and she thinks maybe it was from \"chickenpox. \"    Patient is treated with Prograf, CellCept and prednisone        There are no other complaints, changes, or physical findings at this time. PCP: ENRIQUE Leblanc NP    Current Facility-Administered Medications   Medication Dose Route Frequency Provider Last Rate Last Admin    ondansetron [facility]) injection 4 mg  4 mg IntraVENous Once PRN Staci Cheney, DO        doxycycline (VIBRAMYCIN) 100 mg in sodium chloride 0.9 % 100 mL IVPB (mini-bag)  100 mg IntraVENous Q12H [place] Cheney, DO         No current outpatient medications on file. Past History     Past Medical History:  Past Medical History:   Diagnosis Date    Chronic kidney disease     ESRD    Dialysis patient ([facility] [address]     PUD (peptic ulcer disease) 2004    Thyroid disease        Past Surgical History:  Past Surgical History:   Procedure Laterality Date    HYSTERECTOMY (CERVIX STATUS UNKNOWN)      IR THRMB/INFUSION DIAYSIS CIRCUIT W PTA  9/10/2019    IR THRMB/INFUSION DIAYSIS CIRCUIT W PTA 9/10/2019 [facility] RAD ANGIO IR    IR THRMB/INFUSION DIAYSIS CIRCUIT W PTA  9/24/2019    IR THRMB/INFUSION DIAYSIS CIRCUIT W PTA 9/24/2019 [facility] RAD ANGIO IR    IR THRMB/INFUSION DIAYSIS CIRCUIT W PTA  9/24/2019    IR THRMB/INFUSION DIAYSIS CIRCUIT W PTA  9/10/2019    OTHER SURGICAL HISTORY Left     dialysis graft arm; removed    TONSILLECTOMY      TRANSPLANT  2004    renal transplant    VASCULAR SURGERY      VASCULAR SURGERY Left     AV graft, (\"does not work\")    VASCULAR SURGERY Right     Dialysis catheter       Family History:  Family History   Problem Relation Age of Onset    Hypertension Brother     Diabetes Maternal Grandmother        Social History:  Social History     Tobacco Use    Smoking status: Never    Smokeless tobacco: Never   Substance Use Topics    Alcohol use: No    Drug use: No       Allergies:   Allergies   Allergen Reactions    Hydrocodone-Acetaminophen Nausea And Vomiting    Vancomycin Nausea And Vomiting       Medications:  Current Facility-Administered Medications   Medication Dose Route Frequency Provider Last Rate Last Admin    ondansetron (ZOFRAN) injection 4 mg  4 mg IntraVENous Once PRN Yenni Fran, DO        doxycycline (VIBRAMYCIN) 100 mg in sodium chloride 0.9 % 100 mL IVPB (mini-bag)  100 mg IntraVENous Q12H Yenni Fran, DO         No current outpatient medications on file. Social Determinants of Health:  Social Determinants of Health     Tobacco Use: Low Risk     Smoking Tobacco Use: Never    Smokeless Tobacco Use: Never    Passive Exposure: Not on file   Alcohol Use: Not on file   Financial Resource Strain: Not on file   Food Insecurity: Not on file   Transportation Needs: Not on file   Physical Activity: Not on file   Stress: Not on file   Social Connections: Not on file   Intimate Partner Violence: Not on file   Depression: Not on file   Housing Stability: Not on file       Good access to primary and/or specialist care. Reports generally stable financial, home and living environment. , Cared for by Family, and Employed    Review of Systems     Review of Systems   Constitutional:  Positive for chills, fatigue and fever. HENT:  Negative for congestion and rhinorrhea. Eyes:  Negative for visual disturbance. Respiratory:  Negative for cough and shortness of breath. Cardiovascular:  Negative for chest pain. Gastrointestinal:  Positive for nausea. Negative for abdominal pain, diarrhea and vomiting. Genitourinary:  Positive for flank pain. Negative for dysuria, frequency, hematuria and urgency. Musculoskeletal:  Negative for back pain and neck pain. Skin:  Negative for rash. Neurological:  Negative for dizziness, syncope, light-headedness and headaches. Physical Exam     Physical Exam  Vitals and nursing note reviewed. Constitutional:       General: She is not in acute distress. Appearance: Normal appearance. She is obese. She is not ill-appearing or toxic-appearing. HENT:      Head: Normocephalic and atraumatic.       Right Ear: External ear normal.      Left Ear: External ear normal.      Nose: Nose normal. No congestion.      Mouth/Throat:      Mouth: Mucous membranes are moist.      Pharynx: Oropharynx is clear.   Eyes:      Extraocular Movements: Extraocular movements intact.      Conjunctiva/sclera: Conjunctivae normal.      Pupils: Pupils are equal, round, and reactive to light.   Neck:      Comments: No meningismus  Cardiovascular:      Rate and Rhythm: Normal rate and regular rhythm.      Pulses: Normal pulses.      Heart sounds: Murmur (3/6 systolic murmur) heard.   Pulmonary:      Effort: Pulmonary effort is normal. No respiratory distress.      Breath sounds: Normal breath sounds. No wheezing or rales.   Abdominal:      General: Abdomen is flat. There is no distension.      Tenderness: There is no abdominal tenderness. There is no rebound.   Musculoskeletal:         General: No swelling or tenderness. Normal range of motion.      Cervical back: Normal range of motion and neck supple. No tenderness.      Right lower leg: No edema.      Left lower leg: No edema.   Skin:     General: Skin is warm.      Capillary Refill: Capillary refill takes less than 2 seconds.      Findings: No lesion or rash.   Neurological:      General: No focal deficit present.      Mental Status: She is alert.      Cranial Nerves: No cranial nerve deficit.      Sensory: No sensory deficit.      Motor: No weakness.       Lab and Diagnostic Study Results     Labs -  Recent Results (from the past 36 hour(s))   CBC with Auto Differential    Collection Time: 02/25/23  7:15 PM   Result Value Ref Range    WBC 11.6 4.6 - 13.2 K/uL    RBC 4.38 4.20 - 5.30 M/uL    Hemoglobin 12.9 12.0 - 16.0 g/dL    Hematocrit 40.2 35.0 - 45.0 %    MCV 91.8 78.0 - 100.0 FL    MCH 29.5 24.0 - 34.0 PG    MCHC 32.1 31.0 - 37.0 g/dL    RDW 13.7 11.6 - 14.5 %    Platelets 269 135 - 420 K/uL    MPV 10.7 9.2 - 11.8 FL    Nucleated RBCs 0.0 0  WBC    nRBC 0.00 0.00 - 0.01 K/uL    Seg Neutrophils 85 (H) 40 - 73 %    Lymphocytes 3 (L)  21 - 52 %    Monocytes 9 3 - 10 %    Eosinophils % 2 0 - 5 %    Basophils 0 0 - 2 %    Immature Granulocytes 1 (H) 0.0 - 0.5 %    Segs Absolute 9.9 (H) 1.8 - 8.0 K/UL    Absolute Lymph # 0.4 (L) 0.9 - 3.6 K/UL    Absolute Mono # 1.1 0.05 - 1.2 K/UL    Absolute Eos # 0.2 0.0 - 0.4 K/UL    Basophils Absolute 0.0 0.0 - 0.1 K/UL    Absolute Immature Granulocyte 0.1 (H) 0.00 - 0.04 K/UL    Differential Type AUTOMATED     Comprehensive Metabolic Panel    Collection Time: 02/25/23  7:15 PM   Result Value Ref Range    Sodium 137 136 - 145 mmol/L    Potassium 4.1 3.5 - 5.5 mmol/L    Chloride 109 100 - 111 mmol/L    CO2 24 21 - 32 mmol/L    Anion Gap 4 3.0 - 18 mmol/L    Glucose 90 74 - 99 mg/dL    BUN 28 (H) 7.0 - 18 MG/DL    Creatinine 1.58 (H) 0.6 - 1.3 MG/DL    Bun/Cre Ratio 18 12 - 20      Est, Glom Filt Rate 40 (L) >60 ml/min/1.73m2    Calcium 9.6 8.5 - 10.1 MG/DL    Total Bilirubin 0.2 0.2 - 1.0 MG/DL    ALT 15 13 - 56 U/L    AST 11 10 - 38 U/L    Alk Phosphatase 186 (H) 45 - 117 U/L    Total Protein 8.2 6.4 - 8.2 g/dL    Albumin 4.4 3.4 - 5.0 g/dL    Globulin 3.8 2.0 - 4.0 g/dL    Albumin/Globulin Ratio 1.2 0.8 - 1.7     Lactate, Sepsis    Collection Time: 02/25/23  7:15 PM   Result Value Ref Range    Lactic Acid, Sepsis 0.6 0.4 - 2.0 MMOL/L   Troponin    Collection Time: 02/25/23  7:15 PM   Result Value Ref Range    Troponin, High Sensitivity 9 0 - 54 ng/L   COVID-19 & Influenza Combo    Collection Time: 02/25/23  7:15 PM    Specimen: Nasopharyngeal   Result Value Ref Range    SARS-CoV-2, PCR Detected (A) NOTD      Rapid Influenza A By PCR Not detected NOTD      Rapid Influenza B By PCR Not detected NOTD     POCT lactic acid (lactate)    Collection Time: 02/25/23  7:22 PM   Result Value Ref Range    POC Lactic Acid 0.62 0.40 - 2.00 mmol/L   EKG 12 Lead    Collection Time: 02/25/23  7:49 PM   Result Value Ref Range    Ventricular Rate 110 BPM    Atrial Rate 110 BPM    P-R Interval 122 ms    QRS Duration 72 ms    Q-T Interval 336 ms    QTc Calculation (Bazett) 454 ms    P Axis 62 degrees    R Axis 24 degrees    T Axis 104 degrees    Diagnosis Sinus tachycardia    POCT Glucose    Collection Time: 02/25/23  7:56 PM   Result Value Ref Range    POC Glucose 81 70 - 110 mg/dL   POCT rapid strep A    Collection Time: 02/25/23  8:08 PM   Result Value Ref Range    POC Strep A Antigen Negative NEG     Urinalysis    Collection Time: 02/25/23  8:46 PM   Result Value Ref Range    Color, UA YELLOW      Appearance CLEAR      Specific Gravity, UA 1.017 1.005 - 1.030      pH, Urine 7.0 5.0 - 8.0      Protein, UA TRACE (A) NEG mg/dL    Glucose, UA Negative NEG mg/dL    Ketones, Urine Negative NEG mg/dL    Bilirubin Urine Negative NEG      Blood, Urine Negative NEG      Urobilinogen, Urine 0.2 0.2 - 1.0 EU/dL    Nitrite, Urine Negative NEG      Leukocyte Esterase, Urine TRACE (A) NEG     Urinalysis, Micro    Collection Time: 02/25/23  8:46 PM   Result Value Ref Range    WBC, UA 0 to 3 0 - 5 /hpf    RBC, UA Negative 0 - 5 /hpf    Epithelial Cells UA 1+ 0 - 5 /lpf    BACTERIA, URINE FEW (A) NEG /hpf           Radiologic Studies -   CT CHEST ABDOMEN PELVIS WO CONTRAST Additional Contrast? None   Final Result   1. Mild groundglass opacification in the right upper lobe, possibly infectious. 2.  No evidence of infection in the abdomen or pelvis. XR CHEST PORTABLE   Final Result   No acute process. Please see the full medical record for comprehensive list of labs and imaging obtained during the visit. All labs and imaging studies were personally reviewed.     Non-plain film images such as CT, Ultrasound and MRI are read by the radiologist. Plain radiographic images are visualized and preliminarily interpreted by the emergency physician with the below findings:    My intepretation(s) if applicable are below:     EKG interpretation(s): See ED Course section below for my interpretation of EKG(s)    Xray Interpretations(s): See ED Course for my X-ray interpretation(s)    Cardiac Monitor Interpretation:     Rate:  106  BPM,   Rhythm: Sinus Tachycardia    Pulse Oximetry Interpretation: 88% on Room Air, 94% on 2L NC      Medical Decision Making and ED Course   - I am the first and primary provider for this patient AND AM THE PRIMARY PROVIDER OF RECORD. - I reviewed the vital signs, available nursing notes, past medical history, past surgical history, family history and social history. - Initial assessment performed. The patients presenting problems have been discussed, and the staff are in agreement with the care plan formulated and outlined with them. I have encouraged them to ask questions as they arise throughout their visit. Vital Signs-Reviewed the patient's vital signs. Vitals:    02/25/23 2043 02/25/23 2058 02/25/23 2113 02/25/23 2311   BP: 131/74 133/75  (!) 140/80   Pulse:  (!) 111  (!) 111   Resp:  (!) 38  28   Temp:   99.5 °F (37.5 °C)    TempSrc:   Oral    SpO2:  91%  98%   Weight:       Height:                 Records Reviewed:   Nursing Notes, available previous labs, imaging and medical records have been reviewed (if available). Additional Considerations:    N/A    Provider Notes (Medical Decision Making):     Raven Gonzales is a 50 y.o. female with noted past medical history,who presented to the Emergency department with a chief complaint of Chills, Fever, and Abnormal Lab              MDM  Number of Diagnoses or Management Options  Diagnosis management comments: Patient is a 43-year-old female who presents to the ED with complaints of fever and chills. History of kidney transplant. Sent over to rule out sepsis. Differential diagnosis includes Ddx: Sepsis, severe sepsis, SIRS, septic shock, pneumonia, UTI, intra-abdominal process, cellulitis, influenza, COVID, etc.    Her creatinine at urgent care was 1.6. She has a slight CARLOS above her baseline. We will treat with some IV fluids for now.   Her exam was only significant for murmur.  No abdominal tenderness.  It is unknown if this is a new murmur or not.  There is no history of IV drug use which would put her at a greater risk for endocarditis.               ED Course:         ED Course as of 02/25/23 2343   Sat Feb 25, 2023 1934 Eva Aj -- Transplant Dr at [facility] [EVAN]   2000 EKG interpretation sinus tachycardia rate of 110 bpm, axis is normal normal intervals.  No ST elevation there is millimeter of ST depression in leads V3 V4 and a file with T wave inversions in the same corresponding leads.  No additional abnormal findings in comparison to prior EKG from 2018 morphology is very similar without any acute ischemic changes [EVAN]   2033 Patient reassessed, her labs are overall resulted as above.  Creatinine 1.58, mild CARLOS.  Her total white blood cell count is within normal ranges in the upper limits however she has a slight left shift.  Increased percentage of immature is an segmented neutrophils.  On reassessment the patient is tachypneic to the upper 20s and 30s although she currently denies any shortness of breath or chest pain.  She complains of continued nausea so I will medicate her with some Zofran.  Troponin resulted normal. [EVAN]   2113 COVID-positive. [EVAN]   2227 Patient's baseline creatinine is closer to 1.2.  She does have a mild acute kidney injury.  CT scan shows findings in the right upper lobe likely pneumonia. [EVAN]   2230 COVID-positive.  Findings on CT of possible developing infiltrate.  Will administer starting doses of IV antibiotics and Decadron.  Her saturations are running between the upper 80s and low 90s.  She is at times tachypneic. [EVAN]   2233 We will contact nephrologist on-call to discuss the case.  Anticipate admission [EVAN]   2242 Discussed with nephrology, Dr. Charlton.  Patient does not currently have a local nephrologist and he was contacted because he is on-call for the hospital for nephrology.  He agrees  patient can be admitted here, there is no evidence of organ rejection or significant kidney injury. Recommended [EVAN]   2259 D/w Dr Bib Og. We do not have mAb for COVID any longer. She recommended attempted transfer to her transplant center if possible for Monoclonal antibody, our ID service historically has preferred patients with transplant to be admitted to transplant centers for COVID infection as we do not have monoclonal antibody or specialized COVID treatments any longer. [EVAN]   3492 Case was discussed with transplant surgery, Dr. Matthew Park at [facility] Drs. [id number] [facility] with transfer recommended patient be admitted to the medicine service and then consult nephrology/transplant after admission. [EVAN]   6476 Discussed with ED physician, Dr. Abelardo Reyes for transfer. Agrees to accept the patient to the ER to ER and then will be admitted after she arrives in the ER. [EVAN]      ED Course User Index  [EVAN] Chiara Torres DO       ------------------------------------------------------------------------------------------------------------        Consultations:     CONSULTS:  IP CONSULT TO NEPHROLOGY  IP CONSULT TO HOSPITALIST    See the ED course section or MDM, if applicable for details on the content of consultations requested. Procedures and Critical Care       Performed by: Chiara Torres DO    Procedures             CRITICAL CARE NOTE :  11:43 PM  CRITICAL CARE TIME: CRITICAL CARE NOTE:    I have spent 45 minutes of critical care time involved in lab review, consultations with specialist, family decision-making, and documentation. During this entire length of time I was immediately available to the patient. Critical Care: The reason for providing this level of medical care for this critically ill patient was due a critical illness that impaired one or more vital organ systems such that there was a high probability of imminent or life threatening deterioration in the patients condition.  This care involved high complexity decision making to assess, manipulate, and support vital system functions, to treat this vital organ system failure and to prevent further life threatening deterioration of the patients condition. Aggregate Critical Care Time is exclusive of procedural and teaching time. DO Rosaura Argueta DO        Disposition       Disposition: Transferred to Another Facility      Diagnosis     Clinical Impression:   1. COVID-19    2. Acute respiratory failure due to COVID-19 ([facility] [address]    3. History of transplantation, renal    4. CARLOS (acute kidney injury) ([facility] [address]        PATIENT REFERRED TO:  No follow-up provider specified. DISCHARGE MEDICATIONS:  New Prescriptions    No medications on file       Attestations:    Rosaura Azul DO    Please note that this dictation was completed with nWay, the computer voice recognition software. Quite often unanticipated grammatical, syntax, homophones, and other interpretive errors are inadvertently transcribed by the computer software. Please disregard these errors. Please excuse any errors that have escaped final proofreading. Thank you.          Rosaura Azul DO  02/25/23 1325

## 2023-02-26 NOTE — ED NOTES
Pt with complaints of HA. Pt medicated with tylenol along with IV ABX as ordered. Pt resting on stretcher.       Diego Lr RN  02/25/23 4000

## 2023-02-27 LAB
BACTERIA SPEC CULT: NORMAL
SERVICE CMNT-IMP: NORMAL

## 2023-02-28 LAB
BACTERIA SPEC CULT: NORMAL
SERVICE CMNT-IMP: NORMAL

## 2023-03-03 LAB
BACTERIA SPEC CULT: NORMAL
BACTERIA SPEC CULT: NORMAL
SERVICE CMNT-IMP: NORMAL
SERVICE CMNT-IMP: NORMAL

## 2023-03-11 ENCOUNTER — APPOINTMENT (OUTPATIENT)
Facility: HOSPITAL | Age: 48
End: 2023-03-11
Payer: MEDICAID

## 2023-03-11 ENCOUNTER — HOSPITAL ENCOUNTER (EMERGENCY)
Facility: HOSPITAL | Age: 48
Discharge: HOME | End: 2023-03-11
Attending: EMERGENCY MEDICINE
Payer: MEDICAID

## 2023-03-11 VITALS
HEART RATE: 75 BPM | WEIGHT: 220 LBS | HEIGHT: 65 IN | SYSTOLIC BLOOD PRESSURE: 109 MMHG | TEMPERATURE: 97.3 F | DIASTOLIC BLOOD PRESSURE: 69 MMHG | OXYGEN SATURATION: 96 % | BODY MASS INDEX: 36.65 KG/M2 | RESPIRATION RATE: 28 BRPM

## 2023-03-11 DIAGNOSIS — R06.02 SHORTNESS OF BREATH: ICD-10-CM

## 2023-03-11 DIAGNOSIS — U07.1 COVID: Primary | ICD-10-CM

## 2023-03-11 LAB
ALBUMIN SERPL-MCNC: 3.2 G/DL (ref 3.4–5)
ALBUMIN/GLOB SERPL: 0.7 (ref 0.8–1.7)
ALP SERPL-CCNC: 124 U/L (ref 45–117)
ALT SERPL-CCNC: 29 U/L (ref 13–56)
ANION GAP SERPL CALC-SCNC: 6 MMOL/L (ref 3–18)
AST SERPL-CCNC: 33 U/L (ref 10–38)
BASOPHILS # BLD: 0 K/UL (ref 0–0.1)
BASOPHILS NFR BLD: 0 % (ref 0–2)
BILIRUB SERPL-MCNC: 0.4 MG/DL (ref 0.2–1)
BUN SERPL-MCNC: 17 MG/DL (ref 7–18)
BUN/CREAT SERPL: 13 (ref 12–20)
CALCIUM SERPL-MCNC: 8.1 MG/DL (ref 8.5–10.1)
CHLORIDE SERPL-SCNC: 107 MMOL/L (ref 100–111)
CO2 SERPL-SCNC: 22 MMOL/L (ref 21–32)
CREAT SERPL-MCNC: 1.35 MG/DL (ref 0.6–1.3)
DIFFERENTIAL METHOD BLD: ABNORMAL
EKG ATRIAL RATE: 79 BPM
EKG ATRIAL RATE: 86 BPM
EKG DIAGNOSIS: NORMAL
EKG DIAGNOSIS: NORMAL
EKG P AXIS: 64 DEGREES
EKG P AXIS: 68 DEGREES
EKG P-R INTERVAL: 130 MS
EKG P-R INTERVAL: 138 MS
EKG Q-T INTERVAL: 384 MS
EKG Q-T INTERVAL: 388 MS
EKG QRS DURATION: 66 MS
EKG QRS DURATION: 70 MS
EKG QTC CALCULATION (BAZETT): 444 MS
EKG QTC CALCULATION (BAZETT): 459 MS
EKG R AXIS: 23 DEGREES
EKG R AXIS: 35 DEGREES
EKG T AXIS: 31 DEGREES
EKG T AXIS: 97 DEGREES
EKG VENTRICULAR RATE: 79 BPM
EKG VENTRICULAR RATE: 86 BPM
EOSINOPHIL # BLD: 0.1 K/UL (ref 0–0.4)
EOSINOPHIL NFR BLD: 1 % (ref 0–5)
ERYTHROCYTE [DISTWIDTH] IN BLOOD BY AUTOMATED COUNT: 13.7 % (ref 11.6–14.5)
GLOBULIN SER CALC-MCNC: 4.6 G/DL (ref 2–4)
GLUCOSE SERPL-MCNC: 134 MG/DL (ref 74–99)
HCT VFR BLD AUTO: 37.5 % (ref 35–45)
HGB BLD-MCNC: 12.1 G/DL (ref 12–16)
IMM GRANULOCYTES # BLD AUTO: 0 K/UL (ref 0–0.04)
IMM GRANULOCYTES NFR BLD AUTO: 1 % (ref 0–0.5)
LYMPHOCYTES # BLD: 1 K/UL (ref 0.9–3.6)
LYMPHOCYTES NFR BLD: 20 % (ref 21–52)
MAGNESIUM SERPL-MCNC: 1.3 MG/DL (ref 1.6–2.6)
MCH RBC QN AUTO: 28.7 PG (ref 24–34)
MCHC RBC AUTO-ENTMCNC: 32.3 G/DL (ref 31–37)
MCV RBC AUTO: 88.9 FL (ref 78–100)
MONOCYTES # BLD: 0.5 K/UL (ref 0.05–1.2)
MONOCYTES NFR BLD: 9 % (ref 3–10)
NEUTS SEG # BLD: 3.5 K/UL (ref 1.8–8)
NEUTS SEG NFR BLD: 69 % (ref 40–73)
NRBC # BLD: 0 K/UL (ref 0–0.01)
NRBC BLD-RTO: 0 PER 100 WBC
PLATELET # BLD AUTO: 227 K/UL (ref 135–420)
PMV BLD AUTO: 10.9 FL (ref 9.2–11.8)
POTASSIUM SERPL-SCNC: 4.7 MMOL/L (ref 3.5–5.5)
PROT SERPL-MCNC: 7.8 G/DL (ref 6.4–8.2)
RBC # BLD AUTO: 4.22 M/UL (ref 4.2–5.3)
SODIUM SERPL-SCNC: 135 MMOL/L (ref 136–145)
TROPONIN I SERPL HS-MCNC: 17 NG/L (ref 0–54)
TROPONIN I SERPL HS-MCNC: 19 NG/L (ref 0–54)
WBC # BLD AUTO: 5 K/UL (ref 4.6–13.2)

## 2023-03-11 PROCEDURE — 94762 N-INVAS EAR/PLS OXIMTRY CONT: CPT | Performed by: EMERGENCY MEDICINE

## 2023-03-11 PROCEDURE — 71045 X-RAY EXAM CHEST 1 VIEW: CPT

## 2023-03-11 PROCEDURE — 96365 THER/PROPH/DIAG IV INF INIT: CPT | Performed by: EMERGENCY MEDICINE

## 2023-03-11 PROCEDURE — 93005 ELECTROCARDIOGRAM TRACING: CPT | Performed by: EMERGENCY MEDICINE

## 2023-03-11 PROCEDURE — 85025 COMPLETE CBC W/AUTO DIFF WBC: CPT

## 2023-03-11 PROCEDURE — 2580000003 HC RX 258: Performed by: EMERGENCY MEDICINE

## 2023-03-11 PROCEDURE — 80053 COMPREHEN METABOLIC PANEL: CPT

## 2023-03-11 PROCEDURE — 6360000002 HC RX W HCPCS: Performed by: EMERGENCY MEDICINE

## 2023-03-11 PROCEDURE — 96361 HYDRATE IV INFUSION ADD-ON: CPT | Performed by: EMERGENCY MEDICINE

## 2023-03-11 PROCEDURE — 99285 EMERGENCY DEPT VISIT HI MDM: CPT | Performed by: EMERGENCY MEDICINE

## 2023-03-11 PROCEDURE — 84484 ASSAY OF TROPONIN QUANT: CPT

## 2023-03-11 PROCEDURE — 93010 ELECTROCARDIOGRAM REPORT: CPT | Performed by: INTERNAL MEDICINE

## 2023-03-11 PROCEDURE — 83735 ASSAY OF MAGNESIUM: CPT

## 2023-03-11 RX ORDER — BENZONATATE 100 MG/1
100 CAPSULE ORAL 3 TIMES DAILY PRN
Qty: 21 CAPSULE | Refills: 0 | Status: SHIPPED | OUTPATIENT
Start: 2023-03-11 | End: 2023-03-18

## 2023-03-11 RX ORDER — 0.9 % SODIUM CHLORIDE 0.9 %
1000 INTRAVENOUS SOLUTION INTRAVENOUS ONCE
Status: COMPLETED | OUTPATIENT
Start: 2023-03-11 | End: 2023-03-11

## 2023-03-11 RX ORDER — MAGNESIUM SULFATE 1 G/100ML
1000 INJECTION INTRAVENOUS
Status: COMPLETED | OUTPATIENT
Start: 2023-03-11 | End: 2023-03-11

## 2023-03-11 RX ADMIN — MAGNESIUM SULFATE HEPTAHYDRATE 1000 MG: 1 INJECTION, SOLUTION INTRAVENOUS at 15:52

## 2023-03-11 RX ADMIN — SODIUM CHLORIDE 1000 ML: 900 INJECTION, SOLUTION INTRAVENOUS at 15:11

## 2023-03-11 ASSESSMENT — PAIN - FUNCTIONAL ASSESSMENT: PAIN_FUNCTIONAL_ASSESSMENT: NONE - DENIES PAIN

## 2023-03-11 NOTE — DISCHARGE INSTRUCTIONS
Follow-up with your primary care doctor. Return to the ED for worsening symptoms or for other concerns. Call your transplant nephrology department to follow-up in clinic.

## 2023-03-11 NOTE — Clinical Note
Henrik Salazar was seen and treated in our emergency department on 3/11/2023. She may return to work on 03/13/2023. If you have any questions or concerns, please don't hesitate to call.       Ilene Schwarz MD

## 2023-03-11 NOTE — ED PROVIDER NOTES
THE [facility] EMERGENCY DEPT  EMERGENCY DEPARTMENT ENCOUNTER    Patient Name: Gabrielle Chand  MRN: [id number]  YOB: 1975  Provider: Laxmi Loco MD  PCP: ENRIQUE Puri NP   Time/Date of evaluation: 1:34 PM EST on 3/11/23    History of Presenting Illness     Chief Complaint   Patient presents with    Shortness of Breath       History Provided by: Patient   History is limited by: Nothing    HISTORY (Narative):   Gabrielle Chand is a 50 y.o. female with a PMHX of ESRD status post kidney transplant (2019)  who presents to the emergency department (room 9) by POV C/O dyspnea onset yesterday. Associated sxs include worsening shortness of breath with exertion. Patient tested positive for COVID on Sunday (6 days ago). Pt denies any other sxs or complaints. Patient states that she normally gets care for her kidney transplant at [facility] in [place]. Patient states that she was diagnosed with COVID a week ago and was not started on Paxlovid due to her antirejection medications. Patient denies recent travel, recent trauma, recent surgery. Patient denies oral contraceptives. Nursing Notes were all reviewed and agreed with or any disagreements were addressed in the HPI.     Past History     PAST MEDICAL HISTORY:  Past Medical History:   Diagnosis Date    Chronic kidney disease     ESRD    Dialysis patient ([facility] [address]     PUD (peptic ulcer disease) 2004    Thyroid disease        PAST SURGICAL HISTORY:  Past Surgical History:   Procedure Laterality Date    HYSTERECTOMY (CERVIX STATUS UNKNOWN)      IR THRMB/INFUSION DIAYSIS CIRCUIT W PTA  9/10/2019    IR THRMB/INFUSION DIAYSIS CIRCUIT W PTA 9/10/2019 [facility] RAD ANGIO IR    IR THRMB/INFUSION DIAYSIS CIRCUIT W PTA  9/24/2019    IR THRMB/INFUSION DIAYSIS CIRCUIT W PTA 9/24/2019 [facility] RAD ANGIO IR    IR THRMB/INFUSION DIAYSIS CIRCUIT W PTA  9/24/2019    IR THRMB/INFUSION DIAYSIS CIRCUIT W PTA  9/10/2019    OTHER SURGICAL HISTORY Left     dialysis graft arm; removed    TONSILLECTOMY      TRANSPLANT  2004    renal transplant    VASCULAR SURGERY      VASCULAR SURGERY Left     AV graft, (\"does not work\")    VASCULAR SURGERY Right     Dialysis catheter       FAMILY HISTORY:  Family History   Problem Relation Age of Onset    Hypertension Brother     Diabetes Maternal Grandmother        SOCIAL HISTORY:  Social History     Tobacco Use    Smoking status: Never    Smokeless tobacco: Never   Substance Use Topics    Alcohol use: No    Drug use: No       MEDICATIONS:  No current facility-administered medications for this encounter. Current Outpatient Medications   Medication Sig Dispense Refill    benzonatate (TESSALON PERLES) 100 MG capsule Take 1 capsule by mouth 3 times daily as needed for Cough 21 capsule 0       ALLERGIES:  Allergies   Allergen Reactions    Hydrocodone-Acetaminophen Nausea And Vomiting    Vancomycin Nausea And Vomiting       SOCIAL DETERMINANTS OF HEALTH:  Social Determinants of Health     Tobacco Use: Low Risk     Smoking Tobacco Use: Never    Smokeless Tobacco Use: Never    Passive Exposure: Not on file   Alcohol Use: Not on file   Financial Resource Strain: Not on file   Food Insecurity: Not on file   Transportation Needs: Not on file   Physical Activity: Not on file   Stress: Not on file   Social Connections: Not on file   Intimate Partner Violence: Not on file   Depression: Not on file   Housing Stability: Not on file       Review of Systems     Negative except as listed above in HPI. Physical Exam     Vitals:    03/11/23 1557 03/11/23 1627 03/11/23 1657 03/11/23 1727   BP: 105/71 108/72 113/75 109/69   Pulse: 79 78 73 75   Resp: 20 22 25 28   Temp:       SpO2: 97% 98% 95% 96%   Weight:       Height:           Physical Exam  Vitals and nursing note reviewed. Constitutional:       General: She is not in acute distress. Appearance: Normal appearance. She is normal weight. She is not ill-appearing.    HENT:      Head: Normocephalic and atraumatic. Nose: Nose normal. No rhinorrhea. Mouth/Throat:      Mouth: Mucous membranes are moist.      Pharynx: No oropharyngeal exudate or posterior oropharyngeal erythema. Eyes:      General:         Right eye: No discharge. Left eye: No discharge. Extraocular Movements: Extraocular movements intact. Conjunctiva/sclera: Conjunctivae normal.      Pupils: Pupils are equal, round, and reactive to light. Cardiovascular:      Rate and Rhythm: Normal rate and regular rhythm. Heart sounds: No murmur heard. No friction rub. No gallop. Pulmonary:      Effort: Pulmonary effort is normal. No respiratory distress. Breath sounds: No wheezing, rhonchi or rales. Abdominal:      General: Bowel sounds are normal.      Palpations: Abdomen is soft. Tenderness: There is no abdominal tenderness. There is no guarding or rebound. Musculoskeletal:         General: No swelling, tenderness or deformity. Normal range of motion. Cervical back: Normal range of motion and neck supple. No rigidity. Lymphadenopathy:      Cervical: No cervical adenopathy. Skin:     General: Skin is warm and dry. Findings: No rash. Neurological:      General: No focal deficit present. Mental Status: She is alert and oriented to person, place, and time.    Psychiatric:         Mood and Affect: Mood normal.         Behavior: Behavior normal.       Diagnostic Study Results     LABS:  Recent Results (from the past 12 hour(s))   EKG 12 Lead    Collection Time: 03/11/23  1:20 PM   Result Value Ref Range    Ventricular Rate 86 BPM    Atrial Rate 86 BPM    P-R Interval 130 ms    QRS Duration 66 ms    Q-T Interval 384 ms    QTc Calculation (Bazett) 459 ms    P Axis 64 degrees    R Axis 23 degrees    T Axis 31 degrees    Diagnosis Normal sinus rhythm    CBC with Auto Differential    Collection Time: 03/11/23  1:30 PM   Result Value Ref Range    WBC 5.0 4.6 - 13.2 K/uL    RBC 4.22 4.20 - 5.30 M/uL Hemoglobin 12.1 12.0 - 16.0 g/dL    Hematocrit 37.5 35.0 - 45.0 %    MCV 88.9 78.0 - 100.0 FL    MCH 28.7 24.0 - 34.0 PG    MCHC 32.3 31.0 - 37.0 g/dL    RDW 13.7 11.6 - 14.5 %    Platelets 296 731 - 640 K/uL    MPV 10.9 9.2 - 11.8 FL    Nucleated RBCs 0.0 0  WBC    nRBC 0.00 0.00 - 0.01 K/uL    Seg Neutrophils 69 40 - 73 %    Lymphocytes 20 (L) 21 - 52 %    Monocytes 9 3 - 10 %    Eosinophils % 1 0 - 5 %    Basophils 0 0 - 2 %    Immature Granulocytes 1 (H) 0.0 - 0.5 %    Segs Absolute 3.5 1.8 - 8.0 K/UL    Absolute Lymph # 1.0 0.9 - 3.6 K/UL    Absolute Mono # 0.5 0.05 - 1.2 K/UL    Absolute Eos # 0.1 0.0 - 0.4 K/UL    Basophils Absolute 0.0 0.0 - 0.1 K/UL    Absolute Immature Granulocyte 0.0 0.00 - 0.04 K/UL    Differential Type AUTOMATED     Comprehensive Metabolic Panel    Collection Time: 03/11/23  1:30 PM   Result Value Ref Range    Sodium 135 (L) 136 - 145 mmol/L    Potassium 4.7 3.5 - 5.5 mmol/L    Chloride 107 100 - 111 mmol/L    CO2 22 21 - 32 mmol/L    Anion Gap 6 3.0 - 18 mmol/L    Glucose 134 (H) 74 - 99 mg/dL    BUN 17 7.0 - 18 MG/DL    Creatinine 1.35 (H) 0.6 - 1.3 MG/DL    Bun/Cre Ratio 13 12 - 20      Est, Glom Filt Rate 48 (L) >60 ml/min/1.73m2    Calcium 8.1 (L) 8.5 - 10.1 MG/DL    Total Bilirubin 0.4 0.2 - 1.0 MG/DL    ALT 29 13 - 56 U/L    AST 33 10 - 38 U/L    Alk Phosphatase 124 (H) 45 - 117 U/L    Total Protein 7.8 6.4 - 8.2 g/dL    Albumin 3.2 (L) 3.4 - 5.0 g/dL    Globulin 4.6 (H) 2.0 - 4.0 g/dL    Albumin/Globulin Ratio 0.7 (L) 0.8 - 1.7     Magnesium    Collection Time: 03/11/23  1:30 PM   Result Value Ref Range    Magnesium 1.3 (L) 1.6 - 2.6 mg/dL   Troponin    Collection Time: 03/11/23  1:30 PM   Result Value Ref Range    Troponin, High Sensitivity 19 0 - 54 ng/L   EKG 12 Lead    Collection Time: 03/11/23  3:42 PM   Result Value Ref Range    Ventricular Rate 79 BPM    Atrial Rate 79 BPM    P-R Interval 138 ms    QRS Duration 70 ms    Q-T Interval 388 ms    QTc Calculation (Bazett) 444 ms    P Axis 68 degrees    R Axis 35 degrees    T Axis 97 degrees    Diagnosis Normal sinus rhythm    Troponin    Collection Time: 03/11/23  3:55 PM   Result Value Ref Range    Troponin, High Sensitivity 17 0 - 54 ng/L       RADIOLOGIC STUDIES:   Non x-ray images such as CT, Ultrasound and MRI are read by the radiologist. X-ray images are visualized and preliminarily interpreted by the ED Provider with the findings as listed in the ED Course section below. Interpretation per the Radiologist is listed below, if available at the time of this note:    XR CHEST PORTABLE   Final Result   1. Multifocal pneumonia most evident in the left midlung and right lower lung. Procedures     Procedures    ED Course     1:34 PM DIVINA Salazar MD) am the first provider for this patient. Initial assessment performed. I reviewed the vital signs, available nursing notes, past medical history, past surgical history, family history and social history. The patients presenting problems have been discussed, and they are in agreement with the care plan formulated and outlined with them. I have encouraged them to ask questions as they arise throughout their visit. RECORDS REVIEWED: Nursing Notes    Is this patient to be included in the SEP-1 core measure due to severe sepsis or septic shock? No Exclusion criteria - the patient is NOT to be included for SEP-1 Core Measure due to: 2+ SIRS criteria are not met    MEDICATIONS ADMINISTERED IN THE ED:  Medications   0.9 % sodium chloride bolus (0 mLs IntraVENous Stopped 3/11/23 1611)   magnesium sulfate 1000 mg in dextrose 5% 100 mL IVPB (0 mg IntraVENous Stopped 3/11/23 1652)       ED Course as of 03/11/23 1819   Sat Mar 11, 2023   1320 EKG interpretation:  Rhythm: NSR. Rate: 86 bpm; No STEMI  EKG read by Christie Salazar MD    [JM]   [address]  Chest X-ray shows no acute cardiopulmonary process.   Interpreted by Christie Salazar MD.  Pending review by Radiologist   []   [phone] EKG interpretation:  Rhythm: NSR. Rate: 79 bpm; No STEMI  EKG read by Keisha Loja MD    []      ED Course User Index  [] Zhanna Rangel MD       None    Medical Decision Making     SCREENING TOOLS:  HEART Score:    History:  0: Slightly Suspicious  EK: Normal  Age:  1: 45-65  Risk Factors:  0: None  None  Troponin 1: 19 ng/L  Troponin 2: 17 ng/L    Total: 1  0-3: Low risk: less than 2% risk of Major Adverse Cardiac Event at 6 weeks. Troponin increase by 1.4x or more: No    MDM:  I utilized an evidence-based risk rating tool (CMT) along with my training and experience to weigh the risk of discharge against the risks of further testing, imaging, or hospitalization. At this time I estimate the risks of additional testing, imaging, or hospitalization to be equal to or greater than the risk of discharge. I discussed my risk assessment with the patient and the patient consents to the risk of discharge as well as the risk of uncertainty in estimating outcomes. The patient's HEART Score is <4. In rare cases, I give patients with HEART Score of 4 the option of discharge, but only when they meet criteria for \"Low 4,\" meaning that HST was used, and the 4 is not from a highly suspicious story, highly suspicious EKG, or positive cardiac enzymes. In these selected cases, the risk of a \"Low 4\" is still most likely lower than the risk of admission and further testing/imaging.  Aby Gosling Score for PE:  0: No: Clinical Signs and symptoms of DVT  0: No: PE is #1 diagnosis or equally as likely  0: No: HR > 100  0: No: Surgery in previous 4 weeks, or immobilization for 3 days  0: No: Previously diagnosed PE/DVT  0: No: Hemoptysis  0: No: Malignancy with treatment within 6 months or palliative care  0: Total    < 2: Low Risk    PE Rule-Out Criteria:  PERC Negative (Meets all criteria):  Age < 50  HR < 100  O2 Sat on Room Air < 95%  No Prior PE/DVT  No Trauma or Surgery within 4 weeks  No Hemoptysis  No Exogenous Estrogen  No Unilateral Leg Swelling    DDX: ACS, Anemia, Arhythmia, COVID, Metabolic Acidosis, Pneumonia, Pulmonary Edema, URI, and CRF      DISCUSSION:  This appears to be a mild contion. This appears to be an acute condition. [age] y.o. female with 1 day of shortness of breath, 6 days status post onset of COVID. Patient is immune suppressed on immunotherapy for kidney transplant. She did not receive Paxlovid for her COVID infection. In evaluation of the above differential diagnosis, consideration was given to the following tests and treatments. CBC was within normal limits. CMP showed an improved creatinine at 1.35. Patient had 2 EKGs which were within normal limits. She had 2 troponins trended which were not elevated and not uptrending. Chest x-ray was within normal limits. Heart score was 1. Ischemic cardiac disease is very unlikely given his presentation. Patient had a Wells low and PERC negative. Very unlikely PE given this. In fact that this for PE is likely less than the risk of harm from scanning or hospitalization. Patient had slightly low magnesium which was replaced in the ED. She is also on magnesium replacement as an outpatient. The decision to perform testing and results were discussed with the patient. I discussed each of these tests and considerations with the patient. They agree with the plan of discharge. ADDITIONAL CONSIDERATIONS:  None    Critical Care Time:     None    Keisha Loja MD    Diagnosis and Disposition     DISPOSITION Decision To Discharge 03/11/2023 06:18:54 PM    DISCHARGE NOTE:  Dashawn Da Silva  results have been reviewed with her. She has been counseled regarding her diagnosis, treatment, and plan. She verbally conveys understanding and agreement of the signs, symptoms, diagnosis, treatment and prognosis and additionally agrees to follow up as discussed.   She also agrees with the care-plan and conveys that all of her questions have been answered. I have also provided discharge instructions for her that include: educational information regarding their diagnosis and treatment, and list of reasons why they would want to return to the ED prior to their follow-up appointment, should her condition change. She has been provided with education for proper emergency department utilization. CLINICAL IMPRESSION:  1. COVID    2. Shortness of breath        PLAN:  D/C Home    DISCHARGE MEDICATIONS:  Current Discharge Medication List             Details   benzonatate (TESSALON PERLES) 100 MG capsule Take 1 capsule by mouth 3 times daily as needed for Cough  Qty: 21 capsule, Refills: 0             DISCONTINUED MEDICATIONS:  Current Discharge Medication List          PATIENT REFERRED TO:  Follow Up with:  ENRIQUE Dudley - MIRIAN  [address]  [phone]    Schedule an appointment as soon as possible for a visit   As soon as possible, For follow up from the Emergency Department    THE [facility] EMERGENCY DEPT  [address] [facility] [address] [phone]    As needed, If symptoms worsen    I Madhu Lu MD am the primary clinician of record. Gauri Disclaimer     Please note that this dictation was completed with [place], the computer voice recognition software. Quite often unanticipated grammatical, syntax, homophones, and other interpretive errors are inadvertently transcribed by the computer software. Please disregard these errors. Please excuse any errors that have escaped final proofreading.     Madhu Lu MD  (Electronically signed)           Lyndsey Baldwin MD  03/11/23 5857

## 2023-05-25 ENCOUNTER — HOSPITAL ENCOUNTER (EMERGENCY)
Facility: HOSPITAL | Age: 48
Discharge: HOME | End: 2023-05-25
Attending: EMERGENCY MEDICINE
Payer: COMMERCIAL

## 2023-05-25 ENCOUNTER — APPOINTMENT (OUTPATIENT)
Facility: HOSPITAL | Age: 48
End: 2023-05-25
Payer: COMMERCIAL

## 2023-05-25 VITALS
HEART RATE: 81 BPM | BODY MASS INDEX: 35.49 KG/M2 | SYSTOLIC BLOOD PRESSURE: 132 MMHG | WEIGHT: 213 LBS | HEIGHT: 65 IN | DIASTOLIC BLOOD PRESSURE: 93 MMHG | TEMPERATURE: 96.9 F
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
## 2023-05-25 DIAGNOSIS — M54.9 ACUTE BACK PAIN, UNSPECIFIED BACK LOCATION, UNSPECIFIED BACK PAIN LATERALITY: ICD-10-CM

## 2023-05-25 DIAGNOSIS — S16.1XXA STRAIN OF NECK MUSCLE, INITIAL ENCOUNTER: Primary | ICD-10-CM

## 2023-05-25 PROCEDURE — 72072 X-RAY EXAM THORAC SPINE 3VWS: CPT

## 2023-05-25 PROCEDURE — 99284 EMERGENCY DEPT VISIT MOD MDM: CPT

## 2023-05-25 PROCEDURE — 6370000000 HC RX 637 (ALT 250 FOR IP): Performed by: NURSE PRACTITIONER

## 2023-05-25 PROCEDURE — 72128 CT CHEST SPINE W/O DYE: CPT

## 2023-05-25 PROCEDURE — 72110 X-RAY EXAM L-2 SPINE 4/>VWS: CPT

## 2023-05-25 RX ORDER — ACETAMINOPHEN 500 MG
1000 TABLET ORAL
Status: COMPLETED | OUTPATIENT
Start: 2023-05-25 | End: 2023-05-25

## 2023-05-25 RX ORDER — CYCLOBENZAPRINE HCL 10 MG
10 TABLET ORAL 3 TIMES DAILY PRN
Qty: 21 TABLET | Refills: 0 | Status: SHIPPED | OUTPATIENT
Start: 2023-05-25 | End: 2023-06-04

## 2023-05-25 RX ADMIN — ACETAMINOPHEN 1000 MG: 500 TABLET ORAL at 14:56

## 2023-05-25 ASSESSMENT — PAIN SCALES - GENERAL: PAINLEVEL_OUTOF10: 8

## 2023-05-25 ASSESSMENT — PAIN - FUNCTIONAL ASSESSMENT: PAIN_FUNCTIONAL_ASSESSMENT: 0-10

## 2023-05-25 ASSESSMENT — PAIN DESCRIPTION - LOCATION: LOCATION: NECK

## 2023-05-25 ASSESSMENT — PAIN DESCRIPTION - DESCRIPTORS: DESCRIPTORS: SHARP

## 2023-05-25 NOTE — DISCHARGE INSTRUCTIONS
Tylenol according to package directions  Muscle relaxers as prescribed, muscle relaxers may cause sedation do not take with alcohol or take and drive  Pain may increase over the next couple of days  May use ice for up to 20 minutes at a time, do not place ice directly on skin  After 48 hours may rotate with moist heat  Avoid strenuous activity but do not avoid all activity, gentle exercises and stretching as discussed    Return to care for new or worsening symptoms to include increased headache, nausea vomiting, focal weakness, loss of control of bladder or bowels, numbness of groin or bottom, extremity weakness or changes in sensation, fever/chills, night pain or other concerning symptoms
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
## 2023-09-13 ENCOUNTER — APPOINTMENT (OUTPATIENT)
Facility: HOSPITAL | Age: 48
End: 2023-09-13
Payer: MEDICAID

## 2023-09-13 ENCOUNTER — HOSPITAL ENCOUNTER (EMERGENCY)
Facility: HOSPITAL | Age: 48
Discharge: HOME | End: 2023-09-13
Payer: MEDICAID

## 2023-09-13 VITALS
BODY MASS INDEX: 35.49 KG/M2 | RESPIRATION RATE: 18 BRPM | HEIGHT: 65 IN | SYSTOLIC BLOOD PRESSURE: 120 MMHG | WEIGHT: 213 LBS | DIASTOLIC BLOOD PRESSURE: 76 MMHG | HEART RATE: 88 BPM | TEMPERATURE: 98.1 F | OXYGEN SATURATION: 97 %

## 2023-09-13 DIAGNOSIS — S63.617A SPRAIN OF LEFT LITTLE FINGER, UNSPECIFIED SITE OF DIGIT, INITIAL ENCOUNTER: Primary | ICD-10-CM

## 2023-09-13 PROCEDURE — 73120 X-RAY EXAM OF HAND: CPT

## 2023-09-13 PROCEDURE — 99283 EMERGENCY DEPT VISIT LOW MDM: CPT

## 2023-09-13 NOTE — ED TRIAGE NOTES
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  States a throbbing pain, able to use fingers to hold personal objects at this time

## 2023-09-13 NOTE — DISCHARGE INSTRUCTIONS
Buddy tape for your fingers to decrease range of motion while your finger is healing from the sprain. Recommend ice to the area. You can take Tylenol as needed. Follow-up with the hand specialist as needed.

## 2023-09-13 NOTE — ED PROVIDER NOTES
THE [facility] EMERGENCY DEPT  EMERGENCY DEPARTMENT ENCOUNTER       Pt Name: Alma Albert  MRN: [id number]  [address] [facility] 1975  Date of evaluation: 9/13/2023  Provider: Servando Reyez PA-C   PCP: ENRIQUE Weathers NP  Note Started: 11:57 AM 9/13/23     CHIEF COMPLAINT       Chief Complaint   Patient presents with    Hand Injury        HISTORY OF PRESENT ILLNESS: 1 or more elements      History From: Patient  HPI Limitations: None     Alma Albert is a 46 y.o. female who presents to the emergency department with a chief complaint of left fifth finger pain onset 3 days ago. Patient reports that she snagged her finger on her laundry basket and it bent off to the side suddenly. She has had pain ever since. She has not tried any treatments at home. She also reports some mild pain at the left thumb but denies any injury to that area. She denies any significant trauma otherwise, redness, swelling, fever. Nursing Notes were all reviewed and agreed with or any disagreements were addressed in the HPI.     PAST HISTORY     Past Medical History:  Past Medical History:   Diagnosis Date    Chronic kidney disease     ESRD    Dialysis patient ([address])     PUD (peptic ulcer disease) 2004    Thyroid disease        Past Surgical History:  Past Surgical History:   Procedure Laterality Date    HYSTERECTOMY (CERVIX STATUS UNKNOWN)      IR THRMB/INFUSION DIAYSIS CIRCUIT W PTA  9/10/2019    IR THRMB/INFUSION DIAYSIS CIRCUIT W PTA 9/10/2019 [facility] RAD ANGIO IR    IR THRMB/INFUSION DIAYSIS CIRCUIT W PTA  9/24/2019    IR THRMB/INFUSION DIAYSIS CIRCUIT W PTA 9/24/2019 [facility] RAD ANGIO IR    IR THRMB/INFUSION DIAYSIS CIRCUIT W PTA  9/24/2019    IR THRMB/INFUSION DIAYSIS CIRCUIT W PTA  9/10/2019    OTHER SURGICAL HISTORY Left     dialysis graft arm; removed    TONSILLECTOMY      TRANSPLANT  2004    renal transplant    VASCULAR SURGERY      VASCULAR SURGERY Left     AV graft, (\"does not work\")    VASCULAR SURGERY Right     Dialysis

## 2024-02-12 ENCOUNTER — APPOINTMENT (OUTPATIENT)
Facility: HOSPITAL | Age: 49
End: 2024-02-12
Payer: MEDICAID

## 2024-02-12 ENCOUNTER — HOSPITAL ENCOUNTER (EMERGENCY)
Facility: HOSPITAL | Age: 49
Discharge: HOME | End: 2024-02-12
Payer: MEDICAID

## 2024-02-12 VITALS
HEIGHT: 65 IN | OXYGEN SATURATION: 99 % | TEMPERATURE: 96.9 F | HEART RATE: 94 BPM | BODY MASS INDEX: 35.82 KG/M2 | DIASTOLIC BLOOD PRESSURE: 85 MMHG | RESPIRATION RATE: 16 BRPM | WEIGHT: 215 LBS | SYSTOLIC BLOOD PRESSURE: 135 MMHG

## 2024-02-12 DIAGNOSIS — M25.511 ACUTE PAIN OF RIGHT SHOULDER: Primary | ICD-10-CM

## 2024-02-12 LAB
D DIMER PPP FEU-MCNC: 0.98 UG/ML(FEU)
ECHO BSA: 2.11 M2

## 2024-02-12 PROCEDURE — 73030 X-RAY EXAM OF SHOULDER: CPT

## 2024-02-12 PROCEDURE — 93971 EXTREMITY STUDY: CPT

## 2024-02-12 PROCEDURE — 85379 FIBRIN DEGRADATION QUANT: CPT

## 2024-02-12 PROCEDURE — 6370000000 HC RX 637 (ALT 250 FOR IP): Performed by: EMERGENCY MEDICINE

## 2024-02-12 PROCEDURE — 99284 EMERGENCY DEPT VISIT MOD MDM: CPT

## 2024-02-12 PROCEDURE — 6370000000 HC RX 637 (ALT 250 FOR IP): Performed by: NURSE PRACTITIONER

## 2024-02-12 RX ORDER — METHOCARBAMOL 750 MG/1
750 TABLET, FILM COATED ORAL 4 TIMES DAILY
Qty: 40 TABLET | Refills: 0 | Status: SHIPPED | OUTPATIENT
Start: 2024-02-12 | End: 2024-02-22

## 2024-02-12 RX ORDER — METHOCARBAMOL 500 MG/1
750 TABLET, FILM COATED ORAL
Status: COMPLETED | OUTPATIENT
Start: 2024-02-12 | End: 2024-02-12

## 2024-02-12 RX ORDER — METHYLPREDNISOLONE 4 MG/1
TABLET ORAL
Qty: 21 TABLET | Refills: 0 | Status: SHIPPED | OUTPATIENT
Start: 2024-02-12 | End: 2024-02-18

## 2024-02-12 RX ORDER — LIDOCAINE 4 G/G
1 PATCH TOPICAL
Status: DISCONTINUED | OUTPATIENT
Start: 2024-02-12 | End: 2024-02-12 | Stop reason: HOSPADM

## 2024-02-12 RX ORDER — ACETAMINOPHEN 325 MG/1
650 TABLET ORAL
Status: COMPLETED | OUTPATIENT
Start: 2024-02-12 | End: 2024-02-12

## 2024-02-12 RX ORDER — OXYCODONE HYDROCHLORIDE AND ACETAMINOPHEN 5; 325 MG/1; MG/1
1 TABLET ORAL
Status: COMPLETED | OUTPATIENT
Start: 2024-02-12 | End: 2024-02-12

## 2024-02-12 RX ADMIN — METHOCARBAMOL 750 MG: 500 TABLET ORAL at 04:59

## 2024-02-12 RX ADMIN — OXYCODONE HYDROCHLORIDE AND ACETAMINOPHEN 1 TABLET: 5; 325 TABLET ORAL at 04:59

## 2024-02-12 RX ADMIN — ACETAMINOPHEN 650 MG: 325 TABLET ORAL at 00:35

## 2024-02-12 NOTE — ED TRIAGE NOTES
Right shoulder pain that started today. 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

## 2024-02-12 NOTE — ED PROVIDER NOTES
[facility] EMERGENCY DEPT  EMERGENCY DEPARTMENT ENCOUNTER       Pt Name: Christen Kay  MRN: [id number]  Birthdate 1975  Date of evaluation: 2/11/2024  PCP: Eleanor Aj APRN - NP  Note Started: 2:06 AM 2/12/24     CHIEF COMPLAINT       Chief Complaint   Patient presents with    Shoulder Pain        HISTORY OF PRESENT ILLNESS: 1 or more elements      History From: Patient  HPI Limitations: None  Chronic Conditions: Renal transplant patient circa 2019, fistula in right upper extremity.    Christen Kay is a 48 y.o. female who presents to ED c/o right shoulder pain which is intermittent x 2 days.  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  Nothing seems to make the pain better.  The pain is worse with movement.  She has not tried any medications at home for it.  She describes it as throbbing then sharp and burning.  She denies any numbness or tingling in her right upper extremity.  She does have an old fistula in her right upper extremity with that she said is not in use.  It has not been used since 2019.  She denies any fever, chills, nausea, vomiting, diarrhea, dysuria, chest pain, shortness of breath, dizziness, lightheadedness, blurry vision.     Nursing Notes were all reviewed and agreed with or any disagreements were addressed in the HPI.      PHYSICAL EXAM      Vitals:    02/12/24 0013 02/12/24 0130   BP: 135/85    Pulse: 94    Resp: 16    Temp: 96.9 °F (36.1 °C)    TempSrc: Oral    SpO2: 99% 99%   Weight: 97.5 kg (215 lb)    Height: 1.651 m (5' 5\")      Physical Exam  Constitutional:       Appearance: Normal appearance.   Musculoskeletal:        Arms:       Comments: Generalized tenderness in the right shoulder.  No decreased range of motion active or passive with the right upper extremity.  Right radial pulses 2+.  Sensation is intact in the right upper extremity.  There is a old fistula with no thrill noted.  There is no ecchymosis swelling or

## 2024-02-12 NOTE — DISCHARGE INSTRUCTIONS
Your case was reviewed with your kidney transplant team members overnight.  Call them later today to evaluate for your symptoms and you can see them in follow-up if not by phone by direct visitation.  Otherwise set up appointment with primary care doctor will refer you to orthopedists here in the local area

## 2024-02-12 NOTE — ED NOTES
Patient discharged at this time. Discharge instructions explained to patient and patient verbalized understanding. Patient is ambulatory, alert, and oriented at discharge. Patient's daughter to pick her up and drive her home.

## 2024-03-13 ENCOUNTER — TRANSCRIBE ORDERS (OUTPATIENT)
Facility: HOSPITAL | Age: 49
End: 2024-03-13

## 2024-03-13 DIAGNOSIS — M25.511 RIGHT SHOULDER PAIN, UNSPECIFIED CHRONICITY: Primary | ICD-10-CM

## 2024-04-23 ENCOUNTER — HOSPITAL ENCOUNTER (OUTPATIENT)
Facility: HOSPITAL | Age: 49
Discharge: HOME | End: 2024-04-26
Attending: ORTHOPAEDIC SURGERY
Payer: MEDICAID

## 2024-04-23 DIAGNOSIS — M25.511 RIGHT SHOULDER PAIN, UNSPECIFIED CHRONICITY: ICD-10-CM

## 2024-04-23 PROCEDURE — 6360000004 HC RX CONTRAST MEDICATION

## 2024-04-23 PROCEDURE — 2500000003 HC RX 250 WO HCPCS

## 2024-04-23 PROCEDURE — 23350 INJECTION FOR SHOULDER X-RAY: CPT

## 2024-04-23 PROCEDURE — 73201 CT UPPER EXTREMITY W/DYE: CPT

## 2024-04-23 PROCEDURE — 2580000003 HC RX 258

## 2024-04-23 RX ORDER — LIDOCAINE HYDROCHLORIDE 10 MG/ML
5 INJECTION, SOLUTION EPIDURAL; INFILTRATION; INTRACAUDAL; PERINEURAL ONCE
Status: COMPLETED | OUTPATIENT
Start: 2024-04-23 | End: 2024-04-23

## 2024-04-23 RX ORDER — SODIUM CHLORIDE 0.9 % (FLUSH) 0.9 %
10 SYRINGE (ML) INJECTION PRN
Status: DISCONTINUED | OUTPATIENT
Start: 2024-04-23 | End: 2024-04-27 | Stop reason: HOSPADM

## 2024-04-23 RX ADMIN — SODIUM CHLORIDE, PRESERVATIVE FREE 7 ML: 5 INJECTION INTRAVENOUS at 14:16

## 2024-04-23 RX ADMIN — LIDOCAINE HYDROCHLORIDE 5 ML: 10 INJECTION, SOLUTION EPIDURAL; INFILTRATION; INTRACAUDAL; PERINEURAL at 14:11

## 2024-04-23 RX ADMIN — IOPAMIDOL 14 ML: 612 INJECTION, SOLUTION INTRAVENOUS at 14:12

## 2024-05-10 ENCOUNTER — HOSPITAL ENCOUNTER (OUTPATIENT)
Facility: HOSPITAL | Age: 49
End: 2024-05-10
Payer: MEDICAID

## 2024-05-10 DIAGNOSIS — Z01.818 PRE-OP TESTING: ICD-10-CM

## 2024-05-10 LAB
ALBUMIN SERPL-MCNC: 4 G/DL (ref 3.4–5)
ALBUMIN/GLOB SERPL: 1.2 (ref 0.8–1.7)
ALP SERPL-CCNC: 163 U/L (ref 45–117)
ALT SERPL-CCNC: 12 U/L (ref 13–56)
ANION GAP SERPL CALC-SCNC: 8 MMOL/L (ref 3–18)
AST SERPL-CCNC: 8 U/L (ref 10–38)
BASOPHILS # BLD: 0 K/UL (ref 0–0.1)
BASOPHILS NFR BLD: 1 % (ref 0–2)
BILIRUB SERPL-MCNC: 0.4 MG/DL (ref 0.2–1)
BUN SERPL-MCNC: 27 MG/DL (ref 7–18)
BUN/CREAT SERPL: 16 (ref 12–20)
CALCIUM SERPL-MCNC: 9.4 MG/DL (ref 8.5–10.1)
CHLORIDE SERPL-SCNC: 109 MMOL/L (ref 100–111)
CO2 SERPL-SCNC: 24 MMOL/L (ref 21–32)
CREAT SERPL-MCNC: 1.66 MG/DL (ref 0.6–1.3)
DIFFERENTIAL METHOD BLD: ABNORMAL
EKG ATRIAL RATE: 76 BPM
EKG DIAGNOSIS: NORMAL
EKG P AXIS: 47 DEGREES
EKG P-R INTERVAL: 148 MS
EKG Q-T INTERVAL: 384 MS
EKG QRS DURATION: 72 MS
EKG QTC CALCULATION (BAZETT): 432 MS
EKG R AXIS: 31 DEGREES
EKG T AXIS: 51 DEGREES
EKG VENTRICULAR RATE: 76 BPM
EOSINOPHIL # BLD: 0.2 K/UL (ref 0–0.4)
EOSINOPHIL NFR BLD: 2 % (ref 0–5)
ERYTHROCYTE [DISTWIDTH] IN BLOOD BY AUTOMATED COUNT: 14.1 % (ref 11.6–14.5)
GLOBULIN SER CALC-MCNC: 3.4 G/DL (ref 2–4)
GLUCOSE SERPL-MCNC: 102 MG/DL (ref 74–99)
HCT VFR BLD AUTO: 36.2 % (ref 35–45)
HGB BLD-MCNC: 11.3 G/DL (ref 12–16)
IMM GRANULOCYTES # BLD AUTO: 0 K/UL (ref 0–0.04)
IMM GRANULOCYTES NFR BLD AUTO: 0 % (ref 0–0.5)
LYMPHOCYTES # BLD: 1.3 K/UL (ref 0.9–3.6)
LYMPHOCYTES NFR BLD: 20 % (ref 21–52)
MCH RBC QN AUTO: 28.4 PG (ref 24–34)
MCHC RBC AUTO-ENTMCNC: 31.2 G/DL (ref 31–37)
MCV RBC AUTO: 91 FL (ref 78–100)
MONOCYTES # BLD: 0.5 K/UL (ref 0.05–1.2)
MONOCYTES NFR BLD: 8 % (ref 3–10)
NEUTS SEG # BLD: 4.6 K/UL (ref 1.8–8)
NEUTS SEG NFR BLD: 70 % (ref 40–73)
NRBC # BLD: 0 K/UL (ref 0–0.01)
NRBC BLD-RTO: 0 PER 100 WBC
PLATELET # BLD AUTO: 297 K/UL (ref 135–420)
PMV BLD AUTO: 11.1 FL (ref 9.2–11.8)
POTASSIUM SERPL-SCNC: 4.6 MMOL/L (ref 3.5–5.5)
PROT SERPL-MCNC: 7.4 G/DL (ref 6.4–8.2)
RBC # BLD AUTO: 3.98 M/UL (ref 4.2–5.3)
SODIUM SERPL-SCNC: 141 MMOL/L (ref 136–145)
WBC # BLD AUTO: 6.6 K/UL (ref 4.6–13.2)

## 2024-05-10 PROCEDURE — 36415 COLL VENOUS BLD VENIPUNCTURE: CPT

## 2024-05-10 PROCEDURE — 80053 COMPREHEN METABOLIC PANEL: CPT

## 2024-05-10 PROCEDURE — 85025 COMPLETE CBC W/AUTO DIFF WBC: CPT

## 2024-05-10 PROCEDURE — 93005 ELECTROCARDIOGRAM TRACING: CPT | Performed by: ORTHOPAEDIC SURGERY

## 2024-05-17 ENCOUNTER — ANESTHESIA EVENT (OUTPATIENT)
Facility: HOSPITAL | Age: 49
End: 2024-05-17
Payer: MEDICAID

## 2024-05-19 NOTE — H&P
Patient Name:   ESTHER HURTADO   Account #:  [id number]  YOB: 1975    Chief Complaint:  Right shoulder pain.    History of Chief Complaint:  This is an 48-year-old woman who has a history of a kidney transplant.  She has had an injury to her right shoulder, which she feels may have come from pulling laundry at work.  She has since been out of work as a result of pain in the shoulder. She saw Dr. Love, who tried a cortisone injection without much improvement, followed by a CT arthrogram which was reviewed. This shows a high-grade undersurface tear of the posterior aspect of the supraspinatus with moderate glenohumeral and mild DJD of the AC joint, with chronic nonunited distal clavicle deformity, with a hypodense area along the esophagus which has been worked up since then and is felt to be incidental.    Past Medical/Surgical History:    Disease/Disorder Date Side Surgery Date Side Comment   Renal disease      JRG 03/30/2018 -      HeRO graft placement         Kidney transplant 10/19/2019       Allergies:    Ingredient Reaction Medication Name Comment   HYDROCODONE BITARTRATE Itchy Eyes, Hives     ACETAMINOPHEN  Vicodin    ASPIRIN          Current Medications:    Medication Directions   aspirin 81 mg tablet,delayed release    cinacalcet 60 mg tablet TAKE 1 TABLET BY MOUTH EVERY NIGHT AT BEDTIME   magnesium oxide 400 mg (241.3 mg magnesium) tablet TAKE 2 TABLETS BY MOUTH TWICE DAILY   methocarbamol 750 mg tablet TAKE 1 TABLET BY MOUTH FOUR TIMES DAILY FOR 10 DAYS   mycophenolate mofetil 250 mg capsule TAKE 2 CAPSULES BY MOUTH TWICE DAILY   omeprazole 40 mg capsule,delayed release TAKE 1 CAPSULE BY MOUTH EVERY DAY   Percocet 5 mg-325 mg tablet take 1 tablet by oral route every 4 hours as needed   Percocet 5 mg-325 mg tablet take 1 tablet by oral route every 4 hours as needed   pravastatin 20 mg tablet    prednisone 5 mg tablet TAKE 1 TABLET BY MOUTH EVERY DAY   tacrolimus 1 mg capsule,

## 2024-05-20 ENCOUNTER — HOSPITAL ENCOUNTER (OUTPATIENT)
Facility: HOSPITAL | Age: 49
Setting detail: OUTPATIENT SURGERY
Discharge: HOME | End: 2024-05-20
Attending: ORTHOPAEDIC SURGERY | Admitting: ORTHOPAEDIC SURGERY
Payer: MEDICAID

## 2024-05-20 ENCOUNTER — ANESTHESIA (OUTPATIENT)
Facility: HOSPITAL | Age: 49
End: 2024-05-20
Payer: MEDICAID

## 2024-05-20 VITALS
SYSTOLIC BLOOD PRESSURE: 159 MMHG | OXYGEN SATURATION: 94 % | TEMPERATURE: 98.2 F | DIASTOLIC BLOOD PRESSURE: 84 MMHG | BODY MASS INDEX: 35.92 KG/M2 | HEIGHT: 65 IN | WEIGHT: 215.6 LBS | RESPIRATION RATE: 16 BRPM | HEART RATE: 91 BPM

## 2024-05-20 DIAGNOSIS — M75.41 ROTATOR CUFF IMPINGEMENT SYNDROME OF RIGHT SHOULDER: Primary | Chronic | ICD-10-CM

## 2024-05-20 PROCEDURE — 3700000000 HC ANESTHESIA ATTENDED CARE: Performed by: ORTHOPAEDIC SURGERY

## 2024-05-20 PROCEDURE — 7100000010 HC PHASE II RECOVERY - FIRST 15 MIN: Performed by: ORTHOPAEDIC SURGERY

## 2024-05-20 PROCEDURE — 6360000002 HC RX W HCPCS: Performed by: ANESTHESIOLOGY

## 2024-05-20 PROCEDURE — 2580000003 HC RX 258: Performed by: ORTHOPAEDIC SURGERY

## 2024-05-20 PROCEDURE — 2500000003 HC RX 250 WO HCPCS: Performed by: ORTHOPAEDIC SURGERY

## 2024-05-20 PROCEDURE — 64415 NJX AA&/STRD BRCH PLXS IMG: CPT | Performed by: ANESTHESIOLOGY

## 2024-05-20 PROCEDURE — 6360000002 HC RX W HCPCS: Performed by: ORTHOPAEDIC SURGERY

## 2024-05-20 PROCEDURE — 2500000003 HC RX 250 WO HCPCS: Performed by: ANESTHESIOLOGY

## 2024-05-20 PROCEDURE — 3600000013 HC SURGERY LEVEL 3 ADDTL 15MIN: Performed by: ORTHOPAEDIC SURGERY

## 2024-05-20 PROCEDURE — 2720000010 HC SURG SUPPLY STERILE: Performed by: ORTHOPAEDIC SURGERY

## 2024-05-20 PROCEDURE — 3600000003 HC SURGERY LEVEL 3 BASE: Performed by: ORTHOPAEDIC SURGERY

## 2024-05-20 PROCEDURE — 7100000000 HC PACU RECOVERY - FIRST 15 MIN: Performed by: ORTHOPAEDIC SURGERY

## 2024-05-20 PROCEDURE — 2500000003 HC RX 250 WO HCPCS: Performed by: NURSE ANESTHETIST, CERTIFIED REGISTERED

## 2024-05-20 PROCEDURE — 2709999900 HC NON-CHARGEABLE SUPPLY: Performed by: ORTHOPAEDIC SURGERY

## 2024-05-20 PROCEDURE — 7100000001 HC PACU RECOVERY - ADDTL 15 MIN: Performed by: ORTHOPAEDIC SURGERY

## 2024-05-20 PROCEDURE — 3700000001 HC ADD 15 MINUTES (ANESTHESIA): Performed by: ORTHOPAEDIC SURGERY

## 2024-05-20 PROCEDURE — 7100000011 HC PHASE II RECOVERY - ADDTL 15 MIN: Performed by: ORTHOPAEDIC SURGERY

## 2024-05-20 PROCEDURE — 6360000002 HC RX W HCPCS: Performed by: NURSE ANESTHETIST, CERTIFIED REGISTERED

## 2024-05-20 PROCEDURE — C1713 ANCHOR/SCREW BN/BN,TIS/BN: HCPCS | Performed by: ORTHOPAEDIC SURGERY

## 2024-05-20 DEVICE — ANCHOR SUTURE BIOCOMP 4.75X19.1 MM SWIVELOCK C: Type: IMPLANTABLE DEVICE | Site: SHOULDER | Status: FUNCTIONAL

## 2024-05-20 RX ORDER — FENTANYL CITRATE 50 UG/ML
INJECTION, SOLUTION INTRAMUSCULAR; INTRAVENOUS PRN
Status: DISCONTINUED | OUTPATIENT
Start: 2024-05-20 | End: 2024-05-20 | Stop reason: SDUPTHER

## 2024-05-20 RX ORDER — OXYCODONE HYDROCHLORIDE 5 MG/1
10 TABLET ORAL PRN
Status: DISCONTINUED | OUTPATIENT
Start: 2024-05-20 | End: 2024-05-20 | Stop reason: HOSPADM

## 2024-05-20 RX ORDER — MIDAZOLAM HYDROCHLORIDE 1 MG/ML
INJECTION INTRAMUSCULAR; INTRAVENOUS PRN
Status: DISCONTINUED | OUTPATIENT
Start: 2024-05-20 | End: 2024-05-20 | Stop reason: SDUPTHER

## 2024-05-20 RX ORDER — ROPIVACAINE HYDROCHLORIDE 5 MG/ML
INJECTION, SOLUTION EPIDURAL; INFILTRATION; PERINEURAL PRN
Status: DISCONTINUED | OUTPATIENT
Start: 2024-05-20 | End: 2024-05-20 | Stop reason: SDUPTHER

## 2024-05-20 RX ORDER — HYDROMORPHONE HYDROCHLORIDE 1 MG/ML
0.5 INJECTION, SOLUTION INTRAMUSCULAR; INTRAVENOUS; SUBCUTANEOUS EVERY 5 MIN PRN
Status: DISCONTINUED | OUTPATIENT
Start: 2024-05-20 | End: 2024-05-20 | Stop reason: HOSPADM

## 2024-05-20 RX ORDER — NALOXONE HYDROCHLORIDE 0.4 MG/ML
INJECTION, SOLUTION INTRAMUSCULAR; INTRAVENOUS; SUBCUTANEOUS PRN
Status: DISCONTINUED | OUTPATIENT
Start: 2024-05-20 | End: 2024-05-20 | Stop reason: HOSPADM

## 2024-05-20 RX ORDER — LIDOCAINE HYDROCHLORIDE 20 MG/ML
INJECTION, SOLUTION EPIDURAL; INFILTRATION; INTRACAUDAL; PERINEURAL PRN
Status: DISCONTINUED | OUTPATIENT
Start: 2024-05-20 | End: 2024-05-20 | Stop reason: SDUPTHER

## 2024-05-20 RX ORDER — EPHEDRINE SULFATE/0.9% NACL/PF 50 MG/5 ML
SYRINGE (ML) INTRAVENOUS PRN
Status: DISCONTINUED | OUTPATIENT
Start: 2024-05-20 | End: 2024-05-20 | Stop reason: SDUPTHER

## 2024-05-20 RX ORDER — ONDANSETRON 2 MG/ML
INJECTION INTRAMUSCULAR; INTRAVENOUS PRN
Status: DISCONTINUED | OUTPATIENT
Start: 2024-05-20 | End: 2024-05-20 | Stop reason: SDUPTHER

## 2024-05-20 RX ORDER — PHENYLEPHRINE HCL IN 0.9% NACL 1 MG/10 ML
SYRINGE (ML) INTRAVENOUS PRN
Status: DISCONTINUED | OUTPATIENT
Start: 2024-05-20 | End: 2024-05-20 | Stop reason: SDUPTHER

## 2024-05-20 RX ORDER — MIDAZOLAM HYDROCHLORIDE 2 MG/2ML
INJECTION, SOLUTION INTRAMUSCULAR; INTRAVENOUS
Status: COMPLETED
Start: 2024-05-20 | End: 2024-05-20

## 2024-05-20 RX ORDER — FENTANYL CITRATE 50 UG/ML
50 INJECTION, SOLUTION INTRAMUSCULAR; INTRAVENOUS EVERY 5 MIN PRN
Status: DISCONTINUED | OUTPATIENT
Start: 2024-05-20 | End: 2024-05-20 | Stop reason: HOSPADM

## 2024-05-20 RX ORDER — OXYCODONE HYDROCHLORIDE AND ACETAMINOPHEN 5; 325 MG/1; MG/1
1 TABLET ORAL EVERY 4 HOURS PRN
Qty: 30 TABLET | Refills: 0
Start: 2024-05-20 | End: 2024-05-27

## 2024-05-20 RX ORDER — DEXAMETHASONE SODIUM PHOSPHATE 10 MG/ML
INJECTION, SOLUTION INTRAMUSCULAR; INTRAVENOUS PRN
Status: DISCONTINUED | OUTPATIENT
Start: 2024-05-20 | End: 2024-05-20 | Stop reason: SDUPTHER

## 2024-05-20 RX ORDER — SODIUM CHLORIDE, SODIUM LACTATE, POTASSIUM CHLORIDE, CALCIUM CHLORIDE 600; 310; 30; 20 MG/100ML; MG/100ML; MG/100ML; MG/100ML
INJECTION, SOLUTION INTRAVENOUS CONTINUOUS
Status: DISCONTINUED | OUTPATIENT
Start: 2024-05-20 | End: 2024-05-20 | Stop reason: HOSPADM

## 2024-05-20 RX ORDER — ONDANSETRON 2 MG/ML
4 INJECTION INTRAMUSCULAR; INTRAVENOUS
Status: DISCONTINUED | OUTPATIENT
Start: 2024-05-20 | End: 2024-05-20 | Stop reason: HOSPADM

## 2024-05-20 RX ORDER — PROPOFOL 10 MG/ML
INJECTION, EMULSION INTRAVENOUS PRN
Status: DISCONTINUED | OUTPATIENT
Start: 2024-05-20 | End: 2024-05-20 | Stop reason: SDUPTHER

## 2024-05-20 RX ORDER — FENTANYL CITRATE 50 UG/ML
INJECTION, SOLUTION INTRAMUSCULAR; INTRAVENOUS
Status: DISCONTINUED
Start: 2024-05-20 | End: 2024-05-20 | Stop reason: HOSPADM

## 2024-05-20 RX ORDER — DEXAMETHASONE SODIUM PHOSPHATE 10 MG/ML
INJECTION, SOLUTION INTRAMUSCULAR; INTRAVENOUS
Status: COMPLETED
Start: 2024-05-20 | End: 2024-05-20

## 2024-05-20 RX ORDER — DEXMEDETOMIDINE HYDROCHLORIDE 100 UG/ML
INJECTION, SOLUTION INTRAVENOUS
Status: COMPLETED
Start: 2024-05-20 | End: 2024-05-20

## 2024-05-20 RX ORDER — SODIUM CHLORIDE, SODIUM LACTATE, POTASSIUM CHLORIDE, AND CALCIUM CHLORIDE .6; .31; .03; .02 G/100ML; G/100ML; G/100ML; G/100ML
IRRIGANT IRRIGATION PRN
Status: DISCONTINUED | OUTPATIENT
Start: 2024-05-20 | End: 2024-05-20 | Stop reason: ALTCHOICE

## 2024-05-20 RX ORDER — OXYCODONE HYDROCHLORIDE 5 MG/1
5 TABLET ORAL PRN
Status: DISCONTINUED | OUTPATIENT
Start: 2024-05-20 | End: 2024-05-20 | Stop reason: HOSPADM

## 2024-05-20 RX ORDER — DEXMEDETOMIDINE HYDROCHLORIDE 100 UG/ML
INJECTION, SOLUTION INTRAVENOUS PRN
Status: DISCONTINUED | OUTPATIENT
Start: 2024-05-20 | End: 2024-05-20 | Stop reason: SDUPTHER

## 2024-05-20 RX ADMIN — MIDAZOLAM 2 MG: 1 INJECTION INTRAMUSCULAR; INTRAVENOUS at 09:14

## 2024-05-20 RX ADMIN — Medication 200 MCG: at 10:21

## 2024-05-20 RX ADMIN — PROPOFOL 200 MG: 10 INJECTION, EMULSION INTRAVENOUS at 09:56

## 2024-05-20 RX ADMIN — Medication 10 MG: at 10:51

## 2024-05-20 RX ADMIN — ROPIVACAINE HYDROCHLORIDE 30 ML: 5 INJECTION, SOLUTION EPIDURAL; INFILTRATION; PERINEURAL at 09:16

## 2024-05-20 RX ADMIN — DEXMEDETOMIDINE HYDROCHLORIDE 20 MCG: 100 INJECTION, SOLUTION INTRAVENOUS at 09:16

## 2024-05-20 RX ADMIN — ONDANSETRON HYDROCHLORIDE 4 MG: 2 INJECTION INTRAMUSCULAR; INTRAVENOUS at 09:59

## 2024-05-20 RX ADMIN — Medication 100 MCG: at 10:41

## 2024-05-20 RX ADMIN — WATER 2000 MG: 1 INJECTION INTRAMUSCULAR; INTRAVENOUS; SUBCUTANEOUS at 09:49

## 2024-05-20 RX ADMIN — LIDOCAINE HYDROCHLORIDE 80 MG: 20 INJECTION, SOLUTION EPIDURAL; INFILTRATION; INTRACAUDAL; PERINEURAL at 09:56

## 2024-05-20 RX ADMIN — SODIUM CHLORIDE, SODIUM LACTATE, POTASSIUM CHLORIDE, AND CALCIUM CHLORIDE: 600; 310; 30; 20 INJECTION, SOLUTION INTRAVENOUS at 10:47

## 2024-05-20 RX ADMIN — SODIUM CHLORIDE, SODIUM LACTATE, POTASSIUM CHLORIDE, AND CALCIUM CHLORIDE: 600; 310; 30; 20 INJECTION, SOLUTION INTRAVENOUS at 08:20

## 2024-05-20 RX ADMIN — PROPOFOL 200 MG: 10 INJECTION, EMULSION INTRAVENOUS at 10:30

## 2024-05-20 RX ADMIN — FENTANYL CITRATE 100 MCG: 50 INJECTION, SOLUTION INTRAMUSCULAR; INTRAVENOUS at 09:14

## 2024-05-20 RX ADMIN — Medication 10 MG: at 10:01

## 2024-05-20 RX ADMIN — DEXAMETHASONE SODIUM PHOSPHATE 4 MG: 10 INJECTION, SOLUTION INTRAMUSCULAR; INTRAVENOUS at 09:16

## 2024-05-20 RX ADMIN — Medication 100 MCG: at 10:49

## 2024-05-20 ASSESSMENT — PAIN - FUNCTIONAL ASSESSMENT: PAIN_FUNCTIONAL_ASSESSMENT: 0-10

## 2024-05-20 ASSESSMENT — PAIN SCALES - GENERAL
PAINLEVEL_OUTOF10: 0
PAINLEVEL_OUTOF10: 0

## 2024-05-20 ASSESSMENT — LIFESTYLE VARIABLES: SMOKING_STATUS: 1

## 2024-05-20 ASSESSMENT — PAIN DESCRIPTION - DESCRIPTORS: DESCRIPTORS: ACHING

## 2024-05-20 NOTE — DISCHARGE INSTRUCTIONS
from the wound.  A new or higher fever.       Bleeding After Surgery: Care Instructions  Overview  After surgery, it is common to have some minor bruising or bleeding from the cut (incision) made by your doctor. But problems may occur that cause you to bleed too much in the surgery area.  An injury to a blood vessel can cause bleeding after surgery. Other causes include medicines such as aspirin or anticoagulants (blood thinners).  To help stop the bleeding, your doctor may have put pressure on the incision or sewn up or cauterized (sealed) the incision. Or you may have had surgery to stop bleeding inside the surgery area. Your doctor also may have given you medicines that help stop the bleeding.  How can you care for yourself at home?  If you have strips of tape on the incision, leave the tape on until it falls off. Or follow your doctor's instructions for removing the tape. Keep the area dry at all times.  You will have a dressing over the incision. A dressing helps the incision heal and protects it. Your doctor will tell you how to take care of this.  If you do not have tape on the incision, wash the area daily with warm, soapy water, and pat it dry. Don't use hydrogen peroxide or alcohol, which can slow healing.    When should you call for help?    Call your doctor now or seek immediate medical care if:    You are dizzy or lightheaded, or you feel like you may faint.     You have bleeding that starts again or gets worse, such as soaking one or more bandages over 2 to 4 hours, even after holding pressure on the area.         __________________________________________________________________________________________________________________________________

## 2024-05-20 NOTE — PERIOP NOTE
TRANSFER - IN REPORT:    Verbal report received from Bhumika MONTEIRO on Christen Kay  being received from [facility] for routine post-op      Report consisted of patient's Situation, Background, Assessment and   Recommendations(SBAR).     Information from the following report(s) Nurse Handoff Report, Adult Overview, Surgery Report, Intake/Output, MAR, and Med Rec Status was reviewed with the receiving nurse.    Opportunity for questions and clarification was provided.      Assessment completed upon patient's arrival to unit and care assumed.

## 2024-05-20 NOTE — INTERVAL H&P NOTE
Update History & Physical    The patient's History and Physical of May 19, 2023 was reviewed with the patient and I examined the patient. There was no change. The surgical site was confirmed by the patient and me.     Plan: The risks, benefits, expected outcome, and alternative to the recommended procedure have been discussed with the patient. Patient understands and wants to proceed with the procedure.     Electronically signed by KAMRAN OG MD on 5/20/2024 at 9:27 AM

## 2024-05-20 NOTE — ANESTHESIA PRE PROCEDURE
Department of Anesthesiology  Preprocedure Note       Name:  Christen Kay   Age:  48 y.o.  :  1975                                          MRN:  [id number]         Date:  2024      Surgeon: Surgeon(s):  Omid Doss MD    Procedure: Procedure(s):  RIGHT SHOULDER ARTHROSCOPIC DECOMPRESSION, RESECTION DISTAL CLAVICLE, ROTATOR CUFF REPAIR, BICEPS RELEASE (SPEC POP)    Medications prior to admission:   Prior to Admission medications    Medication Sig Start Date End Date Taking? Authorizing Provider   aspirin 81 MG EC tablet Take 1 tablet by mouth daily    Diane Grover MD   mycophenolate (CELLCEPT) 250 MG capsule Take 2 capsules by mouth 2 times daily    Diane Grover MD   tacrolimus (PROGRAF) 1 MG capsule Take 1 capsule by mouth 2 times daily 1/15/20   Diane Grover MD   predniSONE (DELTASONE) 5 MG tablet Take 1 tablet by mouth daily    Diane Grover MD   cinacalcet (SENSIPAR) 60 MG tablet Take 1 tablet by mouth nightly 24   Diane Grover MD   magnesium oxide (MAG-OX) 400 (240 Mg) MG tablet Take 2 tablets by mouth 2 times daily 24   Diane Grover MD   albuterol sulfate HFA (PROVENTIL;VENTOLIN;PROAIR) 108 (90 Base) MCG/ACT inhaler Inhale 2 puffs into the lungs every 4 hours as needed for Wheezing 18   Automatic Reconciliation, Ar   diphenhydrAMINE (BENADRYL) 25 MG capsule Take 1 capsule by mouth every 6 hours as needed for Allergies    Automatic Reconciliation, Ar   pravastatin (PRAVACHOL) 20 MG tablet Take 1 tablet by mouth daily    Automatic Reconciliation, Ar       Current medications:    Current Facility-Administered Medications   Medication Dose Route Frequency Provider Last Rate Last Admin    lactated ringers IV soln infusion   IntraVENous Continuous Omid Doss  mL/hr at 24 0820 New Bag at 24 0820    ceFAZolin (ANCEF) 2,000 mg in sterile water 20 mL IV syringe  2,000 mg IntraVENous On Call to OR Romario

## 2024-05-20 NOTE — PERIOP NOTE
TRANSFER - IN REPORT:    Verbal report received from OR, RN on Christen Kay  being received from OR for routine progression of patient care      Report consisted of patient's Situation, Background, Assessment and   Recommendations(SBAR).     Information from the following report(s) Adult Overview, Surgery Report, Intake/Output, and MAR was reviewed with the receiving nurse.    Opportunity for questions and clarification was provided.      Assessment completed upon patient's arrival to unit and care assumed.

## 2024-05-20 NOTE — OP NOTE
PREOPERATIVE DIAGNOSES:    Rotator cuff tear, right shoulder  Impingement.  Degenerative arthritis of the acromioclavicular joint.      POSTOPERATIVE DIAGNOSES:    Rotator cuff tear, right shoulder  Impingement.  Degenerative arthritis of the acromioclavicular joint.   Biceps partial tear     PROCEDURES PERFORMED:    Arthroscopic decompression.  Resection distal clavicle.  Single Row Rotator cuff repair, right shoulder.   Biceps release    SURGEON:  Omid Doss MD    ANESTHESIOLOGIST:  Anesthesiologist: Param Salazar DO  CRNA: Abi Ackerman APRN - CRNA    ASSISTANTS: Circulator: Nick Upton RN  Scrub Person First: Deena Zavala  Scrub Person Second: Hannah Mcmanus  Circulator Assist: Nancy Rutherford RN    ANESTHESIA:   General anesthesia after scalene block.      COMPLICATIONS:  None.    BLOOD LOSS:  Minimal.      SPECIMENS: None    DESCRIPTION OF PROCEDURE:  This 48 y.o.-year-old female, with a history of persistent pain in the right shoulder, unresponsive to conservative care.  The patient had a MRI showing the above noted findings and was then scheduled for surgery.    PROCEDURE:  The patient was taken to the operating room and given general anesthesia after a scalene block.  When it was adequate, the right  shoulder was examined and showed full motion with no gross instability.  The patient was placed in a left lateral decubitus position.  The right shoulder was placed in traction, prepped and draped in a normal manner and injected with 30 mL of 1% Marcaine with Epinephrine.  Anterior and posterior stab wounds were made, and a standard arthroscopic examination was performed.  This revealed a large tear of the supraspinatus..  The biceps and the superior labrum were torn and unstable. The biceps was debrided and released and the labrum was debrided with electrocautery. The articular surfaces of the joint appeared normal.  The instruments were then redirected in the subacromial space.    A lateral

## 2024-05-20 NOTE — ANESTHESIA PROCEDURE NOTES
Peripheral Block    Patient location during procedure: pre-op  Reason for block: post-op pain management and at surgeon's request  Start time: 5/20/2024 9:14 AM  End time: 5/20/2024 9:17 AM  Staffing  Performed: anesthesiologist   Anesthesiologist: Param Salazar DO  Performed by: Param Salazar DO  Authorized by: Param Salazar DO    Preanesthetic Checklist  Completed: patient identified, IV checked, site marked, risks and benefits discussed, surgical/procedural consents, equipment checked, pre-op evaluation, timeout performed, anesthesia consent given, oxygen available, monitors applied/VS acknowledged, fire risk safety assessment completed and verbalized and blood product R/B/A discussed and consented  Peripheral Block   Patient position: supine (hob 30 degrees)  Prep: ChloraPrep  Provider prep: mask and sterile gloves  Patient monitoring: cardiac monitor, continuous pulse ox, frequent blood pressure checks, IV access, oxygen and responsive to questions  Block type: Brachial plexus  Interscalene  Laterality: right  Injection technique: single-shot  Guidance: nerve stimulator, ultrasound guided and loss of twitch greater than 0.4mA    Needle   Needle type: insulated echogenic nerve stimulator needle   Needle gauge: 21 G  Needle localization: ultrasound guidance and nerve stimulator  Needle length: 5 cm  Assessment   Injection assessment: negative aspiration for heme, no paresthesia on injection, local visualized surrounding nerve on ultrasound and no intravascular symptoms  Paresthesia pain: none  Slow fractionated injection: yes  Hemodynamics: stable  Outcomes: uncomplicated and patient tolerated procedure well

## 2024-05-20 NOTE — PERIOP NOTE
Reviewed PTA medication list with patient/caregiver and patient/caregiver denies any additional medications.     Patient admits to having a responsible adult care for them at home for at least 24 hours after surgery.    Patient encouraged to use gown warming system and informed that using said warming gown to regulate body temperature prior to a procedure has been shown to help reduce the risks of blood clots and infection.    Patient's pharmacy of choice verified and documented in PTA medication section.    Dual skin assessment & fall risk band verification completed with SHANELL Rodriguez RN.

## 2024-05-20 NOTE — ANESTHESIA POSTPROCEDURE EVALUATION
Post-Anesthesia Evaluation & Assessment    Vitals  BP: 136/72  Temp: 98.2 °F (36.8 °C)  Temp Source: Temporal  Pulse: 81  Respirations: 20  SpO2: 97 %  Height: 165.1 cm (5' 5\")  Weight - Scale: 97.8 kg (215 lb 9.6 oz)    Nausea/Vomiting: Controlled.    Post-operative hydration adequate.    Pain managed.    Mental status & Level of consciousness: alert and oriented x 3    Neurological status: right upper extremity weakness secondary to nerve block    Pulmonary status: airway patent, adequate oxygenation.    Complications related to anesthesia: none    Patient has met all PACU discharge requirements.      Param Salazar, DO

## 2024-05-21 ENCOUNTER — CARE COORDINATION (OUTPATIENT)
Dept: OTHER | Facility: CLINIC | Age: 49
End: 2024-05-21

## 2024-05-21 NOTE — CARE COORDINATION
Ambulatory Care Coordination Note     2024 9:13 AM     Patient Current Location:  [place]     This patient was received as a referral from [facility] .    ACM contacted the patient by telephone. Verified name and  with patient as identifiers. Provided introduction to self, and explanation of the ACM role.   Patient declined care management services at this time.          ACM: XIN ALEXANDER RN     Care Summary Note: Able to reach patient. Discussed ACM role with Patient First. Patient states that her daughter is able to assist her with needs at this time. Patient reports no care management needs at this time.        Follow Up:   No further Ambulatory Care Management follow-up scheduled at this time.  patient  has Ambulatory Care Manager's contact information for any further questions, concerns or needs.       Xin Alexander -[phone]

## 2024-09-06 ENCOUNTER — HOSPITAL ENCOUNTER (OUTPATIENT)
Facility: HOSPITAL | Age: 49
Setting detail: RECURRING SERIES
Discharge: HOME | End: 2024-09-09
Payer: MEDICAID

## 2024-09-06 PROCEDURE — 97161 PT EVAL LOW COMPLEX 20 MIN: CPT

## 2024-09-15 ENCOUNTER — HOSPITAL ENCOUNTER (EMERGENCY)
Facility: HOSPITAL | Age: 49
Discharge: HOME | End: 2024-09-15
Payer: MEDICAID

## 2024-09-15 ENCOUNTER — APPOINTMENT (OUTPATIENT)
Facility: HOSPITAL | Age: 49
End: 2024-09-15
Payer: MEDICAID

## 2024-09-15 VITALS
HEART RATE: 72 BPM | HEIGHT: 65 IN | DIASTOLIC BLOOD PRESSURE: 75 MMHG | WEIGHT: 215 LBS | RESPIRATION RATE: 16 BRPM | BODY MASS INDEX: 35.82 KG/M2 | SYSTOLIC BLOOD PRESSURE: 121 MMHG | TEMPERATURE: 96.8 F | OXYGEN SATURATION: 100 %

## 2024-09-15 DIAGNOSIS — R05.1 ACUTE COUGH: Primary | ICD-10-CM

## 2024-09-15 DIAGNOSIS — Z94.0 HISTORY OF RENAL TRANSPLANT: ICD-10-CM

## 2024-09-15 LAB
ALBUMIN SERPL-MCNC: 4 G/DL (ref 3.4–5)
ALBUMIN/GLOB SERPL: 1.1 (ref 0.8–1.7)
ALP SERPL-CCNC: 149 U/L (ref 45–117)
ALT SERPL-CCNC: 15 U/L (ref 13–56)
ANION GAP SERPL CALC-SCNC: 8 MMOL/L (ref 3–18)
APPEARANCE UR: CLEAR
AST SERPL-CCNC: 13 U/L (ref 10–38)
BASOPHILS # BLD: 0 K/UL (ref 0–0.1)
BASOPHILS NFR BLD: 1 % (ref 0–2)
BILIRUB SERPL-MCNC: 0.3 MG/DL (ref 0.2–1)
BILIRUB UR QL: NEGATIVE
BUN SERPL-MCNC: 21 MG/DL (ref 7–18)
BUN/CREAT SERPL: 13 (ref 12–20)
CALCIUM SERPL-MCNC: 9.7 MG/DL (ref 8.5–10.1)
CHLORIDE SERPL-SCNC: 111 MMOL/L (ref 100–111)
CO2 SERPL-SCNC: 22 MMOL/L (ref 21–32)
COLOR UR: YELLOW
CREAT SERPL-MCNC: 1.63 MG/DL (ref 0.6–1.3)
DIFFERENTIAL METHOD BLD: ABNORMAL
EOSINOPHIL # BLD: 0.3 K/UL (ref 0–0.4)
EOSINOPHIL NFR BLD: 4 % (ref 0–5)
ERYTHROCYTE [DISTWIDTH] IN BLOOD BY AUTOMATED COUNT: 14 % (ref 11.6–14.5)
FLUAV RNA SPEC QL NAA+PROBE: NOT DETECTED
FLUBV RNA SPEC QL NAA+PROBE: NOT DETECTED
GLOBULIN SER CALC-MCNC: 3.6 G/DL (ref 2–4)
GLUCOSE SERPL-MCNC: 111 MG/DL (ref 74–99)
GLUCOSE UR STRIP.AUTO-MCNC: NEGATIVE MG/DL
HCT VFR BLD AUTO: 37.7 % (ref 35–45)
HGB BLD-MCNC: 11.8 G/DL (ref 12–16)
HGB UR QL STRIP: NEGATIVE
IMM GRANULOCYTES # BLD AUTO: 0 K/UL (ref 0–0.04)
IMM GRANULOCYTES NFR BLD AUTO: 0 % (ref 0–0.5)
KETONES UR QL STRIP.AUTO: NEGATIVE MG/DL
LEUKOCYTE ESTERASE UR QL STRIP.AUTO: NEGATIVE
LYMPHOCYTES # BLD: 1.4 K/UL (ref 0.9–3.6)
LYMPHOCYTES NFR BLD: 22 % (ref 21–52)
MCH RBC QN AUTO: 28.9 PG (ref 24–34)
MCHC RBC AUTO-ENTMCNC: 31.3 G/DL (ref 31–37)
MCV RBC AUTO: 92.4 FL (ref 78–100)
MONOCYTES # BLD: 0.4 K/UL (ref 0.05–1.2)
MONOCYTES NFR BLD: 7 % (ref 3–10)
NEUTS SEG # BLD: 4.1 K/UL (ref 1.8–8)
NEUTS SEG NFR BLD: 66 % (ref 40–73)
NITRITE UR QL STRIP.AUTO: NEGATIVE
NRBC # BLD: 0 K/UL (ref 0–0.01)
NRBC BLD-RTO: 0 PER 100 WBC
PH UR STRIP: 7.5 (ref 5–8)
PLATELET # BLD AUTO: 253 K/UL (ref 135–420)
PMV BLD AUTO: 10.9 FL (ref 9.2–11.8)
POTASSIUM SERPL-SCNC: 4.8 MMOL/L (ref 3.5–5.5)
PROT SERPL-MCNC: 7.6 G/DL (ref 6.4–8.2)
PROT UR STRIP-MCNC: NEGATIVE MG/DL
RBC # BLD AUTO: 4.08 M/UL (ref 4.2–5.3)
SARS-COV-2 RNA RESP QL NAA+PROBE: NOT DETECTED
SODIUM SERPL-SCNC: 141 MMOL/L (ref 136–145)
SOURCE: NORMAL
SP GR UR REFRACTOMETRY: 1.01 (ref 1–1.03)
UROBILINOGEN UR QL STRIP.AUTO: 0.2 EU/DL (ref 0.2–1)
WBC # BLD AUTO: 6.2 K/UL (ref 4.6–13.2)

## 2024-09-15 PROCEDURE — 87636 SARSCOV2 & INF A&B AMP PRB: CPT

## 2024-09-15 PROCEDURE — 85025 COMPLETE CBC W/AUTO DIFF WBC: CPT

## 2024-09-15 PROCEDURE — 99284 EMERGENCY DEPT VISIT MOD MDM: CPT

## 2024-09-15 PROCEDURE — 80053 COMPREHEN METABOLIC PANEL: CPT

## 2024-09-15 PROCEDURE — 71046 X-RAY EXAM CHEST 2 VIEWS: CPT

## 2024-09-15 PROCEDURE — 81003 URINALYSIS AUTO W/O SCOPE: CPT

## 2024-09-15 PROCEDURE — 2580000003 HC RX 258: Performed by: PHYSICIAN ASSISTANT

## 2024-09-15 RX ORDER — 0.9 % SODIUM CHLORIDE 0.9 %
500 INTRAVENOUS SOLUTION INTRAVENOUS ONCE
Status: COMPLETED | OUTPATIENT
Start: 2024-09-15 | End: 2024-09-15

## 2024-09-15 RX ORDER — DOXYCYCLINE HYCLATE 100 MG
100 TABLET ORAL 2 TIMES DAILY
Qty: 20 TABLET | Refills: 0 | Status: SHIPPED | OUTPATIENT
Start: 2024-09-15 | End: 2024-09-25

## 2024-09-15 RX ORDER — ALBUTEROL SULFATE 90 UG/1
2 INHALANT RESPIRATORY (INHALATION) EVERY 6 HOURS PRN
Qty: 18 G | Refills: 0 | Status: SHIPPED | OUTPATIENT
Start: 2024-09-15 | End: 2024-09-16

## 2024-09-15 RX ORDER — INHALER, ASSIST DEVICES
SPACER (EA) MISCELLANEOUS
Qty: 1 EACH | Refills: 0 | Status: SHIPPED | OUTPATIENT
Start: 2024-09-15

## 2024-09-15 RX ADMIN — SODIUM CHLORIDE 500 ML: 9 INJECTION, SOLUTION INTRAVENOUS at 13:24

## 2024-09-16 RX ORDER — ALBUTEROL SULFATE 90 UG/1
2 INHALANT RESPIRATORY (INHALATION) EVERY 6 HOURS PRN
Qty: 18 G | Refills: 0 | Status: SHIPPED | OUTPATIENT
Start: 2024-09-16

## 2024-09-18 ENCOUNTER — HOSPITAL ENCOUNTER (EMERGENCY)
Facility: HOSPITAL | Age: 49
Discharge: HOME | End: 2024-09-18
Attending: EMERGENCY MEDICINE
Payer: MEDICAID

## 2024-09-18 ENCOUNTER — APPOINTMENT (OUTPATIENT)
Facility: HOSPITAL | Age: 49
End: 2024-09-18
Payer: MEDICAID

## 2024-09-18 VITALS
WEIGHT: 215 LBS | BODY MASS INDEX: 35.82 KG/M2 | DIASTOLIC BLOOD PRESSURE: 77 MMHG | SYSTOLIC BLOOD PRESSURE: 127 MMHG | HEIGHT: 65 IN | RESPIRATION RATE: 16 BRPM | HEART RATE: 78 BPM | OXYGEN SATURATION: 100 % | TEMPERATURE: 98.4 F
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
## 2024-09-18 DIAGNOSIS — M25.511 ACUTE PAIN OF RIGHT SHOULDER: Primary | ICD-10-CM

## 2024-09-18 PROCEDURE — 99283 EMERGENCY DEPT VISIT LOW MDM: CPT

## 2024-09-18 PROCEDURE — 73030 X-RAY EXAM OF SHOULDER: CPT

## 2024-09-18 ASSESSMENT — PAIN DESCRIPTION - ORIENTATION: ORIENTATION: RIGHT

## 2024-09-18 ASSESSMENT — PAIN - FUNCTIONAL ASSESSMENT: PAIN_FUNCTIONAL_ASSESSMENT: 0-10

## 2024-09-18 ASSESSMENT — PAIN DESCRIPTION - DESCRIPTORS: DESCRIPTORS: ACHING

## 2024-09-18 ASSESSMENT — PAIN DESCRIPTION - FREQUENCY: FREQUENCY: CONTINUOUS

## 2024-09-18 ASSESSMENT — PAIN SCALES - GENERAL: PAINLEVEL_OUTOF10: 7

## 2024-09-18 ASSESSMENT — PAIN DESCRIPTION - LOCATION: LOCATION: SHOULDER

## 2024-09-18 ASSESSMENT — PAIN DESCRIPTION - PAIN TYPE: TYPE: ACUTE PAIN

## 2024-09-18 NOTE — PERIOP NOTE
TRANSFER - OUT REPORT:    Verbal report given to CAYETANO Purdy on Christen Kay  being transferred to Phase II for routine progression of patient care       Report consisted of patient's Situation, Background, Assessment and   Recommendations(SBAR).     Information from the following report(s) Adult Overview, Surgery Report, Intake/Output, and MAR was reviewed with the receiving nurse.           Lines:   Peripheral IV 05/20/24 Left Foot (Active)   Site Assessment Clean, dry & intact 05/20/24 1247   Line Status Infusing 05/20/24 1247   Line Care Connections checked and tightened 05/20/24 1247   Phlebitis Assessment No symptoms 05/20/24 1247   Infiltration Assessment 0 05/20/24 1247   Alcohol Cap Used No 05/20/24 0819   Dressing Status Clean, dry & intact 05/20/24 1247   Dressing Type Transparent 05/20/24 1247   Dressing Intervention New 05/20/24 0819        Opportunity for questions and clarification was provided.      Patient transported with:  Registered Nurse         100% of the time

## 2024-09-19 ENCOUNTER — CARE COORDINATION (OUTPATIENT)
Dept: OTHER | Facility: CLINIC | Age: 49
End: 2024-09-19

## 2024-09-23 ENCOUNTER — CARE COORDINATION (OUTPATIENT)
Dept: OTHER | Facility: CLINIC | Age: 49
End: 2024-09-23

## 2024-09-24 ENCOUNTER — HOSPITAL ENCOUNTER (OUTPATIENT)
Facility: HOSPITAL | Age: 49
Setting detail: RECURRING SERIES
Discharge: HOME | End: 2024-09-27
Payer: MEDICAID

## 2024-09-24 PROCEDURE — 97535 SELF CARE MNGMENT TRAINING: CPT

## 2024-09-24 PROCEDURE — 97112 NEUROMUSCULAR REEDUCATION: CPT

## 2024-09-24 PROCEDURE — 97110 THERAPEUTIC EXERCISES: CPT

## 2024-09-24 PROCEDURE — 97530 THERAPEUTIC ACTIVITIES: CPT

## 2024-09-25 ENCOUNTER — TELEPHONE (OUTPATIENT)
Facility: HOSPITAL | Age: 49
End: 2024-09-25

## 2024-09-25 ENCOUNTER — APPOINTMENT (OUTPATIENT)
Facility: HOSPITAL | Age: 49
End: 2024-09-25
Payer: MEDICAID

## 2024-09-26 ENCOUNTER — HOSPITAL ENCOUNTER (OUTPATIENT)
Facility: HOSPITAL | Age: 49
Setting detail: RECURRING SERIES
Discharge: HOME | End: 2024-09-29
Payer: MEDICAID

## 2024-09-26 PROCEDURE — 97535 SELF CARE MNGMENT TRAINING: CPT

## 2024-09-26 PROCEDURE — 97110 THERAPEUTIC EXERCISES: CPT

## 2024-09-26 PROCEDURE — 97530 THERAPEUTIC ACTIVITIES: CPT

## 2024-09-26 PROCEDURE — 97112 NEUROMUSCULAR REEDUCATION: CPT

## 2024-09-26 NOTE — PROGRESS NOTES
PHYSICAL / OCCUPATIONAL THERAPY - DAILY TREATMENT NOTE     Patient Name: Christen Kay    Date: 2024    : 1975  [place] / Product Type: *No Product type* /      Patient  verified Yes     Visit #   Current / Total 3 14   Time   In / Out 12:32 1:10   Pain   In / Out 0 0   Subjective Functional Status/Changes: Patient reports no issues after starting exercises last visit.   Changes to:  Allergies, Med Hx, Sx Hx?   no       TREATMENT AREA =  Pain in right shoulder [M25.511]    If an interpreting service is utilized for treatment of this patient, the contents of this document represent the material reviewed with the patient via the .     OBJECTIVE    Therapeutic Procedures:  Tx Min Billable or 1:1 Min (if diff from Tx Min) Procedure, Rationale, Specifics   8   22960 Therapeutic Exercise (timed):  increase ROM, strength, coordination, balance, and proprioception to improve patient's ability to progress to PLOF and address remaining functional goals. (see flow sheet as applicable)    Details if applicable:       9  14735 Therapeutic Activity (timed):  use of dynamic activities replicating functional movements to increase ROM, strength, coordination, balance, and proprioception in order to improve patient's ability to progress to PLOF and address remaining functional goals.  (see flow sheet as applicable)    Details if applicable:     13  04110 Neuromuscular Re-Education (timed):  improve balance, coordination, kinesthetic sense, posture, core stability and proprioception to improve patient's ability to develop conscious control of individual muscles and awareness of position of extremities in order to progress to PLOF and address remaining functional goals. (see flow sheet as applicable)   8  60496 Self Care/Home Management (timed):  improve patient knowledge and understanding of positioning and posture/ergonomics  to improve patient's ability to

## 2024-09-27 ENCOUNTER — HOSPITAL ENCOUNTER (OUTPATIENT)
Facility: HOSPITAL | Age: 49
Setting detail: RECURRING SERIES
End: 2024-09-27
Payer: MEDICAID

## 2024-09-27 ENCOUNTER — TELEPHONE (OUTPATIENT)
Facility: HOSPITAL | Age: 49
End: 2024-09-27

## 2024-09-30 ENCOUNTER — CARE COORDINATION (OUTPATIENT)
Dept: OTHER | Facility: CLINIC | Age: 49
End: 2024-09-30

## 2024-09-30 NOTE — CARE COORDINATION
Ambulatory Care Coordination Note     9/30/2024 11:05 AM     patient outreach attempt by this ACM today to offer care management services. ACM was unable to reach the patient by telephone today; left voice message requesting a return phone call to this ACM.     Patient closed (unable to reach patient) from the High Risk Care Management program on 9/30/2024.  No further Ambulatory Care Manager follow up scheduled.     ISSA Prasad RN  Ambulatory Care Manager  Phone: [phone]  Email: [email or web address]

## 2024-10-01 ENCOUNTER — HOSPITAL ENCOUNTER (OUTPATIENT)
Facility: HOSPITAL | Age: 49
Setting detail: RECURRING SERIES
Discharge: HOME | End: 2024-10-04
Payer: MEDICAID

## 2024-10-01 PROCEDURE — 97110 THERAPEUTIC EXERCISES: CPT

## 2024-10-01 PROCEDURE — 97530 THERAPEUTIC ACTIVITIES: CPT

## 2024-10-01 PROCEDURE — 97112 NEUROMUSCULAR REEDUCATION: CPT

## 2024-10-01 PROCEDURE — 97535 SELF CARE MNGMENT TRAINING: CPT

## 2024-10-01 NOTE — PROGRESS NOTES
PHYSICAL / OCCUPATIONAL THERAPY - DAILY TREATMENT NOTE     Patient Name: Christen Kay    Date: 10/1/2024    : 1975  Insurance: [place] / [place] / Product Type: *No Product type* /      Patient  verified Yes     Visit #   Current / Total 4 14   Time   In / Out 10:32 11:10   Pain   In / Out 0 0   Subjective Functional Status/Changes: Patient reports no pain today or over the weekend   Changes to:  Allergies, Med Hx, Sx Hx?   no       TREATMENT AREA =  Pain in right shoulder [M25.511]    If an interpreting service is utilized for treatment of this patient, the contents of this document represent the material reviewed with the patient via the .     OBJECTIVE      Therapeutic Procedures:  Tx Min Billable or 1:1 Min (if diff from Tx Min) Procedure, Rationale, Specifics   8  36032 Therapeutic Exercise (timed):  increase ROM, strength, coordination, balance, and proprioception to improve patient's ability to progress to PLOF and address remaining functional goals. (see flow sheet as applicable)    Details if applicable:       10  29926 Neuromuscular Re-Education (timed):  improve balance, coordination, kinesthetic sense, posture, core stability and proprioception to improve patient's ability to develop conscious control of individual muscles and awareness of position of extremities in order to progress to PLOF and address remaining functional goals. (see flow sheet as applicable)    Details if applicable:     12  48158 Therapeutic Activity (timed):  use of dynamic activities replicating functional movements to increase ROM, strength, coordination, balance, and proprioception in order to improve patient's ability to progress to PLOF and address remaining functional goals.  (see flow sheet as applicable)     Details if applicable:     8  16102 Self Care/Home Management (timed):  improve patient knowledge and understanding of pain reducing techniques, positioning,

## 2024-10-02 ENCOUNTER — HOSPITAL ENCOUNTER (OUTPATIENT)
Facility: HOSPITAL | Age: 49
Setting detail: RECURRING SERIES
Discharge: HOME | End: 2024-10-05
Payer: MEDICAID

## 2024-10-02 PROCEDURE — 97535 SELF CARE MNGMENT TRAINING: CPT

## 2024-10-02 PROCEDURE — 97112 NEUROMUSCULAR REEDUCATION: CPT

## 2024-10-02 PROCEDURE — 97110 THERAPEUTIC EXERCISES: CPT

## 2024-10-02 PROCEDURE — 97530 THERAPEUTIC ACTIVITIES: CPT

## 2024-10-02 NOTE — PROGRESS NOTES
questions regarding her new HEP  to improve patient's ability to progress to PLOF and address remaining functional goals.  (see flow sheet as applicable)     Details if applicable:     39  [facility] Totals Reminder: bill using total billable min of TIMED therapeutic procedures (example: do not include dry needle or estim unattended, both untimed codes, in totals to left)  8-22 min = 1 unit; 23-37 min = 2 units; 38-52 min = 3 units; 53-67 min = 4 units; 68-82 min = 5 units   Total Total     TOTAL TREATMENT TIME:        39     [x]  Patient Education billed concurrently with other procedures   [x] Review HEP    [] Progressed/Changed HEP, detail:    [] Other detail:       Objective Information/Functional Measures/Assessment    Patient continues to make improvements in parascapular strength, though on occasion she does need cueing to eliminate an upper trap hike.  Patient noted to have full flexion and abduction with ease during wand mobility exercises today, so will discontinue those.  Will plan to formally reassess patient next visit for 30 day progress note.    Patient will continue to benefit from skilled PT / OT services to modify and progress therapeutic interventions, analyze and address functional mobility deficits, analyze and address ROM deficits, analyze and address strength deficits, analyze and address soft tissue restrictions, analyze and cue for proper movement patterns, analyze and modify for postural abnormalities, and instruct in home and community integration to address functional deficits and attain remaining goals.    Progress toward goals / Updated goals:  []  See Progress Note/Recertification    Short Term Goals: To be accomplished in 7 treatments:  Patient will be independent and compliant with HEP to progress toward goals and restore functional mobility.   [facility] Status: issued at [facility]  Current: Patient reports compliance with HEP, though does require verbal cueing throughout skilled PT sessions as the

## 2024-10-03 ENCOUNTER — HOSPITAL ENCOUNTER (OUTPATIENT)
Facility: HOSPITAL | Age: 49
Setting detail: RECURRING SERIES
Discharge: HOME | End: 2024-10-06
Payer: MEDICAID

## 2024-10-03 PROCEDURE — 97530 THERAPEUTIC ACTIVITIES: CPT

## 2024-10-03 PROCEDURE — 97112 NEUROMUSCULAR REEDUCATION: CPT

## 2024-10-03 PROCEDURE — 97110 THERAPEUTIC EXERCISES: CPT

## 2024-10-03 NOTE — PROGRESS NOTES
PHYSICAL / OCCUPATIONAL THERAPY - DAILY TREATMENT NOTE     Patient Name: Christen Kay    Date: 10/3/2024    : 1975  Insurance: [place] / [place] / Product Type: *No Product type* /      Patient  verified Yes     Visit #   Current / Total 6 14   Time   In / Out 10:38 11:15   Pain   In / Out 7/10 7/10   Subjective Functional Status/Changes: Pt states she woke up this morning with an increase in pain    Changes to:  Allergies, Med Hx, Sx Hx?   no       TREATMENT AREA =  Pain in right shoulder [M25.511]    If an interpreting service is utilized for treatment of this patient, the contents of this document represent the material reviewed with the patient via the .     OBJECTIVE    Therapeutic Procedures:  Tx Min Billable or 1:1 Min (if diff from Tx Min) Procedure, Rationale, Specifics   20  27173 Therapeutic Exercise (timed):  increase ROM, strength, coordination, balance, and proprioception to improve patient's ability to progress to PLOF and address remaining functional goals. (see flow sheet as applicable)    Details if applicable:       8  52983 Therapeutic Activity (timed):  use of dynamic activities replicating functional movements to increase ROM, strength, coordination, balance, and proprioception in order to improve patient's ability to progress to PLOF and address remaining functional goals.  (see flow sheet as applicable)    Details if applicable:     9  10033 Neuromuscular Re-Education (timed):  improve balance, coordination, kinesthetic sense, posture, core stability and proprioception to improve patient's ability to develop conscious control of individual muscles and awareness of position of extremities in order to progress to PLOF and address remaining functional goals. (see flow sheet as applicable)     Details if applicable:           Details if applicable:            Details if applicable:     37  [facility] Totals Reminder: bill using total billable min of

## 2024-10-08 ENCOUNTER — HOSPITAL ENCOUNTER (OUTPATIENT)
Facility: HOSPITAL | Age: 49
Setting detail: RECURRING SERIES
End: 2024-10-08
Payer: MEDICAID

## 2024-10-08 ENCOUNTER — TELEPHONE (OUTPATIENT)
Facility: HOSPITAL | Age: 49
End: 2024-10-08

## 2024-10-09 ENCOUNTER — HOSPITAL ENCOUNTER (OUTPATIENT)
Facility: HOSPITAL | Age: 49
Setting detail: RECURRING SERIES
Discharge: HOME | End: 2024-10-12
Payer: MEDICAID

## 2024-10-09 PROCEDURE — 97110 THERAPEUTIC EXERCISES: CPT

## 2024-10-09 PROCEDURE — 97112 NEUROMUSCULAR REEDUCATION: CPT

## 2024-10-09 PROCEDURE — 97530 THERAPEUTIC ACTIVITIES: CPT

## 2024-10-09 PROCEDURE — 97535 SELF CARE MNGMENT TRAINING: CPT

## 2024-10-09 NOTE — PROGRESS NOTES
[facility]  [address] [place] [address]  Ph [phone]  Fx [phone]    Physical Therapy Progress Note  Patient name: Christen Kay Start of Care: 2024   Referral source: Omid Doss MD : 1975   Medical/Treatment Diagnosis: Pain in right shoulder [M25.511] Onset Date:24   Prior Hospitalization: see medical history Provider#: [id number]   Medications: Verified on Patient Summary List    Comorbidities:  Musculoskeletal disorders and Other: Kidney transplant   Prior Level of Function: functionally independent, no AD, active lifestyle     Reporting Period: 24-10/9/24    Visits from Start of Care: 8    Missed Visits: 2    Updated Goals/Measure of Progress:   Short Term Goals: To be accomplished in 7 treatments:  Patient will be independent and compliant with HEP to progress toward goals and restore functional mobility.   Eval Status: issued at eval  PN 10/9/2024: Pt reports every other day compliance with HEP   Progressing        Patient will improve pain in right shoulder to 4/10  to improve reaching and lifting tolerance and restore prior level of function.  Eval Status: 8/10 at worst  Current: 4/10 pain at the worst in the last week   10/2/2024, MET      Pt will have 4/5 right shoulder strength to return to goals of improved lifting and reaching.  Eval Status:   Shoulder Left (1-5) Right (1-5) Right 10/9/24   Shoulder Flexion 4 3 5   Shoulder ABD 4 3 4   Shoulder IR 4 4 5   Shoulder ER 4 3 4   PN 10/9/2024: see above, no pain with testing  MET      Pt will have painfree right shoulder  AROM WFL to aid in functional mechanics for ADLs.  Eval Status:   Shoulder Left Right Right 10/9/24   Flexion 160 160 155    158 156   ER T3 T2 T3   IR T12 T12  T9   PN 10/9/2024: WFL but, 4/10 pain with all ranges   Progressing     Long Term Goals: To be accomplished in 14 treatments:  Patient will score 20% or lower on QuickDASH to meet MCID and show

## 2024-10-09 NOTE — PROGRESS NOTES
PHYSICAL / OCCUPATIONAL THERAPY - DAILY TREATMENT NOTE     Patient Name: Christen Kay    Date: 10/9/2024    : 1975  Ins[place] / Product Type: *No Product type* /      Patient  verified Yes     Visit #   Current / Total 7 14   Time   In / Out 10:30 11:23   Pain   In / Out 4/10 3/10   Subjective Functional Status/Changes: \"I have some still. I feel a little better but, it still hurts.\"   Changes to:  Allergies, Med Hx, Sx Hx?   no       TREATMENT AREA =  Pain in right shoulder [M25.511]    If an interpreting service is utilized for treatment of this patient, the contents of this document represent the material reviewed with the patient via the .     OBJECTIVE    Therapeutic Procedures:  Tx Min Billable or 1:1 Min (if diff from Tx Min) Procedure, Rationale, Specifics   20  91690 Therapeutic Exercise (timed):  increase ROM, strength, coordination, balance, and proprioception to improve patient's ability to progress to PLOF and address remaining functional goals. (see flow sheet as applicable)    Details if applicable:       10  38939 Neuromuscular Re-Education (timed):  improve balance, coordination, kinesthetic sense, posture, core stability and proprioception to improve patient's ability to develop conscious control of individual muscles and awareness of position of extremities in order to progress to PLOF and address remaining functional goals. (see flow sheet as applicable)    Details if applicable:  parascapular activation and utilization to avoid shoulder hike compensation   10  01702 Therapeutic Activity (timed):  use of dynamic activities replicating functional movements to increase ROM, strength, coordination, balance, and proprioception in order to improve patient's ability to progress to PLOF and address remaining functional goals.  (see flow sheet as applicable)     Details if applicable:     13  76431 Self Care/Home Management (timed):

## 2024-10-10 ENCOUNTER — HOSPITAL ENCOUNTER (OUTPATIENT)
Facility: HOSPITAL | Age: 49
Setting detail: RECURRING SERIES
End: 2024-10-10
Payer: MEDICAID

## 2024-10-10 ENCOUNTER — TELEPHONE (OUTPATIENT)
Facility: HOSPITAL | Age: 49
End: 2024-10-10

## 2024-10-15 ENCOUNTER — HOSPITAL ENCOUNTER (OUTPATIENT)
Facility: HOSPITAL | Age: 49
Setting detail: RECURRING SERIES
Discharge: HOME | End: 2024-10-18
Payer: MEDICAID

## 2024-10-15 PROCEDURE — 97535 SELF CARE MNGMENT TRAINING: CPT

## 2024-10-15 PROCEDURE — 97112 NEUROMUSCULAR REEDUCATION: CPT

## 2024-10-15 PROCEDURE — 97110 THERAPEUTIC EXERCISES: CPT

## 2024-10-15 PROCEDURE — 97530 THERAPEUTIC ACTIVITIES: CPT

## 2024-10-15 NOTE — PROGRESS NOTES
PHYSICAL / OCCUPATIONAL THERAPY - DAILY TREATMENT NOTE     Patient Name: Christen Kay    Date: 10/15/2024    : 1975  [place]: [place] / Product Type: *No Product type* /      Patient  verified Yes     Visit #   Current / Total 8 14   Time   In / Out 10:30 11:28   Pain   In / Out 0/10 010   Subjective Functional Status/Changes: \"I don't have any pain right now. 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   no       TREATMENT AREA =  Pain in right shoulder [M25.511]    If an interpreting service is utilized for treatment of this patient, the contents of this document represent the material reviewed with the patient via the .     OBJECTIVE    Therapeutic Procedures:  Tx Min Billable or 1:1 Min (if diff from Tx Min) Procedure, Rationale, Specifics   26  21318 Therapeutic Exercise (timed):  increase ROM, strength, coordination, balance, and proprioception to improve patient's ability to progress to PLOF and address remaining functional goals. (see flow sheet as applicable)    Details if applicable:       8  50448 Neuromuscular Re-Education (timed):  improve balance, coordination, kinesthetic sense, posture, core stability and proprioception to improve patient's ability to develop conscious control of individual muscles and awareness of position of extremities in order to progress to PLOF and address remaining functional goals. (see flow sheet as applicable)    Details if applicable:  Parascapular activation and postural correction cueing   14  75359 Therapeutic Activity (timed):  use of dynamic activities replicating functional movements to increase ROM, strength, coordination, balance, and proprioception in order to improve patient's ability to progress to PLOF and address remaining functional goals.  (see flow sheet as applicable)     Details if applicable:     10  30513 Self Care/Home

## 2024-10-16 ENCOUNTER — HOSPITAL ENCOUNTER (OUTPATIENT)
Facility: HOSPITAL | Age: 49
Setting detail: RECURRING SERIES
Discharge: HOME | End: 2024-10-19
Payer: MEDICAID

## 2024-10-16 PROCEDURE — 97112 NEUROMUSCULAR REEDUCATION: CPT

## 2024-10-16 PROCEDURE — 97110 THERAPEUTIC EXERCISES: CPT

## 2024-10-16 PROCEDURE — 97535 SELF CARE MNGMENT TRAINING: CPT

## 2024-10-16 PROCEDURE — 97530 THERAPEUTIC ACTIVITIES: CPT

## 2024-10-16 NOTE — PROGRESS NOTES
tolerance and restore prior level of function.  Eval Status: 8/10 at worst  PN 10/9/2024: 7/10 at worst in the past week after sleeping with arm overhead   Progressing    PLAN  Yes  Continue plan of care  []  Upgrade activities as tolerated  []  Discharge due to :  []  Other:    Susy Atkinson, PT    10/16/2024    9:28 AM    Future Appointments   Date Time Provider Department Center   10/22/2024 10:30 AM Arnel Rausch Jr., [facility]   10/23/2024 10:30 AM Delvin Pena, [facility]   10/29/2024 10:30 AM Susy Atkinson, PT [facility]   10/30/2024 10:30 AM Delvin Pena, [facility]

## 2024-10-22 ENCOUNTER — HOSPITAL ENCOUNTER (OUTPATIENT)
Facility: HOSPITAL | Age: 49
Setting detail: RECURRING SERIES
End: 2024-10-22
Payer: MEDICAID

## 2024-10-22 ENCOUNTER — HOSPITAL ENCOUNTER (OUTPATIENT)
Facility: HOSPITAL | Age: 49
Setting detail: RECURRING SERIES
Discharge: HOME | End: 2024-10-25
Payer: MEDICAID

## 2024-10-22 ENCOUNTER — TELEPHONE (OUTPATIENT)
Facility: HOSPITAL | Age: 49
End: 2024-10-22

## 2024-10-22 PROCEDURE — 97112 NEUROMUSCULAR REEDUCATION: CPT

## 2024-10-22 PROCEDURE — 97530 THERAPEUTIC ACTIVITIES: CPT

## 2024-10-22 PROCEDURE — 97110 THERAPEUTIC EXERCISES: CPT

## 2024-10-22 NOTE — PROGRESS NOTES
10/2/2024, MET      Pt will have 4/5 right shoulder strength to return to goals of improved lifting and reaching.  Eval Status:   Shoulder Left (1-5) Right (1-5) Right 10/9/24   Shoulder Flexion 4 3 5   Shoulder ABD 4 3 4   Shoulder IR 4 4 5   Shoulder ER 4 3 4   PN 10/9/2024: see above, no pain with testing  MET      Pt will have painfree right shoulder  AROM WFL to aid in functional mechanics for ADLs.  Eval Status:   Shoulder Left Right Right 10/9/24   Flexion 160 160 155    158 156   ER T3 T2 T3   IR T12 T12  T9   PN 10/9/2024: WFL but, 4/10 pain with all ranges   Progressing       Long Term Goals: To be accomplished in 14 treatments:  Patient will score 20% or lower on QuickDASH to meet MCID and show improvement with functional activities and ADL's.        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  Eval Status:   Shoulder Left (1-5) Right (1-5) Right 10/9/24   Shoulder Flexion 4 3 5   Shoulder ABD 4 3 4   Shoulder IR 4 4 5   Shoulder ER 4 3 4   PN 10/9/2024: see above, no pain with testing   Progressing  Current: progressed UE strengthening and introduced shelf lift this visit with 2#, which the pt was able to perform with no complaints of pain or discomfort and no compensatory strategies. 10/22/24  progressing    Patient will improve pain in right shoulder  to 1-2/10 at worst to improve showering tolerance and restore prior level of function.  Eval Status: 8/10 at worst  PN 10/9/2024: 7/10 at worst in the past week after sleeping with arm overhead   Progressing    PLAN  Yes

## 2024-10-23 ENCOUNTER — APPOINTMENT (OUTPATIENT)
Facility: HOSPITAL | Age: 49
End: 2024-10-23
Payer: MEDICAID

## 2024-10-29 ENCOUNTER — APPOINTMENT (OUTPATIENT)
Facility: HOSPITAL | Age: 49
End: 2024-10-29
Payer: MEDICAID

## 2024-10-29 ENCOUNTER — TELEPHONE (OUTPATIENT)
Facility: HOSPITAL | Age: 49
End: 2024-10-29

## 2024-10-30 ENCOUNTER — HOSPITAL ENCOUNTER (OUTPATIENT)
Facility: HOSPITAL | Age: 49
Setting detail: RECURRING SERIES
Discharge: HOME | End: 2024-11-02
Payer: MEDICAID

## 2024-10-30 PROCEDURE — 97112 NEUROMUSCULAR REEDUCATION: CPT

## 2024-10-30 PROCEDURE — 97110 THERAPEUTIC EXERCISES: CPT

## 2024-10-30 PROCEDURE — 97530 THERAPEUTIC ACTIVITIES: CPT

## 2024-10-30 PROCEDURE — 97535 SELF CARE MNGMENT TRAINING: CPT

## 2024-10-30 NOTE — PROGRESS NOTES
[facility]  [address] [place] [address]  Ph [phone]  Fx [phone]    Physical Therapy Discharge Summary    Patient name: Christen Kay Start of Care: 2024   Referral source: Omid Doss MD : 1975   Medical/Treatment Diagnosis: Pain in right shoulder [M25.511] Onset Date:24   Prior Hospitalization: see medical history Provider#: [id number]   Medications: Verified on Patient Summary List     Comorbidities:  Musculoskeletal disorders and Other: Kidney transplant   Prior Level of Function: functionally independent, no AD, active lifestyle      Reporting Period: 24-10/9/24    Visits from Start of Care: 11    Missed Visits: 5    Reporting Period : 24 to 10/30/24    Goals/Measure of Progress:  Short Term Goals: To be accomplished in 7 treatments:  Patient will be independent and compliant with HEP to progress toward goals and restore functional mobility.   Eval Status: issued at al  Current: Patient reports compliance with their HEP.   10/16/2024, MET      Patient will improve pain in right shoulder to 4/10  to improve reaching and lifting tolerance and restore prior level of function.  Eval Status: 8/10 at worst  Current: 4/10 pain at the worst in the last week   10/2/2024, MET      Pt will have 4/5 right shoulder strength to return to goals of improved lifting and reaching.  Eval Status:   Shoulder Left (1-5) Right (1-5) Right 10/9/24   Shoulder Flexion 4 3 5   Shoulder ABD 4 3 4   Shoulder IR 4 4 5   Shoulder ER 4 3 4   PN 10/9/2024: see above, no pain with testing  MET      Pt will have painfree right shoulder  AROM WFL to aid in functional mechanics for ADLs.  Eval Status:   Shoulder Left Right Right 10/9/24 Right 10/30/24   Flexion 160 160 155 156    158 156 155   ER T3 T2 T3 T3   IR T12 T12  T9 T6   PN 10/9/2024: WFL but, 4/10 pain with all ranges   Progressing  Current: see above,  WFL with 4/10 with IR only 10/30/24 Progressing   
Physical Therapy Discharge Instructions    [facility]  [address] [place] [address]  Ph [phone]  Fx [phone]      Patient: Christen Kay  : 1975      Continue Home Exercise Program 1 times per day for 3 weeks, then decrease to 4 times per week      Continue with    [x] Ice  as needed      [x] Heat           Follow up with MD:     [] Upon completion of therapy     [x] As needed      Recommendations:     [x]   Return to activity with home program    []   Return to activity with the following modifications:       []Post Rehab Program    []Join Independent aquatic program     []Return to/join local gym        Additional Comments:         Delvin Pena, MARSHA 10/30/2024 11:49 AM  
injury/symptom/pain management, positioning, and posture/ergonomics  to improve patient's ability to progress to PLOF and address remaining functional goals.  (see flow sheet as applicable)    Details if applicable:            Details if applicable:     56  [facility] Totals Reminder: bill using total billable min of TIMED therapeutic procedures (example: do not include dry needle or estim unattended, both untimed codes, in totals to left)  8-22 min = 1 unit; 23-37 min = 2 units; 38-52 min = 3 units; 53-67 min = 4 units; 68-82 min = 5 units   Total Total     TOTAL TREATMENT TIME:        56     [x]  Patient Education billed concurrently with other procedures   [x] Review HEP    [x] Progressed/Changed HEP, detail: Access Code: [email or web address]  URL: [email or web address]  Date: 10/30/2024  Prepared by: Delvin Pena    Exercises  - Seated Scapular Retraction  - 1 x daily - 7 x weekly - 3 sets - 10 reps  - Standing Row with Anchored Resistance  - 1 x daily - 7 x weekly - 2 sets - 10 reps  - Shoulder External Rotation and Scapular Retraction with Resistance  - 1 x daily - 7 x weekly - 2 sets - 10 reps  - Shoulder Extension with Resistance  - 1 x daily - 7 x weekly - 2 sets - 10 reps  - Standing Shoulder Flexion to 90 Degrees with Dumbbells  - 1 x daily - 7 x weekly - 2 sets - 10 reps  - Scaption with Dumbbells  - 1 x daily - 7 x weekly - 2 sets - 10 reps  - Shoulder Abduction with Dumbbells - Palms Down  - 1 x daily - 7 x weekly - 2 sets - 10 reps  - Standing Anti-Rotation Press with Anchored Resistance  - 1 x daily - 7 x weekly - 3 sets - 10 reps  - Prone Shoulder Row  - 1 x daily - 7 x weekly - 3 sets - 10 reps  - Prone Shoulder Extension - Single Arm  - 1 x daily - 7 x weekly - 3 sets - 10 reps  - Prone Shoulder Horizontal Abduction  - 1 x daily - 7 x weekly - 3 sets - 10 reps  - Prone Shoulder Flexion with Dumbbell  - 1 x daily - 7 x weekly - 3 sets - 10 reps  [] Other detail:       Objective

## 2024-10-31 ENCOUNTER — APPOINTMENT (OUTPATIENT)
Facility: HOSPITAL | Age: 49
End: 2024-10-31
Payer: MEDICAID

## 2025-02-19 ENCOUNTER — APPOINTMENT (OUTPATIENT)
Facility: HOSPITAL | Age: 50
End: 2025-02-19
Payer: MEDICAID

## 2025-02-19 ENCOUNTER — HOSPITAL ENCOUNTER (EMERGENCY)
Facility: HOSPITAL | Age: 50
Discharge: HOME | End: 2025-02-19
Payer: MEDICAID

## 2025-02-19 VITALS
SYSTOLIC BLOOD PRESSURE: 134 MMHG | OXYGEN SATURATION: 99 % | DIASTOLIC BLOOD PRESSURE: 89 MMHG | TEMPERATURE: 98.6 F | HEIGHT: 65 IN | BODY MASS INDEX: 35.82 KG/M2 | WEIGHT: 215 LBS | HEART RATE: 74 BPM | RESPIRATION RATE: 16 BRPM

## 2025-02-19 DIAGNOSIS — M77.52 TENDINITIS OF LEFT FOOT: Primary | ICD-10-CM

## 2025-02-19 LAB — ECHO BSA: 2.11 M2

## 2025-02-19 PROCEDURE — 93971 EXTREMITY STUDY: CPT

## 2025-02-19 PROCEDURE — 73630 X-RAY EXAM OF FOOT: CPT

## 2025-02-19 PROCEDURE — 99284 EMERGENCY DEPT VISIT MOD MDM: CPT

## 2025-02-19 RX ORDER — HYDROCODONE BITARTRATE AND ACETAMINOPHEN 5; 325 MG/1; MG/1
1 TABLET ORAL EVERY 4 HOURS PRN
Qty: 12 TABLET | Refills: 0 | Status: SHIPPED | OUTPATIENT
Start: 2025-02-19 | End: 2025-02-22

## 2025-02-19 RX ORDER — ONDANSETRON 4 MG/1
4 TABLET, ORALLY DISINTEGRATING ORAL EVERY 8 HOURS PRN
Qty: 12 TABLET | Refills: 0 | Status: SHIPPED | OUTPATIENT
Start: 2025-02-19

## 2025-02-19 ASSESSMENT — PAIN - FUNCTIONAL ASSESSMENT: PAIN_FUNCTIONAL_ASSESSMENT: 0-10

## 2025-02-19 ASSESSMENT — PAIN SCALES - GENERAL: PAINLEVEL_OUTOF10: 7

## 2025-02-19 NOTE — ED PROVIDER NOTES
[facility]  EMERGENCY DEPARTMENT ENCOUNTER       Pt Name: Christen Kay  MRN: [id number]  Birthdate 1975  Date of evaluation: 2/19/2025  PCP: Eleanor Aj APRN - NP  Note Started: 2:53 PM 2/19/25     CHIEF COMPLAINT       Chief Complaint   Patient presents with    Foot Pain        HISTORY OF PRESENT ILLNESS: 1 or more elements      History From: Patient  HPI Limitations: None  Chronic Conditions: CKD, OA, PUD  Social Determinants affecting Dx or Tx: none      Christen Kay is a 49 y.o. female who presents to ED c/o left foot pain. Pain x 1 day. Pain located to medial aspect of foot. Worse with movement. No trauma, swelling, numbness, color change. Pt has no hx of DVT/PE. No recent travel, surgery, exogenous estrogen use     Nursing Notes were all reviewed and agreed with or any disagreements were addressed in the HPI.    PAST HISTORY     Past Medical History:  Past Medical History:   Diagnosis Date    Arthritis     right shoulder    Chronic kidney disease     ESRD    Dialysis patient 2019    History of blood transfusion     PUD (peptic ulcer disease) 2004    Thyroid disease     Wears glasses        Past Surgical History:  Past Surgical History:   Procedure Laterality Date    HYSTERECTOMY (CERVIX STATUS UNKNOWN)      partial    IR THRMBC/NFS DIALYSIS CIRCUIT  09/10/2019    IR THRMB/INFUSION DIAYSIS CIRCUIT W PTA 9/10/2019 [facility] RAD ANGIO IR    IR THRMBC/NFS DIALYSIS CIRCUIT  09/24/2019    IR THRMB/INFUSION DIAYSIS CIRCUIT W PTA 9/24/2019 [facility] RAD ANGIO IR    IR THRMBC/NFS DIALYSIS CIRCUIT  9/24/2019    IR THRMBC/NFS DIALYSIS CIRCUIT  9/10/2019    OTHER SURGICAL HISTORY Left     dialysis graft arm; removed    SHOULDER ARTHROSCOPY Right 5/20/2024    RIGHT SHOULDER ARTHROSCOPIC DECOMPRESSION, RESECTION DISTAL CLAVICLE, ROTATOR CUFF REPAIR, BICEPS RELEASE (SPEC POP) performed by Omid Doss MD at [facility] MAIN OR    TONSILLECTOMY      TRANSPLANT      renal transplant  2004,

## 2025-03-27 ENCOUNTER — HOSPITAL ENCOUNTER (EMERGENCY)
Facility: HOSPITAL | Age: 50
Discharge: HOME | End: 2025-03-27
Payer: MEDICAID

## 2025-03-27 VITALS
HEIGHT: 65 IN | DIASTOLIC BLOOD PRESSURE: 83 MMHG | HEART RATE: 80 BPM | WEIGHT: 220 LBS | BODY MASS INDEX: 36.65 KG/M2 | TEMPERATURE: 98.1 F | OXYGEN SATURATION: 98 % | SYSTOLIC BLOOD PRESSURE: 121 MMHG | RESPIRATION RATE: 16 BRPM

## 2025-03-27 DIAGNOSIS — J06.9 VIRAL URI WITH COUGH: Primary | ICD-10-CM

## 2025-03-27 LAB
FLUAV RNA SPEC QL NAA+PROBE: NOT DETECTED
FLUBV RNA SPEC QL NAA+PROBE: NOT DETECTED
SARS-COV-2 RNA RESP QL NAA+PROBE: NOT DETECTED
SOURCE: NORMAL

## 2025-03-27 PROCEDURE — 99283 EMERGENCY DEPT VISIT LOW MDM: CPT

## 2025-03-27 PROCEDURE — 87636 SARSCOV2 & INF A&B AMP PRB: CPT

## 2025-03-27 RX ORDER — CETIRIZINE HYDROCHLORIDE 10 MG/1
10 TABLET ORAL DAILY
Qty: 30 TABLET | Refills: 1 | Status: SHIPPED | OUTPATIENT
Start: 2025-03-27

## 2025-03-27 RX ORDER — ALBUTEROL SULFATE 90 UG/1
2 INHALANT RESPIRATORY (INHALATION) EVERY 6 HOURS PRN
Qty: 54 G | Refills: 0 | Status: SHIPPED | OUTPATIENT
Start: 2025-03-27

## 2025-03-27 RX ORDER — BENZONATATE 200 MG/1
200 CAPSULE ORAL 3 TIMES DAILY PRN
Qty: 30 CAPSULE | Refills: 0 | Status: SHIPPED | OUTPATIENT
Start: 2025-03-27 | End: 2025-04-06

## 2025-03-27 ASSESSMENT — LIFESTYLE VARIABLES
HOW OFTEN DO YOU HAVE A DRINK CONTAINING ALCOHOL: NEVER
HOW MANY STANDARD DRINKS CONTAINING ALCOHOL DO YOU HAVE ON A TYPICAL DAY: PATIENT DOES NOT DRINK

## 2025-03-27 ASSESSMENT — PAIN - FUNCTIONAL ASSESSMENT: PAIN_FUNCTIONAL_ASSESSMENT: NONE - DENIES PAIN

## 2025-03-27 NOTE — ED PROVIDER NOTES
[facility]  EMERGENCY DEPARTMENT ENCOUNTER       Pt Name: Christen Kay  MRN: [id number]  Birthdate 1975  Date of evaluation: 3/27/2025  Provider: ENRIQUE Kay - ILEANA   PCP: Eleanor Aj APRN - NP  Note Started:  9:55 AM EDT 3/27/25     CHIEF COMPLAINT       Chief Complaint   Patient presents with    Cough    Pharyngitis        HISTORY OF PRESENT ILLNESS: 1 or more elements      History From: Patient  HPI Limitations: None     Christen Kay is a 49 y.o. female past medical history of end-stage renal disease, who presents complaining of a dry cough, sore throat, runny nose, generalized fatigue x 5 days.  Patient endorses sick contacts at home.  She denies fever, chills, nausea, vomiting, diarrhea, myalgia, shortness of breath or chest pain.  Denies history of asthma or COPD.     Nursing Notes were all reviewed and agreed with or any disagreements were addressed in the HPI.     REVIEW OF SYSTEMS      Review of Systems     Positives and Pertinent negatives as per HPI.    PAST HISTORY     Past Medical History:  Past Medical History:   Diagnosis Date    Arthritis     right shoulder    Chronic kidney disease     ESRD    Dialysis patient 2019    History of blood transfusion     PUD (peptic ulcer disease) 2004    Thyroid disease     Wears glasses        Past Surgical History:  Past Surgical History:   Procedure Laterality Date    HYSTERECTOMY (CERVIX STATUS UNKNOWN)      partial    IR THRMBC/NFS DIALYSIS CIRCUIT  09/10/2019    IR THRMB/INFUSION DIAYSIS CIRCUIT W PTA 9/10/2019 [facility] RAD ANGIO IR    IR THRMBC/NFS DIALYSIS CIRCUIT  09/24/2019    IR THRMB/INFUSION DIAYSIS CIRCUIT W PTA 9/24/2019 [facility] RAD ANGIO IR    IR THRMBC/NFS DIALYSIS CIRCUIT  9/24/2019    IR THRMBC/NFS DIALYSIS CIRCUIT  9/10/2019    OTHER SURGICAL HISTORY Left     dialysis graft arm; removed    SHOULDER ARTHROSCOPY Right 5/20/2024    RIGHT SHOULDER ARTHROSCOPIC DECOMPRESSION, RESECTION DISTAL CLAVICLE,

## 2025-03-27 NOTE — ED TRIAGE NOTES
Pt alert and conversational. Ambulated to triage. C/O dry cough, sore throat from cough, runny nose since Sunday. States one of her grandchildren is sick.     Active Ambulatory Problems     Diagnosis Date Noted    PNA (pneumonia) 02/23/2017    Diarrhea 08/21/2017    CAP (community acquired pneumonia) 02/21/2017    ESRD on dialysis ([facility]) 02/21/2017    Constipation 02/26/2017    ESRD (end stage renal disease) ([facility]) 06/24/2017    Diastolic dysfunction 02/22/2017    Hyponatremia 02/21/2017    Bacteremia due to Staphylococcus aureus 02/23/2017    Hypokalemia 02/24/2017    Hyperkalemia 08/21/2017    Dehydration 02/21/2017    Sepsis ([facility]) 02/21/2017    C. 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

## 2025-04-28 ENCOUNTER — HOSPITAL ENCOUNTER (EMERGENCY)
Facility: HOSPITAL | Age: 50
Discharge: HOME | End: 2025-04-28
Attending: EMERGENCY MEDICINE
Payer: MEDICAID

## 2025-04-28 ENCOUNTER — APPOINTMENT (OUTPATIENT)
Facility: HOSPITAL | Age: 50
End: 2025-04-28
Payer: MEDICAID

## 2025-04-28 VITALS
HEART RATE: 69 BPM | RESPIRATION RATE: 16 BRPM | OXYGEN SATURATION: 99 % | WEIGHT: 220 LBS | SYSTOLIC BLOOD PRESSURE: 113 MMHG | TEMPERATURE: 97.9 F | BODY MASS INDEX: 36.65 KG/M2 | DIASTOLIC BLOOD PRESSURE: 82 MMHG | HEIGHT: 65 IN

## 2025-04-28 DIAGNOSIS — S69.92XA INJURY OF LEFT WRIST, INITIAL ENCOUNTER: Primary | ICD-10-CM

## 2025-04-28 PROCEDURE — 99283 EMERGENCY DEPT VISIT LOW MDM: CPT

## 2025-04-28 PROCEDURE — 73110 X-RAY EXAM OF WRIST: CPT

## 2025-04-28 RX ORDER — OXYCODONE HYDROCHLORIDE 5 MG/1
5 TABLET ORAL EVERY 6 HOURS PRN
Qty: 12 TABLET | Refills: 0 | Status: SHIPPED | OUTPATIENT
Start: 2025-04-28 | End: 2025-05-01

## 2025-04-28 ASSESSMENT — PAIN SCALES - GENERAL: PAINLEVEL_OUTOF10: 8

## 2025-04-28 ASSESSMENT — PAIN DESCRIPTION - ORIENTATION: ORIENTATION: LEFT

## 2025-04-28 ASSESSMENT — LIFESTYLE VARIABLES
HOW MANY STANDARD DRINKS CONTAINING ALCOHOL DO YOU HAVE ON A TYPICAL DAY: PATIENT DOES NOT DRINK
HOW OFTEN DO YOU HAVE A DRINK CONTAINING ALCOHOL: NEVER

## 2025-04-28 ASSESSMENT — PAIN - FUNCTIONAL ASSESSMENT: PAIN_FUNCTIONAL_ASSESSMENT: 0-10

## 2025-04-28 ASSESSMENT — PAIN DESCRIPTION - LOCATION: LOCATION: WRIST

## 2025-04-28 NOTE — ED TRIAGE NOTES
Pt alert and conversational. Ambulated to triage. C/O L wrist pain 8/10 X 2 days. Denies injury and swelling.     Active Ambulatory Problems     Diagnosis Date Noted    PNA (pneumonia) 02/23/2017    Diarrhea 08/21/2017    CAP (community acquired pneumonia) 02/21/2017    ESRD on dialysis ([facility]) 02/21/2017    Constipation 02/26/2017    ESRD (end stage renal disease) ([facility]) 06/24/2017    Diastolic dysfunction 02/22/2017    Hyponatremia 02/21/2017    Bacteremia due to Staphylococcus aureus 02/23/2017    Hypokalemia 02/24/2017    Hyperkalemia 08/21/2017    Dehydration 02/21/2017    Sepsis ([facility]) 02/21/2017    C. 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

## 2025-04-29 NOTE — ED PROVIDER NOTES
EMERGENCY DEPARTMENT HISTORY AND PHYSICAL EXAM      Date: 4/28/2025  Patient Name: Christen Kay    History of Presenting Illness     Chief Complaint   Patient presents with    Wrist Pain       History Provided By: Patient    HPI: Christen Kay, 49 y.o. female with PMHx as noted below presents the emergency department for evaluation of left wrist injury.  Patient was pulling something when she felt sharp pain lateral aspect of her left wrist.    PCP: Eleanor Aj APRN - NP    No current facility-administered medications for this encounter.     Current Outpatient Medications   Medication Sig Dispense Refill    oxyCODONE (ROXICODONE) 5 MG immediate release tablet Take 1 tablet by mouth every 6 hours as needed for Pain for up to 3 days. Intended supply: 3 days. Take lowest dose possible to manage pain Max Daily Amount: 20 mg 12 tablet 0    albuterol sulfate HFA (PROVENTIL;VENTOLIN;PROAIR) 108 (90 Base) MCG/ACT inhaler Inhale 2 puffs into the lungs every 6 hours as needed for Wheezing 54 g 0    cetirizine (ZYRTEC) 10 MG tablet Take 1 tablet by mouth daily 30 tablet 1    ondansetron (ZOFRAN-ODT) 4 MG disintegrating tablet Take 1 tablet by mouth every 8 hours as needed for Nausea or Vomiting 12 tablet 0    Spacer/Aero-Holding Chambers (COMPACT SPACE CHAMBER) DANELLE Please provide one adult spacer to be used with HFA 1 each 0    aspirin 81 MG EC tablet Take 1 tablet by mouth daily      mycophenolate (CELLCEPT) 250 MG capsule Take 2 capsules by mouth 2 times daily      tacrolimus (PROGRAF) 1 MG capsule Take 1 capsule by mouth 2 times daily      predniSONE (DELTASONE) 5 MG tablet Take 1 tablet by mouth daily      cinacalcet (SENSIPAR) 60 MG tablet Take 1 tablet by mouth nightly      magnesium oxide (MAG-OX) 400 (240 Mg) MG tablet Take 2 tablets by mouth 2 times daily      diphenhydrAMINE (BENADRYL) 25 MG capsule Take 1 capsule by mouth every 6 hours as needed for Allergies      pravastatin (PRAVACHOL) 20 MG

## 2025-06-10 ENCOUNTER — APPOINTMENT (OUTPATIENT)
Facility: HOSPITAL | Age: 50
End: 2025-06-10
Payer: MEDICAID

## 2025-06-10 ENCOUNTER — HOSPITAL ENCOUNTER (EMERGENCY)
Facility: HOSPITAL | Age: 50
Discharge: HOME | End: 2025-06-10
Payer: MEDICAID

## 2025-06-10 VITALS
BODY MASS INDEX: 36.65 KG/M2 | WEIGHT: 220 LBS | OXYGEN SATURATION: 99 % | HEART RATE: 79 BPM | RESPIRATION RATE: 18 BRPM | TEMPERATURE: 98.4 F | SYSTOLIC BLOOD PRESSURE: 122 MMHG | DIASTOLIC BLOOD PRESSURE: 88 MMHG | HEIGHT: 65 IN

## 2025-06-10 DIAGNOSIS — S63.92XA SPRAIN OF LEFT HAND, INITIAL ENCOUNTER: Primary | ICD-10-CM

## 2025-06-10 DIAGNOSIS — S60.222A CONTUSION OF LEFT HAND, INITIAL ENCOUNTER: ICD-10-CM

## 2025-06-10 PROCEDURE — 6370000000 HC RX 637 (ALT 250 FOR IP): Performed by: NURSE PRACTITIONER

## 2025-06-10 PROCEDURE — 73130 X-RAY EXAM OF HAND: CPT

## 2025-06-10 PROCEDURE — 99283 EMERGENCY DEPT VISIT LOW MDM: CPT

## 2025-06-10 RX ORDER — ACETAMINOPHEN 325 MG/1
650 TABLET ORAL
Status: COMPLETED | OUTPATIENT
Start: 2025-06-10 | End: 2025-06-10

## 2025-06-10 RX ADMIN — ACETAMINOPHEN 650 MG: 325 TABLET ORAL at 20:10

## 2025-06-10 ASSESSMENT — PAIN SCALES - GENERAL: PAINLEVEL_OUTOF10: 6

## 2025-06-10 NOTE — ED TRIAGE NOTES
Pt in ED with c/o left hand pain. Pt states she had hit the bed about one week ago and it had swelled up. Pain is still there. Pt has been taking Tylenol for the pain with no relief

## 2025-06-10 NOTE — ED PROVIDER NOTES
[facility]  EMERGENCY DEPARTMENT ENCOUNTER       Pt Name: Christen Kay  MRN: [id number]  Birthdate 1975  Date of evaluation: 6/10/2025  PCP: Eleanor Aj APRN - NP  Note Started: 8:12 PM 6/10/25     CHIEF COMPLAINT       Chief Complaint   Patient presents with    Hand Pain        HISTORY OF PRESENT ILLNESS: 1 or more elements      History From: Patient  HPI Limitations: None  Chronic Conditions: Anemia, CKD, Dialysis patient, Arthitis, PUD  Social Determinants affecting Dx or Tx:      Christen Kay is a 50 y.o. female who presents to ED c/o L hand injury and pain; onset approximately 1 week ago. She states she was trying to get her dog out from underneath her bed using a broom when she struck the bed frame with her non-dominant L hand. She has been using tylenol for pain, without relief. She reports swelling that has gradually improved over the past week. She denies numbness/tingling or decreased ROM.       Nursing Notes were all reviewed and agreed with or any disagreements were addressed in the HPI.    PAST HISTORY     Past Medical History:  Past Medical History:   Diagnosis Date    Arthritis     right shoulder    Chronic kidney disease     ESRD    Dialysis patient 2019    History of blood transfusion     PUD (peptic ulcer disease) 2004    Thyroid disease     Wears glasses        Past Surgical History:  Past Surgical History:   Procedure Laterality Date    HYSTERECTOMY (CERVIX STATUS UNKNOWN)      partial    IR THRMBC/NFS DIALYSIS CIRCUIT  09/10/2019    IR THRMB/INFUSION DIAYSIS CIRCUIT W PTA 9/10/2019 [facility] RAD ANGIO IR    IR THRMBC/NFS DIALYSIS CIRCUIT  09/24/2019    IR THRMB/INFUSION DIAYSIS CIRCUIT W PTA 9/24/2019 [facility] RAD ANGIO IR    IR THRMBC/NFS DIALYSIS CIRCUIT  9/24/2019    IR THRMBC/NFS DIALYSIS CIRCUIT  9/10/2019    OTHER SURGICAL HISTORY Left     dialysis graft arm; removed    SHOULDER ARTHROSCOPY Right 5/20/2024    RIGHT SHOULDER ARTHROSCOPIC

## 2025-06-11 NOTE — DISCHARGE INSTRUCTIONS
RICE care as discussed  Use splint as applied  Elevate as much as possible  May use ice up to 20 minutes at a time, do not place ice directly on skin  May use OTC Tylenol  according to package directions for pain  Up with PCM/Ortho if pain does not improve in 1 to 2 weeks  Return to care for new or worsening symptoms to include pale or cool extremity, loss of sensation (loosen Ace wrap first and see if this resolves), marked increase in swelling or other concerns

## 2025-06-26 ENCOUNTER — APPOINTMENT (OUTPATIENT)
Facility: HOSPITAL | Age: 50
End: 2025-06-26
Attending: EMERGENCY MEDICINE
Payer: MEDICAID

## 2025-06-26 ENCOUNTER — HOSPITAL ENCOUNTER (EMERGENCY)
Facility: HOSPITAL | Age: 50
Discharge: HOME | End: 2025-06-26
Payer: MEDICAID

## 2025-06-26 VITALS
RESPIRATION RATE: 18 BRPM | TEMPERATURE: 97.4 F | SYSTOLIC BLOOD PRESSURE: 119 MMHG | HEIGHT: 65 IN | BODY MASS INDEX: 37.49 KG/M2 | DIASTOLIC BLOOD PRESSURE: 68 MMHG | OXYGEN SATURATION: 100 % | WEIGHT: 225 LBS | HEART RATE: 69 BPM

## 2025-06-26 DIAGNOSIS — T82.868A: ICD-10-CM

## 2025-06-26 DIAGNOSIS — M79.602 LEFT ARM PAIN: Primary | ICD-10-CM

## 2025-06-26 LAB
ALBUMIN SERPL-MCNC: 4.3 G/DL (ref 3.4–5)
ALBUMIN/GLOB SERPL: 1.2 (ref 0.8–1.7)
ALP SERPL-CCNC: 130 U/L (ref 45–117)
ALT SERPL-CCNC: 12 U/L (ref 10–35)
ANION GAP SERPL CALC-SCNC: 11 MMOL/L (ref 3–18)
AST SERPL-CCNC: 18 U/L (ref 10–38)
BASOPHILS # BLD: 0.03 K/UL (ref 0–0.1)
BASOPHILS NFR BLD: 0.5 % (ref 0–2)
BILIRUB SERPL-MCNC: 0.3 MG/DL (ref 0.2–1)
BUN SERPL-MCNC: 26 MG/DL (ref 6–23)
BUN/CREAT SERPL: 17 (ref 12–20)
CALCIUM SERPL-MCNC: 9.9 MG/DL (ref 8.5–10.1)
CHLORIDE SERPL-SCNC: 107 MMOL/L (ref 98–107)
CK SERPL-CCNC: 197 U/L (ref 26–192)
CO2 SERPL-SCNC: 23 MMOL/L (ref 21–32)
CREAT SERPL-MCNC: 1.48 MG/DL (ref 0.6–1.3)
DIFFERENTIAL METHOD BLD: ABNORMAL
ECHO BSA: 2.16 M2
EOSINOPHIL # BLD: 0.06 K/UL (ref 0–0.4)
EOSINOPHIL NFR BLD: 1.1 % (ref 0–5)
ERYTHROCYTE [DISTWIDTH] IN BLOOD BY AUTOMATED COUNT: 13.8 % (ref 11.6–14.5)
GLOBULIN SER CALC-MCNC: 3.5 G/DL (ref 2–4)
GLUCOSE SERPL-MCNC: 92 MG/DL (ref 74–108)
HCT VFR BLD AUTO: 35.4 % (ref 35–45)
HGB BLD-MCNC: 11.2 G/DL (ref 12–16)
IMM GRANULOCYTES # BLD AUTO: 0.01 K/UL (ref 0–0.04)
IMM GRANULOCYTES NFR BLD AUTO: 0.2 % (ref 0–0.5)
LYMPHOCYTES # BLD: 1.59 K/UL (ref 0.9–3.3)
LYMPHOCYTES NFR BLD: 27.8 % (ref 21–52)
MCH RBC QN AUTO: 28.6 PG (ref 24–34)
MCHC RBC AUTO-ENTMCNC: 31.6 G/DL (ref 31–37)
MCV RBC AUTO: 90.5 FL (ref 78–100)
MONOCYTES # BLD: 0.41 K/UL (ref 0.05–1.2)
MONOCYTES NFR BLD: 7.2 % (ref 3–10)
NEUTS SEG # BLD: 3.61 K/UL (ref 1.8–8)
NEUTS SEG NFR BLD: 63.2 % (ref 40–73)
NRBC # BLD: 0 K/UL (ref 0–0.01)
NRBC BLD-RTO: 0 PER 100 WBC
PLATELET # BLD AUTO: 257 K/UL (ref 135–420)
PMV BLD AUTO: 10.9 FL (ref 9.2–11.8)
POTASSIUM SERPL-SCNC: 5 MMOL/L (ref 3.5–5.5)
PROT SERPL-MCNC: 7.7 G/DL (ref 6.4–8.2)
RBC # BLD AUTO: 3.91 M/UL (ref 4.2–5.3)
SODIUM SERPL-SCNC: 141 MMOL/L (ref 136–145)
WBC # BLD AUTO: 5.7 K/UL (ref 4.6–13.2)

## 2025-06-26 PROCEDURE — 99284 EMERGENCY DEPT VISIT MOD MDM: CPT

## 2025-06-26 PROCEDURE — 93971 EXTREMITY STUDY: CPT

## 2025-06-26 PROCEDURE — 85025 COMPLETE CBC W/AUTO DIFF WBC: CPT

## 2025-06-26 PROCEDURE — 82550 ASSAY OF CK (CPK): CPT

## 2025-06-26 PROCEDURE — 93005 ELECTROCARDIOGRAM TRACING: CPT | Performed by: NURSE PRACTITIONER

## 2025-06-26 PROCEDURE — 80053 COMPREHEN METABOLIC PANEL: CPT

## 2025-06-26 RX ORDER — TRAMADOL HYDROCHLORIDE 50 MG/1
50 TABLET ORAL EVERY 4 HOURS PRN
Qty: 18 TABLET | Refills: 0 | Status: SHIPPED | OUTPATIENT
Start: 2025-06-26 | End: 2025-06-29

## 2025-06-26 NOTE — ED TRIAGE NOTES
Patient ambulatory to ED c/o left arm pain. States that she had used to have dialysis through the left arm and now her shunts are in pain.     Patient had kidney transplant in 2019 so has not used her arm for dialysis since then.

## 2025-06-26 NOTE — ED PROVIDER NOTES
[facility]  EMERGENCY DEPARTMENT ENCOUNTER       Pt Name: Christen Kay  MRN: [id number]  Birthdate 1975  Date of evaluation: 6/26/2025  PCP: Eleanor Aj APRN - NP  Note Started: 8:11 PM 6/26/25     CHIEF COMPLAINT       Chief Complaint   Patient presents with    Arm Pain        HISTORY OF PRESENT ILLNESS: 1 or more elements      History From: Patient  HPI Limitations: None  Chronic Conditions: Status post kidney transplant, CKD, arthritis, PUD, thyroid disease  Social Determinants affecting Dx or Tx: none      Christen Kay is a 50 y.o. female who presents to ED c/o left arm pain today without known injury.  Patient does have a dialysis shunt to left arm, has not been used since her kidney transplant in 2019.  No focal weakness or paresthesias, no chest pain or shortness of breath, no fever or chills.  Patient has been taking Tylenol OTC without relief for pain, cannot take NSAIDs due to history of kidney transplant.     Nursing Notes were all reviewed and agreed with or any disagreements were addressed in the HPI.    PAST HISTORY     Past Medical History:  Past Medical History:   Diagnosis Date    Arthritis     right shoulder    Chronic kidney disease     ESRD    Dialysis patient 2019    History of blood transfusion     PUD (peptic ulcer disease) 2004    Thyroid disease     Wears glasses        Past Surgical History:  Past Surgical History:   Procedure Laterality Date    HYSTERECTOMY (CERVIX STATUS UNKNOWN)      partial    IR THRMBC/NFS DIALYSIS CIRCUIT  09/10/2019    IR THRMB/INFUSION DIAYSIS CIRCUIT W PTA 9/10/2019 [facility]    IR THRMBC/NFS DIALYSIS CIRCUIT  09/24/2019    IR THRMB/INFUSION DIAYSIS CIRCUIT W PTA 9/24/2019 [facility]    IR THRMBC/NFS DIALYSIS CIRCUIT  9/24/2019    IR THRMBC/NFS DIALYSIS CIRCUIT  9/10/2019    OTHER SURGICAL HISTORY Left     dialysis graft arm; removed    SHOULDER ARTHROSCOPY Right 5/20/2024    RIGHT SHOULDER

## 2025-06-26 NOTE — DISCHARGE INSTRUCTIONS
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

## 2025-06-27 LAB
EKG ATRIAL RATE: 69 BPM
EKG DIAGNOSIS: NORMAL
EKG P AXIS: 35 DEGREES
EKG P-R INTERVAL: 142 MS
EKG Q-T INTERVAL: 412 MS
EKG QRS DURATION: 76 MS
EKG QTC CALCULATION (BAZETT): 441 MS
EKG R AXIS: 1 DEGREES
EKG T AXIS: 53 DEGREES
EKG VENTRICULAR RATE: 69 BPM

## 2025-06-27 PROCEDURE — 93010 ELECTROCARDIOGRAM REPORT: CPT | Performed by: INTERNAL MEDICINE

## 2025-08-26 ENCOUNTER — APPOINTMENT (OUTPATIENT)
Facility: HOSPITAL | Age: 50
End: 2025-08-26
Payer: MEDICAID

## 2025-08-26 ENCOUNTER — HOSPITAL ENCOUNTER (EMERGENCY)
Facility: HOSPITAL | Age: 50
Discharge: HOME | End: 2025-08-26
Attending: EMERGENCY MEDICINE
Payer: MEDICAID

## 2025-08-26 VITALS
OXYGEN SATURATION: 98 % | BODY MASS INDEX: 37.49 KG/M2 | TEMPERATURE: 98.1 F | RESPIRATION RATE: 16 BRPM | WEIGHT: 225 LBS | HEART RATE: 84 BPM | SYSTOLIC BLOOD PRESSURE: 121 MMHG | HEIGHT: 65 IN | DIASTOLIC BLOOD PRESSURE: 79 MMHG

## 2025-08-26 DIAGNOSIS — M25.532 LEFT WRIST PAIN: Primary | ICD-10-CM

## 2025-08-26 LAB — ECHO BSA: 2.16 M2

## 2025-08-26 PROCEDURE — 93971 EXTREMITY STUDY: CPT

## 2025-08-26 PROCEDURE — 99284 EMERGENCY DEPT VISIT MOD MDM: CPT

## 2025-09-02 ENCOUNTER — APPOINTMENT (OUTPATIENT)
Facility: HOSPITAL | Age: 50
End: 2025-09-02
Payer: MEDICAID

## 2025-09-02 ENCOUNTER — HOSPITAL ENCOUNTER (EMERGENCY)
Facility: HOSPITAL | Age: 50
Discharge: HOME | End: 2025-09-02
Payer: MEDICAID

## 2025-09-02 VITALS
WEIGHT: 225 LBS | HEART RATE: 79 BPM | OXYGEN SATURATION: 97 % | HEIGHT: 65 IN | RESPIRATION RATE: 16 BRPM | TEMPERATURE: 96.8 F | SYSTOLIC BLOOD PRESSURE: 136 MMHG | BODY MASS INDEX: 37.49 KG/M2 | DIASTOLIC BLOOD PRESSURE: 90 MMHG

## 2025-09-02 DIAGNOSIS — S59.901A ELBOW INJURY, RIGHT, INITIAL ENCOUNTER: Primary | ICD-10-CM

## 2025-09-02 PROCEDURE — 6370000000 HC RX 637 (ALT 250 FOR IP): Performed by: PHYSICIAN ASSISTANT

## 2025-09-02 PROCEDURE — 73080 X-RAY EXAM OF ELBOW: CPT

## 2025-09-02 PROCEDURE — 99283 EMERGENCY DEPT VISIT LOW MDM: CPT

## 2025-09-02 RX ORDER — ACETAMINOPHEN 500 MG
1000 TABLET ORAL
Status: COMPLETED | OUTPATIENT
Start: 2025-09-02 | End: 2025-09-02

## 2025-09-02 RX ADMIN — ACETAMINOPHEN 1000 MG: 500 TABLET ORAL at 19:33

## (undated) DEVICE — SUTURE PROL SZ 5-0 L24IN NONABSORBABLE BLU L9.3MM BV-1 3/8 9702H

## (undated) DEVICE — SUTURE ETHILON SZ 3-0 L18IN NONABSORBABLE BLK PS-2 L19MM 3/8 1669H

## (undated) DEVICE — (D)SYR 10ML 1/5ML GRAD NSAF -- PKGING CHANGE USE ITEM 338027

## (undated) DEVICE — INTENDED FOR TISSUE SEPARATION, AND OTHER PROCEDURES THAT REQUIRE A SHARP SURGICAL BLADE TO PUNCTURE OR CUT.: Brand: BARD-PARKER ® CARBON RIB-BACK BLADES

## (undated) DEVICE — DRAPE,THYROID,SOFT,STERILE: Brand: MEDLINE

## (undated) DEVICE — STERILE (10.2 X 147CM) TELESCOPICALLY-FOLDED COVER: Brand: CIV-FLEX™ TRANSDUCER COVER

## (undated) DEVICE — SUT SLK 2-0SH 30IN BLK --

## (undated) DEVICE — DECANTING SET

## (undated) DEVICE — 3M™ COBAN™ NL STERILE NON-LATEX SELF-ADHERENT WRAP, 2084S, 4 IN X 5 YD (10 CM X 4,5 M), 18 ROLLS/CASE: Brand: 3M™ COBAN™

## (undated) DEVICE — PACK PROCEDURE SURG SHLDR ORTHOPEDIC CUST

## (undated) DEVICE — SPONGE LAP 18X18IN STRL -- 5/PK

## (undated) DEVICE — SUTURE GOR TX SZ 5-0 L30IN NONABSORBABLE L12MM TTC-12 3/8 6M10A

## (undated) DEVICE — DEVON™ KNEE AND BODY STRAP 60" X 3" (1.5 M X 7.6 CM): Brand: DEVON

## (undated) DEVICE — DRAPE,SPLIT ,77X120: Brand: MEDLINE

## (undated) DEVICE — KIT CATH 12.5FR 16CM HAD 3 MAHRK ELITE

## (undated) DEVICE — SUTURE PROL SZ 5-0 L24IN NONABSORBABLE BLU L17MM RB-1 1/2 8555H

## (undated) DEVICE — DRSG PATCH ANTIMIC 1INX7.0MM -- CONVERT TO ITEM 356054

## (undated) DEVICE — BNDG ELAS STD 6INCH --

## (undated) DEVICE — SUTURE PERMA-HAND SZ 3-0 L24IN NONABSORBABLE BLK W/O NDL SA74H

## (undated) DEVICE — DERMABOND SKIN ADH 0.7ML -- DERMABOND ADVANCED 12/BX

## (undated) DEVICE — STERILE POLYISOPRENE POWDER-FREE SURGICAL GLOVES: Brand: PROTEXIS

## (undated) DEVICE — SOL IRRIGATION INJ NACL 0.9% 500ML BTL

## (undated) DEVICE — 3-0 COATED VICRYL PLUS UNDYED 1X27" SH --

## (undated) DEVICE — SHOULDER SUSPENSION KIT 6 PER BOX

## (undated) DEVICE — SPONGE HEMOSTAT CELLULS 4X8IN -- SURGICEL

## (undated) DEVICE — SOLUTION IV 500ML 0.9% SOD CHL FLX CONT

## (undated) DEVICE — SYRINGE BLB 50CC IRRIG PLIABLE FNGR FLNG GRAD FLSK DISP

## (undated) DEVICE — SUTURE PERMAHAND SZ 3-0 L30IN NONABSORBABLE BLK W/O NDL SA84H

## (undated) DEVICE — INTRODUCER SHTH 6FR CANN L11CM DIL TIP 35MM GRN TUNGSTEN

## (undated) DEVICE — GDWIRE TAPR STR 0.035INX180CM --

## (undated) DEVICE — SHEET,DRAPE,70X85,STERILE: Brand: MEDLINE

## (undated) DEVICE — FOGARTY - HYDRAGRIP SURGICAL - CLAMP INSERTS: Brand: FOGARTY SOFTJAW

## (undated) DEVICE — SUPER TURBOVAC 90 INTEGRATED CABLE WAND ICW: Brand: COBLATION

## (undated) DEVICE — LIGHT HANDLE: Brand: DEVON

## (undated) DEVICE — TUBE IRRIG L8IN LNG PT W/ CONN FOR PMP SYS REDEUCE

## (undated) DEVICE — SOLUTION SURG PREP 26 CC PURPREP

## (undated) DEVICE — Device

## (undated) DEVICE — DRAPE,U/ SHT,SPLIT,PLAS,STERIL: Brand: MEDLINE

## (undated) DEVICE — SUTURE PERMAHAND SZ 2-0 L30IN NONABSORBABLE BLK SILK W/O A305H

## (undated) DEVICE — SWAB CULT LIQ STUART AGR AERB MOD IN BRK SGL RAYON TIP PLAS 220099] BECTON DICKINSON MICRO]

## (undated) DEVICE — 3M™ STERI-DRAPE™ INCISE DRAPE 1050 (60CM X 45CM): Brand: STERI-DRAPE™

## (undated) DEVICE — REM POLYHESIVE ADULT PATIENT RETURN ELECTRODE: Brand: VALLEYLAB

## (undated) DEVICE — 3M™ TEGADERM™ TRANSPARENT FILM DRESSING FRAME STYLE, 1626W, 4 IN X 4-3/4 IN (10 CM X 12 CM), 50/CT 4CT/CASE: Brand: 3M™ TEGADERM™

## (undated) DEVICE — 4-PORT MANIFOLD: Brand: NEPTUNE 2

## (undated) DEVICE — APPLIER CLP L9.375IN APER 2.1MM CLS L3.8MM 20 SM TI CLP

## (undated) DEVICE — STERILE POLYISOPRENE POWDER-FREE SURGICAL GLOVES WITH EMOLLIENT COATING: Brand: PROTEXIS

## (undated) DEVICE — MAYO STAND COVER: Brand: CONVERTORS

## (undated) DEVICE — SUT MONOCRYL PLUS UD 4-0 --

## (undated) DEVICE — RADIFOCUS GLIDEWIRE: Brand: GLIDEWIRE

## (undated) DEVICE — GEL ULT/PHONIC CPL MED STRL --

## (undated) DEVICE — KENDALL SCD EXPRESS SLEEVES, KNEE LENGTH, MEDIUM: Brand: KENDALL SCD

## (undated) DEVICE — GAUZE,SPONGE,8"X4",12PLY,XRAY,STRL,LF: Brand: MEDLINE

## (undated) DEVICE — (D)PREP SKN CHLRAPRP APPL 26ML -- CONVERT TO ITEM 371833

## (undated) DEVICE — SUT SLK 3-0 30IN SH BLK --

## (undated) DEVICE — INSULATED BLADE ELECTRODE: Brand: EDGE

## (undated) DEVICE — SOL INJ SOD CL 0.9% 500ML BG --

## (undated) DEVICE — 4.5 MM INCISOR PLUS STRAIGHT                                    BLADES, POWER/EP-1, VIOLET, PACKAGED                                    6 PER BOX, STERILE

## (undated) DEVICE — DRAPE XR C ARM 41X74IN LF --

## (undated) DEVICE — SUTURE PERMA-HAND SZ 2-0 L24IN NONABSORBABLE BLK W/O NDL SA75H

## (undated) DEVICE — SOL IRR STRL H2O 1000ML BTL --

## (undated) DEVICE — AGENT HEMSTAT W4XL4IN OXIDIZED REGENERATED CELOS STRUCTURED

## (undated) DEVICE — DEVICE INFL SYR BLLN ENDO 30 -- INTELLI

## (undated) DEVICE — SOLUTION IRRIG 1000ML 0.9% SOD CHL CONT

## (undated) DEVICE — KERLIX BANDAGE ROLL: Brand: KERLIX

## (undated) DEVICE — CANISTER SUCTION HI FLO 30000CC FLUID MGMT SYS TRUCLEAR DISP

## (undated) DEVICE — GAUZE SPONGES,12 PLY: Brand: CURITY

## (undated) DEVICE — SUTURE FIBERTAPE FIBERWIRE SZ 2-0 30IN NONABSORB BLU AR72377

## (undated) DEVICE — SUT VCRL + 2-0 36IN CT1 UD --

## (undated) DEVICE — 5.5 MM ACROMIONIZER STRAIGHT                                    BURRS, POWER/EP-1, BROWN, 8000                                    MAXIMUM RPM, PACKAGED 6 PER BOX, STERILE

## (undated) DEVICE — SUT ETHLN 3-0 18IN PS2 BLK --

## (undated) DEVICE — SWAB CULT SGL AMIES W/O CHAR FOR THRT VAG SKIN HRT CULTSWAB

## (undated) DEVICE — PTA BALLOON DILATATION CATHETER: Brand: CHARGER™

## (undated) DEVICE — ABDOMINAL PAD: Brand: DERMACEA

## (undated) DEVICE — SUT SLK 0 30IN TIE MP BLK --

## (undated) DEVICE — GARMENT,MEDLINE,DVT,INT,CALF,MED, GEN2: Brand: MEDLINE

## (undated) DEVICE — SCORPION NEEDLE

## (undated) DEVICE — DRSG PATCH ANTIMIC 1INX4.0MM -- CONVERT TO ITEM 356053

## (undated) DEVICE — RADIFOCUS TORQUE DEVICE MULTI-TORQUE VISE: Brand: RADIFOCUS TORQUE DEVICE

## (undated) DEVICE — VESSEL LOOPS,MAXI, RED: Brand: DEVON

## (undated) DEVICE — IODOFORM PACKING STRIP: Brand: CURITY

## (undated) DEVICE — APPLIER LIG CLP M L11IN TI STR RNG HNDL FOR 20 CLP DISP

## (undated) DEVICE — SOLUTION IRRIG 3000ML LAC R FLX CONT